# Patient Record
Sex: FEMALE | Race: WHITE | NOT HISPANIC OR LATINO
[De-identification: names, ages, dates, MRNs, and addresses within clinical notes are randomized per-mention and may not be internally consistent; named-entity substitution may affect disease eponyms.]

---

## 2017-10-30 ENCOUNTER — APPOINTMENT (OUTPATIENT)
Dept: MAMMOGRAPHY | Facility: IMAGING CENTER | Age: 57
End: 2017-10-30
Payer: COMMERCIAL

## 2017-10-30 ENCOUNTER — RESULT REVIEW (OUTPATIENT)
Age: 57
End: 2017-10-30

## 2017-10-30 ENCOUNTER — OUTPATIENT (OUTPATIENT)
Dept: OUTPATIENT SERVICES | Facility: HOSPITAL | Age: 57
LOS: 1 days | End: 2017-10-30
Payer: COMMERCIAL

## 2017-10-30 DIAGNOSIS — Z00.8 ENCOUNTER FOR OTHER GENERAL EXAMINATION: ICD-10-CM

## 2017-10-30 DIAGNOSIS — Z98.890 OTHER SPECIFIED POSTPROCEDURAL STATES: Chronic | ICD-10-CM

## 2017-10-30 DIAGNOSIS — O00.90 UNSPECIFIED ECTOPIC PREGNANCY WITHOUT INTRAUTERINE PREGNANCY: Chronic | ICD-10-CM

## 2017-10-30 PROCEDURE — 77067 SCR MAMMO BI INCL CAD: CPT

## 2017-10-30 PROCEDURE — 77063 BREAST TOMOSYNTHESIS BI: CPT

## 2017-10-30 PROCEDURE — G0202: CPT | Mod: 26

## 2017-10-30 PROCEDURE — 77063 BREAST TOMOSYNTHESIS BI: CPT | Mod: 26

## 2017-11-21 ENCOUNTER — LABORATORY RESULT (OUTPATIENT)
Age: 57
End: 2017-11-21

## 2017-11-21 ENCOUNTER — APPOINTMENT (OUTPATIENT)
Dept: DERMATOLOGY | Facility: CLINIC | Age: 57
End: 2017-11-21
Payer: COMMERCIAL

## 2017-11-21 VITALS — DIASTOLIC BLOOD PRESSURE: 50 MMHG | SYSTOLIC BLOOD PRESSURE: 100 MMHG

## 2017-11-21 DIAGNOSIS — D48.5 NEOPLASM OF UNCERTAIN BEHAVIOR OF SKIN: ICD-10-CM

## 2017-11-21 DIAGNOSIS — Z12.83 ENCOUNTER FOR SCREENING FOR MALIGNANT NEOPLASM OF SKIN: ICD-10-CM

## 2017-11-21 PROCEDURE — 11100 BX SKIN SUBCUTANEOUS&/MUCOUS MEMBRANE 1 LESION: CPT

## 2017-11-21 PROCEDURE — 99213 OFFICE O/P EST LOW 20 MIN: CPT | Mod: 25

## 2017-11-24 ENCOUNTER — TRANSCRIPTION ENCOUNTER (OUTPATIENT)
Age: 57
End: 2017-11-24

## 2017-11-24 PROBLEM — Z12.83 SCREENING FOR MALIGNANT NEOPLASM OF SKIN: Status: ACTIVE | Noted: 2017-11-21

## 2017-12-16 ENCOUNTER — TRANSCRIPTION ENCOUNTER (OUTPATIENT)
Age: 57
End: 2017-12-16

## 2018-02-25 ENCOUNTER — TRANSCRIPTION ENCOUNTER (OUTPATIENT)
Age: 58
End: 2018-02-25

## 2018-05-10 ENCOUNTER — APPOINTMENT (OUTPATIENT)
Dept: CARDIOLOGY | Facility: CLINIC | Age: 58
End: 2018-05-10
Payer: COMMERCIAL

## 2018-05-10 VITALS
HEIGHT: 61 IN | WEIGHT: 124 LBS | SYSTOLIC BLOOD PRESSURE: 101 MMHG | OXYGEN SATURATION: 97 % | DIASTOLIC BLOOD PRESSURE: 68 MMHG | HEART RATE: 59 BPM | TEMPERATURE: 98.1 F | BODY MASS INDEX: 23.41 KG/M2 | RESPIRATION RATE: 16 BRPM

## 2018-05-10 PROCEDURE — 99215 OFFICE O/P EST HI 40 MIN: CPT

## 2018-05-10 PROCEDURE — 93000 ELECTROCARDIOGRAM COMPLETE: CPT

## 2018-05-16 ENCOUNTER — OUTPATIENT (OUTPATIENT)
Dept: OUTPATIENT SERVICES | Facility: HOSPITAL | Age: 58
LOS: 1 days | End: 2018-05-16

## 2018-05-16 ENCOUNTER — APPOINTMENT (OUTPATIENT)
Dept: CV DIAGNOSITCS | Facility: HOSPITAL | Age: 58
End: 2018-05-16

## 2018-05-16 DIAGNOSIS — Z98.890 OTHER SPECIFIED POSTPROCEDURAL STATES: Chronic | ICD-10-CM

## 2018-05-16 DIAGNOSIS — O00.90 UNSPECIFIED ECTOPIC PREGNANCY WITHOUT INTRAUTERINE PREGNANCY: Chronic | ICD-10-CM

## 2018-05-16 DIAGNOSIS — I35.9 NONRHEUMATIC AORTIC VALVE DISORDER, UNSPECIFIED: ICD-10-CM

## 2018-08-15 ENCOUNTER — OUTPATIENT (OUTPATIENT)
Dept: OUTPATIENT SERVICES | Facility: HOSPITAL | Age: 58
LOS: 1 days | End: 2018-08-15

## 2018-08-15 ENCOUNTER — APPOINTMENT (OUTPATIENT)
Dept: CV DIAGNOSITCS | Facility: HOSPITAL | Age: 58
End: 2018-08-15
Payer: COMMERCIAL

## 2018-08-15 DIAGNOSIS — O00.90 UNSPECIFIED ECTOPIC PREGNANCY WITHOUT INTRAUTERINE PREGNANCY: Chronic | ICD-10-CM

## 2018-08-15 DIAGNOSIS — Z98.890 OTHER SPECIFIED POSTPROCEDURAL STATES: Chronic | ICD-10-CM

## 2018-08-15 DIAGNOSIS — R07.9 CHEST PAIN, UNSPECIFIED: ICD-10-CM

## 2018-08-15 DIAGNOSIS — I35.9 NONRHEUMATIC AORTIC VALVE DISORDER, UNSPECIFIED: ICD-10-CM

## 2018-08-15 PROCEDURE — 93306 TTE W/DOPPLER COMPLETE: CPT | Mod: 26

## 2018-09-01 ENCOUNTER — TRANSCRIPTION ENCOUNTER (OUTPATIENT)
Age: 58
End: 2018-09-01

## 2018-10-23 ENCOUNTER — RESULT REVIEW (OUTPATIENT)
Age: 58
End: 2018-10-23

## 2018-11-09 ENCOUNTER — APPOINTMENT (OUTPATIENT)
Dept: INFECTIOUS DISEASE | Facility: HOSPITAL | Age: 58
End: 2018-11-09
Payer: SELF-PAY

## 2018-11-09 PROCEDURE — 99401 PREV MED CNSL INDIV APPRX 15: CPT

## 2018-12-21 ENCOUNTER — APPOINTMENT (OUTPATIENT)
Dept: INFECTIOUS DISEASE | Facility: HOSPITAL | Age: 58
End: 2018-12-21
Payer: SELF-PAY

## 2018-12-21 DIAGNOSIS — Z71.89 OTHER SPECIFIED COUNSELING: ICD-10-CM

## 2018-12-21 PROCEDURE — 90472 IMMUNIZATION ADMIN EACH ADD: CPT | Mod: NC

## 2018-12-21 PROCEDURE — 90691 TYPHOID VACCINE IM: CPT

## 2018-12-21 PROCEDURE — 90632 HEPA VACCINE ADULT IM: CPT

## 2018-12-21 PROCEDURE — 90717 YELLOW FEVER VACCINE SUBQ: CPT

## 2018-12-21 PROCEDURE — 90471 IMMUNIZATION ADMIN: CPT | Mod: NC

## 2019-02-01 ENCOUNTER — TRANSCRIPTION ENCOUNTER (OUTPATIENT)
Age: 59
End: 2019-02-01

## 2019-02-06 ENCOUNTER — APPOINTMENT (OUTPATIENT)
Dept: ORTHOPEDIC SURGERY | Facility: CLINIC | Age: 59
End: 2019-02-06
Payer: COMMERCIAL

## 2019-02-06 VITALS
SYSTOLIC BLOOD PRESSURE: 105 MMHG | DIASTOLIC BLOOD PRESSURE: 71 MMHG | HEIGHT: 61 IN | WEIGHT: 125 LBS | BODY MASS INDEX: 23.6 KG/M2 | HEART RATE: 66 BPM

## 2019-02-06 DIAGNOSIS — M54.5 LOW BACK PAIN: ICD-10-CM

## 2019-02-06 DIAGNOSIS — M79.10 MYALGIA, UNSPECIFIED SITE: ICD-10-CM

## 2019-02-06 DIAGNOSIS — M60.9 MYOSITIS, UNSPECIFIED: ICD-10-CM

## 2019-02-06 PROCEDURE — 72100 X-RAY EXAM L-S SPINE 2/3 VWS: CPT

## 2019-02-06 PROCEDURE — 99214 OFFICE O/P EST MOD 30 MIN: CPT | Mod: 25

## 2019-02-06 PROCEDURE — 20552 NJX 1/MLT TRIGGER POINT 1/2: CPT

## 2019-02-06 NOTE — DISCUSSION/SUMMARY
[de-identified] : left sacroiliitis\par discussed options\par HEP-lumbar\par I offered an injection after all risks were explained including allergic reaction to an infection under sterile conditions 1 mg of Depo-Medrol and 2 cc of 1% lidocaine without epinephrine was injected into the painful site. The patient tolerated the procedure well and received significant relief following the injection.\par All options discussed including rest, medicine, home exercise, acupuncture, Chiropractic care, Physical Therapy, Pain management, and last resort surgery. \par All questions were answered, all alternatives discussed and the patient is in complete agreement with that plan. Follow-up appointment as instructed. Any issues and the patient will call or come in sooner. \par

## 2019-02-06 NOTE — ADDENDUM
[FreeTextEntry1] : This note was authored by Marilin Montez working as a medical scribe for Dr. Piyush Fontanez. The note was reviewed, edited, and revised by Dr. Piyush Fontanez whom is in agreement with the exam findings, imaging findings, and treatment plan. 02/06/2019.

## 2019-02-06 NOTE — HISTORY OF PRESENT ILLNESS
[de-identified] : C/o non radiating LBP x 1 month.\par Then went to [place].\par Recently had the flu- still recovering.\par Legs ok.\par L>R sided LBP  \par No fever chills sweats nausea vomiting no bowel or bladder dysfunction, no recent weight loss or gain no night pain. This history is in addition to the intake form that I personally reviewed.

## 2019-02-06 NOTE — PHYSICAL EXAM
[UE/LE] : Sensory: Intact in bilateral upper & lower extremities [ALL] : dorsalis pedis, posterior tibial, femoral, popliteal, and radial 2+ and symmetric bilaterally [de-identified] : 5 out of 5 motor strength, sensation is intact and symmetrical full range of motion flexion extension and rotation, no palpatory tenderness full range of motion of hips knees shoulders and elbows (all four extremities), no atrophy, negative straight leg raise, no pathological reflexes, no swelling, normal ambulation, no apparent distress skin is intact, no palpable lymph nodes, no upper or lower extremity instability, alert and oriented x3 and normal mood. Normal finger-to nose test. pain in LB [de-identified] : AP/lat lumbar- Adequate lordosis, adequate disc height, no instability- Reviewed with the patient. Patient understands that I am not a radiologist.

## 2019-02-13 ENCOUNTER — APPOINTMENT (OUTPATIENT)
Dept: ORTHOPEDIC SURGERY | Facility: CLINIC | Age: 59
End: 2019-02-13
Payer: COMMERCIAL

## 2019-02-13 VITALS — HEIGHT: 61 IN | WEIGHT: 125 LBS | BODY MASS INDEX: 23.6 KG/M2

## 2019-02-13 PROCEDURE — 99214 OFFICE O/P EST MOD 30 MIN: CPT

## 2019-02-13 PROCEDURE — 73562 X-RAY EXAM OF KNEE 3: CPT | Mod: LT

## 2019-03-04 ENCOUNTER — RESULT REVIEW (OUTPATIENT)
Age: 59
End: 2019-03-04

## 2019-03-13 ENCOUNTER — APPOINTMENT (OUTPATIENT)
Dept: RADIOLOGY | Facility: IMAGING CENTER | Age: 59
End: 2019-03-13
Payer: COMMERCIAL

## 2019-03-13 ENCOUNTER — APPOINTMENT (OUTPATIENT)
Dept: MAMMOGRAPHY | Facility: IMAGING CENTER | Age: 59
End: 2019-03-13
Payer: COMMERCIAL

## 2019-03-13 ENCOUNTER — OUTPATIENT (OUTPATIENT)
Dept: OUTPATIENT SERVICES | Facility: HOSPITAL | Age: 59
LOS: 1 days | End: 2019-03-13
Payer: COMMERCIAL

## 2019-03-13 DIAGNOSIS — Z98.890 OTHER SPECIFIED POSTPROCEDURAL STATES: Chronic | ICD-10-CM

## 2019-03-13 DIAGNOSIS — Z00.8 ENCOUNTER FOR OTHER GENERAL EXAMINATION: ICD-10-CM

## 2019-03-13 DIAGNOSIS — O00.90 UNSPECIFIED ECTOPIC PREGNANCY WITHOUT INTRAUTERINE PREGNANCY: Chronic | ICD-10-CM

## 2019-03-13 PROCEDURE — 77080 DXA BONE DENSITY AXIAL: CPT

## 2019-03-13 PROCEDURE — 77063 BREAST TOMOSYNTHESIS BI: CPT | Mod: 26

## 2019-03-13 PROCEDURE — 77080 DXA BONE DENSITY AXIAL: CPT | Mod: 26

## 2019-03-13 PROCEDURE — 77067 SCR MAMMO BI INCL CAD: CPT | Mod: 26

## 2019-03-13 PROCEDURE — 77063 BREAST TOMOSYNTHESIS BI: CPT

## 2019-03-13 PROCEDURE — 77067 SCR MAMMO BI INCL CAD: CPT

## 2019-04-30 ENCOUNTER — APPOINTMENT (OUTPATIENT)
Dept: MRI IMAGING | Facility: CLINIC | Age: 59
End: 2019-04-30
Payer: COMMERCIAL

## 2019-04-30 ENCOUNTER — OUTPATIENT (OUTPATIENT)
Dept: OUTPATIENT SERVICES | Facility: HOSPITAL | Age: 59
LOS: 1 days | End: 2019-04-30
Payer: COMMERCIAL

## 2019-04-30 DIAGNOSIS — M25.562 PAIN IN LEFT KNEE: ICD-10-CM

## 2019-04-30 DIAGNOSIS — O00.90 UNSPECIFIED ECTOPIC PREGNANCY WITHOUT INTRAUTERINE PREGNANCY: Chronic | ICD-10-CM

## 2019-04-30 DIAGNOSIS — Z98.890 OTHER SPECIFIED POSTPROCEDURAL STATES: Chronic | ICD-10-CM

## 2019-04-30 PROCEDURE — 73721 MRI JNT OF LWR EXTRE W/O DYE: CPT

## 2019-04-30 PROCEDURE — 73721 MRI JNT OF LWR EXTRE W/O DYE: CPT | Mod: 26,LT

## 2019-05-09 ENCOUNTER — APPOINTMENT (OUTPATIENT)
Dept: ORTHOPEDIC SURGERY | Facility: CLINIC | Age: 59
End: 2019-05-09
Payer: COMMERCIAL

## 2019-05-09 VITALS — HEIGHT: 61 IN | WEIGHT: 125 LBS | BODY MASS INDEX: 23.6 KG/M2

## 2019-05-09 DIAGNOSIS — G89.29 PAIN IN LEFT KNEE: ICD-10-CM

## 2019-05-09 DIAGNOSIS — M25.562 PAIN IN LEFT KNEE: ICD-10-CM

## 2019-05-09 PROCEDURE — 99214 OFFICE O/P EST MOD 30 MIN: CPT

## 2019-05-09 PROCEDURE — 73502 X-RAY EXAM HIP UNI 2-3 VIEWS: CPT | Mod: LT

## 2019-05-10 PROBLEM — M25.562 CHRONIC PAIN OF LEFT KNEE: Status: ACTIVE | Noted: 2019-02-13

## 2019-07-09 ENCOUNTER — APPOINTMENT (OUTPATIENT)
Dept: INFECTIOUS DISEASE | Facility: CLINIC | Age: 59
End: 2019-07-09

## 2019-08-16 ENCOUNTER — EMERGENCY (EMERGENCY)
Facility: HOSPITAL | Age: 59
LOS: 1 days | Discharge: HOME | End: 2019-08-16
Attending: EMERGENCY MEDICINE | Admitting: EMERGENCY MEDICINE
Payer: COMMERCIAL

## 2019-08-16 VITALS
OXYGEN SATURATION: 100 % | DIASTOLIC BLOOD PRESSURE: 79 MMHG | RESPIRATION RATE: 16 BRPM | SYSTOLIC BLOOD PRESSURE: 148 MMHG | HEART RATE: 62 BPM

## 2019-08-16 DIAGNOSIS — O00.90 UNSPECIFIED ECTOPIC PREGNANCY WITHOUT INTRAUTERINE PREGNANCY: Chronic | ICD-10-CM

## 2019-08-16 DIAGNOSIS — Z98.890 OTHER SPECIFIED POSTPROCEDURAL STATES: Chronic | ICD-10-CM

## 2019-08-16 LAB
ALBUMIN SERPL ELPH-MCNC: 4.4 G/DL — SIGNIFICANT CHANGE UP (ref 3.3–5)
ALP SERPL-CCNC: 72 U/L — SIGNIFICANT CHANGE UP (ref 40–120)
ALT FLD-CCNC: 25 U/L — SIGNIFICANT CHANGE UP (ref 4–33)
ANION GAP SERPL CALC-SCNC: 11 MMO/L — SIGNIFICANT CHANGE UP (ref 7–14)
APTT BLD: 34.3 SEC — SIGNIFICANT CHANGE UP (ref 27.5–36.3)
AST SERPL-CCNC: 24 U/L — SIGNIFICANT CHANGE UP (ref 4–32)
BILIRUB SERPL-MCNC: 0.6 MG/DL — SIGNIFICANT CHANGE UP (ref 0.2–1.2)
BUN SERPL-MCNC: 15 MG/DL — SIGNIFICANT CHANGE UP (ref 7–23)
CALCIUM SERPL-MCNC: 9.8 MG/DL — SIGNIFICANT CHANGE UP (ref 8.4–10.5)
CHLORIDE SERPL-SCNC: 108 MMOL/L — HIGH (ref 98–107)
CO2 SERPL-SCNC: 24 MMOL/L — SIGNIFICANT CHANGE UP (ref 22–31)
CREAT SERPL-MCNC: 0.63 MG/DL — SIGNIFICANT CHANGE UP (ref 0.5–1.3)
GLUCOSE SERPL-MCNC: 112 MG/DL — HIGH (ref 70–99)
HCT VFR BLD CALC: 43.3 % — SIGNIFICANT CHANGE UP (ref 34.5–45)
HGB BLD-MCNC: 13.8 G/DL — SIGNIFICANT CHANGE UP (ref 11.5–15.5)
INR BLD: 1.05 — SIGNIFICANT CHANGE UP (ref 0.88–1.17)
MCHC RBC-ENTMCNC: 29.2 PG — SIGNIFICANT CHANGE UP (ref 27–34)
MCHC RBC-ENTMCNC: 31.9 % — LOW (ref 32–36)
MCV RBC AUTO: 91.7 FL — SIGNIFICANT CHANGE UP (ref 80–100)
NRBC # FLD: 0 K/UL — SIGNIFICANT CHANGE UP (ref 0–0)
PLATELET # BLD AUTO: 111 K/UL — LOW (ref 150–400)
PMV BLD: 11.6 FL — SIGNIFICANT CHANGE UP (ref 7–13)
POTASSIUM SERPL-MCNC: 3.3 MMOL/L — LOW (ref 3.5–5.3)
POTASSIUM SERPL-SCNC: 3.3 MMOL/L — LOW (ref 3.5–5.3)
PROT SERPL-MCNC: 6.7 G/DL — SIGNIFICANT CHANGE UP (ref 6–8.3)
PROTHROM AB SERPL-ACNC: 11.7 SEC — SIGNIFICANT CHANGE UP (ref 9.8–13.1)
RBC # BLD: 4.72 M/UL — SIGNIFICANT CHANGE UP (ref 3.8–5.2)
RBC # FLD: 12.8 % — SIGNIFICANT CHANGE UP (ref 10.3–14.5)
SODIUM SERPL-SCNC: 143 MMOL/L — SIGNIFICANT CHANGE UP (ref 135–145)
TROPONIN T, HIGH SENSITIVITY: < 6 NG/L — SIGNIFICANT CHANGE UP (ref ?–14)
WBC # BLD: 5.41 K/UL — SIGNIFICANT CHANGE UP (ref 3.8–10.5)
WBC # FLD AUTO: 5.41 K/UL — SIGNIFICANT CHANGE UP (ref 3.8–10.5)

## 2019-08-16 PROCEDURE — 71046 X-RAY EXAM CHEST 2 VIEWS: CPT | Mod: 26

## 2019-08-16 PROCEDURE — 99284 EMERGENCY DEPT VISIT MOD MDM: CPT | Mod: 25

## 2019-08-16 PROCEDURE — 93010 ELECTROCARDIOGRAM REPORT: CPT

## 2019-08-16 NOTE — ED PROVIDER NOTE - PMH
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

## 2019-08-16 NOTE — ED PROVIDER NOTE - OBJECTIVE STATEMENT
59 yr old female c/o multiple episodes of chest tightness, @ SOB, also SOBOE. lasts 30 min.  started 1 week ago, sometimes @ exercise, sometimes at rest. No HTN, DM, smoking, chol, though pos family hx. Has low platelets, idiopathic portal and splenic vein thrombosis, periotic paralysis.  denies n/v/lightheadedness or palpitations.

## 2019-08-16 NOTE — ED PROVIDER NOTE - NSFOLLOWUPINSTRUCTIONS_ED_ALL_ED_FT
Call Dr. Martínez Schmidt [phone] Monday morning for possible stress test and echocardiogram, perhaps as early as Tuesday. Return if symptoms worsen beforehand.

## 2019-08-16 NOTE — ED PROVIDER NOTE - PROGRESS NOTE DETAILS
Maren: Lab results unremarkable. EKG unremarkable. HEART score low. Arranged short-term f/u w/ Tele-Doc Martínez Schmidt.

## 2019-08-16 NOTE — ED ADULT TRIAGE NOTE - CHIEF COMPLAINT QUOTE
my c/o mid sternal chest pressure/tightness. pt has similar episode on Saturday relieved with rest. 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 pt has similar episode on Saturday relieved with rest. [facility]

## 2019-08-21 ENCOUNTER — APPOINTMENT (OUTPATIENT)
Dept: CARDIOLOGY | Facility: CLINIC | Age: 59
End: 2019-08-21
Payer: COMMERCIAL

## 2019-08-21 VITALS
HEIGHT: 61 IN | WEIGHT: 122 LBS | OXYGEN SATURATION: 95 % | HEART RATE: 62 BPM | SYSTOLIC BLOOD PRESSURE: 113 MMHG | DIASTOLIC BLOOD PRESSURE: 73 MMHG | BODY MASS INDEX: 23.03 KG/M2

## 2019-08-21 LAB — DEPRECATED D DIMER PPP IA-ACNC: <150 NG/ML DDU

## 2019-08-21 PROCEDURE — 93000 ELECTROCARDIOGRAM COMPLETE: CPT

## 2019-08-21 PROCEDURE — 99215 OFFICE O/P EST HI 40 MIN: CPT

## 2019-08-22 ENCOUNTER — NON-APPOINTMENT (OUTPATIENT)
Age: 59
End: 2019-08-22

## 2019-08-22 NOTE — HISTORY OF PRESENT ILLNESS
[FreeTextEntry1] : 59 yr F with no known CAD. \par She has been experiencing dyspnea and chest tightness for last 2 weeks since she came back from a vacation, drove for 2 hours. (BUT  STATES THAT SHE HAD SYMPTOMS EVEN PRIOR TO THE TRIP). She had symptoms walking in the backyard, but yet there are times she can do activities with minimal symptoms. She had symptoms at rest too.

## 2019-08-22 NOTE — DISCUSSION/SUMMARY
[FreeTextEntry1] : Chest pain and Dyspnea: I ordered D-Dimer that is negative, its unlikely that she had a PE. Since she had symptoms even prior to the road trip , I think we should focus on work up for ischemia. Will get Stress test for the patient. \par

## 2019-08-22 NOTE — PHYSICAL EXAM
[General Appearance - Well Developed] : well developed [General Appearance - Well Nourished] : well nourished [Normal Conjunctiva] : the conjunctiva exhibited no abnormalities [Normal Oropharynx] : normal oropharynx [Normal Jugular Venous A Waves Present] : normal jugular venous A waves present [Normal Jugular Venous V Waves Present] : normal jugular venous V waves present [] : no respiratory distress [Respiration, Rhythm And Depth] : normal respiratory rhythm and effort [Heart Rate And Rhythm] : heart rate and rhythm were normal [Heart Sounds] : normal S1 and S2 [Abnormal Walk] : normal gait [Nail Clubbing] : no clubbing of the fingernails [Skin Color & Pigmentation] : normal skin color and pigmentation [Oriented To Time, Place, And Person] : oriented to person, place, and time

## 2019-08-22 NOTE — REVIEW OF SYSTEMS
[Fever] : no fever [Earache] : no earache [Blurry Vision] : no blurred vision [Shortness Of Breath] : shortness of breath [Chest Pain] : chest pain [Cough] : no cough [Heartburn] : no heartburn [Incontinence] : no incontinence [Dizziness] : no dizziness [Confusion] : no confusion was observed [Excessive Thirst] : no polydipsia [Easy Bleeding] : no tendency for easy bleeding

## 2019-08-30 ENCOUNTER — OUTPATIENT (OUTPATIENT)
Dept: OUTPATIENT SERVICES | Facility: HOSPITAL | Age: 59
LOS: 1 days | End: 2019-08-30

## 2019-08-30 ENCOUNTER — APPOINTMENT (OUTPATIENT)
Dept: CV DIAGNOSITCS | Facility: HOSPITAL | Age: 59
End: 2019-08-30
Payer: COMMERCIAL

## 2019-08-30 DIAGNOSIS — Z98.890 OTHER SPECIFIED POSTPROCEDURAL STATES: Chronic | ICD-10-CM

## 2019-08-30 DIAGNOSIS — R07.89 OTHER CHEST PAIN: ICD-10-CM

## 2019-08-30 DIAGNOSIS — O00.90 UNSPECIFIED ECTOPIC PREGNANCY WITHOUT INTRAUTERINE PREGNANCY: Chronic | ICD-10-CM

## 2019-08-30 PROCEDURE — 93350 STRESS TTE ONLY: CPT | Mod: 26

## 2019-08-30 PROCEDURE — 93325 DOPPLER ECHO COLOR FLOW MAPG: CPT | Mod: 26,GC

## 2019-08-30 PROCEDURE — 93320 DOPPLER ECHO COMPLETE: CPT | Mod: 26,GC

## 2019-10-18 ENCOUNTER — APPOINTMENT (OUTPATIENT)
Dept: DERMATOLOGY | Facility: CLINIC | Age: 59
End: 2019-10-18
Payer: COMMERCIAL

## 2019-10-18 DIAGNOSIS — R22.9 LOCALIZED SWELLING, MASS AND LUMP, UNSPECIFIED: ICD-10-CM

## 2019-10-18 DIAGNOSIS — L82.0 INFLAMED SEBORRHEIC KERATOSIS: ICD-10-CM

## 2019-10-18 DIAGNOSIS — L81.4 OTHER MELANIN HYPERPIGMENTATION: ICD-10-CM

## 2019-10-18 DIAGNOSIS — Z12.83 ENCOUNTER FOR SCREENING FOR MALIGNANT NEOPLASM OF SKIN: ICD-10-CM

## 2019-10-18 DIAGNOSIS — B07.8 OTHER VIRAL WARTS: ICD-10-CM

## 2019-10-18 DIAGNOSIS — L82.1 OTHER SEBORRHEIC KERATOSIS: ICD-10-CM

## 2019-10-18 PROCEDURE — 99214 OFFICE O/P EST MOD 30 MIN: CPT | Mod: 25

## 2019-10-18 PROCEDURE — 17110 DESTRUCTION B9 LES UP TO 14: CPT

## 2020-02-28 ENCOUNTER — EMERGENCY (EMERGENCY)
Facility: HOSPITAL | Age: 60
LOS: 1 days | Discharge: HOME | End: 2020-02-28
Attending: EMERGENCY MEDICINE | Admitting: EMERGENCY MEDICINE
Payer: COMMERCIAL

## 2020-02-28 VITALS
SYSTOLIC BLOOD PRESSURE: 121 MMHG | HEART RATE: 64 BPM | RESPIRATION RATE: 16 BRPM | OXYGEN SATURATION: 97 % | TEMPERATURE: 98 F | DIASTOLIC BLOOD PRESSURE: 68 MMHG

## 2020-02-28 VITALS
OXYGEN SATURATION: 99 % | SYSTOLIC BLOOD PRESSURE: 105 MMHG | HEART RATE: 59 BPM | DIASTOLIC BLOOD PRESSURE: 64 MMHG | RESPIRATION RATE: 16 BRPM | TEMPERATURE: 98 F

## 2020-02-28 DIAGNOSIS — O00.90 UNSPECIFIED ECTOPIC PREGNANCY WITHOUT INTRAUTERINE PREGNANCY: Chronic | ICD-10-CM

## 2020-02-28 DIAGNOSIS — Z98.890 OTHER SPECIFIED POSTPROCEDURAL STATES: Chronic | ICD-10-CM

## 2020-02-28 LAB
ALBUMIN SERPL ELPH-MCNC: 4.3 G/DL — SIGNIFICANT CHANGE UP (ref 3.3–5)
ALP SERPL-CCNC: 66 U/L — SIGNIFICANT CHANGE UP (ref 40–120)
ALT FLD-CCNC: 19 U/L — SIGNIFICANT CHANGE UP (ref 4–33)
ANION GAP SERPL CALC-SCNC: 11 MMO/L — SIGNIFICANT CHANGE UP (ref 7–14)
APPEARANCE UR: CLEAR — SIGNIFICANT CHANGE UP
APTT BLD: 35.9 SEC — SIGNIFICANT CHANGE UP (ref 27.5–36.3)
AST SERPL-CCNC: 20 U/L — SIGNIFICANT CHANGE UP (ref 4–32)
BASOPHILS # BLD AUTO: 0.03 K/UL — SIGNIFICANT CHANGE UP (ref 0–0.2)
BASOPHILS NFR BLD AUTO: 0.7 % — SIGNIFICANT CHANGE UP (ref 0–2)
BILIRUB SERPL-MCNC: 0.6 MG/DL — SIGNIFICANT CHANGE UP (ref 0.2–1.2)
BILIRUB UR-MCNC: NEGATIVE — SIGNIFICANT CHANGE UP
BLD GP AB SCN SERPL QL: NEGATIVE — SIGNIFICANT CHANGE UP
BLOOD UR QL VISUAL: NEGATIVE — SIGNIFICANT CHANGE UP
BUN SERPL-MCNC: 12 MG/DL — SIGNIFICANT CHANGE UP (ref 7–23)
CALCIUM SERPL-MCNC: 9.5 MG/DL — SIGNIFICANT CHANGE UP (ref 8.4–10.5)
CHLORIDE SERPL-SCNC: 109 MMOL/L — HIGH (ref 98–107)
CO2 SERPL-SCNC: 22 MMOL/L — SIGNIFICANT CHANGE UP (ref 22–31)
COLOR SPEC: SIGNIFICANT CHANGE UP
CREAT SERPL-MCNC: 0.59 MG/DL — SIGNIFICANT CHANGE UP (ref 0.5–1.3)
EOSINOPHIL # BLD AUTO: 0.15 K/UL — SIGNIFICANT CHANGE UP (ref 0–0.5)
EOSINOPHIL NFR BLD AUTO: 3.3 % — SIGNIFICANT CHANGE UP (ref 0–6)
GLUCOSE SERPL-MCNC: 98 MG/DL — SIGNIFICANT CHANGE UP (ref 70–99)
GLUCOSE UR-MCNC: NEGATIVE — SIGNIFICANT CHANGE UP
HCT VFR BLD CALC: 44.4 % — SIGNIFICANT CHANGE UP (ref 34.5–45)
HGB BLD-MCNC: 14.1 G/DL — SIGNIFICANT CHANGE UP (ref 11.5–15.5)
IMM GRANULOCYTES NFR BLD AUTO: 0.2 % — SIGNIFICANT CHANGE UP (ref 0–1.5)
INR BLD: 1.03 — SIGNIFICANT CHANGE UP (ref 0.88–1.17)
KETONES UR-MCNC: NEGATIVE — SIGNIFICANT CHANGE UP
LEUKOCYTE ESTERASE UR-ACNC: NEGATIVE — SIGNIFICANT CHANGE UP
LIDOCAIN IGE QN: 43.4 U/L — SIGNIFICANT CHANGE UP (ref 7–60)
LYMPHOCYTES # BLD AUTO: 1.31 K/UL — SIGNIFICANT CHANGE UP (ref 1–3.3)
LYMPHOCYTES # BLD AUTO: 28.7 % — SIGNIFICANT CHANGE UP (ref 13–44)
MCHC RBC-ENTMCNC: 29.6 PG — SIGNIFICANT CHANGE UP (ref 27–34)
MCHC RBC-ENTMCNC: 31.8 % — LOW (ref 32–36)
MCV RBC AUTO: 93.3 FL — SIGNIFICANT CHANGE UP (ref 80–100)
MONOCYTES # BLD AUTO: 0.41 K/UL — SIGNIFICANT CHANGE UP (ref 0–0.9)
MONOCYTES NFR BLD AUTO: 9 % — SIGNIFICANT CHANGE UP (ref 2–14)
NEUTROPHILS # BLD AUTO: 2.66 K/UL — SIGNIFICANT CHANGE UP (ref 1.8–7.4)
NEUTROPHILS NFR BLD AUTO: 58.1 % — SIGNIFICANT CHANGE UP (ref 43–77)
NITRITE UR-MCNC: NEGATIVE — SIGNIFICANT CHANGE UP
NRBC # FLD: 0 K/UL — SIGNIFICANT CHANGE UP (ref 0–0)
PH UR: 8 — SIGNIFICANT CHANGE UP (ref 5–8)
PLATELET # BLD AUTO: 115 K/UL — LOW (ref 150–400)
PMV BLD: 11.6 FL — SIGNIFICANT CHANGE UP (ref 7–13)
POTASSIUM SERPL-MCNC: 3.6 MMOL/L — SIGNIFICANT CHANGE UP (ref 3.5–5.3)
POTASSIUM SERPL-SCNC: 3.6 MMOL/L — SIGNIFICANT CHANGE UP (ref 3.5–5.3)
PROT SERPL-MCNC: 6.4 G/DL — SIGNIFICANT CHANGE UP (ref 6–8.3)
PROT UR-MCNC: NEGATIVE — SIGNIFICANT CHANGE UP
PROTHROM AB SERPL-ACNC: 11.8 SEC — SIGNIFICANT CHANGE UP (ref 9.8–13.1)
RBC # BLD: 4.76 M/UL — SIGNIFICANT CHANGE UP (ref 3.8–5.2)
RBC # FLD: 13.1 % — SIGNIFICANT CHANGE UP (ref 10.3–14.5)
RH IG SCN BLD-IMP: NEGATIVE — SIGNIFICANT CHANGE UP
SODIUM SERPL-SCNC: 142 MMOL/L — SIGNIFICANT CHANGE UP (ref 135–145)
SP GR SPEC: 1.01 — SIGNIFICANT CHANGE UP (ref 1–1.04)
UROBILINOGEN FLD QL: NORMAL — SIGNIFICANT CHANGE UP
WBC # BLD: 4.57 K/UL — SIGNIFICANT CHANGE UP (ref 3.8–10.5)
WBC # FLD AUTO: 4.57 K/UL — SIGNIFICANT CHANGE UP (ref 3.8–10.5)

## 2020-02-28 PROCEDURE — 99284 EMERGENCY DEPT VISIT MOD MDM: CPT

## 2020-02-28 PROCEDURE — 74177 CT ABD & PELVIS W/CONTRAST: CPT | Mod: 26

## 2020-02-28 RX ADMIN — Medication 30 MILLILITER(S): at 14:40

## 2020-02-28 NOTE — ED ADULT NURSE NOTE - ISOLATION TYPE:
Detail Level: Detailed
None
Quality 402: Tobacco Use And Help With Quitting Among Adolescents: Patient screened for tobacco and never smoked
Quality 130: Documentation Of Current Medications In The Medical Record: Current Medications Documented
Quality 110: Preventive Care And Screening: Influenza Immunization: Influenza Immunization Administered during Influenza season
Quality 46: Medication Reconciliation: For eligible patients only (patients seen within 30 days from discharge) Were discharged medications reconciled with the current medication list in their medication record within 30 days of discharge from the inpatient facility?

## 2020-02-28 NOTE — ED PROVIDER NOTE - NSFOLLOWUPINSTRUCTIONS_ED_ALL_ED_FT
REST, NO STRENUOUS ACTIVITY  LIGHT DIET ADVANCE AS TOLERATED  YOU WERE GIVEN COPIES OF ALL RESULTS  PLEASE FOLLOW UP WITH REGULAR DOCTOR IN 3-5 DAYS  RETURN TO ER FOR WORSENING SYMPTOMS

## 2020-02-28 NOTE — ED PROVIDER NOTE - PATIENT PORTAL LINK FT
You can access the FollowMyHealth Patient Portal offered by [facility] by registering at the following website: [email or web address]. By joining [employer]’s FollowMyHealth portal, you will also be able to view your health information using other applications (apps) compatible with our system.

## 2020-02-28 NOTE — ED PROVIDER NOTE - OBJECTIVE STATEMENT
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 Pt. states that yesterday afternoon she developed right sided lower abdominal pain - initially thought pain was gas pain - took otc gas meds with minimal relief - states this am neelam still persistent without any imrpovement - stats passing gas and had BM this am. admits to mild nausea, not vomit. + mild decreased appetite. Denies fever chills night sweats weakness dizziness.   PSH: inguinal hernia repair 50 years ago.

## 2020-02-28 NOTE — ED PROVIDER NOTE - ATTENDING CONTRIBUTION TO CARE
Agree with above, pt with worsening RLQ pain, worse with movement, +decreased appetite. Concern for appendicitis, pending CTAP, labs.

## 2020-02-28 NOTE — ED PROVIDER NOTE - CLINICAL SUMMARY MEDICAL DECISION MAKING FREE TEXT BOX
58 y/o female c/o RLQ pain x 2 days  -possible appendicitis, r/o intra-abdominal pathology  -labs, ct a/p w contrast  -possible surgery consult

## 2020-02-28 NOTE — ED ADULT NURSE NOTE - CHIEF COMPLAINT QUOTE
c/o right sided abdominal pain.  Last bowel movement, last night. denies nausea.  Pt endorses "I just don't feel well"  Pt endorses travel to [place] end of January.

## 2020-02-28 NOTE — ED ADULT TRIAGE NOTE - CHIEF COMPLAINT QUOTE
c/o right sided abdominal pain.  Last bowel movement, last night. denies nausea.  Pt endorses "I just don't feel well"  Pt endorses travel to [place] end of January.

## 2020-02-28 NOTE — ED PROVIDER NOTE - PROGRESS NOTE DETAILS
GABI Hernandez - patient still c/o right lower quadrant pain - worse with movement and taking a deep breath. Ct showing no signs of appnedicitis - otherwise unremarkable and does not explain patients pain. Discussed results of CT with patient - offered further observation in ER/CDU - states would rather go home - shared decision making performed with patient and her  - educated on strict return to ER precautions which patient agrees with - stable for dc with close PMD follow up.

## 2020-02-28 NOTE — ED ADULT NURSE REASSESSMENT NOTE - NS ED NURSE REASSESS COMMENT FT1
Patient with her  at her bedside, continues to have lower right sided abdominal pain, Maalox given as ordered. Patient states that she wants to go home, stating she thinks it is gas pains.

## 2020-04-02 ENCOUNTER — APPOINTMENT (OUTPATIENT)
Dept: DISASTER EMERGENCY | Facility: CLINIC | Age: 60
End: 2020-04-02

## 2020-04-25 ENCOUNTER — MESSAGE (OUTPATIENT)
Age: 60
End: 2020-04-25

## 2020-05-04 LAB
SARS-COV-2 IGG SERPL IA-ACNC: 0 INDEX
SARS-COV-2 IGG SERPL QL IA: NEGATIVE

## 2020-06-02 ENCOUNTER — RESULT REVIEW (OUTPATIENT)
Age: 60
End: 2020-06-02

## 2020-06-05 ENCOUNTER — RESULT REVIEW (OUTPATIENT)
Age: 60
End: 2020-06-05

## 2020-06-11 ENCOUNTER — APPOINTMENT (OUTPATIENT)
Dept: MAMMOGRAPHY | Facility: CLINIC | Age: 60
End: 2020-06-11

## 2020-06-11 ENCOUNTER — OUTPATIENT (OUTPATIENT)
Dept: OUTPATIENT SERVICES | Facility: HOSPITAL | Age: 60
LOS: 1 days | End: 2020-06-11
Payer: COMMERCIAL

## 2020-06-11 DIAGNOSIS — Z00.8 ENCOUNTER FOR OTHER GENERAL EXAMINATION: ICD-10-CM

## 2020-06-11 DIAGNOSIS — O00.90 UNSPECIFIED ECTOPIC PREGNANCY WITHOUT INTRAUTERINE PREGNANCY: Chronic | ICD-10-CM

## 2020-06-11 DIAGNOSIS — Z98.890 OTHER SPECIFIED POSTPROCEDURAL STATES: Chronic | ICD-10-CM

## 2020-06-11 PROCEDURE — 77063 BREAST TOMOSYNTHESIS BI: CPT | Mod: 26

## 2020-06-11 PROCEDURE — 77067 SCR MAMMO BI INCL CAD: CPT

## 2020-06-11 PROCEDURE — 77063 BREAST TOMOSYNTHESIS BI: CPT

## 2020-06-11 PROCEDURE — 77067 SCR MAMMO BI INCL CAD: CPT | Mod: 26

## 2020-07-30 ENCOUNTER — APPOINTMENT (OUTPATIENT)
Dept: CARDIOLOGY | Facility: CLINIC | Age: 60
End: 2020-07-30
Payer: COMMERCIAL

## 2020-07-30 VITALS
OXYGEN SATURATION: 97 % | HEIGHT: 61 IN | BODY MASS INDEX: 23.81 KG/M2 | HEART RATE: 63 BPM | TEMPERATURE: 97 F | SYSTOLIC BLOOD PRESSURE: 112 MMHG | DIASTOLIC BLOOD PRESSURE: 74 MMHG

## 2020-07-30 VITALS — BODY MASS INDEX: 23.81 KG/M2 | WEIGHT: 126 LBS

## 2020-07-30 DIAGNOSIS — I86.4 GASTRIC VARICES: ICD-10-CM

## 2020-07-30 PROCEDURE — 99214 OFFICE O/P EST MOD 30 MIN: CPT

## 2020-07-30 PROCEDURE — 93000 ELECTROCARDIOGRAM COMPLETE: CPT

## 2020-07-31 ENCOUNTER — NON-APPOINTMENT (OUTPATIENT)
Age: 60
End: 2020-07-31

## 2020-08-12 ENCOUNTER — OUTPATIENT (OUTPATIENT)
Dept: OUTPATIENT SERVICES | Facility: HOSPITAL | Age: 60
LOS: 1 days | End: 2020-08-12

## 2020-08-12 ENCOUNTER — APPOINTMENT (OUTPATIENT)
Dept: CV DIAGNOSITCS | Facility: HOSPITAL | Age: 60
End: 2020-08-12
Payer: COMMERCIAL

## 2020-08-12 DIAGNOSIS — Z98.890 OTHER SPECIFIED POSTPROCEDURAL STATES: Chronic | ICD-10-CM

## 2020-08-12 DIAGNOSIS — R06.02 SHORTNESS OF BREATH: ICD-10-CM

## 2020-08-12 DIAGNOSIS — O00.90 UNSPECIFIED ECTOPIC PREGNANCY WITHOUT INTRAUTERINE PREGNANCY: Chronic | ICD-10-CM

## 2020-08-12 PROCEDURE — 93350 STRESS TTE ONLY: CPT | Mod: 26

## 2020-08-12 PROCEDURE — 93325 DOPPLER ECHO COLOR FLOW MAPG: CPT | Mod: 26,GC

## 2020-08-12 PROCEDURE — 93320 DOPPLER ECHO COMPLETE: CPT | Mod: 26,GC

## 2020-12-01 ENCOUNTER — TRANSCRIPTION ENCOUNTER (OUTPATIENT)
Age: 60
End: 2020-12-01

## 2020-12-07 ENCOUNTER — APPOINTMENT (OUTPATIENT)
Dept: PRIMARY CARE | Facility: HOSPITAL | Age: 60
End: 2020-12-07

## 2020-12-07 ENCOUNTER — OUTPATIENT (OUTPATIENT)
Dept: OUTPATIENT SERVICES | Facility: HOSPITAL | Age: 60
LOS: 1 days | End: 2020-12-07

## 2020-12-07 VITALS
OXYGEN SATURATION: 100 % | SYSTOLIC BLOOD PRESSURE: 124 MMHG | BODY MASS INDEX: 23.41 KG/M2 | HEIGHT: 61 IN | HEART RATE: 67 BPM | TEMPERATURE: 98.2 F | WEIGHT: 124 LBS | DIASTOLIC BLOOD PRESSURE: 78 MMHG

## 2020-12-07 DIAGNOSIS — O00.90 UNSPECIFIED ECTOPIC PREGNANCY WITHOUT INTRAUTERINE PREGNANCY: Chronic | ICD-10-CM

## 2020-12-07 DIAGNOSIS — Z20.828 CONTACT WITH AND (SUSPECTED) EXPOSURE TO OTHER VIRAL COMMUNICABLE DISEASES: ICD-10-CM

## 2020-12-07 DIAGNOSIS — Z98.890 OTHER SPECIFIED POSTPROCEDURAL STATES: Chronic | ICD-10-CM

## 2020-12-08 LAB — SARS-COV-2 N GENE NPH QL NAA+PROBE: NOT DETECTED

## 2021-01-10 NOTE — HISTORY OF PRESENT ILLNESS
[FreeTextEntry8] : 60 F NKDA with PMH of GERD, Anxiety and and no PSH [facility].\par \par States that she wants to get COVID PCR before meeting her family member.  Denies any fever, cough, sore throat or SOB. No loss of smell or taste, no chest tightness, or any GI related symptoms of nausea, vomiting or diarrhea. \par \par She works  in [employer]. She takes regular maintenance medications. Denies any chest pain, no chest palpitations or any respiratory distress\par \par

## 2021-01-10 NOTE — PHYSICAL EXAM
[No Acute Distress] : no acute distress [Well Nourished] : well nourished [Well Developed] : well developed [Well-Appearing] : well-appearing [Normal Sclera/Conjunctiva] : normal sclera/conjunctiva [PERRL] : pupils equal round and reactive to light [EOMI] : extraocular movements intact [Normal Outer Ear/Nose] : the outer ears and nose were normal in appearance [Normal Oropharynx] : the oropharynx was normal [No JVD] : no jugular venous distention [No Lymphadenopathy] : no lymphadenopathy [Thyroid Normal, No Nodules] : the thyroid was normal and there were no nodules present [Supple] : supple [No Respiratory Distress] : no respiratory distress  [No Accessory Muscle Use] : no accessory muscle use [Clear to Auscultation] : lungs were clear to auscultation bilaterally [Regular Rhythm] : with a regular rhythm [Normal Rate] : normal rate  [Normal S1, S2] : normal S1 and S2 [No Murmur] : no murmur heard [No Carotid Bruits] : no carotid bruits [No Abdominal Bruit] : a ~M bruit was not heard ~T in the abdomen [No Varicosities] : no varicosities [Pedal Pulses Present] : the pedal pulses are present [No Edema] : there was no peripheral edema [No Palpable Aorta] : no palpable aorta [No Extremity Clubbing/Cyanosis] : no extremity clubbing/cyanosis [Soft] : abdomen soft [Non-distended] : non-distended [Non Tender] : non-tender [No Masses] : no abdominal mass palpated [No HSM] : no HSM [Normal Bowel Sounds] : normal bowel sounds [Normal Posterior Cervical Nodes] : no posterior cervical lymphadenopathy [Normal Anterior Cervical Nodes] : no anterior cervical lymphadenopathy [No CVA Tenderness] : no CVA  tenderness [No Spinal Tenderness] : no spinal tenderness [No Joint Swelling] : no joint swelling [Grossly Normal Strength/Tone] : grossly normal strength/tone [No Rash] : no rash [Coordination Grossly Intact] : coordination grossly intact [No Focal Deficits] : no focal deficits [Normal Gait] : normal gait [Normal Affect] : the affect was normal [Normal Insight/Judgement] : insight and judgment were intact

## 2021-06-09 ENCOUNTER — APPOINTMENT (OUTPATIENT)
Dept: ORTHOPEDIC SURGERY | Facility: CLINIC | Age: 61
End: 2021-06-09
Payer: COMMERCIAL

## 2021-06-09 VITALS
BODY MASS INDEX: 23.98 KG/M2 | SYSTOLIC BLOOD PRESSURE: 101 MMHG | DIASTOLIC BLOOD PRESSURE: 68 MMHG | OXYGEN SATURATION: 95 % | HEART RATE: 62 BPM | HEIGHT: 61 IN | WEIGHT: 127 LBS

## 2021-06-09 DIAGNOSIS — Z78.9 OTHER SPECIFIED HEALTH STATUS: ICD-10-CM

## 2021-06-09 DIAGNOSIS — M54.5 LOW BACK PAIN: ICD-10-CM

## 2021-06-09 DIAGNOSIS — M25.552 PAIN IN LEFT HIP: ICD-10-CM

## 2021-06-09 DIAGNOSIS — Z82.49 FAMILY HISTORY OF ISCHEMIC HEART DISEASE AND OTHER DISEASES OF THE CIRCULATORY SYSTEM: ICD-10-CM

## 2021-06-09 DIAGNOSIS — Z80.9 FAMILY HISTORY OF MALIGNANT NEOPLASM, UNSPECIFIED: ICD-10-CM

## 2021-06-09 DIAGNOSIS — Z87.891 PERSONAL HISTORY OF NICOTINE DEPENDENCE: ICD-10-CM

## 2021-06-09 DIAGNOSIS — G89.29 LOW BACK PAIN: ICD-10-CM

## 2021-06-09 DIAGNOSIS — M16.12 UNILATERAL PRIMARY OSTEOARTHRITIS, LEFT HIP: ICD-10-CM

## 2021-06-09 PROCEDURE — 99214 OFFICE O/P EST MOD 30 MIN: CPT

## 2021-06-09 PROCEDURE — 73502 X-RAY EXAM HIP UNI 2-3 VIEWS: CPT | Mod: LT

## 2021-06-09 PROCEDURE — 99072 ADDL SUPL MATRL&STAF TM PHE: CPT

## 2021-06-09 PROCEDURE — 72100 X-RAY EXAM L-S SPINE 2/3 VWS: CPT

## 2021-06-09 NOTE — HISTORY OF PRESENT ILLNESS
[Worsening] : worsening [___ days] : [unfilled] day(s) ago [6] : a maximum pain level of 6/10 [Walking] : worsened by walking [Ice] : relieved by ice [Rest] : relieved by rest [de-identified] : certain movements [de-identified] : Pt presents for initial evaluation with pain in her left hip pt is pointing to her left buttock and she states the pain radiates to her left thigh, she  has taken Motrin with relief, Pt states she was walking extensively  on 6/6/2021 which may have attributed to the pain in her hip, pt hx reveals T11 fx. [de-identified] : medication

## 2021-06-09 NOTE — PHYSICAL EXAM
[de-identified] : Physical examination of the left hip discloses functional stable but slightly tender range of motion on extremes of internal and external rotation.\par Examination of the lumbosacral spine discloses forward flexion at 85 degrees with mild tenderness to the left lateral flexion at 25 degrees.  Negative straight leg raise.  Deep tendon reflexes are bilaterally brisk and symmetric to the lower extremities. [de-identified] : X-rays taken of the lumbosacral spine and AP and lateral projections disclose minimal disc space narrowin\par X-rays of the pelvis including AP and lateral view of the left hip disclose mild medial joint space narrowing with inferior osteophytes otherwise nonspecific

## 2021-06-09 NOTE — DISCUSSION/SUMMARY
[de-identified] : The patient was advised of her findings.  She will start meloxicam and was advised on precautions.  The patient will also be referred to physical therapy for her left hip and back.  If radicular symptoms persist or worsen she may follow-up with a spine specialist accordingly.

## 2021-06-15 ENCOUNTER — RESULT REVIEW (OUTPATIENT)
Age: 61
End: 2021-06-15

## 2021-06-17 ENCOUNTER — APPOINTMENT (OUTPATIENT)
Dept: CARDIOLOGY | Facility: CLINIC | Age: 61
End: 2021-06-17
Payer: COMMERCIAL

## 2021-06-17 VITALS
OXYGEN SATURATION: 96 % | SYSTOLIC BLOOD PRESSURE: 128 MMHG | WEIGHT: 130 LBS | HEIGHT: 61 IN | HEART RATE: 63 BPM | BODY MASS INDEX: 24.55 KG/M2 | DIASTOLIC BLOOD PRESSURE: 80 MMHG | RESPIRATION RATE: 14 BRPM

## 2021-06-17 DIAGNOSIS — I05.9 RHEUMATIC MITRAL VALVE DISEASE, UNSPECIFIED: ICD-10-CM

## 2021-06-17 PROCEDURE — 93000 ELECTROCARDIOGRAM COMPLETE: CPT

## 2021-06-17 PROCEDURE — 99072 ADDL SUPL MATRL&STAF TM PHE: CPT

## 2021-06-17 PROCEDURE — 99214 OFFICE O/P EST MOD 30 MIN: CPT

## 2021-06-22 ENCOUNTER — OUTPATIENT (OUTPATIENT)
Dept: OUTPATIENT SERVICES | Facility: HOSPITAL | Age: 61
LOS: 1 days | End: 2021-06-22
Payer: COMMERCIAL

## 2021-06-22 ENCOUNTER — APPOINTMENT (OUTPATIENT)
Dept: MAMMOGRAPHY | Facility: IMAGING CENTER | Age: 61
End: 2021-06-22
Payer: COMMERCIAL

## 2021-06-22 ENCOUNTER — APPOINTMENT (OUTPATIENT)
Dept: RADIOLOGY | Facility: IMAGING CENTER | Age: 61
End: 2021-06-22
Payer: COMMERCIAL

## 2021-06-22 DIAGNOSIS — Z00.8 ENCOUNTER FOR OTHER GENERAL EXAMINATION: ICD-10-CM

## 2021-06-22 DIAGNOSIS — O00.90 UNSPECIFIED ECTOPIC PREGNANCY WITHOUT INTRAUTERINE PREGNANCY: Chronic | ICD-10-CM

## 2021-06-22 DIAGNOSIS — Z98.890 OTHER SPECIFIED POSTPROCEDURAL STATES: Chronic | ICD-10-CM

## 2021-06-22 PROCEDURE — 77080 DXA BONE DENSITY AXIAL: CPT | Mod: 26

## 2021-06-22 PROCEDURE — 77067 SCR MAMMO BI INCL CAD: CPT | Mod: 26

## 2021-06-22 PROCEDURE — 77067 SCR MAMMO BI INCL CAD: CPT

## 2021-06-22 PROCEDURE — 77080 DXA BONE DENSITY AXIAL: CPT

## 2021-06-22 PROCEDURE — 77063 BREAST TOMOSYNTHESIS BI: CPT | Mod: 26

## 2021-06-22 PROCEDURE — 77063 BREAST TOMOSYNTHESIS BI: CPT

## 2021-06-23 LAB
ANION GAP SERPL CALC-SCNC: 10 MMOL/L
BUN SERPL-MCNC: 23 MG/DL
CALCIUM SERPL-MCNC: 9.5 MG/DL
CHLORIDE SERPL-SCNC: 106 MMOL/L
CO2 SERPL-SCNC: 24 MMOL/L
CREAT SERPL-MCNC: 0.71 MG/DL
GLUCOSE SERPL-MCNC: 91 MG/DL
POTASSIUM SERPL-SCNC: 4.3 MMOL/L
SODIUM SERPL-SCNC: 140 MMOL/L

## 2021-06-30 ENCOUNTER — APPOINTMENT (OUTPATIENT)
Dept: CT IMAGING | Facility: CLINIC | Age: 61
End: 2021-06-30
Payer: COMMERCIAL

## 2021-06-30 ENCOUNTER — APPOINTMENT (OUTPATIENT)
Dept: CT IMAGING | Facility: CLINIC | Age: 61
End: 2021-06-30

## 2021-06-30 ENCOUNTER — APPOINTMENT (OUTPATIENT)
Dept: MAMMOGRAPHY | Facility: IMAGING CENTER | Age: 61
End: 2021-06-30
Payer: COMMERCIAL

## 2021-06-30 ENCOUNTER — APPOINTMENT (OUTPATIENT)
Dept: ULTRASOUND IMAGING | Facility: IMAGING CENTER | Age: 61
End: 2021-06-30
Payer: COMMERCIAL

## 2021-06-30 ENCOUNTER — OUTPATIENT (OUTPATIENT)
Dept: OUTPATIENT SERVICES | Facility: HOSPITAL | Age: 61
LOS: 1 days | End: 2021-06-30
Payer: COMMERCIAL

## 2021-06-30 ENCOUNTER — RESULT REVIEW (OUTPATIENT)
Age: 61
End: 2021-06-30

## 2021-06-30 DIAGNOSIS — R07.89 OTHER CHEST PAIN: ICD-10-CM

## 2021-06-30 DIAGNOSIS — O00.90 UNSPECIFIED ECTOPIC PREGNANCY WITHOUT INTRAUTERINE PREGNANCY: Chronic | ICD-10-CM

## 2021-06-30 DIAGNOSIS — Z98.890 OTHER SPECIFIED POSTPROCEDURAL STATES: Chronic | ICD-10-CM

## 2021-06-30 DIAGNOSIS — Z00.8 ENCOUNTER FOR OTHER GENERAL EXAMINATION: ICD-10-CM

## 2021-06-30 PROCEDURE — 75574 CT ANGIO HRT W/3D IMAGE: CPT | Mod: 26

## 2021-06-30 PROCEDURE — 75574 CT ANGIO HRT W/3D IMAGE: CPT | Mod: 26,76

## 2021-06-30 PROCEDURE — 76642 ULTRASOUND BREAST LIMITED: CPT

## 2021-06-30 PROCEDURE — G0279: CPT

## 2021-06-30 PROCEDURE — G0279: CPT | Mod: 26

## 2021-06-30 PROCEDURE — 77065 DX MAMMO INCL CAD UNI: CPT | Mod: 26,RT

## 2021-06-30 PROCEDURE — 82565 ASSAY OF CREATININE: CPT

## 2021-06-30 PROCEDURE — 76642 ULTRASOUND BREAST LIMITED: CPT | Mod: 26,RT

## 2021-06-30 PROCEDURE — 77065 DX MAMMO INCL CAD UNI: CPT

## 2021-07-07 ENCOUNTER — APPOINTMENT (OUTPATIENT)
Dept: CV DIAGNOSITCS | Facility: HOSPITAL | Age: 61
End: 2021-07-07
Payer: COMMERCIAL

## 2021-07-07 ENCOUNTER — OUTPATIENT (OUTPATIENT)
Dept: OUTPATIENT SERVICES | Facility: HOSPITAL | Age: 61
LOS: 1 days | End: 2021-07-07

## 2021-07-07 DIAGNOSIS — R06.02 SHORTNESS OF BREATH: ICD-10-CM

## 2021-07-07 DIAGNOSIS — I35.9 NONRHEUMATIC AORTIC VALVE DISORDER, UNSPECIFIED: ICD-10-CM

## 2021-07-07 DIAGNOSIS — O00.90 UNSPECIFIED ECTOPIC PREGNANCY WITHOUT INTRAUTERINE PREGNANCY: Chronic | ICD-10-CM

## 2021-07-07 DIAGNOSIS — Z98.890 OTHER SPECIFIED POSTPROCEDURAL STATES: Chronic | ICD-10-CM

## 2021-07-07 PROCEDURE — 93306 TTE W/DOPPLER COMPLETE: CPT | Mod: 26

## 2021-07-08 DIAGNOSIS — R07.89 OTHER CHEST PAIN: ICD-10-CM

## 2021-07-08 PROCEDURE — 75574 CT ANGIO HRT W/3D IMAGE: CPT

## 2021-07-28 ENCOUNTER — APPOINTMENT (OUTPATIENT)
Dept: CARDIOTHORACIC SURGERY | Facility: CLINIC | Age: 61
End: 2021-07-28
Payer: COMMERCIAL

## 2021-07-28 DIAGNOSIS — S22.089A UNSPECIFIED FRACTURE OF T11-T12 VERTEBRA, INITIAL ENCOUNTER FOR CLOSED FRACTURE: ICD-10-CM

## 2021-07-28 DIAGNOSIS — Z86.69 PERSONAL HISTORY OF OTHER DISEASES OF THE NERVOUS SYSTEM AND SENSE ORGANS: ICD-10-CM

## 2021-07-28 DIAGNOSIS — Z87.19 PERSONAL HISTORY OF OTHER DISEASES OF THE DIGESTIVE SYSTEM: ICD-10-CM

## 2021-07-28 DIAGNOSIS — Z85.828 PERSONAL HISTORY OF OTHER MALIGNANT NEOPLASM OF SKIN: ICD-10-CM

## 2021-07-28 DIAGNOSIS — Z82.79 FAMILY HISTORY OF OTHER CONGENITAL MALFORMATIONS, DEFORMATIONS AND CHROMOSOMAL ABNORMALITIES: ICD-10-CM

## 2021-07-28 PROCEDURE — 99203 OFFICE O/P NEW LOW 30 MIN: CPT | Mod: 95

## 2021-07-29 PROBLEM — Z85.828 HISTORY OF BASAL CELL CARCINOMA (BCC): Status: RESOLVED | Noted: 2021-07-29 | Resolved: 2021-07-29

## 2021-07-29 PROBLEM — S22.089A T11 VERTEBRAL FRACTURE: Status: RESOLVED | Noted: 2021-07-29 | Resolved: 2021-07-29

## 2021-07-29 PROBLEM — Z87.19 HISTORY OF GASTROESOPHAGEAL REFLUX (GERD): Status: RESOLVED | Noted: 2021-07-29 | Resolved: 2021-07-29

## 2021-07-29 PROBLEM — Z82.79 FAMILY HISTORY OF BICUSPID AORTIC VALVE: Status: ACTIVE | Noted: 2021-07-29

## 2021-07-29 PROBLEM — Z86.69 HISTORY OF PSEUDOSEIZURE: Status: RESOLVED | Noted: 2021-07-29 | Resolved: 2021-07-29

## 2021-07-29 RX ORDER — AZITHROMYCIN 250 MG/1
250 TABLET, FILM COATED ORAL
Qty: 4 | Refills: 0 | Status: COMPLETED | COMMUNITY
Start: 2018-12-19 | End: 2021-07-29

## 2021-07-29 RX ORDER — ONDANSETRON 4 MG/1
4 TABLET ORAL AS DIRECTED
Qty: 30 | Refills: 0 | Status: COMPLETED | COMMUNITY
Start: 2018-12-21 | End: 2021-07-29

## 2021-07-29 RX ORDER — ATOVAQUONE AND PROGUANIL HYDROCHLORIDE 250; 100 MG/1; MG/1
250-100 TABLET, FILM COATED ORAL
Qty: 30 | Refills: 0 | Status: COMPLETED | COMMUNITY
Start: 2018-12-19 | End: 2021-07-29

## 2021-07-29 RX ORDER — MELOXICAM 15 MG/1
15 TABLET ORAL
Qty: 30 | Refills: 1 | Status: COMPLETED | COMMUNITY
Start: 2021-06-09 | End: 2021-07-29

## 2021-07-29 NOTE — REASON FOR VISIT
[Consultation] : a consultation visit [Home] : at home, [unfilled] , at the time of the visit. [Medical Office: ([facility])___] : at the medical office located in  [Verbal consent obtained from patient] : the patient, [unfilled] [Spouse] : spouse

## 2021-08-01 NOTE — PROCEDURE
[FreeTextEntry1] : Dr. Echevarria reviewed the indications for surgery, and used our webpage [email or web address] <[email or web address]> to illustrate the aorta and anatomy of the heart. Those indications are the following: size greater than 5.0 cm, symptomatic aneurysms, family history of aortic dissection or aneurysm death with a size greater than 4.5 cm, other necessary cardiac procedures such as coronary artery bypass grafting or valvular disorders with an aneurysm greater than 4.5 cm, or connective tissue disorders with an aneurysm size greater than 4.5 cm. The patient does not meet size criteria for surgical intervention at the time.\par \par Dr. Echevarria discussed activity restrictions with the patient, and would advise exercise at a moderate amount with no heavy lifting over one third of body weight, and avoiding heart rates that exceed 140 beats per minute. In addition, every patient should abstain from tobacco abuse and to avoid all illicit drug use, especially stimulants such as cocaine or methamphetamine. Dr. Echevarria also counseled regarding maintaining a healthy heart diet, and losing any excessive weight as this also put undue stress on both the aorta and entire cardiovascular system. First degree family members should be screened for bicuspid valve disease, and ascending aortic aneurysms. \par \par Patient was advised to view the educational video prior to this visit regarding aortic pathology, risk factors, surgical procedures, and lifestyle modifications. Video can be retrieved at <[email or web address]>.\par

## 2021-08-01 NOTE — ASSESSMENT
[FreeTextEntry1] : 61 year old female with a past medical history of GERD, basal cell carcinoma, splenic vein thrombosis (dx 31 years ago), nonepileptic seizures (last episode 10-13 yrs ago), periodic paralysis (4/7 siblings), bicuspid aortic valve with moderate-severe aortic stenosis and mild to moderate aortic regurgitation who presents for evaluation and management of her valvular disease and dilated ascending aorta. She is referred by Dr. Celso Calix. \par \par I have reviewed the patient's medical records, diagnostic images during the time of this office consultation and have made the following recommendation. Based on the findings of the echocardiogram from 7/7/2021 (Peak transaortic valve gradient equals 31mmHg, mean transaortic valve gradient equals 18mmHg and estimated valve area equals 1 sqcm. I would like to discuss her case with Dr. Celso Calix before finalizing a plan.\par \par Plan: \par Dr. Echevarria will discuss plan with Dr. Fifi Briggspar F/U with [facility] to determine if she is a candidate for a TAVR \par Continue current medication management. \par \par

## 2021-08-01 NOTE — HISTORY OF PRESENT ILLNESS
[FreeTextEntry1] : 61 year old female with a past medical history of GERD, basal cell carcinoma, splenic vein thrombosis (dx 31 years ago), nonepileptic seizures (last episode 10-13 yrs ago), periodic paralysis (4/7 siblings), bicuspid aortic valve with moderate-severe aortic stenosis and mild to moderate aortic regurgitation who presents for evaluation and management of her valvular disease and dilated ascending aorta. She is referred by Dr. Celso Calix. \par \par Patient reports progressive dyspnea on exertion over the last 2-3 months. She is able to walk 1-2 blocks before having to stop and rest. She also reports chest pain (w/ activity and at rest), palpitations and fatigue (NYHA III). She denies fever, chills, headache, blurred vision, dizziness, syncope, orthopnea, paroxysmal nocturnal dyspnea, nausea, vomiting, abdominal pain, back pain, BRBPR or swelling to legs.\par

## 2021-08-01 NOTE — END OF VISIT
[Time Spent: ___ minutes] : I have spent [unfilled] minutes of time on the encounter. [FreeTextEntry3] : I, ZOHREH ROCHA , am scribing for and in the presence of ADAN WARE the following sections: History of present illness, past Medical/family/surgical/family/social history, review of systems, vital signs, physical exam and disposition.\par \par I personally performed the services described in the documentation, reviewed the documentation recorded by the scribe in my presence and it accurately and completely records my words and actions.\par \par

## 2021-08-01 NOTE — REVIEW OF SYSTEMS
[Feeling Tired] : feeling tired [Chest Pain] : chest pain [Palpitations] : palpitations [SOB on Exertion] : shortness of breath during exertion [Negative] : Heme/Lymph [Cough] : no cough [Orthopnea] : no orthopnea

## 2021-08-01 NOTE — DATA REVIEWED
[FreeTextEntry1] : Echocardiogram 7/7/21: \par Mitral annular calcification, otherwise normal mitral valve. Moderate mitral regurgitation \par Calcified bicuspid aortic valve, Peak transaortic gradient equals 31mmHg , mean transaortic valve gradient equals 18mmHg, estimated aortic valve equals 1 sqcm. consistent with moderate to severe aortic stenosis. Mild -to-moderate aortic regurgitation \par The aortic root at the sinus of Valsalva measures 2.9 cm. The ascending aorta measures 4.0 cmm at 3.7 cm from the SOV. There is effacement of the sinotubular junction\par Mildly dilated left atrium. LA Volume index =37 cc/m2 \par Normal left ventricular internal dimensions and wall thicknesses\par Normal left ventricular systolic function. No segmental wall motion abnormalities \par Mild diastolic dysfunction \par Normal right ventricular size and function \par Normal tricuspid valve. Mild to moderate tricuspid regurgitation \par Estimated pulmonary artery systolic pressures equals 32 mmHg, assuming right atrial pressure equals 100 mmHg, consistent with normal pulmonary pressures\par \par \par CTA heart coronary 6/30/21: \par \par Aortic measurements as follows:\par Sinuses of Valsalva: 3.1 x 3.6 x 3.2 cm\par Sinotubular junction: 3.3 x 3.3\par Ascending aorta: 3.9 x 4.0 cm\par Descending thoracic aorta: 2.4 x 2.4 cm\par \par Approximately 20% narrowing in the proximal LAD from noncalcified plaque. No obstructive coronary disease.\par Dilated ascending aorta up to 4 cm. No intramural hematoma or aortic dissection.\par \par

## 2021-08-09 ENCOUNTER — APPOINTMENT (OUTPATIENT)
Dept: CARDIOTHORACIC SURGERY | Facility: CLINIC | Age: 61
End: 2021-08-09
Payer: COMMERCIAL

## 2021-08-09 PROCEDURE — 99214 OFFICE O/P EST MOD 30 MIN: CPT | Mod: 95

## 2021-08-09 NOTE — REASON FOR VISIT
[Home] : at home, [unfilled] , at the time of the visit. [Medical Office: ([facility])___] : at the medical office located in  [Verbal consent obtained from patient] : the patient, [unfilled] [Consultation] : a consultation visit [Spouse] : spouse

## 2021-08-11 ENCOUNTER — APPOINTMENT (OUTPATIENT)
Dept: HEMATOLOGY ONCOLOGY | Facility: CLINIC | Age: 61
End: 2021-08-11
Payer: COMMERCIAL

## 2021-08-11 ENCOUNTER — LABORATORY RESULT (OUTPATIENT)
Age: 61
End: 2021-08-11

## 2021-08-11 VITALS
OXYGEN SATURATION: 97 % | TEMPERATURE: 97.4 F | BODY MASS INDEX: 23.6 KG/M2 | HEIGHT: 61 IN | WEIGHT: 125 LBS | DIASTOLIC BLOOD PRESSURE: 74 MMHG | HEART RATE: 64 BPM | SYSTOLIC BLOOD PRESSURE: 118 MMHG

## 2021-08-11 PROCEDURE — 99204 OFFICE O/P NEW MOD 45 MIN: CPT | Mod: 25

## 2021-08-11 PROCEDURE — 36415 COLL VENOUS BLD VENIPUNCTURE: CPT

## 2021-08-11 NOTE — HISTORY OF PRESENT ILLNESS
[de-identified] : 61 years old white female history of severe GI bleed at age 15 for unclear reason... 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 She was seen by Dr. Myrna Louis at the time...\par \par Patient is now scheduled to have possible TAVR as well as repair of an aortic aneurysm and CABG, and she is here for hematological consultation prior to the planned procedures.

## 2021-08-11 NOTE — ASSESSMENT
[FreeTextEntry1] : Discussed with patient and Dr. Watkins..\par \par We will do repeat CBC, coags and lupus anticoagulants...\par \par \par Patient may also be a candidate for hypercoagulable work-up in view of her portal vein thrombosis... She denies family history of thrombosis.

## 2021-08-11 NOTE — END OF VISIT
[Time Spent: ___ minutes] : I have spent [unfilled] minutes of time on the encounter. [FreeTextEntry3] : \par \par I personally performed the services described in the documentation, reviewed the documentation recorded by the scribe in my presence and it accurately and completely records my words and actions.\par \par

## 2021-08-12 LAB
25(OH)D3 SERPL-MCNC: 35.7 NG/ML
ALBUMIN SERPL ELPH-MCNC: 4.6 G/DL
ALP BLD-CCNC: 75 U/L
ALT SERPL-CCNC: 22 U/L
ANION GAP SERPL CALC-SCNC: 10 MMOL/L
APTT 2H P 1:4 NP PPP: 35.3 SEC
APTT 2H P INC PPP: 36 SEC
APTT IMM NP/PRE NP PPP: 34.3 SEC
APTT INV RATIO PPP: 38 SEC
AST SERPL-CCNC: 22 U/L
BASOPHILS # BLD AUTO: 0.04 K/UL
BASOPHILS NFR BLD AUTO: 0.7 %
BILIRUB SERPL-MCNC: 0.7 MG/DL
BUN SERPL-MCNC: 14 MG/DL
CALCIUM SERPL-MCNC: 9.8 MG/DL
CHLORIDE SERPL-SCNC: 109 MMOL/L
CO2 SERPL-SCNC: 24 MMOL/L
CONFIRM: 32.6 SEC
CREAT SERPL-MCNC: 0.71 MG/DL
DRVVT IMM 1:2 NP PPP: NORMAL
DRVVT SCREEN TO CONFIRM RATIO: 0.86 RATIO
EOSINOPHIL # BLD AUTO: 0.16 K/UL
EOSINOPHIL NFR BLD AUTO: 2.9 %
ERYTHROCYTE [SEDIMENTATION RATE] IN BLOOD BY WESTERGREN METHOD: 2 MM/HR
GLUCOSE SERPL-MCNC: 97 MG/DL
HCT VFR BLD CALC: 46.5 %
HGB BLD-MCNC: 14.7 G/DL
IMM GRANULOCYTES NFR BLD AUTO: 0.2 %
LDH SERPL-CCNC: 173 U/L
LYMPHOCYTES # BLD AUTO: 1.54 K/UL
LYMPHOCYTES NFR BLD AUTO: 27.7 %
MAN DIFF?: NORMAL
MCHC RBC-ENTMCNC: 29.4 PG
MCHC RBC-ENTMCNC: 31.6 GM/DL
MCV RBC AUTO: 93 FL
MONOCYTES # BLD AUTO: 0.47 K/UL
MONOCYTES NFR BLD AUTO: 8.5 %
NEUTROPHILS # BLD AUTO: 3.33 K/UL
NEUTROPHILS NFR BLD AUTO: 60 %
NPP NORMAL POOLED PLASMA: 33.3 SECS
PLATELET # BLD AUTO: 145 K/UL
POTASSIUM SERPL-SCNC: 3.9 MMOL/L
PROT SERPL-MCNC: 6.9 G/DL
RBC # BLD: 5 M/UL
RBC # FLD: 13.6 %
SCREEN DRVVT: 37.5 SEC
SILICA CLOTTING TIME INTERPRETATION: NORMAL
SILICA CLOTTING TIME S/C: 1.04 RATIO
SODIUM SERPL-SCNC: 143 MMOL/L
TSH SERPL-ACNC: 2.15 UIU/ML
VIT B12 SERPL-MCNC: 440 PG/ML
WBC # FLD AUTO: 5.55 K/UL

## 2021-08-22 LAB
B2 GLYCOPROT1 IGA SERPL IA-ACNC: <5 SAU
B2 GLYCOPROT1 IGG SER-ACNC: <5 SGU
B2 GLYCOPROT1 IGM SER-ACNC: 6.6 SMU
CARDIOLIPIN AB SER IA-ACNC: NEGATIVE

## 2021-08-25 ENCOUNTER — APPOINTMENT (OUTPATIENT)
Dept: NEUROLOGY | Facility: CLINIC | Age: 61
End: 2021-08-25
Payer: COMMERCIAL

## 2021-08-25 VITALS
BODY MASS INDEX: 23.41 KG/M2 | TEMPERATURE: 98.2 F | HEART RATE: 63 BPM | HEIGHT: 61 IN | OXYGEN SATURATION: 97 % | SYSTOLIC BLOOD PRESSURE: 106 MMHG | DIASTOLIC BLOOD PRESSURE: 70 MMHG | WEIGHT: 124 LBS

## 2021-08-25 DIAGNOSIS — G72.3 PERIODIC PARALYSIS: ICD-10-CM

## 2021-08-25 PROCEDURE — 99205 OFFICE O/P NEW HI 60 MIN: CPT

## 2021-08-25 NOTE — PHYSICAL EXAM
[FreeTextEntry1] : Alert.  Fully oriented.  Speech and language are intact.  Cranial nerves II-XII are intact.  Motor exam reveals intact strength with individual muscle testing in bilateral upper and lower extremities.  Tone is normal. However, with percussion, she does have myotonia  Reflexes are normal.  Toes are downgoing.  Sensation is intact to light touch in distal extremities.  Finger-to-nose and heel-to-shin are intact.  Rapid alternating movements are normal in the upper and lower extremities.  Gait is normal.\par

## 2021-08-25 NOTE — HISTORY OF PRESENT ILLNESS
[FreeTextEntry1] : The patient is a 61-year-old female with a past medical history of splenic and portal vein thrombosis, GERD, basal cell carcinoma, nonepileptic spells, and hyperkalemic periodic paralysis.  She has recently been undergoing evaluation for aortic stenosis which was found on evaluation of exertional dyspnea and chest discomfort.  She was referred to neurology given her history of hyperkalemic periodic paralysis and nonepileptic spells.\par \par The patient was diagnosed with hyperkalemic periodic paralysis approximately at the age of 24 after delivery of her son.  She had received succinylcholine for procedure which resulted in prolonged paralysis from almost 1 week.  Eventually she was evaluated by a neuromuscular expert and underwent genetic testing which was consistent with the disorder.  In retrospect, she recalls having multiple episodes during childhood after exertion or after illness where she was unable to move her limbs. She specifically recalls having a gastroenteritis resulting in significant vomiting during which she was unable to move other than turn her head.  3 of her other siblings also have a similar condition.  Their mother likely also had the condition as well.  The patient recalls her mother having to prop herself up on her children while walking home after periods of physical exertion.  One of her 2 sons also has the condition.  Many of her distant family members have the disorder and have responded favorably to treatment. The patient has been taking acetazolamide since her diagnosis which has lessened and decreased the severity of the episodes.  However, she continues to have more mild episodes, with the last 2 weeks ago.  Typical triggers include exercise, extreme cold, illness, or stress.  The weakness can be relatively short-lived or last days but always eventually returns to baseline.  At times, only her upper extremities to be involved whereas other times her whole body is involved..\par \par The patient also reports a prior diagnosis of nonepileptic events.  She describes having a cluster of episodes about 10 to 12 years ago during which her jaw and whole body stiffened.  There were no particular triggers and she does not recall any significant stressors at the time.  She never lost consciousness and recalls the entire episode.Regardless, she has not had any of these for several years.  She has had none of these episodes for several years and attributes this at least in part potentially to starting Celexa.\par \par \par

## 2021-08-25 NOTE — ASSESSMENT
[FreeTextEntry1] : The patient is a very pleasant 59-year-old female with hyperkalemic periodic paralysis for which she is on acetazolamide therapy with benefit.  I also wonder if her previously described nonepileptic seizures were actually myotonic reactions given her history.  Regardless, she has not had any of these for several years. \par \par Given her rare disorder, I have recommended she be evaluated by our neuromuscular expert, Dr. Prado, ideally prior to cardiac intervention, to better risk stratify the patient. In general, opioids and depolarizing drugs should be avoided. I have also requested a Holter monitor to evaluate for potential arrhythmias as patient with this condition can also have electrical abnormalities/prolonged QT interval.

## 2021-09-01 ENCOUNTER — APPOINTMENT (OUTPATIENT)
Dept: PULMONOLOGY | Facility: CLINIC | Age: 61
End: 2021-09-01
Payer: COMMERCIAL

## 2021-09-01 VITALS
TEMPERATURE: 98.1 F | HEART RATE: 71 BPM | BODY MASS INDEX: 23.98 KG/M2 | WEIGHT: 127 LBS | SYSTOLIC BLOOD PRESSURE: 102 MMHG | HEIGHT: 61 IN | DIASTOLIC BLOOD PRESSURE: 68 MMHG | OXYGEN SATURATION: 94 %

## 2021-09-01 DIAGNOSIS — Z11.59 ENCOUNTER FOR SCREENING FOR OTHER VIRAL DISEASES: ICD-10-CM

## 2021-09-01 PROCEDURE — 99204 OFFICE O/P NEW MOD 45 MIN: CPT

## 2021-09-01 NOTE — HISTORY OF PRESENT ILLNESS
[Never] : never [TextBox_4] : She is complaining of progressive shortness of breath.  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  She is short of breath with slight exertion and that shortness of breath associated with chest pain.  The symptoms have released with rest.

## 2021-09-01 NOTE — ASSESSMENT
[FreeTextEntry1] : Cussed the condition in details with the patient.  The work-up that the patient had was consistent with CT scan angiogram and echocardiogram.  The baseline oxygen saturation is normal.  The patient did not desaturate after walking 160 feet.  Exam was unremarkable.  The echo did not reveal any evidence of pulmonary hypertension.  The CT scan angiogram did not reveal any parenchymal abnormality but there was 2 vascular lesions on the right heart border.\par \par I informed the patient that she has no evidence of underlying restrictive lung disease, parenchymal abnormality, no evidence of pulmonary hypertension on the echocardiogram.  I doubt the patient has underlying obstructive lung disease.\par \par I will reviewed the CT scan of the chest with the chest radiologist to evaluate the structure abnormality on the right heart border which was reported could be varuces.\par \par At this point I will follow up with PFT and 6-minute walk test and further recommendation will follow.  I doubt his distant exertion with chest tightness is related to pulmonary source

## 2021-09-09 ENCOUNTER — OUTPATIENT (OUTPATIENT)
Dept: OUTPATIENT SERVICES | Facility: HOSPITAL | Age: 61
LOS: 1 days | End: 2021-09-09
Payer: COMMERCIAL

## 2021-09-09 DIAGNOSIS — O00.90 UNSPECIFIED ECTOPIC PREGNANCY WITHOUT INTRAUTERINE PREGNANCY: Chronic | ICD-10-CM

## 2021-09-09 DIAGNOSIS — Z98.890 OTHER SPECIFIED POSTPROCEDURAL STATES: Chronic | ICD-10-CM

## 2021-09-09 DIAGNOSIS — R55 SYNCOPE AND COLLAPSE: ICD-10-CM

## 2021-09-09 PROCEDURE — 93227 XTRNL ECG REC<48 HR R&I: CPT

## 2021-09-10 DIAGNOSIS — Z01.818 ENCOUNTER FOR OTHER PREPROCEDURAL EXAMINATION: ICD-10-CM

## 2021-09-14 ENCOUNTER — APPOINTMENT (OUTPATIENT)
Dept: PULMONOLOGY | Facility: CLINIC | Age: 61
End: 2021-09-14

## 2021-09-14 ENCOUNTER — APPOINTMENT (OUTPATIENT)
Dept: DISASTER EMERGENCY | Facility: CLINIC | Age: 61
End: 2021-09-14

## 2021-09-15 LAB — SARS-COV-2 N GENE NPH QL NAA+PROBE: NOT DETECTED

## 2021-09-17 ENCOUNTER — APPOINTMENT (OUTPATIENT)
Dept: PULMONOLOGY | Facility: CLINIC | Age: 61
End: 2021-09-17
Payer: COMMERCIAL

## 2021-09-17 VITALS
HEART RATE: 60 BPM | TEMPERATURE: 98.1 F | OXYGEN SATURATION: 97 % | HEIGHT: 60 IN | WEIGHT: 128 LBS | BODY MASS INDEX: 25.13 KG/M2

## 2021-09-17 PROCEDURE — 94726 PLETHYSMOGRAPHY LUNG VOLUMES: CPT

## 2021-09-17 PROCEDURE — 94618 PULMONARY STRESS TESTING: CPT

## 2021-09-17 PROCEDURE — ZZZZZ: CPT

## 2021-09-17 PROCEDURE — 94729 DIFFUSING CAPACITY: CPT

## 2021-09-17 PROCEDURE — 94010 BREATHING CAPACITY TEST: CPT

## 2021-09-24 ENCOUNTER — APPOINTMENT (OUTPATIENT)
Dept: NEUROLOGY | Facility: CLINIC | Age: 61
End: 2021-09-24
Payer: COMMERCIAL

## 2021-09-24 ENCOUNTER — APPOINTMENT (OUTPATIENT)
Dept: NEUROLOGY | Facility: CLINIC | Age: 61
End: 2021-09-24

## 2021-09-24 DIAGNOSIS — G72.3 PERIODIC PARALYSIS: ICD-10-CM

## 2021-09-24 PROCEDURE — 99214 OFFICE O/P EST MOD 30 MIN: CPT | Mod: 95

## 2021-09-24 NOTE — ASSESSMENT
[FreeTextEntry1] : If she does require surgical intervention, she should have a consultation with the anesthesiologist beforehand to ensure proper precautions for her condition, which include: \par avoiding cholinesterase inhibitors\par avoiding depolarizing muscle relaxants (e.g. succinylcholine)\par avoiding administration of potassium\par Maintaining normothermia and normoglycemia \par \par Her neuromuscular symptoms are well controlled on acetazolamide - continue at current dose. \par She can f/u with me going forward every 6 months or as needed

## 2021-09-24 NOTE — CONSULT LETTER
[Dear  ___] : Dear  [unfilled], [Courtesy Letter:] : I had the pleasure of seeing your patient, [unfilled], in my office today. [Please see my note below.] : Please see my note below. [Consult Closing:] : Thank you very much for allowing me to participate in the care of this patient.  If you have any questions, please do not hesitate to contact me. [Sincerely,] : Sincerely, [FreeTextEntry3] : Max Prado M.D.\par Neurology, Electromyography and Neuromuscular Medicine\par [facility]\par \par  of Neurology\par [facility] [place] [facility] [employer] [facility] [employer]

## 2021-09-24 NOTE — HISTORY OF PRESENT ILLNESS
[FreeTextEntry1] : \par Since she was a kid she had episodes of weakness, precipitated by vomiting (when she was sick) \par 4/7 siblings and her mother also had it, and her son developed symptoms at age 1 \par She had an episode as an adult after succinylcholine administration\par She was diagnosed with hyperkalemic periodic paralysis by Dr. Osmel Patterson \par She had genetic testing by Dr. Tawil at [place] which confirmed mutation \par \par She's been having shortness of breath, has undergone extensive evaluation with no clear answer. She was found to have bicuspid aortic valve with mod-sev stenosis and mild regurgitation\par PFTs were normal and CTA coronaries did not show significant stenosis

## 2021-10-04 ENCOUNTER — NON-APPOINTMENT (OUTPATIENT)
Age: 61
End: 2021-10-04

## 2021-10-04 VITALS
TEMPERATURE: 98 F | WEIGHT: 128.09 LBS | HEIGHT: 60 IN | HEART RATE: 60 BPM | DIASTOLIC BLOOD PRESSURE: 77 MMHG | RESPIRATION RATE: 18 BRPM | OXYGEN SATURATION: 99 % | SYSTOLIC BLOOD PRESSURE: 131 MMHG

## 2021-10-05 ENCOUNTER — OUTPATIENT (OUTPATIENT)
Dept: OUTPATIENT SERVICES | Facility: HOSPITAL | Age: 61
LOS: 1 days | Discharge: HOME | End: 2021-10-05
Payer: COMMERCIAL

## 2021-10-05 ENCOUNTER — APPOINTMENT (OUTPATIENT)
Dept: CARDIOTHORACIC SURGERY | Facility: HOSPITAL | Age: 61
End: 2021-10-05

## 2021-10-05 DIAGNOSIS — O00.90 UNSPECIFIED ECTOPIC PREGNANCY WITHOUT INTRAUTERINE PREGNANCY: Chronic | ICD-10-CM

## 2021-10-05 DIAGNOSIS — Z98.890 OTHER SPECIFIED POSTPROCEDURAL STATES: Chronic | ICD-10-CM

## 2021-10-05 LAB
ALBUMIN SERPL ELPH-MCNC: 4.9 G/DL — SIGNIFICANT CHANGE UP (ref 3.3–5)
ALP SERPL-CCNC: 92 U/L — SIGNIFICANT CHANGE UP (ref 40–120)
ALT FLD-CCNC: 22 U/L — SIGNIFICANT CHANGE UP (ref 10–45)
ANION GAP SERPL CALC-SCNC: 9 MMOL/L — SIGNIFICANT CHANGE UP (ref 5–17)
APTT BLD: 35.6 SEC — HIGH (ref 27.5–35.5)
AST SERPL-CCNC: 25 U/L — SIGNIFICANT CHANGE UP (ref 10–40)
BILIRUB SERPL-MCNC: 0.7 MG/DL — SIGNIFICANT CHANGE UP (ref 0.2–1.2)
BLD GP AB SCN SERPL QL: NEGATIVE — SIGNIFICANT CHANGE UP
BLD GP AB SCN SERPL QL: NEGATIVE — SIGNIFICANT CHANGE UP
BUN SERPL-MCNC: 14 MG/DL — SIGNIFICANT CHANGE UP (ref 7–23)
CALCIUM SERPL-MCNC: 10.4 MG/DL — SIGNIFICANT CHANGE UP (ref 8.4–10.5)
CHLORIDE SERPL-SCNC: 107 MMOL/L — SIGNIFICANT CHANGE UP (ref 96–108)
CK MB CFR SERPL CALC: 2.4 NG/ML — SIGNIFICANT CHANGE UP (ref 0–6.7)
CK SERPL-CCNC: 179 U/L — HIGH (ref 25–170)
CO2 SERPL-SCNC: 27 MMOL/L — SIGNIFICANT CHANGE UP (ref 22–31)
CREAT SERPL-MCNC: 0.69 MG/DL — SIGNIFICANT CHANGE UP (ref 0.5–1.3)
GLUCOSE SERPL-MCNC: 101 MG/DL — HIGH (ref 70–99)
HCT VFR BLD CALC: 49 % — HIGH (ref 34.5–45)
HGB BLD-MCNC: 15.7 G/DL — HIGH (ref 11.5–15.5)
INR BLD: 0.93 — SIGNIFICANT CHANGE UP (ref 0.88–1.16)
MAGNESIUM SERPL-MCNC: 2.1 MG/DL — SIGNIFICANT CHANGE UP (ref 1.6–2.6)
MCHC RBC-ENTMCNC: 29.6 PG — SIGNIFICANT CHANGE UP (ref 27–34)
MCHC RBC-ENTMCNC: 32 GM/DL — SIGNIFICANT CHANGE UP (ref 32–36)
MCV RBC AUTO: 92.3 FL — SIGNIFICANT CHANGE UP (ref 80–100)
NRBC # BLD: 0 /100 WBCS — SIGNIFICANT CHANGE UP (ref 0–0)
NT-PROBNP SERPL-SCNC: 129 PG/ML — SIGNIFICANT CHANGE UP (ref 0–300)
PLATELET # BLD AUTO: 141 K/UL — LOW (ref 150–400)
POTASSIUM SERPL-MCNC: 3.6 MMOL/L — SIGNIFICANT CHANGE UP (ref 3.5–5.3)
POTASSIUM SERPL-SCNC: 3.6 MMOL/L — SIGNIFICANT CHANGE UP (ref 3.5–5.3)
PROT SERPL-MCNC: 7.9 G/DL — SIGNIFICANT CHANGE UP (ref 6–8.3)
PROTHROM AB SERPL-ACNC: 11.2 SEC — SIGNIFICANT CHANGE UP (ref 10.6–13.6)
RBC # BLD: 5.31 M/UL — HIGH (ref 3.8–5.2)
RBC # FLD: 13.2 % — SIGNIFICANT CHANGE UP (ref 10.3–14.5)
RH IG SCN BLD-IMP: NEGATIVE — SIGNIFICANT CHANGE UP
RH IG SCN BLD-IMP: NEGATIVE — SIGNIFICANT CHANGE UP
SODIUM SERPL-SCNC: 143 MMOL/L — SIGNIFICANT CHANGE UP (ref 135–145)
TROPONIN T SERPL-MCNC: <0.01 NG/ML — SIGNIFICANT CHANGE UP (ref 0–0.01)
WBC # BLD: 6.15 K/UL — SIGNIFICANT CHANGE UP (ref 3.8–10.5)
WBC # FLD AUTO: 6.15 K/UL — SIGNIFICANT CHANGE UP (ref 3.8–10.5)

## 2021-10-05 PROCEDURE — 93460 R&L HRT ART/VENTRICLE ANGIO: CPT | Mod: 26

## 2021-10-05 NOTE — PROGRESS NOTE ADULT - SUBJECTIVE AND OBJECTIVE BOX
Interventional Cardiology PA SDA Discharge Note    Patient without complaints. 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  Pt noted with no significant gradient. She is to continue current medications as prescribed.     1.	Stable for discharge as per attending Dr. Watkins after bed rest, pt voids, groin/wrist stable and 30 minutes of ambulation.  2.	Follow-up with CTS, Dr. Watkins in 1-2 weeks  3.	Discharged forms signed and copies in chart

## 2021-10-18 DIAGNOSIS — I35.0 NONRHEUMATIC AORTIC (VALVE) STENOSIS: ICD-10-CM

## 2021-10-18 PROCEDURE — C1887: CPT

## 2021-10-18 PROCEDURE — 82553 CREATINE MB FRACTION: CPT

## 2021-10-18 PROCEDURE — C1889: CPT

## 2021-10-18 PROCEDURE — 85027 COMPLETE CBC AUTOMATED: CPT

## 2021-10-18 PROCEDURE — 83735 ASSAY OF MAGNESIUM: CPT

## 2021-10-18 PROCEDURE — C1760: CPT

## 2021-10-18 PROCEDURE — 86901 BLOOD TYPING SEROLOGIC RH(D): CPT

## 2021-10-18 PROCEDURE — C1894: CPT

## 2021-10-18 PROCEDURE — 85730 THROMBOPLASTIN TIME PARTIAL: CPT

## 2021-10-18 PROCEDURE — 84484 ASSAY OF TROPONIN QUANT: CPT

## 2021-10-18 PROCEDURE — 36415 COLL VENOUS BLD VENIPUNCTURE: CPT

## 2021-10-18 PROCEDURE — 83880 ASSAY OF NATRIURETIC PEPTIDE: CPT

## 2021-10-18 PROCEDURE — 80053 COMPREHEN METABOLIC PANEL: CPT

## 2021-10-18 PROCEDURE — C1769: CPT

## 2021-10-18 PROCEDURE — 82550 ASSAY OF CK (CPK): CPT

## 2021-10-18 PROCEDURE — 86850 RBC ANTIBODY SCREEN: CPT

## 2021-10-18 PROCEDURE — 85610 PROTHROMBIN TIME: CPT

## 2021-10-18 PROCEDURE — 93460 R&L HRT ART/VENTRICLE ANGIO: CPT

## 2021-10-18 PROCEDURE — 86900 BLOOD TYPING SEROLOGIC ABO: CPT

## 2021-10-19 DIAGNOSIS — R06.02 SHORTNESS OF BREATH: ICD-10-CM

## 2021-10-19 DIAGNOSIS — G83.9 PARALYTIC SYNDROME, UNSPECIFIED: ICD-10-CM

## 2021-10-19 DIAGNOSIS — I35.0 NONRHEUMATIC AORTIC (VALVE) STENOSIS: ICD-10-CM

## 2021-10-26 ENCOUNTER — APPOINTMENT (OUTPATIENT)
Dept: PULMONOLOGY | Facility: CLINIC | Age: 61
End: 2021-10-26
Payer: COMMERCIAL

## 2021-10-26 PROCEDURE — 99214 OFFICE O/P EST MOD 30 MIN: CPT | Mod: 95

## 2021-10-26 NOTE — REASON FOR VISIT
[Home] : at home, [unfilled] , at the time of the visit. [Medical Office: ([facility])___] : at the medical office located in  [Follow-Up] : a follow-up visit [Shortness of Breath] : shortness of breath

## 2021-10-26 NOTE — ASSESSMENT
[FreeTextEntry1] : I discussed the case in details with the patient I reviewed the PFT which was completely normal the 6-minute walk test was normal the right heart catheterization revealed normal pulmonary artery pressures normal pulmonary vascular resistance and no escalation of oxygenation that would confirm a shunt.  The CT scan angiograms were reviewed with the patient and she has varices.I discussed with the patient that I cannot identify any pulmonary source of intestinal exertion at this point her work-up been negative so far.  Advised the patient to undergo an exercise program and reevaluate in 3 months.  She will require metabolic exercise test and a CT scan of the chest if she is still dyspneic with exertion.

## 2021-11-03 ENCOUNTER — APPOINTMENT (OUTPATIENT)
Dept: PULMONOLOGY | Facility: CLINIC | Age: 61
End: 2021-11-03
Payer: COMMERCIAL

## 2021-11-03 VITALS
TEMPERATURE: 97 F | BODY MASS INDEX: 25.13 KG/M2 | DIASTOLIC BLOOD PRESSURE: 76 MMHG | SYSTOLIC BLOOD PRESSURE: 110 MMHG | OXYGEN SATURATION: 98 % | WEIGHT: 128 LBS | RESPIRATION RATE: 16 BRPM | HEART RATE: 72 BPM | HEIGHT: 60 IN

## 2021-11-03 DIAGNOSIS — Z81.8 FAMILY HISTORY OF OTHER MENTAL AND BEHAVIORAL DISORDERS: ICD-10-CM

## 2021-11-03 DIAGNOSIS — Z80.0 FAMILY HISTORY OF MALIGNANT NEOPLASM OF DIGESTIVE ORGANS: ICD-10-CM

## 2021-11-03 PROCEDURE — 99214 OFFICE O/P EST MOD 30 MIN: CPT | Mod: 25

## 2021-11-03 PROCEDURE — 71046 X-RAY EXAM CHEST 2 VIEWS: CPT

## 2021-11-03 PROCEDURE — 94618 PULMONARY STRESS TESTING: CPT

## 2021-11-03 PROCEDURE — 99204 OFFICE O/P NEW MOD 45 MIN: CPT | Mod: 25

## 2021-11-03 NOTE — HISTORY OF PRESENT ILLNESS
[TextBox_4] : Ms. LOVELL is a 61 year female with a history of arthritis of the hip, bicuspid aortic valve, hypercalcemic periodic predialysis, mild aortic regurgitation, moderate aortic stenosis, myositis rheumatic mitral valve disease, idiopathic thrombocytopenia, GERD, gastric esophageal varices  who now comes in for an initial pulmonary evaluation. Her chief complaint is\par \par -she notes recent SOB on exertion and on stairs\par -she notes Dr. Calix ordered CT and found aneurysm of 4.3 cm in aorta and consult for valve repair.\par -CT angio also found varices \par -she notes Dr Lepe for platelets which was fine \par -she notes seeing Dr. Prado (neruologists) \par -she notes seeing Dr Fatima in 10/2021\par -she notes unable to bike due to SOB\par -she notes walking for exercise\par -she notes intermittent chest pressure on exertion\par -she notes intermittent palpitations that resolved with change in diet\par -she denies coughing and wheezing\par -she denies recent Stress test\par -she notes weight is mostly stable\par -she notes sleeping too much \par -she denies snoring \par -she notes walking up fatigues \par - she reports being able to fall asleep while watching a boring TV show \par -she reports being able to fall asleep as a passenger in a car for over an hour \par -she denies PND \par -she denies itchy eyes and ears \par \par - patient denies any headaches, nausea, vomiting, fever, chills, sweats, chest pain, coughing, wheezing, fatigue, diarrhea, constipation, dysphagia, myalgias, dizziness, leg swelling, leg pain, itchy eyes, itchy ears, heartburn, reflux or sour taste in the mouth \par \par

## 2021-11-03 NOTE — PHYSICAL EXAM
[No Acute Distress] : no acute distress [Normal Oropharynx] : normal oropharynx [Normal Appearance] : normal appearance [No Neck Mass] : no neck mass [Normal Rate/Rhythm] : normal rate/rhythm [Normal S1, S2] : normal s1, s2 [No Murmurs] : no murmurs [No Resp Distress] : no resp distress [Clear to Auscultation Bilaterally] : clear to auscultation bilaterally [No Abnormalities] : no abnormalities [Benign] : benign [Normal Gait] : normal gait [No Clubbing] : no clubbing [No Cyanosis] : no cyanosis [No Edema] : no edema [FROM] : FROM [Normal Color/ Pigmentation] : normal color/ pigmentation [No Focal Deficits] : no focal deficits [Oriented x3] : oriented x3 [Normal Affect] : normal affect [III] : Mallampati Class: III [TextBox_68] : I:E 1:2; distant breath sounds

## 2021-11-03 NOTE — PROCEDURE
[FreeTextEntry1] : CXR revealed a normal sized heart; there was no evidence of infiltrate or effusion -- A normal appearing chest radiograph \par \par Ct Angio Heart (6/30/2021) revealed Approximately 20% narrowing in the proximal LAD from noncalcified plaque. No obstructive coronary disease. Dilated ascending aorta up to 4 cm. No intramural hematoma or aortic dissection.\par \par Cath (10.5.2021) revealed no pulmonary Hypertension and normal left and right heart Catherization \par \par ECHO revealed  moderate MR ,calcified aortic valve. 1 cm opening, Moderate to severe AS. mild to moderate AI. normal pulmonary pressure at the time. \par \par Full PFT revealed normal flows, with a FEV1 of 2,26L, which is 107% of predicted, normal lung volumes, and a diffusion of 14.8, which is 75% of predicted, with a normal flow volume loop \par \par 6 minute walk test reveals a low saturation of 97% with slight evidence of dyspnea or fatigue; walked 489 meters\par  \par

## 2021-11-03 NOTE — REASON FOR VISIT
[Shortness of Breath] : shortness of breath [Initial] : an initial visit [TextBox_44] : SOB following extensive workup CT,cardiac angiogram, cardiac Catherization right and left, full PFTs,\par SOB following extensive workup CT,cardiac angiogram, cardiac Catherization right and left, full PFTs,\par second opinion for SOB following extensive workup CT,cardiac angiogram, cardiac Catherization right and left, full PFTs,

## 2021-11-03 NOTE — ASSESSMENT
[FreeTextEntry1] : Ms. LOVELL is a 61 year female with a history of arthritis of the hip, bicuspid aortic valve, hypercalcemic periodic predialysis, mild aortic regurgitation, moderate aortic stenosis, myositis rheumatic mitral valve disease, idiopathic thrombocytopenia, GERD, gastric esophageal varices  who now comes in for an initial pulmonary evaluation for second opinion for SOB following extensive workup CT,cardiac angiogram, cardiac Catherization right and left, full PFTs,\par \par \par The patient's SOB is felt to be multifactorial:\par -poor mechanics of breathing\par -out of shape/overweight\par -Pulmonary\par   -no pulmonary hypertension based on CT and cardiac ECHO \par   -no emphysema based of CT and PFT\par   -?asthma\par -Cardiac\par    -valvular dysfunction brought on by exertion (resting studies right and left cardiac cath and echo done at rest)\par \par Problem 1: ?asthma \par -consider MCT test \par - Add Stiolto 2 Puffs BID \par -complete HRCT \par - Inhaler technique reviewed as well as oral hygiene technique reviewed with patient. Avoidance of cold air, extremes of temperature, rescue inhaler should be used before exercise. Order of medication reviewed with patient. Recommended use of a cool mist humidifier in the bedroom. \par - Asthma is believed to be caused by inherited (genetic) and environmental factor, but its exact cause is unknown. asthma may be triggered by allergens, lung infections, or irritants in the air. Asthma triggers are different for each person \par \par Problem 2:R/O Iron deficiency anemia \par -complete iron test\par \par Problem 3:Cardiac (valvular dysfunction brought on by exertion (resting studies right and left cardiac cath and echo done at rest)\par -complete cardio pulmonary stress test\par -complete ECHO stress jasmin\par -recommended consult with Dr. Tom Hwang for structural heart\par -Recommend cardiac follow up evaluation with cardiologist if needed \par \par Problem 4?JO (Unrestful sleep/ fatigue)\par -complete home sleep study \par -Sleep apnea is associated with adverse clinical consequences which can affect most organ systems. Cardiovascular disease risk includes arrhythmias, atrial fibrillation, hypertension, coronary artery disease, and stroke. Metabolic disorders include diabetes type 2, non-alcoholic fatty liver disease. Mood disorder especially depression; and cognitive decline especially in the elderly. Associations with chronic reflux/Levine’s esophagus some but not all inclusive. \par -Reasons include arousal consistent with hypopnea; respiratory events most prominent in REM sleep or supine position; therefore sleep staging and body position are important for accurate diagnosis and estimation of AHI. \par  \par \par Problem 5: out of shape\par - Weight loss, exercise and diet control were discussed and are highly encouraged. Treatment options were given such as aqua therapy, and contacting a nutritionist. Recommended to use the elliptical, stationary bike, less use of treadmill. Mindful eating was explained to the patient. Obesity is associated with worsening asthma, SOB, and potential for cardiac disease, diabetes, and other underlying medical conditions.\par \par Problem 6: Poor mechanics of breathing\par - Proper breathing techniques were reviewed with an emphasis on exhalation. Patient instructed to breath in for 1 second and out for four seconds. Patient was encouraged not to talk while walking.\par \par Problem 7: Health Maintenance\par -s/p flu shot\par -recommended strep pneumonia vaccines: Prevnar-13 vaccine, follow by Pneumo vaccine 23 one year following\par -recommended early intervention for URIs\par -recommended regular osteoporosis evaluations\par -recommended early dermatological evaluations\par -recommended after the age of 50 to the age of 70, colonoscopy every 5 years\par \par f/u in 6-8 weeks\par pt is encouraged to call or fax the office with any questions or concerns.\par

## 2021-11-03 NOTE — ADDENDUM
[FreeTextEntry1] : Documented by Meera Jaquez acting as a scribe for Dr. Onofre Cameron on (11/03/2021).\par \par All medical record entries made by the Scribe were at my, Dr. Onofre Cameron's, direction and personally dictated by me on (11/03/2021). I have reviewed the chart and agree that the record accurately reflects my personal performance of the history, physical exam, assessment and plan. I have also personally directed, reviewed, and agree with the discharge instructions.\par

## 2021-11-03 NOTE — HISTORY OF PRESENT ILLNESS
[TextBox_4] : Ms. LOVELL is a 61 year female with a history of arthritis of the hip, bicuspid aortic valve, hypercalcemic periodic predialysis, mild aortic regurgitation, moderate aortic stenosis, myositis rheumatic mitral valve disease, idiopathic thrombocytopenia, GERD, gastric esophageal varices  who now comes in for an initial pulmonary evaluation. Her chief complaint is\par \par -she notes recent SOB on exertion and on stairs\par -she notes Dr. Calix ordered CT and found aneurysm of 4.3 cm in aorta and consult for valve repair.\par -CT angio also found varices \par -she notes Dr Lepe for platelets which was fine \par -she notes seeing Dr. Pardo (neruologists) \par -she notes seeing Dr Fatima in 10/2021\par -she notes unable to bike due to SOB\par -she notes walking for exercise\par -she notes intermittent chest pressure on exertion\par -she notes intermittent palpitations that resolved with change in diet\par -she denies coughing and wheezing\par -she denies recent Stress test\par -she notes weight is mostly stable\par -she notes sleeping too much \par -she denies snoring \par -she notes walking up fatigues \par - she reports being able to fall asleep while watching a boring TV show \par -she reports being able to fall asleep as a passenger in a car for over an hour \par -she denies PND \par -she denies itchy eyes and ears \par \par - patient denies any headaches, nausea, vomiting, fever, chills, sweats, chest pain, coughing, wheezing, fatigue, diarrhea, constipation, dysphagia, myalgias, dizziness, leg swelling, leg pain, itchy eyes, itchy ears, heartburn, reflux or sour taste in the mouth \par \par

## 2021-11-08 ENCOUNTER — TRANSCRIPTION ENCOUNTER (OUTPATIENT)
Age: 61
End: 2021-11-08

## 2021-11-08 LAB
FERRITIN SERPL-MCNC: 25 NG/ML
IRON SATN MFR SERPL: 40 %
IRON SERPL-MCNC: 136 UG/DL
TIBC SERPL-MCNC: 340 UG/DL
UIBC SERPL-MCNC: 204 UG/DL

## 2021-11-11 ENCOUNTER — TRANSCRIPTION ENCOUNTER (OUTPATIENT)
Age: 61
End: 2021-11-11

## 2021-11-12 ENCOUNTER — APPOINTMENT (OUTPATIENT)
Dept: CT IMAGING | Facility: CLINIC | Age: 61
End: 2021-11-12
Payer: COMMERCIAL

## 2021-11-12 ENCOUNTER — OUTPATIENT (OUTPATIENT)
Dept: OUTPATIENT SERVICES | Facility: HOSPITAL | Age: 61
LOS: 1 days | End: 2021-11-12
Payer: COMMERCIAL

## 2021-11-12 DIAGNOSIS — R06.02 SHORTNESS OF BREATH: ICD-10-CM

## 2021-11-12 DIAGNOSIS — O00.90 UNSPECIFIED ECTOPIC PREGNANCY WITHOUT INTRAUTERINE PREGNANCY: Chronic | ICD-10-CM

## 2021-11-12 DIAGNOSIS — Z98.890 OTHER SPECIFIED POSTPROCEDURAL STATES: Chronic | ICD-10-CM

## 2021-11-12 PROCEDURE — 71250 CT THORAX DX C-: CPT | Mod: 26

## 2021-11-12 PROCEDURE — 71250 CT THORAX DX C-: CPT

## 2021-11-17 ENCOUNTER — TRANSCRIPTION ENCOUNTER (OUTPATIENT)
Age: 61
End: 2021-11-17

## 2021-11-19 ENCOUNTER — NON-APPOINTMENT (OUTPATIENT)
Age: 61
End: 2021-11-19

## 2021-11-30 ENCOUNTER — OUTPATIENT (OUTPATIENT)
Dept: OUTPATIENT SERVICES | Facility: HOSPITAL | Age: 61
LOS: 1 days | End: 2021-11-30
Payer: COMMERCIAL

## 2021-11-30 ENCOUNTER — APPOINTMENT (OUTPATIENT)
Dept: CARDIOTHORACIC SURGERY | Facility: CLINIC | Age: 61
End: 2021-11-30
Payer: COMMERCIAL

## 2021-11-30 VITALS
OXYGEN SATURATION: 98 % | TEMPERATURE: 97.5 F | BODY MASS INDEX: 24.55 KG/M2 | DIASTOLIC BLOOD PRESSURE: 69 MMHG | RESPIRATION RATE: 16 BRPM | WEIGHT: 130 LBS | HEART RATE: 60 BPM | SYSTOLIC BLOOD PRESSURE: 105 MMHG | HEIGHT: 61 IN

## 2021-11-30 DIAGNOSIS — I35.0 NONRHEUMATIC AORTIC (VALVE) STENOSIS: ICD-10-CM

## 2021-11-30 DIAGNOSIS — O00.90 UNSPECIFIED ECTOPIC PREGNANCY WITHOUT INTRAUTERINE PREGNANCY: Chronic | ICD-10-CM

## 2021-11-30 DIAGNOSIS — Z98.890 OTHER SPECIFIED POSTPROCEDURAL STATES: Chronic | ICD-10-CM

## 2021-11-30 DIAGNOSIS — K29.71 GASTRITIS, UNSPECIFIED, WITH BLEEDING: ICD-10-CM

## 2021-11-30 PROCEDURE — 99214 OFFICE O/P EST MOD 30 MIN: CPT

## 2021-11-30 PROCEDURE — 93306 TTE W/DOPPLER COMPLETE: CPT | Mod: 26

## 2021-11-30 PROCEDURE — 93306 TTE W/DOPPLER COMPLETE: CPT

## 2021-11-30 PROCEDURE — 99204 OFFICE O/P NEW MOD 45 MIN: CPT

## 2021-12-16 ENCOUNTER — NON-APPOINTMENT (OUTPATIENT)
Age: 61
End: 2021-12-16

## 2021-12-16 ENCOUNTER — APPOINTMENT (OUTPATIENT)
Dept: PULMONOLOGY | Facility: CLINIC | Age: 61
End: 2021-12-16
Payer: COMMERCIAL

## 2021-12-16 VITALS
BODY MASS INDEX: 24.55 KG/M2 | RESPIRATION RATE: 16 BRPM | SYSTOLIC BLOOD PRESSURE: 110 MMHG | DIASTOLIC BLOOD PRESSURE: 60 MMHG | HEIGHT: 61 IN | OXYGEN SATURATION: 99 % | WEIGHT: 130 LBS | HEART RATE: 72 BPM | TEMPERATURE: 97.5 F

## 2021-12-16 PROCEDURE — 94010 BREATHING CAPACITY TEST: CPT

## 2021-12-16 PROCEDURE — 95012 NITRIC OXIDE EXP GAS DETER: CPT

## 2021-12-16 PROCEDURE — 99214 OFFICE O/P EST MOD 30 MIN: CPT | Mod: 25

## 2021-12-16 NOTE — PROCEDURE
[FreeTextEntry1] : PFT - spi reveals normal flows; FEV1 is 2.07 which is 93% of predicted, normal flow volume loop \par \par FENO was   22    ; a normal value being less than 25\par Fractional exhaled nitric oxide (FENO) is regarded as a simple, noninvasive method for assessing eosinophilic airway inflammation. Produced by a variety of cells within the lung, nitric oxide (NO) concentrations are generally low in healthy individuals. However, high concentrations of NO appear to be involved in nonspecific host defense mechanisms and chronic inflammatory diseases such as asthma. The American Thoracic Society (ATS) therefore has recommended using FENO to aid in the diagnosis and monitoring of eosinophilic airway inflammation and asthma, and for identifying steroid responsive individuals whose chronic respiratory symptoms may be caused by airway inflammation. \par \par HRCT chest (11/12/2021) reveals Tubular/branching opacity within the lingula likely represents mucoid impaction. Lungs otherwise clear\par \par ECHO showed moderate aortic stenosis but no Pulmonary hypertension \par

## 2021-12-16 NOTE — ASSESSMENT
[FreeTextEntry1] : Ms. LOVELL is a 61 year female with a history of arthritis of the hip, bicuspid aortic valve, hypercalcemic periodic predialysis, mild aortic regurgitation, moderate aortic stenosis, myositis rheumatic mitral valve disease, idiopathic thrombocytopenia, GERD, gastric esophageal varices  who now comes in for an initial pulmonary evaluation for second opinion for SOB following extensive workup CT,cardiac angiogram, cardiac Catherization right and left;- still symptomatic \par \par \par The patient's SOB is felt to be multifactorial:\par -poor mechanics of breathing\par -out of shape/overweight\par -Pulmonary\par   -no pulmonary hypertension based on CT and cardiac ECHO \par   -no emphysema based of CT and PFT\par   -?asthma\par -Cardiac\par    -valvular dysfunction brought on by exertion (resting studies right and left cardiac cath and echo done at rest)\par \par Problem 1: ?asthma \par -consider MCT test \par - continue Stiolto 2 Puffs qDay \par -add Trelegy 1 puff QD \par -s/p HRCT -showed mucous plugging \par - Inhaler technique reviewed as well as oral hygiene technique reviewed with patient. Avoidance of cold air, extremes of temperature, rescue inhaler should be used before exercise. Order of medication reviewed with patient. Recommended use of a cool mist humidifier in the bedroom. \par - Asthma is believed to be caused by inherited (genetic) and environmental factor, but its exact cause is unknown. asthma may be triggered by allergens, lung infections, or irritants in the air. Asthma triggers are different for each person \par \par Problem 2: No Iron deficiency anemia \par -s/p iron test (normal)\par \par Problem 3:Cardiac (valvular dysfunction brought on by exertion (resting studies right and left cardiac cath and echo done at rest)\par -s/p cardio pulmonary stress test\par -s/p ECHO stress jasmin\par -recommended consult with Dr. Tom Dowell for structural heart\par -Recommend cardiac follow up evaluation with cardiologist if needed \par \par Problem 4?JO (Unrestful sleep/ fatigue)\par -complete home sleep study \par -Sleep apnea is associated with adverse clinical consequences which can affect most organ systems. Cardiovascular disease risk includes arrhythmias, atrial fibrillation, hypertension, coronary artery disease, and stroke. Metabolic disorders include diabetes type 2, non-alcoholic fatty liver disease. Mood disorder especially depression; and cognitive decline especially in the elderly. Associations with chronic reflux/Levine’s esophagus some but not all inclusive. \par -Reasons include arousal consistent with hypopnea; respiratory events most prominent in REM sleep or supine position; therefore sleep staging and body position are important for accurate diagnosis and estimation of AHI. \par  \par \par Problem 5: out of shape\par -Recommended Muniq "OTC" Supplement for controlling blood sugar levels, weight loss, and energy \par - Weight loss, exercise and diet control were discussed and are highly encouraged. Treatment options were given such as aqua therapy, and contacting a nutritionist. Recommended to use the elliptical, stationary bike, less use of treadmill. Mindful eating was explained to the patient. Obesity is associated with worsening asthma, SOB, and potential for cardiac disease, diabetes, and other underlying medical conditions.\par \par Problem 6: Poor mechanics of breathing\par -Recommended Wim Hof and Buteyko breathing techniques \par - Proper breathing techniques were reviewed with an emphasis on exhalation. Patient instructed to breath in for 1 second and out for four seconds. Patient was encouraged not to talk while walking.\par \par Problem 7: Health Maintenance\par -Recommend attempting to get back into work\par -s/p flu shot 2021\par -recommended strep pneumonia vaccines: Prevnar-13 vaccine, follow by Pneumo vaccine 23 one year following\par -recommended early intervention for URIs\par -recommended regular osteoporosis evaluations\par -recommended early dermatological evaluations\par -recommended after the age of 50 to the age of 70, colonoscopy every 5 years\par \par f/u in 6-8 weeks\par pt is encouraged to call or fax the office with any questions or concerns.\par

## 2021-12-16 NOTE — ADDENDUM
[FreeTextEntry1] : Documented by Audie Mars acting as a scribe for Dr. Onofre Cameron on 12/16/2021 .\par \par All medical record entries made by the Scribe were at my, Dr. Onofre Cameron's, direction and personally dictated by me on. I have reviewed the chart and agree that the record accurately reflects my personal performance of the history, physical exam, assessment and plan. I have also personally directed, reviewed, and agree with the discharge instructions.

## 2021-12-16 NOTE — REASON FOR VISIT
[Follow-Up] : a follow-up visit [TextBox_44] : (2nd opinion)-for SOB s/p work incident-work up Non-Dx to this point

## 2021-12-16 NOTE — PHYSICAL EXAM

## 2021-12-16 NOTE — HISTORY OF PRESENT ILLNESS
[TextBox_4] : Ms. LOVELL is a 61 year female with a history of arthritis of the hip, bicuspid aortic valve, hypercalcemic periodic predialysis, mild aortic regurgitation, moderate aortic stenosis, myositis rheumatic mitral valve disease, idiopathic thrombocytopenia, GERD, gastric esophageal varices  who now comes in for an initial pulmonary evaluation. Her chief complaint is\par \par -she notes using her inhaler \par -she notes using the breathing device but it was hurting her chest muscles \par -she notes being sick 3 weeks ago \par -she notes getting test for covid but it was negative \par -she notes sob on exertion \par -she notes blurry vision \par -she notes terrible sour taste in mouth but then resolved \par -she notes 4-5/10 energy levels \par -she notes gaining 3 lbs \par -she notes bowels are regular \par -she notes she can sleep for a while (9-10 hrs/night)   \par -she notes being always thirsty \par -she notes no energy \par -she notes used to walk and yoga \par -she notes taking aspirin at night \par denies any headaches, nausea, vomiting, fever, chills, sweats, chest pain, chest pressure, diarrhea, constipation, dysphagia, dizziness, leg swelling, leg pain, itchy eyes, itchy ears, heartburn, reflux, or sour taste in the mouth.

## 2022-01-09 NOTE — DISCUSSION/SUMMARY
[FreeTextEntry1] : Ms Villalobos presents for evaluation of a bicuspid aortic valve with reported stenosis, which appears to be moderate by today's TTE. This is somewhat disproportionate to a recent direct LV-Ao gradient measurement in the cath lab (see the pressure tracings on the scanned catheterization report), which was in the single digits. Either way, it appears that no AV intervention is warranted at this time. Also documented on the catheterization report were normal right sided and pulmonary pressures, as well as CO and CI (5 and 3.2), wedge pressure (8), LVEDP (6), and oxygen saturations (PA 79%). As such, with respect to her AS, I would recommend continued close cardiac follow up with Dr Calix and a repeat TTE in approximately 4-6 months, or sooner if clinically indicated. 4

## 2022-01-09 NOTE — PHYSICAL EXAM
[Rt] : no varicose veins of the right leg [Lt] : no varicose veins of the left leg [Right Carotid Bruit] : no bruit heard over the right carotid [Left Carotid Bruit] : no bruit heard over the left carotid

## 2022-01-09 NOTE — REVIEW OF SYSTEMS
[Fever] : no fever [Chills] : no chills [Feeling Fatigued] : not feeling fatigued [Lower Ext Edema] : no extremity edema [Abdominal Pain] : no abdominal pain [Nausea] : no nausea [Change in Appetite] : no change in appetite [Dizziness] : no dizziness [de-identified] : ITP

## 2022-01-09 NOTE — CARDIOLOGY SUMMARY
[de-identified] : Aortic measurements as follows:\par Sinuses of Valsalva: 3.1 x 3.6 x 3.2 cm\par Sinotubular junction: 3.3 x 3.3\par Ascending aorta: 3.9 x 4.0 cm\par Descending thoracic aorta: 2.4 x 2.4 cm\par \par Approximately 20% narrowing in the proximal LAD from noncalcified plaque. No obstructive coronary disease.\par Dilated ascending aorta up to 4 cm. No intramural hematoma or aortic dissection. [de-identified] : 10/5/2021\par normal coronary arteries

## 2022-01-09 NOTE — HISTORY OF PRESENT ILLNESS
[FreeTextEntry1] : Ms. Villalobos presents to clinic today for evaluation of aortic stenosis with a known history of a bicuspid aortic valve. She reports Dr. Calix has been monitoring her AS for several years. She has had many years of mild exertional dyspnea, but it has progressed in recent months. In August she reports an onset of exertional dyspnea now associated with exertional angina. She was evaluated via telehealth visit by Dr. Echevarria in July and subsequently underwent cardiac catheterization with Dr. Watkins on 10/5/21 that reported a left dominant system with normal coronary arteries and a peak to peak LV-Ao gradient of 5 mmHg. \par \par Her past medical history includes myositis, basal cell carcinoma, a remote history of splenic vein thrombosis,  rheumatic mitral disease, GERD, esophageal varices, ITP (platelets around 100k), and hypercalcemic/hyperkalemic periodic paralysis diagnosed at age 36. \par \par A repeat echocardiogram today demonstrates a Siever's Type 1 bicuspid aortic valve with a fused raphe between the left and right cusps; the mean AV gradient is estimated at 23 mmHg, DVI 0.27, mild AI, and an MAYCO of 1.1; LVEF is normal (60-65%).

## 2022-04-11 PROBLEM — Z11.59 SCREENING FOR VIRAL DISEASE: Status: ACTIVE | Noted: 2021-09-01

## 2022-04-17 ENCOUNTER — FORM ENCOUNTER (OUTPATIENT)
Age: 62
End: 2022-04-17

## 2022-04-18 ENCOUNTER — OUTPATIENT (OUTPATIENT)
Dept: OUTPATIENT SERVICES | Facility: HOSPITAL | Age: 62
LOS: 1 days | End: 2022-04-18
Payer: COMMERCIAL

## 2022-04-18 ENCOUNTER — APPOINTMENT (OUTPATIENT)
Dept: SLEEP CENTER | Facility: CLINIC | Age: 62
End: 2022-04-18
Payer: COMMERCIAL

## 2022-04-18 DIAGNOSIS — Z98.890 OTHER SPECIFIED POSTPROCEDURAL STATES: Chronic | ICD-10-CM

## 2022-04-18 DIAGNOSIS — O00.90 UNSPECIFIED ECTOPIC PREGNANCY WITHOUT INTRAUTERINE PREGNANCY: Chronic | ICD-10-CM

## 2022-04-18 PROCEDURE — 95806 SLEEP STUDY UNATT&RESP EFFT: CPT

## 2022-04-18 PROCEDURE — 95806 SLEEP STUDY UNATT&RESP EFFT: CPT | Mod: 26

## 2022-04-24 ENCOUNTER — TRANSCRIPTION ENCOUNTER (OUTPATIENT)
Age: 62
End: 2022-04-24

## 2022-04-26 ENCOUNTER — RESULT REVIEW (OUTPATIENT)
Age: 62
End: 2022-04-26

## 2022-04-27 ENCOUNTER — APPOINTMENT (OUTPATIENT)
Dept: PULMONOLOGY | Facility: CLINIC | Age: 62
End: 2022-04-27
Payer: COMMERCIAL

## 2022-04-27 PROCEDURE — 94375 RESPIRATORY FLOW VOLUME LOOP: CPT

## 2022-04-27 PROCEDURE — 94621 CARDIOPULM EXERCISE TESTING: CPT

## 2022-05-03 ENCOUNTER — NON-APPOINTMENT (OUTPATIENT)
Age: 62
End: 2022-05-03

## 2022-05-03 ENCOUNTER — APPOINTMENT (OUTPATIENT)
Dept: PULMONOLOGY | Facility: CLINIC | Age: 62
End: 2022-05-03
Payer: COMMERCIAL

## 2022-05-03 VITALS
OXYGEN SATURATION: 97 % | HEART RATE: 64 BPM | BODY MASS INDEX: 24.73 KG/M2 | TEMPERATURE: 97.3 F | DIASTOLIC BLOOD PRESSURE: 68 MMHG | HEIGHT: 61 IN | WEIGHT: 131 LBS | SYSTOLIC BLOOD PRESSURE: 112 MMHG | RESPIRATION RATE: 16 BRPM

## 2022-05-03 PROCEDURE — 94010 BREATHING CAPACITY TEST: CPT

## 2022-05-03 PROCEDURE — 99214 OFFICE O/P EST MOD 30 MIN: CPT | Mod: 25

## 2022-05-03 PROCEDURE — 95012 NITRIC OXIDE EXP GAS DETER: CPT

## 2022-05-03 NOTE — PROCEDURE
[FreeTextEntry1] : PFT revealed normal flows, with a FEV1 of 2.02L,which is 92% of predicted with a normal flow volume loop \par \par FENO was 32 ; a normal value being less than 25\par Fractional exhaled nitric oxide (FENO) is regarded as a simple, noninvasive method for assessing eosinophilic airway inflammation. Produced by a variety of cells within the lung, nitric oxide (NO) concentrations are generally low in healthy individuals. However, high concentrations of NO appear to be involved in nonspecific host defense mechanisms and chronic inflammatory diseases such as asthma. The American Thoracic Society (ATS) therefore has recommended using FENO to aid in the diagnosis and monitoring of eosinophilic airway inflammation and asthma, and for identifying steroid responsive individuals whose chronic respiratory symptoms may be caused by airway inflammation. \par \par Sleep study (4.18.2022) revealed sleep apnea with an AHI/KATRINA of30.2 , snore index of 0.2% and a low oxygen saturation of 88.0%

## 2022-05-03 NOTE — ASSESSMENT
[FreeTextEntry1] : Ms. LOVELL is a 62 year female with a history of arthritis of the hip, bicuspid aortic valve, hypercalcemic periodic predialysis, mild aortic regurgitation, moderate aortic stenosis, myositis rheumatic mitral valve disease, idiopathic thrombocytopenia, GERD, gastric esophageal varices  who now comes in for an initial pulmonary evaluation for second opinion for SOB following extensive workup CT,cardiac angiogram, cardiac Catherization right and left;- still symptomatic(?cardiac vs pulm vs ortho), +JO\par \par \par The patient's SOB is felt to be multifactorial:\par -poor mechanics of breathing\par -out of shape/overweight\par -Pulmonary\par   -no pulmonary hypertension based on CT and cardiac ECHO \par   -no emphysema based of CT and PFT\par   -?asthma\par -Cardiac\par    -valvular dysfunction brought on by exertion (resting studies right and left cardiac cath and echo done at rest)\par \par Problem 1: ?asthma \par -complete methacholine challenge\par -consider MCT test \par - continue Stiolto 2 Puffs qDay or Trelegy 100 1 puff QD \par -add Trelegy 1 puff QD \par -s/p HRCT -showed mucous plugging \par - Inhaler technique reviewed as well as oral hygiene technique reviewed with patient. Avoidance of cold air, extremes of temperature, rescue inhaler should be used before exercise. Order of medication reviewed with patient. Recommended use of a cool mist humidifier in the bedroom. \par - Asthma is believed to be caused by inherited (genetic) and environmental factor, but its exact cause is unknown. asthma may be triggered by allergens, lung infections, or irritants in the air. Asthma triggers are different for each person \par \par Problem 2: No Iron deficiency anemia \par -s/p iron test (normal)\par \par Problem 3:Cardiac (valvular dysfunction brought on by exertion (resting studies right and left cardiac cath and echo done at rest)\par -s/p cardio pulmonary stress test\par -s/p ECHO stress jasmin\par -recommended consult with Dr. Tom Dowell for structural heart- recommended follow up \par -Recommend cardiac follow up evaluation with cardiologist if needed \par \par Problem 4 (+) JO (Unrestful sleep/ fatigue)\par -complete 2nd sleep study \par -s/p home sleep study \par -Sleep apnea is associated with adverse clinical consequences which can affect most organ systems. Cardiovascular disease risk includes arrhythmias, atrial fibrillation, hypertension, coronary artery disease, and stroke. Metabolic disorders include diabetes type 2, non-alcoholic fatty liver disease. Mood disorder especially depression; and cognitive decline especially in the elderly. Associations with chronic reflux/Levine’s esophagus some but not all inclusive. \par -Reasons include arousal consistent with hypopnea; respiratory events most prominent in REM sleep or supine position; therefore sleep staging and body position are important for accurate diagnosis and estimation of AHI. \par  \par \par Problem 5: out of shape\par -Recommended Muniq "OTC" Supplement for controlling blood sugar levels, weight loss, and energy \par - Weight loss, exercise and diet control were discussed and are highly encouraged. Treatment options were given such as aqua therapy, and contacting a nutritionist. Recommended to use the elliptical, stationary bike, less use of treadmill. Mindful eating was explained to the patient. Obesity is associated with worsening asthma, SOB, and potential for cardiac disease, diabetes, and other underlying medical conditions.\par \par Problem 6: Poor mechanics of breathing\par -Recommended Wim Hof and Buteyko breathing techniques \par -recommended internet exercise platforms: [employer]ward and Aerobic exercise for seniors \par - Proper breathing techniques were reviewed with an emphasis on exhalation. Patient instructed to breath in for 1 second and out for four seconds. Patient was encouraged not to talk while walking.\par \par Problem 7: Health Maintenance\par -Recommend attempting to get back into work\par -s/p flu shot 2021\par -recommended strep pneumonia vaccines: Prevnar-13 vaccine, follow by Pneumo vaccine 23 one year following\par -recommended early intervention for URIs\par -recommended regular osteoporosis evaluations\par -recommended early dermatological evaluations\par -recommended after the age of 50 to the age of 70, colonoscopy every 5 years\par \par f/u in 6-8 weeks\par pt is encouraged to call or fax the office with any questions or concerns.\par

## 2022-05-03 NOTE — HISTORY OF PRESENT ILLNESS
[TextBox_4] : Ms. LOVELL is a 61 year female with a history of arthritis of the hip, bicuspid aortic valve, hypercalcemic periodic predialysis, mild aortic regurgitation, moderate aortic stenosis, myositis rheumatic mitral valve disease, idiopathic thrombocytopenia, GERD, gastric esophageal varices  who now comes in for a follow up pulmonary evaluation. Her chief complaint is\par \par -she notes exposure to the flu\par -she notes returning back to work\par -she notes increased fatigue \par -she notes SOB on stairs \par -she notes chest pressure and awaiting cardiologist appointment \par -she notes good quality of sleep \par -she notes sleep is uninterrupted  \par -She notes that bowels are regular  \par -she notes sense of smell and taste is stable \par -she denies exercise due to work and increased fatigue \par -she notes appetite is stable\par -she notes severe arthralgia and myalgia throughout her whole body 2 weeks ago that has improved \par -She denies taking any new medications, vitamins, or supplements. \par \par -denies any visual issues, headaches, nausea, vomiting, fever, chills, sweats, chest pain, diarrhea, constipation, dysphagia, dizziness, leg swelling, leg pain, itchy eyes, itchy ears, heartburn, reflux, or sour taste in the mouth.

## 2022-05-03 NOTE — ADDENDUM
[FreeTextEntry1] : Documented by Meera Jaquez acting as a scribe for Dr. Onofre Cameron on 05/03/2022 \par \par All medical record entries made by the Scribe were at my, Dr. Onofre Cameron's, direction and personally dictated by me on 05/03/2022 . I have reviewed the chart and agree that the record accurately reflects my personal performance of the history, physical exam, assessment and plan. I have also personally directed, reviewed, and agree with the discharge instructions

## 2022-05-04 ENCOUNTER — NON-APPOINTMENT (OUTPATIENT)
Age: 62
End: 2022-05-04

## 2022-05-06 ENCOUNTER — NON-APPOINTMENT (OUTPATIENT)
Age: 62
End: 2022-05-06

## 2022-05-06 DIAGNOSIS — G47.33 OBSTRUCTIVE SLEEP APNEA (ADULT) (PEDIATRIC): ICD-10-CM

## 2022-05-31 ENCOUNTER — OUTPATIENT (OUTPATIENT)
Dept: OUTPATIENT SERVICES | Facility: HOSPITAL | Age: 62
LOS: 1 days | End: 2022-05-31

## 2022-05-31 ENCOUNTER — APPOINTMENT (OUTPATIENT)
Dept: CV DIAGNOSITCS | Facility: HOSPITAL | Age: 62
End: 2022-05-31
Payer: COMMERCIAL

## 2022-05-31 DIAGNOSIS — Z98.890 OTHER SPECIFIED POSTPROCEDURAL STATES: Chronic | ICD-10-CM

## 2022-05-31 DIAGNOSIS — R06.00 DYSPNEA, UNSPECIFIED: ICD-10-CM

## 2022-05-31 DIAGNOSIS — O00.90 UNSPECIFIED ECTOPIC PREGNANCY WITHOUT INTRAUTERINE PREGNANCY: Chronic | ICD-10-CM

## 2022-05-31 DIAGNOSIS — Q23.1 CONGENITAL INSUFFICIENCY OF AORTIC VALVE: ICD-10-CM

## 2022-05-31 PROCEDURE — 93306 TTE W/DOPPLER COMPLETE: CPT | Mod: 26

## 2022-06-06 ENCOUNTER — APPOINTMENT (OUTPATIENT)
Dept: PULMONOLOGY | Facility: CLINIC | Age: 62
End: 2022-06-06

## 2022-06-06 ENCOUNTER — NON-APPOINTMENT (OUTPATIENT)
Age: 62
End: 2022-06-06

## 2022-06-07 ENCOUNTER — EMERGENCY (EMERGENCY)
Facility: HOSPITAL | Age: 62
LOS: 1 days | Discharge: HOME | End: 2022-06-07
Attending: EMERGENCY MEDICINE | Admitting: EMERGENCY MEDICINE
Payer: COMMERCIAL

## 2022-06-07 VITALS
TEMPERATURE: 98 F | SYSTOLIC BLOOD PRESSURE: 130 MMHG | HEART RATE: 67 BPM | OXYGEN SATURATION: 98 % | DIASTOLIC BLOOD PRESSURE: 65 MMHG | RESPIRATION RATE: 18 BRPM | HEIGHT: 60 IN

## 2022-06-07 VITALS
HEART RATE: 68 BPM | RESPIRATION RATE: 18 BRPM | SYSTOLIC BLOOD PRESSURE: 121 MMHG | DIASTOLIC BLOOD PRESSURE: 70 MMHG | OXYGEN SATURATION: 98 %

## 2022-06-07 DIAGNOSIS — Z98.890 OTHER SPECIFIED POSTPROCEDURAL STATES: Chronic | ICD-10-CM

## 2022-06-07 DIAGNOSIS — O00.90 UNSPECIFIED ECTOPIC PREGNANCY WITHOUT INTRAUTERINE PREGNANCY: Chronic | ICD-10-CM

## 2022-06-07 LAB
ALBUMIN SERPL ELPH-MCNC: 4 G/DL — SIGNIFICANT CHANGE UP (ref 3.3–5)
ALP SERPL-CCNC: 75 U/L — SIGNIFICANT CHANGE UP (ref 40–120)
ALT FLD-CCNC: 20 U/L — SIGNIFICANT CHANGE UP (ref 4–33)
ANION GAP SERPL CALC-SCNC: 9 MMOL/L — SIGNIFICANT CHANGE UP (ref 7–14)
AST SERPL-CCNC: 25 U/L — SIGNIFICANT CHANGE UP (ref 4–32)
BASE EXCESS BLDV CALC-SCNC: -2 MMOL/L — SIGNIFICANT CHANGE UP (ref -2–3)
BASOPHILS # BLD AUTO: 0.04 K/UL — SIGNIFICANT CHANGE UP (ref 0–0.2)
BASOPHILS NFR BLD AUTO: 0.7 % — SIGNIFICANT CHANGE UP (ref 0–2)
BILIRUB SERPL-MCNC: 0.5 MG/DL — SIGNIFICANT CHANGE UP (ref 0.2–1.2)
BLOOD GAS VENOUS COMPREHENSIVE RESULT: SIGNIFICANT CHANGE UP
BUN SERPL-MCNC: 14 MG/DL — SIGNIFICANT CHANGE UP (ref 7–23)
CALCIUM SERPL-MCNC: 9.3 MG/DL — SIGNIFICANT CHANGE UP (ref 8.4–10.5)
CHLORIDE BLDV-SCNC: 108 MMOL/L — SIGNIFICANT CHANGE UP (ref 96–108)
CHLORIDE SERPL-SCNC: 107 MMOL/L — SIGNIFICANT CHANGE UP (ref 98–107)
CO2 BLDV-SCNC: 24.3 MMOL/L — SIGNIFICANT CHANGE UP (ref 22–26)
CO2 SERPL-SCNC: 20 MMOL/L — LOW (ref 22–31)
CREAT SERPL-MCNC: 0.68 MG/DL — SIGNIFICANT CHANGE UP (ref 0.5–1.3)
EGFR: 98 ML/MIN/1.73M2 — SIGNIFICANT CHANGE UP
EOSINOPHIL # BLD AUTO: 0.15 K/UL — SIGNIFICANT CHANGE UP (ref 0–0.5)
EOSINOPHIL NFR BLD AUTO: 2.7 % — SIGNIFICANT CHANGE UP (ref 0–6)
GAS PNL BLDV: 137 MMOL/L — SIGNIFICANT CHANGE UP (ref 136–145)
GLUCOSE BLDV-MCNC: 109 MG/DL — HIGH (ref 70–99)
GLUCOSE SERPL-MCNC: 103 MG/DL — HIGH (ref 70–99)
HCO3 BLDV-SCNC: 23 MMOL/L — SIGNIFICANT CHANGE UP (ref 22–29)
HCT VFR BLD CALC: 41.2 % — SIGNIFICANT CHANGE UP (ref 34.5–45)
HCT VFR BLDA CALC: 40 % — SIGNIFICANT CHANGE UP (ref 34.5–46.5)
HGB BLD CALC-MCNC: 13.4 G/DL — SIGNIFICANT CHANGE UP (ref 11.5–15.5)
HGB BLD-MCNC: 13.2 G/DL — SIGNIFICANT CHANGE UP (ref 11.5–15.5)
IANC: 2.96 K/UL — SIGNIFICANT CHANGE UP (ref 1.8–7.4)
IMM GRANULOCYTES NFR BLD AUTO: 0.2 % — SIGNIFICANT CHANGE UP (ref 0–1.5)
LACTATE BLDV-MCNC: 1.7 MMOL/L — SIGNIFICANT CHANGE UP (ref 0.5–2)
LYMPHOCYTES # BLD AUTO: 1.75 K/UL — SIGNIFICANT CHANGE UP (ref 1–3.3)
LYMPHOCYTES # BLD AUTO: 32 % — SIGNIFICANT CHANGE UP (ref 13–44)
MAGNESIUM SERPL-MCNC: 2 MG/DL — SIGNIFICANT CHANGE UP (ref 1.6–2.6)
MCHC RBC-ENTMCNC: 28.5 PG — SIGNIFICANT CHANGE UP (ref 27–34)
MCHC RBC-ENTMCNC: 32 GM/DL — SIGNIFICANT CHANGE UP (ref 32–36)
MCV RBC AUTO: 89 FL — SIGNIFICANT CHANGE UP (ref 80–100)
MONOCYTES # BLD AUTO: 0.56 K/UL — SIGNIFICANT CHANGE UP (ref 0–0.9)
MONOCYTES NFR BLD AUTO: 10.2 % — SIGNIFICANT CHANGE UP (ref 2–14)
NEUTROPHILS # BLD AUTO: 2.96 K/UL — SIGNIFICANT CHANGE UP (ref 1.8–7.4)
NEUTROPHILS NFR BLD AUTO: 54.2 % — SIGNIFICANT CHANGE UP (ref 43–77)
NRBC # BLD: 0 /100 WBCS — SIGNIFICANT CHANGE UP
NRBC # FLD: 0 K/UL — SIGNIFICANT CHANGE UP
PCO2 BLDV: 40 MMHG — SIGNIFICANT CHANGE UP (ref 39–42)
PH BLDV: 7.37 — SIGNIFICANT CHANGE UP (ref 7.32–7.43)
PLATELET # BLD AUTO: 117 K/UL — LOW (ref 150–400)
PO2 BLDV: 53 MMHG — SIGNIFICANT CHANGE UP
POTASSIUM BLDV-SCNC: 3.9 MMOL/L — SIGNIFICANT CHANGE UP (ref 3.5–5.1)
POTASSIUM SERPL-MCNC: 3.8 MMOL/L — SIGNIFICANT CHANGE UP (ref 3.5–5.3)
POTASSIUM SERPL-SCNC: 3.8 MMOL/L — SIGNIFICANT CHANGE UP (ref 3.5–5.3)
PROT SERPL-MCNC: 6.1 G/DL — SIGNIFICANT CHANGE UP (ref 6–8.3)
RBC # BLD: 4.63 M/UL — SIGNIFICANT CHANGE UP (ref 3.8–5.2)
RBC # FLD: 13.9 % — SIGNIFICANT CHANGE UP (ref 10.3–14.5)
SAO2 % BLDV: 85.2 % — SIGNIFICANT CHANGE UP
SODIUM SERPL-SCNC: 136 MMOL/L — SIGNIFICANT CHANGE UP (ref 135–145)
WBC # BLD: 5.47 K/UL — SIGNIFICANT CHANGE UP (ref 3.8–10.5)
WBC # FLD AUTO: 5.47 K/UL — SIGNIFICANT CHANGE UP (ref 3.8–10.5)

## 2022-06-07 PROCEDURE — 93010 ELECTROCARDIOGRAM REPORT: CPT

## 2022-06-07 PROCEDURE — 99285 EMERGENCY DEPT VISIT HI MDM: CPT | Mod: 25

## 2022-06-07 RX ORDER — SODIUM CHLORIDE 9 MG/ML
1000 INJECTION INTRAMUSCULAR; INTRAVENOUS; SUBCUTANEOUS ONCE
Refills: 0 | Status: COMPLETED | OUTPATIENT
Start: 2022-06-07 | End: 2022-06-07

## 2022-06-07 RX ADMIN — SODIUM CHLORIDE 1000 MILLILITER(S): 9 INJECTION INTRAMUSCULAR; INTRAVENOUS; SUBCUTANEOUS at 11:03

## 2022-06-07 NOTE — ED PROVIDER NOTE - PATIENT PORTAL LINK FT
You can access the FollowMyHealth Patient Portal offered by [facility] by registering at the following website: [email or web address]. By joining [facility]’s FollowMyHealth portal, you will also be able to view your health information using other applications (apps) compatible with our system.

## 2022-06-07 NOTE — ED PROVIDER NOTE - ATTENDING CONTRIBUTION TO CARE
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 Pt was a rapid response and brought to the ED. Pt states since the event feeling better and has had similar events in the past with periodic paralysis. Pt denies any headache, neck pain, back pain, fever, chills, nausea, vomiting, SOB, chest pain, or abd pain. Denies any falls or LOC.   VITALS: Vitals have been reviewed.  GEN APPEARANCE: WDWN, alert and cooperative, non-toxic appearing and in NAD  HEAD: Atraumatic, normocephalic.   EYES: PERRL, EOMI.   EARS: Gross hearing intact.   NOSE: No nasal discharge.   THROAT: MMM. O  CV: RRR, S1S2, no c/r/m/g. No cyanosis or pallor. Extremities warm, well perfused. Cap refill <2 seconds. No bruits.   LUNGS: CTAB. No wheezing. No rales. No rhonchi. No diminished breath sounds.   ABDOMEN: Soft, NTND. No guarding or rebound.   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  Concern for periodic paralysis, electrolyte abnormality, dehydration  Labs, EKG, IVF

## 2022-06-07 NOTE — ED PROVIDER NOTE - MUSCULOSKELETAL, MLM
Spine appears normal, range of motion is not limited, no muscle or joint tenderness. Global weakness but equal bilaterally

## 2022-06-07 NOTE — ED PROVIDER NOTE - NSFOLLOWUPINSTRUCTIONS_ED_ALL_ED_FT
1. Important to follow up with your cardiologist tomorrow for follow up. Please find your results attached and share with your healthcare providers.     2. Important to continue your home medications.     3. Please return to the ED for any persistent or recurrent symptoms, vomiting, diarrhea, or any other concerns.

## 2022-06-07 NOTE — ED PROVIDER NOTE - OBJECTIVE STATEMENT
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 Pt was a rapid response and brought to the ED. Pt states since the event feeling better and has had similar events in the past with periodic paralysis. Pt denies any headache, neck pain, back pain, fever, chills, nausea, vomiting, SOB, chest pain, or abd pain. Denies any falls or LOC.

## 2022-06-07 NOTE — ED ADULT NURSE NOTE - OBJECTIVE STATEMENT
p/t with is a 62y old female with unk neurologic disorder, received awake and responsive, c/o of generalized weakness and near syncopal episode this am, p/t denies any chest pain denies headaches, no neuro deficit noted

## 2022-06-07 NOTE — ED PROVIDER NOTE - PROGRESS NOTE DETAILS
EKG and lab work up WNL Results discussed with patient. Endorses understanding and will follow up with cards tomorrow. Aware to follow up with neurology as well. Will dc with return precautions. Dr. Blair: Pt states feeling better. Able to ambulate. Strength equal b/l. EKG and lab work up WNL

## 2022-06-07 NOTE — ED PROVIDER NOTE - NSICDXPASTMEDICALHX_GEN_ALL_CORE_FT
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

## 2022-06-07 NOTE — ED PROVIDER NOTE - CLINICAL SUMMARY MEDICAL DECISION MAKING FREE TEXT BOX
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   Concern for periodic paralysis, electrolyte abnormality, dehydration  Labs, EKG, IVF

## 2022-06-07 NOTE — ED PROVIDER NOTE - NSICDXPASTSURGICALHX_GEN_ALL_CORE_FT
PAST SURGICAL HISTORY:   delivery NOS     Ectopic pregnancy     H/O bilateral inguinal hernia repair age 5

## 2022-06-07 NOTE — ED ADULT NURSE NOTE - NSIMPLEMENTINTERV_GEN_ALL_ED
Implemented All Fall Risk Interventions:  [place] to call system. Call bell, personal items and telephone within reach. Instruct patient to call for assistance. Room bathroom lighting operational. Non-slip footwear when patient is off stretcher. Physically safe environment: no spills, clutter or unnecessary equipment. Stretcher in lowest position, wheels locked, appropriate side rails in place. Provide visual cue, wrist band, yellow gown, etc. Monitor gait and stability. Monitor for mental status changes and reorient to person, place, and time. Review medications for side effects contributing to fall risk. Reinforce activity limits and safety measures with patient and family.

## 2022-06-07 NOTE — ED ADULT TRIAGE NOTE - CHIEF COMPLAINT QUOTE
p/t c/o of near syncopal episode while drawing blood from patient, denies any chest pain, denies headaches

## 2022-06-07 NOTE — ED PROVIDER NOTE - GASTROINTESTINAL, MLM
[address] Exam    Yuliya Veronica is a 28year old male who presents for comprehensive eye exam. Patient was seen at our office last year, no major changes in vision. Mostly wearing contacts which work well, does have glasses with him today. Also dealing with stye on lower left lid x 1 week, went to [facility] and recommended warm compress which he has been doing a few times a day. States that it is better, but tried putting in CLs and thinks it got irritated again. Still red and slightly swollen, but less irritation than at onset. Negative burning, itching, redness, tearing  Negative flashes, floaters, double vision, loss of vision. HISTORY OF PRESENT ILLNESS  I have reviewed and appreciated the technician's history and test results as outlined above. VISION  Visual Acuity (Snellen - Linear)       Right Left    Dist cc 20/30 20/30    Near cc 20/25 20/    Correction:  Glasses           REFRACTION  Final Rx       Sphere Cylinder    Right -16.75 Sphere    Left -15.75 Sphere    Type:  Single Vision    Expiration Date:  8/30/2021          PHYSICAL EXAM  Main Ophthalmology Exam     External Exam       Right Left    External Normal Normal          Slit Lamp Exam       Right Left    Lids/Lashes Normal Chalazion: Lower lid    Conjunctiva/Sclera Clear Clear    Cornea 2-3 peripheral round scars at 7:00 Clear    Anterior Chamber Deep and quiet Deep and quiet    Iris Round and regular Round and regular    Lens Clear Clear    Vitreous Clear Clear          Fundus Exam       Right Left    Disc Peripapillary atrophy Peripapillary atrophy    C/D Ratio 0.15 0.15    Macula Healthy with sharp foveal reflex Healthy with sharp foveal reflex    Vessels Healthy with normal Artery-Vein ratio Healthy with normal Artery-Vein ratio    Periphery Flat, healthy, without tears or detachments Flat, healthy, without tears or detachments                ASSESSMENT   1. High myopia, both eyes    2.  Chalazion of left lower eyelid        PLAN 1. Dispensed updated glasses and CL Rx. No changes made  2. Add Maxitrol ointment BID OS x 1 week, continue with warm compresses and lid massage. Stay out of CLs until resolved    Discussed exam findings with patient. Patient reassured and has no further questions. Follow up in 1 year . Abdomen soft, non-tender, no guarding.

## 2022-06-08 ENCOUNTER — APPOINTMENT (OUTPATIENT)
Dept: CARDIOLOGY | Facility: CLINIC | Age: 62
End: 2022-06-08

## 2022-06-08 VITALS
BODY MASS INDEX: 24.73 KG/M2 | HEART RATE: 64 BPM | WEIGHT: 131 LBS | OXYGEN SATURATION: 96 % | SYSTOLIC BLOOD PRESSURE: 122 MMHG | DIASTOLIC BLOOD PRESSURE: 76 MMHG | HEIGHT: 61 IN

## 2022-06-08 DIAGNOSIS — I35.1 NONRHEUMATIC AORTIC (VALVE) INSUFFICIENCY: ICD-10-CM

## 2022-06-08 PROCEDURE — 93000 ELECTROCARDIOGRAM COMPLETE: CPT

## 2022-06-08 PROCEDURE — 99214 OFFICE O/P EST MOD 30 MIN: CPT

## 2022-06-21 ENCOUNTER — APPOINTMENT (OUTPATIENT)
Dept: OBGYN | Facility: CLINIC | Age: 62
End: 2022-06-21
Payer: COMMERCIAL

## 2022-06-21 ENCOUNTER — RESULT REVIEW (OUTPATIENT)
Age: 62
End: 2022-06-21

## 2022-06-21 PROCEDURE — 82270 OCCULT BLOOD FECES: CPT

## 2022-06-21 PROCEDURE — 99396 PREV VISIT EST AGE 40-64: CPT | Mod: 25

## 2022-06-21 PROCEDURE — 81002 URINALYSIS NONAUTO W/O SCOPE: CPT

## 2022-06-28 PROBLEM — I35.1 MILD AORTIC REGURGITATION: Status: ACTIVE | Noted: 2021-07-29

## 2022-07-12 NOTE — ED PROVIDER NOTE - NSCAREINITIATED _GEN_ER
MEDICARE WELLNESS VISIT NOTE    HISTORY OF PRESENT ILLNESS:   Anastacio Mcintosh presents for his Subsequent Annual Medicare Wellness Visit. He has no current complaints or concerns.       Patient Care Team:  Jasbir Bullock MD as PCP - General (Internal Medicine)  Ellen Choi MD as Consulting Physician (Electrophysiology)  Vianca Massey MD as Consulting Physician (Peripheral Vascular Medicine)  Lauren Means MD (Internal Medicine - Endocrinology,Diabetes,Metabolism)  Laine Che MD as Anesthesiologist (Pain Medicine - Interventional Pain Medicine)  Hector Barrett MD (Internal Medicine - Pulmonary Disease)  Sean Miller MD (Ophthalmology)  Mine Lewis MD (Orthopedic Surgery)  Vishal Lovell MD (Vascular Surgery)  Cecily Mcmahon RN as Care Transitions Nurse (Registered Nurse)        Patient Active Problem List   Diagnosis   â¢ Vitamin D deficiency   â¢ Mixed hyperlipidemia   â¢ ED (erectile dysfunction)   â¢ Peripheral neuropathy   â¢ Nonalcoholic fatty liver   â¢ DJD (degenerative joint disease), multiple sites   â¢ Obstructive sleep apnea   â¢ Acne rosacea   â¢ Atrial fibrillation, persistent   â¢ Esophageal reflux   â¢ Coronary artery disease    â¢ Hypertension   â¢ Aortic valve regurgitation   â¢ Chronic cough   â¢ Shoulder pain, right   â¢ Gait instability   â¢ Near syncope   â¢ Autonomic dysfunction   â¢ Orthostatic hypotension   â¢ Gout   â¢ Pes cavus   â¢ Peripheral autonomic neuropathy in disorders classified elsewhere   â¢ Porokeratosis   â¢ Fluid level behind tympanic membrane of right ear   â¢ Hearing loss in right ear   â¢ Meniere's disease of right ear   â¢ Lumbar radiculopathy   â¢ Lumbosacral stenosis   â¢ Spinal stenosis, lumbar region, without neurogenic claudication   â¢ Degeneration of lumbar or lumbosacral intervertebral disc   â¢ DDD (degenerative disc disease), lumbar   â¢ Morbid obesity with BMI of 40.0-44.9, adult (CMS/[facility])   â¢ Unsteadiness on feet   â¢ Sensorineural hearing loss (SNHL) of both ears   â¢ Bronchospastic airway disease   â¢ AV block, 1st degree   â¢ Encounter for therapeutic drug monitoring   â¢ Traumatic hematoma of thigh, right, initial encounter   â¢ PVD (peripheral vascular disease) (CMS/[facility])   â¢ Type 2 diabetes mellitus with diabetic nephropathy, without long-term current use of insulin (CMS/[facility])   â¢ Abdominal aortic aneurysm (AAA) without rupture (CMS/[facility])   â¢ Moderate recurrent major depression (CMS/[facility])   â¢ Moderate persistent asthma without complication   â¢ Presence of Watchman left atrial appendage closure device         Past Medical History:   Diagnosis Date   â¢ Arthritis    â¢ Balance problem     uses cane PRN   â¢ CAD (coronary artery disease)    â¢ Cerebral infarction (CMS/[facility])     TIA  hx, no residual   â¢ Colon polyp 06/08/2020    dr Leonardo Owen, tubular adenoma recall 1 year. â¢ Colon polyp 05/24/2021    dr Leonardo Owen   â¢ Depression    â¢ Diabetes    â¢ Diverticulosis of colon    â¢ Dyslipidemia    â¢ Esophageal reflux    â¢ Fracture     4 &5th metatarsal Rt. foot   â¢ Hearing aid worn    â¢ [place] (hard of hearing)     right ear   â¢ Hypertension    â¢ Metabolic syndrome    â¢ Obesity    â¢ ZAINA on CPAP     CPAP 9CM CHANGED FROM AUTOPAP 5-15 cmH20  Patient switched from [facility] to [employer] due to insurance.     â¢ Osteoporosis, unspecified 7/6/2011   â¢ Other chronic pain     arthritis    â¢ Paroxysmal A-fib (CMS/[facility])    â¢ Pneumonia    â¢ RAD (reactive airway disease)    â¢ Seroma due to trauma (CMS/[facility]) 11/20/2020    hematoma/seroma right knee from a fall on 11/20/2020   â¢ Spinal stenosis    â¢ Unspecified sinusitis (chronic)    â¢ Uses walker          Past Surgical History:   Procedure Laterality Date   â¢ Cardiac catherization  2010   â¢ Colonoscopy w/ polypectomy  06/08/2020    Dr Leonardo Owen - 10 polyps removed, repeat in 1 yr   â¢ Colonoscopy w/ polypectomy  05/24/2021    dr Leonardo Owen   â¢ Echo heart resting  08/06/2014    LVEF 60%   â¢ Ep a-fib abl (w/anest)  12/16/2014   â¢ Eye surgery      laser eye surg to rt eye "  â¢ Knee surgery  2011     rt knee arthroscopy   â¢ Memory loop wearable  2013   â¢ Percutaneous placement of drainage catheter knee joint  2021    right knee/thigh area   â¢ Ptca  Mar 2010     3 stents    â¢ Removal gallbladder  1998   â¢ Service to gastroenterology  2012    Colonoscopy   â¢ Stress test  2014   â¢ Rolo - cv  2022   Iesha Beasleye for monitoring purposes     â¢ Rolo for monitoring purposes     â¢ Tilt table evaluation  2013    Positive for vasodepression likely due to autonomic dysfunction with identical reproduction of presenting symptoms   â¢ Tonsillectomy     â¢ Watchman patch mitral valve  2021    left atrial appendage         Social History     Tobacco Use   â¢ Smoking status: Former Smoker     Packs/day: 1.00     Years: 10.00     Pack years: 10.00     Types: Cigarettes     Quit date: 1978     Years since quittin.5   â¢ Smokeless tobacco: Never Used   Vaping Use   â¢ Vaping Use: never used   Substance Use Topics   â¢ Alcohol use: Yes     Alcohol/week: 1.0 standard drink     Types: 1 Standard drinks or equivalent per week     Comment: ""Very limited\""   â¢ Drug use: No     Drug use:    Drug Use:    No              Family History   Problem Relation Age of Onset   â¢ Cancer Mother    â¢ Stroke Mother    â¢ Heart disease Father    â¢ Arrhythmia Sister    â¢ Diabetes Brother        Current Outpatient Medications   Medication Sig Dispense Refill   â¢ metoPROLOL tartrate (LOPRESSOR) 25 MG tablet TAKE 1 TABLET BY MOUTH TWICE DAILY 180 tablet 3   â¢ Trulicity 6.33 WG/7.3HS pen-injector ADMINISTER 0.75 MG UNDER THE SKIN EVERY 7 DAYS FOR 4 DOSES.  INCREASE TO 1.5 MG WEEKLY INJECTION 2 mL 5   â¢ pantoprazole (PROTONIX) 40 MG tablet TAKE 1 TABLET BY MOUTH DAILY 90 tablet 0   â¢ pravastatin (PRAVACHOL) 40 MG tablet TAKE 1 TABLET BY MOUTH DAILY 90 tablet 3   â¢ Januvia 50 MG tablet TAKE 1 TABLET BY MOUTH DAILY 90 tablet 0   â¢ buPROPion XL (WELLBUTRIN XL) 150 MG 24 hr tablet Take 1 tablet by mouth " daily. 90 tablet 1   â¢ escitalopram (LEXAPRO) 20 MG tablet Take 1 tablet by mouth daily. 90 tablet 1   â¢ blood glucose test strip Test blood sugar 1-2 times daily as directed. Diagnosis: E11.9. Meter: One touch ultra 100 strip 3   â¢ ciclopirox olamine (LOPROX) 0.77 % cream Apply topically 2 times daily. Indications: Ringworm of Groin Area, Athlete's Foot 60 g 3   â¢ amLODIPine (NORVASC) 5 MG tablet TAKE 1 TABLET BY MOUTH EVERY EVENING 90 tablet 3   â¢ losartan (COZAAR) 50 MG tablet TAKE 1 TABLET BY MOUTH DAILY 90 tablet 3   â¢ ketoconazole (NIZORAL) 2 % cream Apply two times daily to the face 60 g 11   â¢ Dupilumab (Dupixent) 300 MG/2ML Solution Pen-injector Inject 300 mg into the skin every 28 days. (Patient taking differently: Inject 300 mg into the skin every 28 days. Indications: Atopic Dermatitis) 3.92 mL 6   â¢ albuterol (Ventolin HFA) 108 (90 Base) MCG/ACT inhaler INHALE 2 PUFFS BY MOUTH INTO THE LUNGS EVERY 4 HOURS AS NEEDED FOR SHORTNESS OF BREATH OR WHEEZING 1 each 0   â¢ triamcinolone (ARISTOCORT) 0.1 % cream Apply topically 2 times daily. Indications: Allergic Contact Dermatitis 454 g 0   â¢ alfuzosin (UROXATRAL) 10 MG 24 hr tablet Take 1 tablet by mouth daily. 90 tablet 3   â¢ fluticasone-salmeterol (Advair Diskus) 500-50 MCG/DOSE inhaler Inhale 1 puff into the lungs two times daily. 60 each 5   â¢ camphor-menthol (Sarna) 0.5-0.5 % lotion Apply topically as needed for Itching. 222 mL 0   â¢ psyllium (Metamucil MultiHealth Fiber) 58.12 % packet for oral suspension Take 1 packet by mouth daily. â¢ Vitamin D, Cholecalciferol, 50 mcg (2,000 units) capsule Take 50 mcg by mouth daily. â¢ aspirin 81 MG tablet Take 162 mg by mouth daily. â¢ Multiple Vitamins-Minerals (MULTIVITAMIN PO) Take 1 tablet by mouth nightly. â¢ Loratadine (CLARITIN PO) Take 10 mg by mouth daily. No current facility-administered medications for this visit.         The following items on the Medicare Health Risk Assessment were found to be positive  1.) Do you have an Advance directive, living will, or power of  for health care document that contains your wishes for end of life care?: No     7a.) Have you had a fall in the past year?: Yes     7b.) Do you feel unsteady when standing or walking?: Yes     7c.) Do you worry about falling?: Yes     11h.) Problems with your hearing: Often     15.) How confident are you that you can control and manage most of your health problems?: Somewhat confident         Vision and Hearing screens: Not performed    Advance Directive: The patient has the following documents:  No Advance Directives on file. Patient offered documents. Cognitive/Functional Status: no evidence of cognitive dysfunction by direct observation    Opioid Review: Nolvia Orlando is not taking opioid medications. Recent PHQ 2/9 Score:    PHQ 2:  Date Adult PHQ 2 Score Adult PHQ 2 Interpretation   7/10/2022 0 No further screening needed       PHQ 9:       DEPRESSION ASSESSMENT/PLAN:  Start and/or continue medication. See orders for details. Body mass index is 46.2 kg/mÂ². BMI ASSESSMENT/PLAN:  Patient is obese. 15 minutes of physical activity a day        Needed Screening/Treatment:   Immunizations reviewed and patient needs: COVID-19  Needed follow up:  None    See orders. See Patient Instructions section. Return in about 1 year (around 7/12/2023) for Medicare Wellness Visit. CHIEF COMPLAINT/HISTORY OF PRESENT ILLNESS:  Reddy Kim is a 66year old male that presents for follow up of medical problems  ASHD-no chest pain follows with cardiology  Â   Lipids-tolerating statin  Â   Knee pain-biggest issue and ongoing looking for second opinion regarding the pain has not been taking anything for the pain  Â   DM-on Juanice Ling which is effective not checking sugars.  Also was on trulicity but stopped secondary to cost. Is frustrated with the cost of Juanice Ling and trulicity looking for assistance with Dana Ahn  Â   Depression/anxiety-feels symptoms are stable. Pt does care for his wife who has parkinson disease which is progressing. Pt also is POA for brother in nursing home and had dementia  Â   Apnea-follows with pulmonary and complaint with CPAP  Â   GERD-on PPI  Â       Past Medical History:   Diagnosis Date   â¢ Arthritis    â¢ Balance problem     uses cane PRN   â¢ CAD (coronary artery disease)    â¢ Cerebral infarction (CMS/[facility])     TIA  hx, no residual   â¢ Colon polyp 06/08/2020    dr Carlota Larry, tubular adenoma recall 1 year. â¢ Colon polyp 05/24/2021    dr Carlota Larry   â¢ Depression    â¢ Diabetes    â¢ Diverticulosis of colon    â¢ Dyslipidemia    â¢ Esophageal reflux    â¢ Fracture     4 &5th metatarsal Rt. foot   â¢ Hearing aid worn    â¢ [place] (hard of hearing)     right ear   â¢ Hypertension    â¢ Metabolic syndrome    â¢ Obesity    â¢ ZAINA on CPAP     CPAP 9CM CHANGED FROM AUTOPAP 5-15 cmH20  Patient switched from [facility] to [employer] due to insurance. â¢ Osteoporosis, unspecified 7/6/2011   â¢ Other chronic pain     arthritis    â¢ Paroxysmal A-fib (CMS/[facility])    â¢ Pneumonia    â¢ RAD (reactive airway disease)    â¢ Seroma due to trauma (CMS/[facility]) 11/20/2020    hematoma/seroma right knee from a fall on 11/20/2020   â¢ Spinal stenosis    â¢ Unspecified sinusitis (chronic)    â¢ Uses walker        Current Outpatient Medications   Medication Sig Dispense Refill   â¢ metoPROLOL tartrate (LOPRESSOR) 25 MG tablet TAKE 1 TABLET BY MOUTH TWICE DAILY 180 tablet 3   â¢ Trulicity 7.83 YM/8.7BO pen-injector ADMINISTER 0.75 MG UNDER THE SKIN EVERY 7 DAYS FOR 4 DOSES. INCREASE TO 1.5 MG WEEKLY INJECTION 2 mL 5   â¢ pantoprazole (PROTONIX) 40 MG tablet TAKE 1 TABLET BY MOUTH DAILY 90 tablet 0   â¢ pravastatin (PRAVACHOL) 40 MG tablet TAKE 1 TABLET BY MOUTH DAILY 90 tablet 3   â¢ Januvia 50 MG tablet TAKE 1 TABLET BY MOUTH DAILY 90 tablet 0   â¢ buPROPion XL (WELLBUTRIN XL) 150 MG 24 hr tablet Take 1 tablet by mouth daily.  90 tablet 1   â¢ escitalopram (LEXAPRO) 20 MG tablet Take 1 tablet by mouth daily. 90 tablet 1   â¢ blood glucose test strip Test blood sugar 1-2 times daily as directed. Diagnosis: E11.9. Meter: One touch ultra 100 strip 3   â¢ ciclopirox olamine (LOPROX) 0.77 % cream Apply topically 2 times daily. Indications: Ringworm of Groin Area, Athlete's Foot 60 g 3   â¢ amLODIPine (NORVASC) 5 MG tablet TAKE 1 TABLET BY MOUTH EVERY EVENING 90 tablet 3   â¢ losartan (COZAAR) 50 MG tablet TAKE 1 TABLET BY MOUTH DAILY 90 tablet 3   â¢ ketoconazole (NIZORAL) 2 % cream Apply two times daily to the face 60 g 11   â¢ Dupilumab (Dupixent) 300 MG/2ML Solution Pen-injector Inject 300 mg into the skin every 28 days. (Patient taking differently: Inject 300 mg into the skin every 28 days. Indications: Atopic Dermatitis) 3.92 mL 6   â¢ albuterol (Ventolin HFA) 108 (90 Base) MCG/ACT inhaler INHALE 2 PUFFS BY MOUTH INTO THE LUNGS EVERY 4 HOURS AS NEEDED FOR SHORTNESS OF BREATH OR WHEEZING 1 each 0   â¢ triamcinolone (ARISTOCORT) 0.1 % cream Apply topically 2 times daily. Indications: Allergic Contact Dermatitis 454 g 0   â¢ alfuzosin (UROXATRAL) 10 MG 24 hr tablet Take 1 tablet by mouth daily. 90 tablet 3   â¢ fluticasone-salmeterol (Advair Diskus) 500-50 MCG/DOSE inhaler Inhale 1 puff into the lungs two times daily. 60 each 5   â¢ camphor-menthol (Sarna) 0.5-0.5 % lotion Apply topically as needed for Itching. 222 mL 0   â¢ psyllium (Metamucil MultiHealth Fiber) 58.12 % packet for oral suspension Take 1 packet by mouth daily. â¢ Vitamin D, Cholecalciferol, 50 mcg (2,000 units) capsule Take 50 mcg by mouth daily. â¢ aspirin 81 MG tablet Take 162 mg by mouth daily. â¢ Multiple Vitamins-Minerals (MULTIVITAMIN PO) Take 1 tablet by mouth nightly. â¢ Loratadine (CLARITIN PO) Take 10 mg by mouth daily. No current facility-administered medications for this visit.        ALLERGIES:   Allergen Reactions   â¢ Crestor [Rosuvastatin Calcium] MYALGIA   â¢ Simvastatin "neuropathy   â¢ Latex   (Environmental) RASH   â¢ Skin Adhesives RASH     TELE Patches   â¢ Metformin      GI upset   â¢ Tegaderm Film [Wound Dressing Adhesive] RASH       PHYSICAL EXAM:  Visit Vitals  /84 (BP Location: RUE - Right upper extremity, Patient Position: Sitting, Cuff Size: Large Adult)   Pulse 68   Ht 6' 1.5"" (1.867 m)   Wt (!) 161 kg (355 lb)   SpO2 97%   BMI 46.20 kg/mÂ²     General:  Alert, in no acute distress. Skin:  Warm with normal turgor, no rash. Head:  Atraumatic and normocephalic. Neck:  Supple  Lungs:  Clear to auscultation, no wheezing or rhonchi noted. Heart:  Regular rhythm, S1, S2, no murmur present. Abdomen:  Soft, nondistended, nontender, no guarding or masses, normoactive bowel sounds, no abdominal bruits.   Extremities:  1 plus edema R greater than L    ASSESSMENT:        Type 2 diabetes mellitus with diabetic nephropathy, without long-term current use of insulin (CMS/[facility])  Follow a1c discuss treatment pending results as [facility] [place] [facility] but hoping ot get help from [place] [facility] for program assistance    Obstructive sleep apnea  Follow with pulm continue CPAP    Mild major depression (CMS/[facility])  Stable on current medications    Mixed hyperlipidemia  Continue statin    Coronary artery disease   Medical management follows with cardiology    Lymphedema of lower extremity, unspecified laterality  Lymph edema treatment          " Salbador HernandezPA)

## 2022-07-26 ENCOUNTER — APPOINTMENT (OUTPATIENT)
Dept: CARDIOLOGY | Facility: CLINIC | Age: 62
End: 2022-07-26

## 2022-07-26 PROCEDURE — 99213 OFFICE O/P EST LOW 20 MIN: CPT | Mod: 95

## 2022-08-11 ENCOUNTER — APPOINTMENT (OUTPATIENT)
Dept: PULMONOLOGY | Facility: CLINIC | Age: 62
End: 2022-08-11

## 2022-08-16 ENCOUNTER — APPOINTMENT (OUTPATIENT)
Dept: ULTRASOUND IMAGING | Facility: CLINIC | Age: 62
End: 2022-08-16

## 2022-08-16 ENCOUNTER — APPOINTMENT (OUTPATIENT)
Dept: MAMMOGRAPHY | Facility: CLINIC | Age: 62
End: 2022-08-16

## 2022-08-16 ENCOUNTER — OUTPATIENT (OUTPATIENT)
Dept: OUTPATIENT SERVICES | Facility: HOSPITAL | Age: 62
LOS: 1 days | End: 2022-08-16
Payer: COMMERCIAL

## 2022-08-16 DIAGNOSIS — Z98.890 OTHER SPECIFIED POSTPROCEDURAL STATES: Chronic | ICD-10-CM

## 2022-08-16 DIAGNOSIS — O00.90 UNSPECIFIED ECTOPIC PREGNANCY WITHOUT INTRAUTERINE PREGNANCY: Chronic | ICD-10-CM

## 2022-08-16 DIAGNOSIS — Z00.8 ENCOUNTER FOR OTHER GENERAL EXAMINATION: ICD-10-CM

## 2022-08-16 PROCEDURE — 76641 ULTRASOUND BREAST COMPLETE: CPT

## 2022-08-16 PROCEDURE — 77063 BREAST TOMOSYNTHESIS BI: CPT | Mod: 26

## 2022-08-16 PROCEDURE — 77067 SCR MAMMO BI INCL CAD: CPT | Mod: 26

## 2022-08-16 PROCEDURE — 77067 SCR MAMMO BI INCL CAD: CPT

## 2022-08-16 PROCEDURE — 76641 ULTRASOUND BREAST COMPLETE: CPT | Mod: 26,50

## 2022-08-16 PROCEDURE — 77063 BREAST TOMOSYNTHESIS BI: CPT

## 2022-09-12 ENCOUNTER — APPOINTMENT (OUTPATIENT)
Dept: PULMONOLOGY | Facility: CLINIC | Age: 62
End: 2022-09-12

## 2022-09-12 ENCOUNTER — NON-APPOINTMENT (OUTPATIENT)
Age: 62
End: 2022-09-12

## 2022-09-12 VITALS
HEIGHT: 61 IN | OXYGEN SATURATION: 98 % | DIASTOLIC BLOOD PRESSURE: 80 MMHG | SYSTOLIC BLOOD PRESSURE: 125 MMHG | WEIGHT: 129 LBS | RESPIRATION RATE: 16 BRPM | BODY MASS INDEX: 24.35 KG/M2 | TEMPERATURE: 97.1 F | HEART RATE: 61 BPM

## 2022-09-12 PROCEDURE — 99214 OFFICE O/P EST MOD 30 MIN: CPT | Mod: 25

## 2022-09-12 PROCEDURE — 94010 BREATHING CAPACITY TEST: CPT

## 2022-09-12 PROCEDURE — 95012 NITRIC OXIDE EXP GAS DETER: CPT

## 2022-09-12 NOTE — HISTORY OF PRESENT ILLNESS
[TextBox_4] : Ms. LOVELL is a 61 year female with a history of arthritis of the hip, bicuspid aortic valve, hypercalcemic periodic predialysis, mild aortic regurgitation, moderate aortic stenosis, myositis rheumatic mitral valve disease, idiopathic thrombocytopenia, GERD, gastric esophageal varices  who now comes in for a follow up pulmonary evaluation. Her chief complaint is\par - she notes getting CPAP machine with full mask but was not able to tolerate it \par - she notes changing the mask but it is leaking which interrupts her sleep \par - she notes she have been using it for 3-4 weeks\par - she notes using is regularly\par - she notes weight is stable\par - she notes she will be getting cataract surgery 10/13 and won't be using the machine \par - she notes bowels are regular \par - she notes sense of smell and taste is okay \par - she notes heartburn and reflux \par - she notes breathing has been better \par - she denies taking any new medications, vitamins, or supplements \par - s/p COVID-19 vax x3 \par \par - She denies any headaches, nausea, vomiting, fever, chills, sweats, palpitations, SOB, coughing, wheezing, fatigue, diarrhea, constipation, dysphagia, myalgias, dizziness, leg swelling, leg pain, itchy eyes, itchy ears,  or sour taste in the mouth

## 2022-09-12 NOTE — ASSESSMENT
[FreeTextEntry1] : Ms. LOVELL is a 62 year female with a history of arthritis of the hip, bicuspid aortic valve, hypercalcemic periodic predialysis, mild aortic regurgitation, moderate aortic stenosis, myositis rheumatic mitral valve disease, idiopathic thrombocytopenia, GERD, gastric esophageal varices  who now comes in for a follow up pulmonary evaluation for second opinion for SOB following extensive workup CT,cardiac angiogram, cardiac Catherization right and left;- still symptomatic(?cardiac vs pulm vs ortho), +JO- poor mask tolerance \par \par \par The patient's SOB is felt to be multifactorial:\par -poor mechanics of breathing\par -out of shape/overweight\par -Pulmonary\par   -no pulmonary hypertension based on CT and cardiac ECHO \par   -no emphysema based of CT and PFT\par   -?asthma\par -Cardiac\par    -valvular dysfunction brought on by exertion (resting studies right and left cardiac cath and echo done at rest)\par \par Problem 1: ?asthma \par -complete methacholine challenge (MCT)\par - continue Stiolto 2 Puffs qDay or Trelegy 100 1 puff QD \par -s/p HRCT -showed mucous plugging \par - Inhaler technique reviewed as well as oral hygiene technique reviewed with patient. Avoidance of cold air, extremes of temperature, rescue inhaler should be used before exercise. Order of medication reviewed with patient. Recommended use of a cool mist humidifier in the bedroom. \par - Asthma is believed to be caused by inherited (genetic) and environmental factor, but its exact cause is unknown. asthma may be triggered by allergens, lung infections, or irritants in the air. Asthma triggers are different for each person \par \par Problem 2: No Iron deficiency anemia \par -s/p iron test (normal)\par \par Problem 3:Cardiac (valvular dysfunction brought on by exertion (resting studies right and left cardiac cath and echo done at rest)\par -s/p cardio pulmonary stress test\par -s/p ECHO stress jasmin\par -recommended consult with Dr. Tom Dowell for structural heart- recommended follow up \par -Recommend cardiac follow up evaluation with cardiologist if needed \par \par Problem 4 (+) JO (Unrestful sleep/ fatigue)\par -s/p 2nd sleep study- CPAP in place- (poor mask tolerance) - Dreamwear full face (community) \par -Sleep apnea is associated with adverse clinical consequences which can affect most organ systems. Cardiovascular disease risk includes arrhythmias, atrial fibrillation, hypertension, coronary artery disease, and stroke. Metabolic disorders include diabetes type 2, non-alcoholic fatty liver disease. Mood disorder especially depression; and cognitive decline especially in the elderly. Associations with chronic reflux/Levine’s esophagus some but not all inclusive. \par -Reasons include arousal consistent with hypopnea; respiratory events most prominent in REM sleep or supine position; therefore sleep staging and body position are important for accurate diagnosis and estimation of AHI. \par  \par Problem 5: out of shape\par - [employer] 10 days detox \par -Recommended Muniq "OTC" Supplement for controlling blood sugar levels, weight loss, and energy \par - Weight loss, exercise and diet control were discussed and are highly encouraged. Treatment options were given such as aqua therapy, and contacting a nutritionist. Recommended to use the elliptical, stationary bike, less use of treadmill. Mindful eating was explained to the patient. Obesity is associated with worsening asthma, SOB, and potential for cardiac disease, diabetes, and other underlying medical conditions.\par \par Problem 6: Poor mechanics of breathing\par -Recommended Wim Hof and Buteyko breathing techniques \par -recommended internet exercise platforms: [email or web address] and Aerobic exercise for seniors \par - Proper breathing techniques were reviewed with an emphasis on exhalation. Patient instructed to breath in for 1 second and out for four seconds. Patient was encouraged not to talk while walking.\par \par Problem 7: Health Maintenance\par - s/p COVID-19 vax x3 \par -Recommend attempting to get back into work\par -s/p flu shot 2021\par -recommended strep pneumonia vaccines: Prevnar-13 vaccine, follow by Pneumo vaccine 23 one year following\par -recommended early intervention for URIs\par -recommended regular osteoporosis evaluations\par -recommended early dermatological evaluations\par -recommended after the age of 50 to the age of 70, colonoscopy every 5 years\par \par f/u in 6-8 weeks\par pt is encouraged to call or fax the office with any questions or concerns.\par

## 2022-09-12 NOTE — PROCEDURE
[FreeTextEntry1] : PFT reveals normal flows, with an FEV1 of  2.15L, which is 98% of predicted, with a normal flow volume loop \par \par FENO was 33; a normal value being less than 25\par Fractional exhaled nitric oxide (FENO) is regarded as a simple, noninvasive method for assessing eosinophilic airway inflammation. Produced by a variety of cells within the lung, nitric oxide (NO) concentrations are generally low in healthy individuals. However, high concentrations of NO appear to be involved in nonspecific host defense mechanisms and chronic inflammatory diseases such as asthma. The American Thoracic Society (ATS) therefore has recommended using FENO to aid in the diagnosis and monitoring of eosinophilic airway inflammation and asthma, and for identifying steroid responsive individuals whose chronic respiratory symptoms may be caused by airway inflammation.

## 2022-09-12 NOTE — HISTORY OF PRESENT ILLNESS
[TextBox_4] : VISIT 5/3/2022:\par Ms. LOVELL is a 61 year female with a history of arthritis of the hip, bicuspid aortic valve, hypercalcemic periodic predialysis, mild aortic regurgitation, moderate aortic stenosis, myositis rheumatic mitral valve disease, idiopathic thrombocytopenia, GERD, gastric esophageal varices  who now comes in for a follow up pulmonary evaluation. Her chief complaint is\par \par -she notes exposure to the flu\par -she notes returning back to work\par -she notes increased fatigue \par -she notes SOB on stairs \par -she notes chest pressure and awaiting cardiologist appointment \par -she notes good quality of sleep \par -she notes sleep is uninterrupted  \par -She notes that bowels are regular  \par -she notes sense of smell and taste is stable \par -she denies exercise due to work and increased fatigue \par -she notes appetite is stable\par -she notes severe arthralgia and myalgia throughout her whole body 2 weeks ago that has improved \par -She denies taking any new medications, vitamins, or supplements. \par \par -denies any visual issues, headaches, nausea, vomiting, fever, chills, sweats, chest pain, diarrhea, constipation, dysphagia, dizziness, leg swelling, leg pain, itchy eyes, itchy ears, heartburn, reflux, or sour taste in the mouth. \par \par VISIT TODAY 9/12/2022:\par

## 2022-09-12 NOTE — ASSESSMENT
[FreeTextEntry1] : Ms. LOVELL is a 62 year female with a history of arthritis of the hip, bicuspid aortic valve, hypercalcemic periodic predialysis, mild aortic regurgitation, moderate aortic stenosis, myositis rheumatic mitral valve disease, idiopathic thrombocytopenia, GERD, gastric esophageal varices  who now comes in for an initial pulmonary evaluation for second opinion for SOB following extensive workup CT,cardiac angiogram, cardiac Catherization right and left;- still symptomatic(?cardiac vs pulm vs ortho), +JO\par \par \par The patient's SOB is felt to be multifactorial:\par -poor mechanics of breathing\par -out of shape/overweight\par -Pulmonary\par   -no pulmonary hypertension based on CT and cardiac ECHO \par   -no emphysema based of CT and PFT\par   -?asthma\par -Cardiac\par    -valvular dysfunction brought on by exertion (resting studies right and left cardiac cath and echo done at rest)\par \par Problem 1: ?asthma \par -complete methacholine challenge\par -consider MCT test \par - continue Stiolto 2 Puffs qDay or Trelegy 100 1 puff QD \par -add Trelegy 1 puff QD \par -s/p HRCT -showed mucous plugging \par - Inhaler technique reviewed as well as oral hygiene technique reviewed with patient. Avoidance of cold air, extremes of temperature, rescue inhaler should be used before exercise. Order of medication reviewed with patient. Recommended use of a cool mist humidifier in the bedroom. \par - Asthma is believed to be caused by inherited (genetic) and environmental factor, but its exact cause is unknown. asthma may be triggered by allergens, lung infections, or irritants in the air. Asthma triggers are different for each person \par \par Problem 2: No Iron deficiency anemia \par -s/p iron test (normal)\par \par Problem 3:Cardiac (valvular dysfunction brought on by exertion (resting studies right and left cardiac cath and echo done at rest)\par -s/p cardio pulmonary stress test\par -s/p ECHO stress jasmin\par -recommended consult with Dr. Tom Dowell for structural heart- recommended follow up \par -Recommend cardiac follow up evaluation with cardiologist if needed \par \par Problem 4 (+) JO (Unrestful sleep/ fatigue)\par -complete 2nd sleep study \par -s/p home sleep study \par -Sleep apnea is associated with adverse clinical consequences which can affect most organ systems. Cardiovascular disease risk includes arrhythmias, atrial fibrillation, hypertension, coronary artery disease, and stroke. Metabolic disorders include diabetes type 2, non-alcoholic fatty liver disease. Mood disorder especially depression; and cognitive decline especially in the elderly. Associations with chronic reflux/Levine’s esophagus some but not all inclusive. \par -Reasons include arousal consistent with hypopnea; respiratory events most prominent in REM sleep or supine position; therefore sleep staging and body position are important for accurate diagnosis and estimation of AHI. \par  \par \par Problem 5: out of shape\par -Recommended Muniq "OTC" Supplement for controlling blood sugar levels, weight loss, and energy \par - Weight loss, exercise and diet control were discussed and are highly encouraged. Treatment options were given such as aqua therapy, and contacting a nutritionist. Recommended to use the elliptical, stationary bike, less use of treadmill. Mindful eating was explained to the patient. Obesity is associated with worsening asthma, SOB, and potential for cardiac disease, diabetes, and other underlying medical conditions.\par \par Problem 6: Poor mechanics of breathing\par -Recommended Wim Hof and Buteyko breathing techniques \par -recommended internet exercise platforms: Sookasa and Aerobic exercise for seniors \par - Proper breathing techniques were reviewed with an emphasis on exhalation. Patient instructed to breath in for 1 second and out for four seconds. Patient was encouraged not to talk while walking.\par \par Problem 7: Health Maintenance\par -Recommend attempting to get back into work\par -s/p flu shot 2021\par -recommended strep pneumonia vaccines: Prevnar-13 vaccine, follow by Pneumo vaccine 23 one year following\par -recommended early intervention for URIs\par -recommended regular osteoporosis evaluations\par -recommended early dermatological evaluations\par -recommended after the age of 50 to the age of 70, colonoscopy every 5 years\par \par f/u in 6-8 weeks\par pt is encouraged to call or fax the office with any questions or concerns.\par

## 2022-09-12 NOTE — REASON FOR VISIT
[Follow-Up] : a follow-up visit [TextBox_44] : (2nd opinion)-for SOB s/p work incident-work up Non-Dx to this point, [facility]

## 2022-09-12 NOTE — ADDENDUM
[FreeTextEntry1] : Documented by Dewey Burden acting as a scribe for Dr. Onofre Cameron on (09/12/2022).\par \par All medical record entries made by the Scribe were at my, Dr. Onofre Cameron's, direction and personally dictated by me on (09/12/2022). I have reviewed the chart and agree that the record accurately reflects my personal performance of the history, physical exam, assessment and plan. I have personally directed, reviewed, and agree with the discharge instructions.

## 2022-09-27 ENCOUNTER — APPOINTMENT (OUTPATIENT)
Dept: CARDIOTHORACIC SURGERY | Facility: CLINIC | Age: 62
End: 2022-09-27

## 2022-09-27 ENCOUNTER — OUTPATIENT (OUTPATIENT)
Dept: OUTPATIENT SERVICES | Facility: HOSPITAL | Age: 62
LOS: 1 days | End: 2022-09-27
Payer: COMMERCIAL

## 2022-09-27 VITALS
HEART RATE: 58 BPM | HEIGHT: 61 IN | SYSTOLIC BLOOD PRESSURE: 108 MMHG | RESPIRATION RATE: 18 BRPM | DIASTOLIC BLOOD PRESSURE: 70 MMHG | BODY MASS INDEX: 24.17 KG/M2 | WEIGHT: 128 LBS | OXYGEN SATURATION: 98 %

## 2022-09-27 DIAGNOSIS — I35.0 NONRHEUMATIC AORTIC (VALVE) STENOSIS: ICD-10-CM

## 2022-09-27 DIAGNOSIS — O00.90 UNSPECIFIED ECTOPIC PREGNANCY WITHOUT INTRAUTERINE PREGNANCY: Chronic | ICD-10-CM

## 2022-09-27 DIAGNOSIS — Z98.890 OTHER SPECIFIED POSTPROCEDURAL STATES: Chronic | ICD-10-CM

## 2022-09-27 PROCEDURE — 93306 TTE W/DOPPLER COMPLETE: CPT

## 2022-09-27 PROCEDURE — 99214 OFFICE O/P EST MOD 30 MIN: CPT

## 2022-09-27 PROCEDURE — 93306 TTE W/DOPPLER COMPLETE: CPT | Mod: 26

## 2022-09-27 RX ORDER — ASPIRIN ENTERIC COATED TABLETS 81 MG 81 MG/1
81 TABLET, DELAYED RELEASE ORAL
Refills: 0 | Status: DISCONTINUED | COMMUNITY
End: 2022-09-27

## 2022-09-27 RX ORDER — OSELTAMIVIR PHOSPHATE 75 MG/1
75 CAPSULE ORAL TWICE DAILY
Qty: 10 | Refills: 0 | Status: DISCONTINUED | COMMUNITY
Start: 2022-05-03 | End: 2022-09-27

## 2022-09-30 NOTE — HISTORY OF PRESENT ILLNESS
[FreeTextEntry1] : Ms. Villalobos is a 62 year old female followed by Dr. Calix who presents today for evaluation of aortic stenosis. To review PMH includes known bicuspid AV monitored by Dr. Calix, myositis, basal cell carcinoma, a remote history of splenic vein thrombosis, rheumatic mitral disease, GERD, esophageal varices, ITP (platelets around 100k), and hyperkalemic periodic paralysis diagnosed at age 36. She was seen by Dr. Dowell of structural heart in November of last year were she noted an increase in exertional GUEVARA several months prior to that visit.  She underwent cardiac cath with Dr. Watkins on 10/5/21 that reported a left dominant system with normal coronary arteries and a peak to peak LV-Ao gradient of 5 mmHg. Repeat TTE at the time of that visit (11/30/2021) demonstrated a Siever's Type 1 bicuspid aortic valve with a fused raphe between the left and right cusps; the mean AV gradient is estimated at 23 mmHg, DVI 0.27, mild AI, and an MAYCO of 1.1; LVEF is normal (60-65%). She was to follow up in 4-6 months with TTE or sooner if clinically indicated.  SHe underwent TTE with Dr. Calix on 5/31/2022 that showed Calcified bicuspid AV with decreased opening, PVG 41 mmHg, MVG 23 mmHg MAYCO 1cm2 consistent with mod-severe AS. 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 She remains active biking and camping with no decline in activity tolerance. She has persistent dyspnea when climbing one flight of stairs or climbing an incline. She has intermittent nonexertional chest pressure. She was diagnosed with JO in Spring and now uses a CPAP. \par

## 2022-09-30 NOTE — PHYSICAL EXAM
[General Appearance - In No Acute Distress] : in no acute distress [Sclera] : the sclera and conjunctiva were normal [Hearing Threshold Finger Rub Not Owen] : hearing was normal [Neck Appearance] : the appearance of the neck was normal [Respiration, Rhythm And Depth] : normal respiratory rhythm and effort [Systolic grade ___/6] : A grade [unfilled]/6 systolic murmur was heard. [Left Carotid Bruit] : left carotid bruit heard [Bowel Sounds] : normal bowel sounds [Abnormal Walk] : normal gait [Oriented To Time, Place, And Person] : oriented to person, place, and time [Right Carotid Bruit] : no bruit heard over the right carotid

## 2022-09-30 NOTE — DATA REVIEWED
[FreeTextEntry1] : TTE with Dr. Calix on 5/31/2022 that showed Calcified bicuspid AV with decreased opening, PVG 41 mmHg, MVG 23 mmHg MAYCO 1cm2 consistent with mod-severe AS.

## 2022-09-30 NOTE — ADDENDUM
[FreeTextEntry1] : I met Mrs Villalobos who has moderate-severe aortic stenosis. She also has has known ascending aorta dilatation. \par \par She indeed has exertional symptoms though they are not severe, they do limit her somewhat.\par \par She is young and would normally be an operative candidate, however her history of myositis, ITP and hyperkalemic paralysis do mean that her operative risks is elevated. I don't think she is high risk per sey, but she is certainly prone to the possibility of perioperative morbidity. She has excellent insight and understands this.\par \par I think the best step forward is to undergo a TAVR CT to review the valve morphology and size of the ascending aorta. I think this will help us determine the feasibility of TAVR and whether the ascending aorta needs to be addressed.\par \par If the aorta is stable in size and her valve morphology is favorable, then TAVR I think would be a good option.\par \par Tomás Matthews\par Cardiac Surgeon

## 2022-09-30 NOTE — REVIEW OF SYSTEMS
[SOB on Exertion] : shortness of breath during exertion [Easy Bruising] : a tendency for easy bruising [Negative] : Endocrine [Feeling Tired] : not feeling tired [Orthopnea] : no orthopnea [PND] : no PND [Abdominal Pain] : no abdominal pain [Dizziness] : no dizziness [FreeTextEntry3] : cataract surgery scheduled for 10/13 [FreeTextEntry6] : JO/CPAP

## 2022-09-30 NOTE — CONSULT LETTER
[Dear  ___] : Dear  [unfilled], [Courtesy Letter:] : I had the pleasure of seeing your patient, [unfilled], in my office today. [Please see my note below.] : Please see my note below. [Sincerely,] : Sincerely, [FreeTextEntry2] : Dr. Calix [FreeTextEntry3] : Tomás Matthews MD\par Attending Surgeon\par Cardiovascular & Thoracic Surgery

## 2022-10-10 ENCOUNTER — APPOINTMENT (OUTPATIENT)
Dept: CARDIOLOGY | Facility: CLINIC | Age: 62
End: 2022-10-10

## 2022-10-10 ENCOUNTER — RESULT REVIEW (OUTPATIENT)
Age: 62
End: 2022-10-10

## 2022-10-10 ENCOUNTER — OUTPATIENT (OUTPATIENT)
Dept: OUTPATIENT SERVICES | Facility: HOSPITAL | Age: 62
LOS: 1 days | End: 2022-10-10
Payer: COMMERCIAL

## 2022-10-10 DIAGNOSIS — Z98.890 OTHER SPECIFIED POSTPROCEDURAL STATES: Chronic | ICD-10-CM

## 2022-10-10 DIAGNOSIS — O00.90 UNSPECIFIED ECTOPIC PREGNANCY WITHOUT INTRAUTERINE PREGNANCY: Chronic | ICD-10-CM

## 2022-10-10 DIAGNOSIS — Z00.00 ENCOUNTER FOR GENERAL ADULT MEDICAL EXAMINATION WITHOUT ABNORMAL FINDINGS: ICD-10-CM

## 2022-10-10 DIAGNOSIS — I35.0 NONRHEUMATIC AORTIC (VALVE) STENOSIS: ICD-10-CM

## 2022-10-10 DIAGNOSIS — Q23.1 CONGENITAL INSUFFICIENCY OF AORTIC VALVE: ICD-10-CM

## 2022-10-10 PROCEDURE — 75572 CT HRT W/3D IMAGE: CPT | Mod: 26

## 2022-10-10 PROCEDURE — 75572 CT HRT W/3D IMAGE: CPT

## 2022-10-13 ENCOUNTER — NON-APPOINTMENT (OUTPATIENT)
Age: 62
End: 2022-10-13

## 2022-11-07 PROBLEM — M54.16 LEFT LUMBAR RADICULOPATHY: Status: ACTIVE | Noted: 2021-06-09

## 2022-11-09 ENCOUNTER — APPOINTMENT (OUTPATIENT)
Dept: CARDIOTHORACIC SURGERY | Facility: CLINIC | Age: 62
End: 2022-11-09

## 2022-11-09 VITALS
TEMPERATURE: 97.6 F | OXYGEN SATURATION: 97 % | WEIGHT: 128 LBS | SYSTOLIC BLOOD PRESSURE: 139 MMHG | HEART RATE: 66 BPM | RESPIRATION RATE: 16 BRPM | DIASTOLIC BLOOD PRESSURE: 78 MMHG | HEIGHT: 61 IN | BODY MASS INDEX: 24.17 KG/M2

## 2022-11-09 DIAGNOSIS — M54.16 RADICULOPATHY, LUMBAR REGION: ICD-10-CM

## 2022-11-09 PROCEDURE — 99215 OFFICE O/P EST HI 40 MIN: CPT

## 2022-11-10 RX ORDER — VIT A/VIT C/VIT E/ZINC/COPPER 4296-226
CAPSULE ORAL
Refills: 0 | Status: ACTIVE | COMMUNITY
Start: 2022-11-10

## 2022-11-16 NOTE — DATA REVIEWED
[FreeTextEntry1] : TTE with Dr. Calix on 5/31/2022 that showed Calcified bicuspid AV with decreased opening, PVG 41 mmHg, MVG 23 mmHg MAYCO 1cm2 consistent with mod-severe AS. \par \par 10/10/22 CT heart w/o coronaries revealed  Moderately calcified (Agatston calcium score 549), thickened, and redundant bicuspid aortic valve with a calcified raphe between the left and right coronary cusps (Carson type I) with associated aortopathy. Aortic root measures 41.5 mm at the sinuses of Valsalva in maximum cusp to cusp dimension. Additional aortic measurements reported above. No significant change in the caliber of the aortic root or ascending aorta compared to cardiovascular computed .\par \par 9/27/22 TTE revealed Minimal mitral\par regurgitation.\par Aortic Valve/Aorta: Moderate-severe aortic stenosis:\par ---LVOTd 2.17 cm. DVI = 21.5/81.5 = 0.26.\par ---Peak/mean gradient 44.0/24.3 mmHg.\par ---Aortic valve area by continuity 1.0 cm 2.\par Mild aortic regurgitation.\par Normal aortic root size.\par

## 2022-11-16 NOTE — HISTORY OF PRESENT ILLNESS
[FreeTextEntry1] : Ms. Villalobos is a 62 year old female with PMH of  known bicuspid AV monitored by Dr. Calix, myositis, basal cell carcinoma, a remote history of splenic vein thrombosis, rheumatic mitral disease, GERD, Maccualr degeneration, esophageal varices, ITP (platelets around 100k), and hyperkalemic periodic paralysis diagnosed at age 36. She was seen by Dr. Dowell of structural heart in November of last year were she noted an increase in exertional GUEVARA several months prior to that visit. She underwent cardiac cath with Dr. Watkins on 10/5/21 that reported a left dominant system with normal coronary arteries and a peak to peak LV-Ao gradient of 5 mmHg. Repeat TTE at the time of that visit (11/30/2021) demonstrated a Siever's Type 1 bicuspid aortic valve with a fused raphe between the left and right cusps; the mean AV gradient is estimated at 23 mmHg, DVI 0.27, mild AI, and an MAYCO of 1.1; LVEF is normal (60-65%). She was to follow up in 4-6 months with TTE or sooner if clinically indicated. She underwent TTE with Dr. Calix on 5/31/2022 that showed Calcified bicuspid AV with decreased opening, PVG 41 mmHg, MVG 23 mmHg MAYCO 1cm2 consistent with mod-severe AS. Last visit, was advised to follow up for TAVR work up by  .  She is here for 1.5 year follow up visit. \par \par Today, she presents and reports that she has dyspnea on exertion when climbing stairs for the past 2 years and got worse over the past 1 year, atypical chest pain on/off with occasional pain radiating to Left arm even at rest for 1 year and more frequent for the past 2 - 3 months , Dizziness. She reports that she has periodic paralysis since the age 36 which occurs with stress, fever or other extra stressful activities, last episode 5 years ago.She was started on Diamox which helps her a lot with the symptoms. She was initially followed by the neurologist who is not in practice any more . \par \par Patient is doing well and denies recent hospitalization, ER visits, or surgeries. He denies fever, chills, fatigue, headache, blurred vision,  syncope,  palpitations, orthopnea, paroxysmal nocturnal dyspnea, nausea, vomiting, abdominal pain, back pain, headache, visual disturbances, CVA, PE, DVT, D/C, hematochezia, melena, dysuria, hematuria,  BRBPR or swelling to legs.\par \par \par \par OFF NOTE: As per the patient, she cannot take Calcium Channel blocker

## 2022-11-16 NOTE — ASSESSMENT
[FreeTextEntry1] : Ms. Villalobos is a 62 year old female with PMH of  known bicuspid AV monitored by Dr. Calix, myositis, basal cell carcinoma, a remote history of splenic vein thrombosis, rheumatic mitral disease, GERD, Maccualr degeneration, esophageal varices, ITP (platelets around 100k), and hyperkalemic periodic paralysis diagnosed at age 36. She was seen by Dr. Dowell of structural heart in November of last year were she noted an increase in exertional GUEVARA several months prior to that visit. She underwent cardiac cath with Dr. Watkins on 10/5/21 that reported a left dominant system with normal coronary arteries and a peak to peak LV-Ao gradient of 5 mmHg. Repeat TTE at the time of that visit (11/30/2021) demonstrated a Siever's Type 1 bicuspid aortic valve with a fused raphe between the left and right cusps; the mean AV gradient is estimated at 23 mmHg, DVI 0.27, mild AI, and an MAYCO of 1.1; LVEF is normal (60-65%). She was to follow up in 4-6 months with TTE or sooner if clinically indicated. She underwent TTE with Dr. Calix on 5/31/2022 that showed Calcified bicuspid AV with decreased opening, PVG 41 mmHg, MVG 23 mmHg MAYCO 1cm2 consistent with mod-severe AS. Last visit, was advised to follow up for TAVR work up by  .  She is here for 1.5 year follow up visit. \par \par Today, she presents and reports that she has dyspnea on exertion when climbing stairs for the past 2 years and got worse over the past 1 year, atypical chest pain on/off with occasional pain radiating to Left arm even at rest for 1 year and more frequent for the past 2 - 3 months , Dizziness. She reports that she has periodic paralysis since the age 36 which occurs with stress, fever or other extra stressful activities, last episode 5 years ago.She was started on Diamox which helps her a lot with the symptoms. She was initially followed by the neurologist who is not in practice any more . \par \par \par  reviewed the cardiac imaging, medical records and reports with patient and discussed the case. \par \par TTE with Dr. Calix on 5/31/2022 that showed Calcified bicuspid AV with decreased opening, PVG 41 mmHg, MVG 23 mmHg MAYCO 1cm2 consistent with mod-severe AS. \par \par 10/10/22 CT heart w/o coronaries revealed  Moderately calcified (Agatston calcium score 549), thickened, and redundant bicuspid aortic valve with a calcified raphe between the left and right coronary cusps (Carson type I) with associated aortopathy. Aortic root measures 41.5 mm at the sinuses of Valsalva in maximum cusp to cusp dimension. Additional aortic measurements reported above. No significant change in the caliber of the aortic root or ascending aorta compared to cardiovascular computed .\par \par 9/27/22 TTE revealed Minimal mitral regurgitation.\par Aortic Valve/Aorta: Moderate-severe aortic stenosis:\par ---LVOTd 2.17 cm. DVI = 21.5/81.5 = 0.26.\par ---Peak/mean gradient 44.0/24.3 mmHg.\par ---Aortic valve area by continuity 1.0 cm 2.\par Mild aortic regurgitation.\par Normal aortic root size.\par \par  discussed the risks , benefits and alternatives to surgery. Risks included but not limited to  bleeding , stroke, Myocardial Infarction, kidney problems,Blood transfusion ,permanent  pacemaker implantation,  infections and death.  quoted a (1 - 2%  ) operative mortality and complication risks.  also discussed the various approaches in detail ( Open AVR and Ascending aortic valve replacement VS TAVR ) . will discuss with Dr. Tom Dowell from structural heart team and make final recommendations.  will call back the patient with final recommendations. All questions and concerns were addressed and patient agrees to proceed with the plan. \par \par \par Plan:\par 1)  AVR and Ascending aortic valve replacement VS TAVR  \par 2) Cardiac Catherization to evaluate coronary arteries\par 3) PST \par 4) May return to clinic on PRN basis \par

## 2022-11-16 NOTE — END OF VISIT
[FreeTextEntry3] : \par \par Scribe Attestation:\par I personally scribed for ADAN WARE on Nov 9 2022 12:00PM . \par \par I personally performed the services described in the documentation, reviewed the documentation recorded by the scribe in my presence and it accurately and completely records my words and actions.\par \par \par \par \par Physician Attestation:\par Documented by ART BARDALES acting as a scribe for ADAN WARE 11/9/2022 . \par                 All medical record entries made by the Scribe were at my, ADNA WARE , direction and personally dictated by me on 11/09/2022 . I have reviewed the chart and agree that the record accurately reflects my personal performance of the history, physical exam, assessment and plan\par

## 2022-11-16 NOTE — CONSULT LETTER
[FreeTextEntry1] : \par \par I had the pleasure of seeing your patient, ELLI LOVELL,in my office today. \par \par We take a multidisciplinary team approach to patient care and consider you, the primary physician, an extension of our team. We will maintain an open line of communication with you throughout your patient's treatment course. \par \par She  is being evaluated for thoracic aortic aneurysm. I have reviewed all of the patient's medical records and diagnostic images at the time of her  office consultation. I have enclosed a copy for your records. \par \par I have reviewed the indications for surgery,and used our webpage [email or web address] <[email or web address].org> to illustrate the aorta and anatomy of the heart. The patient meets criteria for surgery (AVR and Ascending aortic valve replacement VS TAVR ).  will discuss with Dr. Tom Dowell from structural heart team and make final recommendations.  will call back the patient with final recommendations. All questions and concerns were addressed and patient agrees to proceed with the plan. \par \par  I will update you on her perioperative status and disposition upon discharge. \par \par I appreciate the opportunity to care for your patient at the [facility] based at [facility]. If there are any questions or concerns, please call me directly at [phone]. \par \par \par \par Sincerely, \par \par \par \par Vimal Echevarria M.D.\par Professor of Cardiovascular and Thoracic Surgery\par Minimally Invasive Valve Surgeon\par Director of Aortic Surgery, [facility]\par Cell: [phone]\par Email: [email or web address] \par \par [facility]:\par [address], [address], [place] [address]\par Office: [phone]\par Fax: [phone]\par \par [facility]:\par Department of Cardiovascular and Thoracic Surgery\par [address] [place] [address], [address]\par Office: [phone]\par Fax: [phone]\par \par Practice Manager: Ms. Theresa Cerda\par Email: [email or web address]\par Phone: [phone]\par \par  [FreeTextEntry2] : Dr.Joe Calix

## 2022-11-28 ENCOUNTER — OUTPATIENT (OUTPATIENT)
Dept: OUTPATIENT SERVICES | Facility: HOSPITAL | Age: 62
LOS: 1 days | End: 2022-11-28
Payer: COMMERCIAL

## 2022-11-28 ENCOUNTER — TRANSCRIPTION ENCOUNTER (OUTPATIENT)
Age: 62
End: 2022-11-28

## 2022-11-28 VITALS
DIASTOLIC BLOOD PRESSURE: 63 MMHG | SYSTOLIC BLOOD PRESSURE: 101 MMHG | RESPIRATION RATE: 16 BRPM | HEART RATE: 60 BPM | OXYGEN SATURATION: 96 %

## 2022-11-28 VITALS
OXYGEN SATURATION: 97 % | TEMPERATURE: 98 F | HEIGHT: 61 IN | WEIGHT: 128.09 LBS | SYSTOLIC BLOOD PRESSURE: 115 MMHG | DIASTOLIC BLOOD PRESSURE: 67 MMHG | RESPIRATION RATE: 16 BRPM | HEART RATE: 60 BPM

## 2022-11-28 DIAGNOSIS — Z98.890 OTHER SPECIFIED POSTPROCEDURAL STATES: Chronic | ICD-10-CM

## 2022-11-28 DIAGNOSIS — I35.1 NONRHEUMATIC AORTIC (VALVE) INSUFFICIENCY: ICD-10-CM

## 2022-11-28 DIAGNOSIS — O00.90 UNSPECIFIED ECTOPIC PREGNANCY WITHOUT INTRAUTERINE PREGNANCY: Chronic | ICD-10-CM

## 2022-11-28 LAB
ALBUMIN SERPL ELPH-MCNC: 4 G/DL — SIGNIFICANT CHANGE UP (ref 3.3–5)
ALP SERPL-CCNC: 77 U/L — SIGNIFICANT CHANGE UP (ref 40–120)
ALT FLD-CCNC: 25 U/L — SIGNIFICANT CHANGE UP (ref 10–45)
ANION GAP SERPL CALC-SCNC: 8 MMOL/L — SIGNIFICANT CHANGE UP (ref 5–17)
AST SERPL-CCNC: 22 U/L — SIGNIFICANT CHANGE UP (ref 10–40)
BILIRUB SERPL-MCNC: 0.5 MG/DL — SIGNIFICANT CHANGE UP (ref 0.2–1.2)
BUN SERPL-MCNC: 21 MG/DL — SIGNIFICANT CHANGE UP (ref 7–23)
CALCIUM SERPL-MCNC: 9.3 MG/DL — SIGNIFICANT CHANGE UP (ref 8.4–10.5)
CHLORIDE SERPL-SCNC: 108 MMOL/L — SIGNIFICANT CHANGE UP (ref 96–108)
CO2 SERPL-SCNC: 25 MMOL/L — SIGNIFICANT CHANGE UP (ref 22–31)
CREAT SERPL-MCNC: 0.56 MG/DL — SIGNIFICANT CHANGE UP (ref 0.5–1.3)
EGFR: 102 ML/MIN/1.73M2 — SIGNIFICANT CHANGE UP
GLUCOSE SERPL-MCNC: 100 MG/DL — HIGH (ref 70–99)
HCT VFR BLD CALC: 39.7 % — SIGNIFICANT CHANGE UP (ref 34.5–45)
HGB BLD-MCNC: 13 G/DL — SIGNIFICANT CHANGE UP (ref 11.5–15.5)
HGB BLDA-MCNC: 13.2 G/DL — SIGNIFICANT CHANGE UP (ref 11.7–16.1)
MAGNESIUM SERPL-MCNC: 2.1 MG/DL — SIGNIFICANT CHANGE UP (ref 1.6–2.6)
MCHC RBC-ENTMCNC: 28.3 PG — SIGNIFICANT CHANGE UP (ref 27–34)
MCHC RBC-ENTMCNC: 32.7 GM/DL — SIGNIFICANT CHANGE UP (ref 32–36)
MCV RBC AUTO: 86.5 FL — SIGNIFICANT CHANGE UP (ref 80–100)
NRBC # BLD: 0 /100 WBCS — SIGNIFICANT CHANGE UP (ref 0–0)
OXYHGB MFR BLDA: 95.5 % — HIGH (ref 90–95)
PLATELET # BLD AUTO: 151 K/UL — SIGNIFICANT CHANGE UP (ref 150–400)
POTASSIUM SERPL-MCNC: 3.5 MMOL/L — SIGNIFICANT CHANGE UP (ref 3.5–5.3)
POTASSIUM SERPL-SCNC: 3.5 MMOL/L — SIGNIFICANT CHANGE UP (ref 3.5–5.3)
PROT SERPL-MCNC: 6.7 G/DL — SIGNIFICANT CHANGE UP (ref 6–8.3)
RBC # BLD: 4.59 M/UL — SIGNIFICANT CHANGE UP (ref 3.8–5.2)
RBC # FLD: 14.4 % — SIGNIFICANT CHANGE UP (ref 10.3–14.5)
SAO2 % BLDA: 97.3 % — SIGNIFICANT CHANGE UP (ref 94–98)
SODIUM SERPL-SCNC: 141 MMOL/L — SIGNIFICANT CHANGE UP (ref 135–145)
WBC # BLD: 4.76 K/UL — SIGNIFICANT CHANGE UP (ref 3.8–10.5)
WBC # FLD AUTO: 4.76 K/UL — SIGNIFICANT CHANGE UP (ref 3.8–10.5)

## 2022-11-28 PROCEDURE — 80053 COMPREHEN METABOLIC PANEL: CPT

## 2022-11-28 PROCEDURE — 93567 NJX CAR CTH SPRVLV AORTGRPHY: CPT

## 2022-11-28 PROCEDURE — C1769: CPT

## 2022-11-28 PROCEDURE — C1894: CPT

## 2022-11-28 PROCEDURE — 82803 BLOOD GASES ANY COMBINATION: CPT

## 2022-11-28 PROCEDURE — 93456 R HRT CORONARY ARTERY ANGIO: CPT | Mod: 26

## 2022-11-28 PROCEDURE — 83735 ASSAY OF MAGNESIUM: CPT

## 2022-11-28 PROCEDURE — 93005 ELECTROCARDIOGRAM TRACING: CPT

## 2022-11-28 PROCEDURE — 85027 COMPLETE CBC AUTOMATED: CPT

## 2022-11-28 PROCEDURE — 93010 ELECTROCARDIOGRAM REPORT: CPT | Mod: 59

## 2022-11-28 PROCEDURE — 99152 MOD SED SAME PHYS/QHP 5/>YRS: CPT

## 2022-11-28 PROCEDURE — 93456 R HRT CORONARY ARTERY ANGIO: CPT

## 2022-11-28 PROCEDURE — C1887: CPT

## 2022-11-28 PROCEDURE — 36415 COLL VENOUS BLD VENIPUNCTURE: CPT

## 2022-11-28 RX ORDER — ASPIRIN/CALCIUM CARB/MAGNESIUM 324 MG
1 TABLET ORAL
Qty: 0 | Refills: 0 | DISCHARGE

## 2022-11-28 NOTE — ASU DISCHARGE PLAN (ADULT/PEDIATRIC) - ASU DC SPECIAL INSTRUCTIONSFT

## 2022-11-28 NOTE — ASU DISCHARGE PLAN (ADULT/PEDIATRIC) - CARE PROVIDER_API CALL
Vimal Echevarria (MD)  Surgery; Thoracic and Cardiac Surgery  [address], [address] [place] [address]  Phone: [phone]  Fax: [phone]  Follow Up Time:

## 2022-11-28 NOTE — ASU DISCHARGE PLAN (ADULT/PEDIATRIC) - NS MD DC FALL RISK RISK
For information on Fall & Injury Prevention, visit: [email or web address] OR  [email or web address] OR  https://www.cdc.gov/steadi/patient.html

## 2022-12-01 ENCOUNTER — APPOINTMENT (OUTPATIENT)
Dept: ULTRASOUND IMAGING | Facility: HOSPITAL | Age: 62
End: 2022-12-01

## 2022-12-01 ENCOUNTER — OUTPATIENT (OUTPATIENT)
Dept: OUTPATIENT SERVICES | Facility: HOSPITAL | Age: 62
LOS: 1 days | Discharge: HOME | End: 2022-12-01

## 2022-12-01 DIAGNOSIS — Q23.1 CONGENITAL INSUFFICIENCY OF AORTIC VALVE: ICD-10-CM

## 2022-12-01 DIAGNOSIS — Z98.890 OTHER SPECIFIED POSTPROCEDURAL STATES: Chronic | ICD-10-CM

## 2022-12-01 DIAGNOSIS — O00.90 UNSPECIFIED ECTOPIC PREGNANCY WITHOUT INTRAUTERINE PREGNANCY: Chronic | ICD-10-CM

## 2022-12-01 PROCEDURE — 93880 EXTRACRANIAL BILAT STUDY: CPT | Mod: 26

## 2022-12-06 ENCOUNTER — NON-APPOINTMENT (OUTPATIENT)
Age: 62
End: 2022-12-06

## 2022-12-06 DIAGNOSIS — Z01.818 ENCOUNTER FOR OTHER PREPROCEDURAL EXAMINATION: ICD-10-CM

## 2022-12-09 ENCOUNTER — LABORATORY RESULT (OUTPATIENT)
Age: 62
End: 2022-12-09

## 2022-12-11 ENCOUNTER — INPATIENT (INPATIENT)
Facility: HOSPITAL | Age: 62
LOS: 8 days | Discharge: HOME HEALTH | DRG: 219 | End: 2022-12-20
Attending: THORACIC SURGERY (CARDIOTHORACIC VASCULAR SURGERY) | Admitting: THORACIC SURGERY (CARDIOTHORACIC VASCULAR SURGERY)
Payer: COMMERCIAL

## 2022-12-11 ENCOUNTER — TRANSCRIPTION ENCOUNTER (OUTPATIENT)
Age: 62
End: 2022-12-11

## 2022-12-11 VITALS — OXYGEN SATURATION: 96 % | RESPIRATION RATE: 15 BRPM | HEART RATE: 67 BPM

## 2022-12-11 DIAGNOSIS — O00.90 UNSPECIFIED ECTOPIC PREGNANCY WITHOUT INTRAUTERINE PREGNANCY: Chronic | ICD-10-CM

## 2022-12-11 DIAGNOSIS — Z98.890 OTHER SPECIFIED POSTPROCEDURAL STATES: Chronic | ICD-10-CM

## 2022-12-11 DIAGNOSIS — Z98.49 CATARACT EXTRACTION STATUS, UNSPECIFIED EYE: Chronic | ICD-10-CM

## 2022-12-11 LAB
A1C WITH ESTIMATED AVERAGE GLUCOSE RESULT: 5.2 % — SIGNIFICANT CHANGE UP (ref 4–5.6)
ALBUMIN SERPL ELPH-MCNC: 4.6 G/DL — SIGNIFICANT CHANGE UP (ref 3.3–5)
ALP SERPL-CCNC: 85 U/L — SIGNIFICANT CHANGE UP (ref 40–120)
ALT FLD-CCNC: 19 U/L — SIGNIFICANT CHANGE UP (ref 10–45)
ANION GAP SERPL CALC-SCNC: 15 MMOL/L — SIGNIFICANT CHANGE UP (ref 5–17)
APPEARANCE UR: CLEAR — SIGNIFICANT CHANGE UP
APTT BLD: 35.7 SEC — HIGH (ref 27.5–35.5)
AST SERPL-CCNC: 23 U/L — SIGNIFICANT CHANGE UP (ref 10–40)
BILIRUB SERPL-MCNC: 0.6 MG/DL — SIGNIFICANT CHANGE UP (ref 0.2–1.2)
BILIRUB UR-MCNC: NEGATIVE — SIGNIFICANT CHANGE UP
BLD GP AB SCN SERPL QL: NEGATIVE — SIGNIFICANT CHANGE UP
BLD GP AB SCN SERPL QL: NEGATIVE — SIGNIFICANT CHANGE UP
BUN SERPL-MCNC: 14 MG/DL — SIGNIFICANT CHANGE UP (ref 7–23)
CALCIUM SERPL-MCNC: 10 MG/DL — SIGNIFICANT CHANGE UP (ref 8.4–10.5)
CHLORIDE SERPL-SCNC: 105 MMOL/L — SIGNIFICANT CHANGE UP (ref 96–108)
CHOLEST SERPL-MCNC: 283 MG/DL — HIGH
CO2 SERPL-SCNC: 22 MMOL/L — SIGNIFICANT CHANGE UP (ref 22–31)
COLOR SPEC: YELLOW — SIGNIFICANT CHANGE UP
CREAT SERPL-MCNC: 0.58 MG/DL — SIGNIFICANT CHANGE UP (ref 0.5–1.3)
DIFF PNL FLD: NEGATIVE — SIGNIFICANT CHANGE UP
EGFR: 102 ML/MIN/1.73M2 — SIGNIFICANT CHANGE UP
ESTIMATED AVERAGE GLUCOSE: 103 MG/DL — SIGNIFICANT CHANGE UP (ref 68–114)
GLUCOSE SERPL-MCNC: 108 MG/DL — HIGH (ref 70–99)
GLUCOSE UR QL: NEGATIVE — SIGNIFICANT CHANGE UP
HCT VFR BLD CALC: 43.3 % — SIGNIFICANT CHANGE UP (ref 34.5–45)
HDLC SERPL-MCNC: 75 MG/DL — SIGNIFICANT CHANGE UP
HGB BLD-MCNC: 14.1 G/DL — SIGNIFICANT CHANGE UP (ref 11.5–15.5)
INR BLD: 1.08 — SIGNIFICANT CHANGE UP (ref 0.88–1.16)
KETONES UR-MCNC: NEGATIVE — SIGNIFICANT CHANGE UP
LEUKOCYTE ESTERASE UR-ACNC: NEGATIVE — SIGNIFICANT CHANGE UP
LIPID PNL WITH DIRECT LDL SERPL: 187 MG/DL — HIGH
MAGNESIUM SERPL-MCNC: 2.1 MG/DL — SIGNIFICANT CHANGE UP (ref 1.6–2.6)
MCHC RBC-ENTMCNC: 28.2 PG — SIGNIFICANT CHANGE UP (ref 27–34)
MCHC RBC-ENTMCNC: 32.6 GM/DL — SIGNIFICANT CHANGE UP (ref 32–36)
MCV RBC AUTO: 86.6 FL — SIGNIFICANT CHANGE UP (ref 80–100)
NITRITE UR-MCNC: NEGATIVE — SIGNIFICANT CHANGE UP
NON HDL CHOLESTEROL: 208 MG/DL — HIGH
NRBC # BLD: 0 /100 WBCS — SIGNIFICANT CHANGE UP (ref 0–0)
NT-PROBNP SERPL-SCNC: 52 PG/ML — SIGNIFICANT CHANGE UP (ref 0–300)
PH UR: 7 — SIGNIFICANT CHANGE UP (ref 5–8)
PHOSPHATE SERPL-MCNC: 3.6 MG/DL — SIGNIFICANT CHANGE UP (ref 2.5–4.5)
PLATELET # BLD AUTO: 147 K/UL — LOW (ref 150–400)
POTASSIUM SERPL-MCNC: 3.4 MMOL/L — LOW (ref 3.5–5.3)
POTASSIUM SERPL-SCNC: 3.4 MMOL/L — LOW (ref 3.5–5.3)
PROT SERPL-MCNC: 7.2 G/DL — SIGNIFICANT CHANGE UP (ref 6–8.3)
PROT UR-MCNC: NEGATIVE MG/DL — SIGNIFICANT CHANGE UP
PROTHROM AB SERPL-ACNC: 12.9 SEC — SIGNIFICANT CHANGE UP (ref 10.5–13.4)
RBC # BLD: 5 M/UL — SIGNIFICANT CHANGE UP (ref 3.8–5.2)
RBC # FLD: 14.5 % — SIGNIFICANT CHANGE UP (ref 10.3–14.5)
RH IG SCN BLD-IMP: NEGATIVE — SIGNIFICANT CHANGE UP
RH IG SCN BLD-IMP: NEGATIVE — SIGNIFICANT CHANGE UP
SODIUM SERPL-SCNC: 142 MMOL/L — SIGNIFICANT CHANGE UP (ref 135–145)
SP GR SPEC: 1.01 — SIGNIFICANT CHANGE UP (ref 1–1.03)
T4 AB SER-ACNC: 7.16 UG/DL — SIGNIFICANT CHANGE UP (ref 4.5–11.7)
TRIGL SERPL-MCNC: 103 MG/DL — SIGNIFICANT CHANGE UP
TROPONIN T SERPL-MCNC: 0.01 NG/ML — SIGNIFICANT CHANGE UP (ref 0–0.01)
TSH SERPL-MCNC: 2.84 UIU/ML — SIGNIFICANT CHANGE UP (ref 0.27–4.2)
UROBILINOGEN FLD QL: 0.2 E.U./DL — SIGNIFICANT CHANGE UP
WBC # BLD: 6.49 K/UL — SIGNIFICANT CHANGE UP (ref 3.8–10.5)
WBC # FLD AUTO: 6.49 K/UL — SIGNIFICANT CHANGE UP (ref 3.8–10.5)

## 2022-12-11 PROCEDURE — 71046 X-RAY EXAM CHEST 2 VIEWS: CPT | Mod: 26

## 2022-12-11 PROCEDURE — 93010 ELECTROCARDIOGRAM REPORT: CPT

## 2022-12-11 RX ORDER — SENNA PLUS 8.6 MG/1
2 TABLET ORAL AT BEDTIME
Refills: 0 | Status: DISCONTINUED | OUTPATIENT
Start: 2022-12-11 | End: 2022-12-12

## 2022-12-11 RX ORDER — CHLORHEXIDINE GLUCONATE 213 G/1000ML
10 SOLUTION TOPICAL ONCE
Refills: 0 | Status: COMPLETED | OUTPATIENT
Start: 2022-12-11 | End: 2022-12-12

## 2022-12-11 RX ORDER — CHLORHEXIDINE GLUCONATE 213 G/1000ML
1 SOLUTION TOPICAL ONCE
Refills: 0 | Status: COMPLETED | OUTPATIENT
Start: 2022-12-12 | End: 2022-12-12

## 2022-12-11 RX ORDER — CHLORHEXIDINE GLUCONATE 213 G/1000ML
1 SOLUTION TOPICAL ONCE
Refills: 0 | Status: COMPLETED | OUTPATIENT
Start: 2022-12-11 | End: 2022-12-11

## 2022-12-11 RX ORDER — HEPARIN SODIUM 5000 [USP'U]/ML
5000 INJECTION INTRAVENOUS; SUBCUTANEOUS EVERY 8 HOURS
Refills: 0 | Status: DISCONTINUED | OUTPATIENT
Start: 2022-12-11 | End: 2022-12-12

## 2022-12-11 RX ORDER — CITALOPRAM 10 MG/1
20 TABLET, FILM COATED ORAL AT BEDTIME
Refills: 0 | Status: DISCONTINUED | OUTPATIENT
Start: 2022-12-11 | End: 2022-12-12

## 2022-12-11 RX ORDER — PANTOPRAZOLE SODIUM 20 MG/1
40 TABLET, DELAYED RELEASE ORAL
Refills: 0 | Status: DISCONTINUED | OUTPATIENT
Start: 2022-12-12 | End: 2022-12-12

## 2022-12-11 RX ORDER — SODIUM CHLORIDE 9 MG/ML
3 INJECTION INTRAMUSCULAR; INTRAVENOUS; SUBCUTANEOUS EVERY 8 HOURS
Refills: 0 | Status: DISCONTINUED | OUTPATIENT
Start: 2022-12-11 | End: 2022-12-12

## 2022-12-11 RX ORDER — ACETAZOLAMIDE 250 MG/1
250 TABLET ORAL DAILY
Refills: 0 | Status: DISCONTINUED | OUTPATIENT
Start: 2022-12-12 | End: 2022-12-12

## 2022-12-11 RX ORDER — POLYETHYLENE GLYCOL 3350 17 G/17G
17 POWDER, FOR SOLUTION ORAL DAILY
Refills: 0 | Status: DISCONTINUED | OUTPATIENT
Start: 2022-12-11 | End: 2022-12-12

## 2022-12-11 RX ADMIN — CHLORHEXIDINE GLUCONATE 1 APPLICATION(S): 213 SOLUTION TOPICAL at 20:00

## 2022-12-11 RX ADMIN — HEPARIN SODIUM 5000 UNIT(S): 5000 INJECTION INTRAVENOUS; SUBCUTANEOUS at 22:27

## 2022-12-11 RX ADMIN — SODIUM CHLORIDE 3 MILLILITER(S): 9 INJECTION INTRAMUSCULAR; INTRAVENOUS; SUBCUTANEOUS at 22:32

## 2022-12-11 RX ADMIN — CHLORHEXIDINE GLUCONATE 1 APPLICATION(S): 213 SOLUTION TOPICAL at 22:40

## 2022-12-11 RX ADMIN — CITALOPRAM 20 MILLIGRAM(S): 10 TABLET, FILM COATED ORAL at 23:15

## 2022-12-11 NOTE — H&P ADULT - ASSESSMENT
62 year old female with PMHx hyperkalemic periodic paralysis (diagnosed at age 36, controlled on diamox, last flare 2 years ago), bicuspid aortic valve with severe AS, aortic aneurysm, splenic vein thrombosis, portal vein thrombosis, macular degeneration, ITP, esophageal varices GERD, basal cell carcinoma, T11 fracture (current), cataracts, presents for direct admission for pre-op optimization prior to AVR and ascending aorta replacement scheduled for 12/12/22 with Dr. Echevarria. Patient states she had intermittent chest pain (occurs at rest) and GUEVARA (walking up 1 flight of stairs) increasing in intensity over the last 1-2 years. Today, she denies CP/SOB/N/V/D/dizziness/cough/fever/chills..    Neurovascular  #Hyperkalemic periodic paralysis  - Seen by Dr. Prado outpatient. Evaluated by inpatient neurology service, recommendations appreciated  - AVOID ADMINISTRATION OF POTASSIUM  - Anesthesia notified*  - Patient will have diamox prior to OR    Cardiovascular   #Bicuspid AV  #Severe AS  #Aortic aneurysm  - OR 12/12 with Dr. Echevarria  - Obtaining PA/lat CXR pre-op  - Pre-op labs  - Diamox to be given prior to OR  - Allergy to calcium channel blockers    Respiratory  #JO on CPAP  - Will order nocturnal CPAP  - PA.lat cxr to be completed tonight    GI   - Stable.  - Tolerating DASH diet  - Patient says she can have foods with potassium, just not in excess  - Protonix for GI prophylaxis  - NPO midnight     Renal/  - BUN/Cr stable at 14/0.58  - Continue to monitor renal function.  - Monitor I/O's  - Replete electrolytes PRN    Endocrine    - A1c 5.2  - TSH pending    Hematologic  - H/H stable at 14.1/43.3 with no obvious signs of bleeding  - PTT 35  - INR 1.08    ID:  - Pt remains afebrile with no elevation in WBC or signs of infection  - Continue to monitor CBC  - Observe for SIRS/Sepsis Syndrome.  - Needs pre-op UA    Prophylaxis:  - DVT prophylaxis with 5000 SubQ Heparin q8h.  - SCD's    Disposition:  - OR 12/12 62 year old female with PMHx hyperkalemic periodic paralysis (diagnosed at age 36, controlled on diamox, last flare 2 years ago), bicuspid aortic valve with severe AS, aortic aneurysm, splenic vein thrombosis, portal vein thrombosis, macular degeneration, ITP, esophageal varices GERD, basal cell carcinoma, T11 fracture (current), cataracts, presents for direct admission for pre-op optimization prior to AVR and ascending aorta replacement scheduled for 12/12/22 with Dr. Echevarria. Patient states she had intermittent chest pain (occurs at rest) and GUEVARA (walking up 1 flight of stairs) increasing in intensity over the last 1-2 years. Today, she denies CP/SOB/N/V/D/dizziness/cough/fever/chills..    Neurovascular  #Hyperkalemic periodic paralysis  - Seen by Dr. Prado outpatient. Evaluated by inpatient neurology service, recommendations appreciated  - AVOID ADMINISTRATION OF POTASSIUM  - Anesthesia notified*  - Patient will have diamox prior to OR    Cardiovascular   #Bicuspid AV  #Severe AS  #Aortic aneurysm  - OR 12/12 with Dr. Echevarria  - Obtaining PA/lat CXR pre-op  - Pre-op labs  - Diamox to be given prior to OR  - Allergy to calcium channel blockers    Respiratory  #JO on CPAP  - Will order nocturnal CPAP  - PA.lat cxr to be completed tonight    GI   - Stable.  - Tolerating DASH diet  - Patient says she can have foods with potassium, just not in excess  - Protonix for GI prophylaxis  - NPO midnight     Renal/  - BUN/Cr stable at 14/0.58  - Continue to monitor renal function.  - Monitor I/O's  - Replete electrolytes PRN    Endocrine    - A1c 5.2  - TSH pending    Hematologic  - H/H stable at 14.1/43.3 with no obvious signs of bleeding  - PTT 35  - INR 1.08    ID:  #Hx COVID, still + test  - COVID over 30 days ago, asymptomatic  - No longer needs isolation  - Pt remains afebrile with no elevation in WBC or signs of infection  - Continue to monitor CBC  - Observe for SIRS/Sepsis Syndrome.  - Needs pre-op UA    Prophylaxis:  - DVT prophylaxis with 5000 SubQ Heparin q8h.  - SCD's    Disposition:  - OR 12/12

## 2022-12-11 NOTE — H&P ADULT - NSHPREVIEWOFSYSTEMS_GEN_ALL_CORE
CONSTITUTIONAL:  Denies Fevers / chills, sweats, fatigue, weight loss, weight gain                                      NEURO:  Denies paresthesias, seizures, syncope, confusion                                                                                EYES:  Denies Blurry vision, discharge, pain, loss of vision                                                                                    CARDIAC: + chest pain + GUEVARA walking up 1 flight of stairs                                                                                  RESPIRATORY:  Denies Wheezing, SOB, cough / sputum, hemoptysis                                                                GI:  Denies Nausea, vomiting, diarrhea, constipation, melena, difficulty swallowing                                               : Denies Hematuria, dysuria, urgency, incontinence                                                                                         MUSCULOSKELETAL:  Denies arthritis, joint swelling, muscle weakness                                                             SKIN/BREAST:  Denies rash, itching, hair loss, masses                                                                                            PSYCH:  Denies depression, anxiety, suicidal ideation                                                                                               HEME/LYMPH:  Denies bruises easily, enlarged lymph nodes, tender lymph nodes                                        ENDOCRINE:  Denies cold intolerance, heat intolerance, polydipsia

## 2022-12-11 NOTE — H&P ADULT - NSHPLABSRESULTS_GEN_ALL_CORE
TTE 9/27/22  Observations:  Mitral Valve: Normal mitral valve. Minimal mitral  regurgitation.  Aortic Valve/Aorta: Moderate-severe aortic stenosis:  ---LVOTd 2.17 cm. DVI = 21.5/81.5 = 0.26.  ---Peak/mean gradient 44.0/24.3 mmHg.  ---Aortic valve area by continuity 1.0 cm 2.  Mild aortic regurgitation.  Normal aortic root size.  Left Atrium: Moderately dilated left atrium.  Left Ventricle: Normal left ventricular internal dimensions  and wall thicknesses.  Normal left ventricular systolic function. No segmental  wall motion abnormalities.  Impaired LV-relaxation with normal filling pressure.  Right Heart: Normal right atrium. Normal right ventricular  size and function.  Normal tricuspid valve. Mild tricuspid regurgitation.  Normal pulmonic valve. Minimal pulmonic regurgitation.  Pericardium/Pleura: Normal pericardium with no pericardial  effusion.  Hemodynamic: Estimated right atrial pressure is normal.  Pulmonary arery systolic pressure is normal.  ------------------------------------------------------------------------  Conclusions:  Normal left ventricular systolic function. No segmental  wall motion abnormalities.  Moderate-severe aortic stenosis.        Carotid Dopplers 12/1/22  IMPRESSION: Intimal thickening without duplex evidence of hemodynamically   significant stenosis of either carotid artery.

## 2022-12-11 NOTE — CONSULT NOTE ADULT - SUBJECTIVE AND OBJECTIVE BOX
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 Neurology was consulted to follow up given the h/o hyperkalemic periodic paralysis as pt would need K load for the procedure.      MEDICATIONS  Home Medications:  CeleXA 20 mg oral tablet: 1 tab(s) orally once a day (at bedtime) (04 Oct 2021 14:31)  Diamox 250 mg oral tablet: 1 tab(s) orally once a day (04 Oct 2021 14:31)  omeprazole 40 mg oral delayed release capsule: 1 cap(s) orally once a day (28 Nov 2022 09:31)  Premarin 0.625 mg/g vaginal cream with applicator:  (04 Oct 2021 14:31)  PreserVision AREDS oral capsule: 1 cap(s) orally once a day (28 Nov 2022 09:29)    MEDICATIONS  (STANDING):  chlorhexidine 0.12% Liquid 10 milliLiter(s) Swish and Spit once  chlorhexidine 4% Liquid 1 Application(s) Topical once  chlorhexidine 4% Liquid 1 Application(s) Topical once  citalopram 20 milliGRAM(s) Oral at bedtime  heparin   Injectable 5000 Unit(s) SubCutaneous every 8 hours  polyethylene glycol 3350 17 Gram(s) Oral daily  senna 2 Tablet(s) Oral at bedtime  sodium chloride 0.9% lock flush 3 milliLiter(s) IV Push every 8 hours    MEDICATIONS  (PRN):      FAMILY HISTORY:  Family history of acute myocardial infarction (Sibling)      SOCIAL HISTORY: negative for tobacco, alcohol, or ilicit drug use.    Allergies    beta blockers (Muscle Pain; Joint Pain)  calcium channel blockers (Muscle Pain; Joint Pain)  Macrobid (Unknown)  succinylcholine (Other)    Intolerances        GEN: NAD, pleasant, cooperative    NEURO:   MENTAL STATUS: AAOx3  LANG/SPEECH: Fluent, intact naming, repetition & comprehension  CRANIAL NERVES:  II: Pupils equal and reactive, no RAPD, normal visual field and fundus  III, IV, VI: EOM intact, no gaze preference or deviation  V: normal  VII: no facial asymmetry  VIII: normal hearing to speech  MOTOR: 5/5 in both upper and lower extremities  REFLEXES: 2/4 throughout, bilateral flexor plantars  SENSORY: Normal to touch, temperature & pin prick in all extremiteis  COORD: Normal finger to nose and heel to shin, no tremor, no dysmetria    LABS:                        14.1   6.49  )-----------( 147      ( 11 Dec 2022 16:10 )             43.3           Hemoglobin A1C:   Vitamin B12   PT/INR - ( 11 Dec 2022 16:10 )   PT: 12.9 sec;   INR: 1.08          PTT - ( 11 Dec 2022 16:10 )  PTT:35.7 sec  CAPILLARY BLOOD GLUCOSE                  Microbiology:      RADIOLOGY, EKG AND ADDITIONAL TESTS: Reviewed.

## 2022-12-11 NOTE — H&P ADULT - NSHPSOCIALHISTORY_GEN_ALL_CORE
Social History  Smoker: former smoker  ETOH Use:   no  Illicit Drug Use:   no  Occupation: RN  Assistant Devices:  none, independent  Lives with: spouse

## 2022-12-11 NOTE — H&P ADULT - NSHPPHYSICALEXAM_GEN_ALL_CORE
GEN: NAD, looks comfortable  Psych: Mood appropriate  Neuro: A&Ox3.  No focal deficits.  Moving all extremities.   HEENT: No obvious abnormalities  CV: S1S2, regular, + murmur RUSB.  No carotid bruits.  No JVD  Lungs: Clear B/L.  No wheezing, rales or rhonchi  ABD: Soft, non-tender, non-distended.  +Bowel sounds  EXT: Warm and well perfused.  No peripheral edema noted  Musculoskeletal: Moving all extremities with normal ROM, no joint swelling  PV: Pedal pulses palpable

## 2022-12-11 NOTE — H&P ADULT - HISTORY OF PRESENT ILLNESS
62 year old female with PMHx hyperkalemic periodic paralysis (diagnosed at age 36, controlled on diamox, last flare 2 years ago), bicuspid aortic valve with severe AS, aortic aneurysm, splenic vein thrombosis, portal vein thrombosis, macular degeneration, ITP, esophageal varices GERD, basal cell carcinoma, T11 fracture (current), cataracts, presents for direct admission for pre-op optimization prior to AVR and ascending aorta replacement scheduled for 12/12/22 with Dr. Echevarria. Patient states she had intermittent chest pain (occurs at rest) and GUEVARA (walking up 1 flight of stairs) increasing in intensity over the last 1-2 years. Today, she denies CP/SOB/N/V/D/dizziness/cough/fever/chills..

## 2022-12-11 NOTE — H&P ADULT - NSICDXPASTSURGICALHX_GEN_ALL_CORE_FT
PAST SURGICAL HISTORY:   delivery NOS     Ectopic pregnancy     H/O bilateral inguinal hernia repair age 5    H/O carpal tunnel repair     S/P cataract surgery

## 2022-12-11 NOTE — PATIENT PROFILE ADULT - FUNCTIONAL ASSESSMENT - DAILY ACTIVITY 4.
4 = No assist / stand by assistance V-Y Flap Text: The defect edges were debeveled with a #15 scalpel blade.  Given the location of the defect, shape of the defect and the proximity to free margins a V-Y flap was deemed most appropriate.  Using a sterile surgical marker, an appropriate advancement flap was drawn incorporating the defect and placing the expected incisions within the relaxed skin tension lines where possible.    The area thus outlined was incised deep to adipose tissue with a #15 scalpel blade.  The skin margins were undermined to an appropriate distance in all directions utilizing iris scissors.

## 2022-12-11 NOTE — H&P ADULT - NSICDXPASTMEDICALHX_GEN_ALL_CORE_FT
PAST MEDICAL HISTORY:  Anxiety     Bicuspid aortic valve     Bilateral carpal tunnel syndrome     Depression     GERD (gastroesophageal reflux disease)     H/O aortic aneurysm     History of ITP     History of macular degeneration     Hyperkalemic periodic paralysis     Portal vein thrombosis cavernous transformation of portal vein causing portal vein and splenic vein thrombosis    Severe aortic stenosis     Splenic vein thrombosis

## 2022-12-11 NOTE — CONSULT NOTE ADULT - ASSESSMENT
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 Neurology was consulted to follow up given the h/o hyperkalemic periodic paralysis as pt would need K load for the procedure.   Due to the Hyperkalemic periodic paralysis, post K load the patient can have weakness lasting upto 24 hrs, however this is reversible.  Please call us for any concern, neurology will continue to follow.  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 Neurology was consulted to follow up given the h/o hyperkalemic periodic paralysis as pt would need K load for the procedure.   Due to the Hyperkalemic periodic paralysis, post K load the patient can have weakness lasting upto 24 hrs, however this is reversible.    Recommendation  Slow extubation ---- assess for good neurologic functions prior to extubation >>> message left w/ anesthesia team  Please call us for any concern, neurology will continue to follow.

## 2022-12-11 NOTE — PATIENT PROFILE ADULT - FALL HARM RISK - HARM RISK INTERVENTIONS

## 2022-12-11 NOTE — PATIENT PROFILE ADULT - NSTRANSFERBELONGINGSDISPO_GEN_A_NUR
Impression: Primary open-angle glaucoma, mild stage, right eye Plan: Pt has Glaucoma    Gonio :   deferred     Pachs:  543/533     Today's IOP :  17, 11       // Tmax & date :  23, 17 Target IOP low to mid teens Pt denies Fhx of Glaucoma Left eye is the better seeing eye No VF today. C/D:  .9-.8 ou
OCT: (6/24/2020) 58, 54 Pt denies Sulfa Allergy   // Pt denies Lung /Heart dx Plan :
1. Cont:
Zioptan qhs ou Cosopt BID OD 2. Patient is doing well ; IOP improved despite patient stating it is getting harder to use drops. 3. Return in 3 month IOP check with optometry. 
4. Return in 6 months for VF, RNFL and IOP check
given to family

## 2022-12-12 ENCOUNTER — APPOINTMENT (OUTPATIENT)
Dept: CARDIOTHORACIC SURGERY | Facility: HOSPITAL | Age: 62
End: 2022-12-12

## 2022-12-12 ENCOUNTER — TRANSCRIPTION ENCOUNTER (OUTPATIENT)
Age: 62
End: 2022-12-12

## 2022-12-12 ENCOUNTER — RESULT REVIEW (OUTPATIENT)
Age: 62
End: 2022-12-12

## 2022-12-12 LAB
ALBUMIN SERPL ELPH-MCNC: 2.6 G/DL — LOW (ref 3.3–5)
ALBUMIN SERPL ELPH-MCNC: 3.8 G/DL — SIGNIFICANT CHANGE UP (ref 3.3–5)
ALBUMIN SERPL ELPH-MCNC: 4.4 G/DL — SIGNIFICANT CHANGE UP (ref 3.3–5)
ALP SERPL-CCNC: 34 U/L — LOW (ref 40–120)
ALP SERPL-CCNC: 39 U/L — LOW (ref 40–120)
ALP SERPL-CCNC: 47 U/L — SIGNIFICANT CHANGE UP (ref 40–120)
ALT FLD-CCNC: 15 U/L — SIGNIFICANT CHANGE UP (ref 10–45)
ALT FLD-CCNC: 16 U/L — SIGNIFICANT CHANGE UP (ref 10–45)
ALT FLD-CCNC: 16 U/L — SIGNIFICANT CHANGE UP (ref 10–45)
ANION GAP SERPL CALC-SCNC: 10 MMOL/L — SIGNIFICANT CHANGE UP (ref 5–17)
ANION GAP SERPL CALC-SCNC: 12 MMOL/L — SIGNIFICANT CHANGE UP (ref 5–17)
ANION GAP SERPL CALC-SCNC: 8 MMOL/L — SIGNIFICANT CHANGE UP (ref 5–17)
APTT BLD: 36.7 SEC — HIGH (ref 27.5–35.5)
APTT BLD: 36.9 SEC — HIGH (ref 27.5–35.5)
APTT BLD: 73.6 SEC — HIGH (ref 27.5–35.5)
AST SERPL-CCNC: 47 U/L — HIGH (ref 10–40)
AST SERPL-CCNC: 54 U/L — HIGH (ref 10–40)
AST SERPL-CCNC: 54 U/L — HIGH (ref 10–40)
BASE EXCESS BLDA CALC-SCNC: -2.1 MMOL/L — LOW (ref -2–3)
BASE EXCESS BLDA CALC-SCNC: -2.3 MMOL/L — LOW (ref -2–3)
BASE EXCESS BLDA CALC-SCNC: -3.3 MMOL/L — LOW (ref -2–3)
BASE EXCESS BLDA CALC-SCNC: -4.9 MMOL/L — LOW (ref -2–3)
BASE EXCESS BLDA CALC-SCNC: 0.5 MMOL/L — SIGNIFICANT CHANGE UP (ref -2–3)
BASE EXCESS BLDA CALC-SCNC: 0.8 MMOL/L — SIGNIFICANT CHANGE UP (ref -2–3)
BASE EXCESS BLDA CALC-SCNC: 2.1 MMOL/L — SIGNIFICANT CHANGE UP (ref -2–3)
BASE EXCESS BLDA CALC-SCNC: 3.1 MMOL/L — HIGH (ref -2–3)
BASE EXCESS BLDV CALC-SCNC: -5.2 MMOL/L — LOW (ref -2–3)
BASE EXCESS BLDV CALC-SCNC: -8.1 MMOL/L — LOW (ref -2–3)
BASE EXCESS BLDV CALC-SCNC: 1.3 MMOL/L — SIGNIFICANT CHANGE UP (ref -2–3)
BASE EXCESS BLDV CALC-SCNC: 2.6 MMOL/L — SIGNIFICANT CHANGE UP (ref -2–3)
BASOPHILS # BLD AUTO: 0.02 K/UL — SIGNIFICANT CHANGE UP (ref 0–0.2)
BASOPHILS NFR BLD AUTO: 0.1 % — SIGNIFICANT CHANGE UP (ref 0–2)
BILIRUB SERPL-MCNC: 0.8 MG/DL — SIGNIFICANT CHANGE UP (ref 0.2–1.2)
BILIRUB SERPL-MCNC: 1.5 MG/DL — HIGH (ref 0.2–1.2)
BILIRUB SERPL-MCNC: 1.6 MG/DL — HIGH (ref 0.2–1.2)
BUN SERPL-MCNC: 11 MG/DL — SIGNIFICANT CHANGE UP (ref 7–23)
CA-I BLDA-SCNC: 0.79 MMOL/L — LOW (ref 1.15–1.33)
CA-I BLDA-SCNC: 0.87 MMOL/L — LOW (ref 1.15–1.33)
CA-I BLDA-SCNC: 0.87 MMOL/L — LOW (ref 1.15–1.33)
CA-I BLDA-SCNC: 1.18 MMOL/L — SIGNIFICANT CHANGE UP (ref 1.15–1.33)
CA-I BLDA-SCNC: 1.2 MMOL/L — SIGNIFICANT CHANGE UP (ref 1.15–1.33)
CA-I BLDA-SCNC: 1.27 MMOL/L — SIGNIFICANT CHANGE UP (ref 1.15–1.33)
CA-I BLDA-SCNC: 1.41 MMOL/L — HIGH (ref 1.15–1.33)
CA-I BLDA-SCNC: 1.69 MMOL/L — CRITICAL HIGH (ref 1.15–1.33)
CA-I SERPL-SCNC: 0.82 MMOL/L — LOW (ref 1.15–1.33)
CA-I SERPL-SCNC: 1.13 MMOL/L — LOW (ref 1.15–1.33)
CALCIUM SERPL-MCNC: 7.6 MG/DL — LOW (ref 8.4–10.5)
CALCIUM SERPL-MCNC: 8 MG/DL — LOW (ref 8.4–10.5)
CALCIUM SERPL-MCNC: 9.1 MG/DL — SIGNIFICANT CHANGE UP (ref 8.4–10.5)
CHLORIDE SERPL-SCNC: 115 MMOL/L — HIGH (ref 96–108)
CHLORIDE SERPL-SCNC: 118 MMOL/L — HIGH (ref 96–108)
CHLORIDE SERPL-SCNC: 119 MMOL/L — HIGH (ref 96–108)
CO2 BLDA-SCNC: 20 MMOL/L — SIGNIFICANT CHANGE UP (ref 19–24)
CO2 BLDA-SCNC: 23 MMOL/L — SIGNIFICANT CHANGE UP (ref 19–24)
CO2 BLDA-SCNC: 24 MMOL/L — SIGNIFICANT CHANGE UP (ref 19–24)
CO2 BLDA-SCNC: 26 MMOL/L — HIGH (ref 19–24)
CO2 BLDA-SCNC: 27 MMOL/L — HIGH (ref 19–24)
CO2 BLDA-SCNC: 28 MMOL/L — HIGH (ref 19–24)
CO2 BLDV-SCNC: 20.2 MMOL/L — LOW (ref 22–26)
CO2 BLDV-SCNC: 21.8 MMOL/L — LOW (ref 22–26)
CO2 BLDV-SCNC: 26.1 MMOL/L — HIGH (ref 22–26)
CO2 BLDV-SCNC: 29.3 MMOL/L — HIGH (ref 22–26)
CO2 SERPL-SCNC: 21 MMOL/L — LOW (ref 22–31)
CO2 SERPL-SCNC: 22 MMOL/L — SIGNIFICANT CHANGE UP (ref 22–31)
CO2 SERPL-SCNC: 24 MMOL/L — SIGNIFICANT CHANGE UP (ref 22–31)
COHGB MFR BLDA: 0.4 % — SIGNIFICANT CHANGE UP
COHGB MFR BLDA: 0.6 % — SIGNIFICANT CHANGE UP
COHGB MFR BLDA: 0.7 % — SIGNIFICANT CHANGE UP
COHGB MFR BLDA: 0.8 % — SIGNIFICANT CHANGE UP
COHGB MFR BLDV: 1.5 % — SIGNIFICANT CHANGE UP
CREAT SERPL-MCNC: 0.56 MG/DL — SIGNIFICANT CHANGE UP (ref 0.5–1.3)
EGFR: 102 ML/MIN/1.73M2 — SIGNIFICANT CHANGE UP
EOSINOPHIL # BLD AUTO: 0.04 K/UL — SIGNIFICANT CHANGE UP (ref 0–0.5)
EOSINOPHIL NFR BLD AUTO: 0.3 % — SIGNIFICANT CHANGE UP (ref 0–6)
FIBRINOGEN PPP-MCNC: 142 MG/DL — LOW (ref 258–438)
FIBRINOGEN PPP-MCNC: 294 MG/DL — SIGNIFICANT CHANGE UP (ref 258–438)
GAS PNL BLDA: SIGNIFICANT CHANGE UP
GAS PNL BLDV: 140 MMOL/L — SIGNIFICANT CHANGE UP (ref 136–145)
GAS PNL BLDV: 144 MMOL/L — SIGNIFICANT CHANGE UP (ref 136–145)
GAS PNL BLDV: SIGNIFICANT CHANGE UP
GLUCOSE BLDA-MCNC: 100 MG/DL — HIGH (ref 70–99)
GLUCOSE BLDA-MCNC: 131 MG/DL — HIGH (ref 70–99)
GLUCOSE BLDA-MCNC: 139 MG/DL — HIGH (ref 70–99)
GLUCOSE BLDA-MCNC: 148 MG/DL — HIGH (ref 70–99)
GLUCOSE BLDA-MCNC: 166 MG/DL — HIGH (ref 70–99)
GLUCOSE BLDA-MCNC: 173 MG/DL — HIGH (ref 70–99)
GLUCOSE BLDA-MCNC: 186 MG/DL — HIGH (ref 70–99)
GLUCOSE BLDA-MCNC: 290 MG/DL — HIGH (ref 70–99)
GLUCOSE BLDC GLUCOMTR-MCNC: 101 MG/DL — HIGH (ref 70–99)
GLUCOSE BLDC GLUCOMTR-MCNC: 159 MG/DL — HIGH (ref 70–99)
GLUCOSE BLDC GLUCOMTR-MCNC: 174 MG/DL — HIGH (ref 70–99)
GLUCOSE BLDC GLUCOMTR-MCNC: 179 MG/DL — HIGH (ref 70–99)
GLUCOSE BLDC GLUCOMTR-MCNC: 185 MG/DL — HIGH (ref 70–99)
GLUCOSE BLDC GLUCOMTR-MCNC: 190 MG/DL — HIGH (ref 70–99)
GLUCOSE BLDV-MCNC: 141 MG/DL — HIGH (ref 70–99)
GLUCOSE SERPL-MCNC: 117 MG/DL — HIGH (ref 70–99)
GLUCOSE SERPL-MCNC: 181 MG/DL — HIGH (ref 70–99)
GLUCOSE SERPL-MCNC: 189 MG/DL — HIGH (ref 70–99)
HCO3 BLDA-SCNC: 19 MMOL/L — LOW (ref 21–28)
HCO3 BLDA-SCNC: 22 MMOL/L — SIGNIFICANT CHANGE UP (ref 21–28)
HCO3 BLDA-SCNC: 23 MMOL/L — SIGNIFICANT CHANGE UP (ref 21–28)
HCO3 BLDA-SCNC: 25 MMOL/L — SIGNIFICANT CHANGE UP (ref 21–28)
HCO3 BLDA-SCNC: 26 MMOL/L — SIGNIFICANT CHANGE UP (ref 21–28)
HCO3 BLDA-SCNC: 27 MMOL/L — SIGNIFICANT CHANGE UP (ref 21–28)
HCO3 BLDV-SCNC: 19 MMOL/L — LOW (ref 22–29)
HCO3 BLDV-SCNC: 21 MMOL/L — LOW (ref 22–29)
HCO3 BLDV-SCNC: 25 MMOL/L — SIGNIFICANT CHANGE UP (ref 22–29)
HCO3 BLDV-SCNC: 28 MMOL/L — SIGNIFICANT CHANGE UP (ref 22–29)
HCT VFR BLD CALC: 20.9 % — CRITICAL LOW (ref 34.5–45)
HCT VFR BLD CALC: 28.4 % — LOW (ref 34.5–45)
HCT VFR BLD CALC: 31.4 % — LOW (ref 34.5–45)
HCV AB S/CO SERPL IA: 0.04 S/CO — SIGNIFICANT CHANGE UP
HCV AB SERPL-IMP: SIGNIFICANT CHANGE UP
HGB BLD CALC-MCNC: 10.9 G/DL — LOW (ref 11.7–16.1)
HGB BLD-MCNC: 10.5 G/DL — LOW (ref 11.5–15.5)
HGB BLD-MCNC: 6.9 G/DL — CRITICAL LOW (ref 11.5–15.5)
HGB BLD-MCNC: 9.3 G/DL — LOW (ref 11.5–15.5)
HGB BLDA-MCNC: 10.3 G/DL — LOW (ref 11.7–16.1)
HGB BLDA-MCNC: 10.4 G/DL — LOW (ref 11.7–16.1)
HGB BLDA-MCNC: 10.6 G/DL — LOW (ref 11.7–16.1)
HGB BLDA-MCNC: 11 G/DL — LOW (ref 11.7–16.1)
HGB BLDA-MCNC: 13.2 G/DL — SIGNIFICANT CHANGE UP (ref 11.7–16.1)
HGB BLDA-MCNC: 14.1 G/DL — SIGNIFICANT CHANGE UP (ref 11.7–16.1)
HGB BLDA-MCNC: 8.8 G/DL — LOW (ref 11.7–16.1)
HGB BLDA-MCNC: 9.5 G/DL — LOW (ref 11.7–16.1)
HOROWITZ INDEX BLDV+IHG-RTO: 80 — SIGNIFICANT CHANGE UP
IMM GRANULOCYTES NFR BLD AUTO: 0.7 % — SIGNIFICANT CHANGE UP (ref 0–0.9)
INR BLD: 1.07 — SIGNIFICANT CHANGE UP (ref 0.88–1.16)
INR BLD: 1.3 — HIGH (ref 0.88–1.16)
INR BLD: 1.37 — HIGH (ref 0.88–1.16)
ISTAT ARTERIAL BE: -3 MMOL/L — LOW (ref -2–3)
ISTAT ARTERIAL GLUCOSE: 167 MG/DL — HIGH (ref 70–99)
ISTAT ARTERIAL HCO3: 22 MMOL/L — SIGNIFICANT CHANGE UP (ref 22–26)
ISTAT ARTERIAL HEMATOCRIT: 27 % — LOW (ref 34.5–45)
ISTAT ARTERIAL HEMOGLOBIN: 9.2 G/DL — LOW (ref 11.5–15.5)
ISTAT ARTERIAL IONIZED CALCIUM: 1.21 MMOL/L — SIGNIFICANT CHANGE UP (ref 1.12–1.3)
ISTAT ARTERIAL PCO2: 37 MMHG — SIGNIFICANT CHANGE UP (ref 35–45)
ISTAT ARTERIAL PH: 7.38 — SIGNIFICANT CHANGE UP (ref 7.35–7.45)
ISTAT ARTERIAL PO2: 214 MMHG — HIGH (ref 80–105)
ISTAT ARTERIAL POTASSIUM: 3.1 MMOL/L — LOW (ref 3.5–5.3)
ISTAT ARTERIAL SO2: 100 % — HIGH (ref 95–98)
ISTAT ARTERIAL SODIUM: 148 MMOL/L — HIGH (ref 135–145)
ISTAT ARTERIAL TCO2: 23 MMOL/L — SIGNIFICANT CHANGE UP (ref 22–31)
LACTATE SERPL-SCNC: 0.9 MMOL/L — SIGNIFICANT CHANGE UP (ref 0.5–2)
LACTATE SERPL-SCNC: 1.6 MMOL/L — SIGNIFICANT CHANGE UP (ref 0.5–2)
LACTATE SERPL-SCNC: 2.2 MMOL/L — HIGH (ref 0.5–2)
LYMPHOCYTES # BLD AUTO: 0.92 K/UL — LOW (ref 1–3.3)
LYMPHOCYTES # BLD AUTO: 6.5 % — LOW (ref 13–44)
MAGNESIUM SERPL-MCNC: 2.6 MG/DL — SIGNIFICANT CHANGE UP (ref 1.6–2.6)
MAGNESIUM SERPL-MCNC: 2.8 MG/DL — HIGH (ref 1.6–2.6)
MCHC RBC-ENTMCNC: 28.6 PG — SIGNIFICANT CHANGE UP (ref 27–34)
MCHC RBC-ENTMCNC: 29.1 PG — SIGNIFICANT CHANGE UP (ref 27–34)
MCHC RBC-ENTMCNC: 29.2 PG — SIGNIFICANT CHANGE UP (ref 27–34)
MCHC RBC-ENTMCNC: 32.7 GM/DL — SIGNIFICANT CHANGE UP (ref 32–36)
MCHC RBC-ENTMCNC: 33 GM/DL — SIGNIFICANT CHANGE UP (ref 32–36)
MCHC RBC-ENTMCNC: 33.4 GM/DL — SIGNIFICANT CHANGE UP (ref 32–36)
MCV RBC AUTO: 86.7 FL — SIGNIFICANT CHANGE UP (ref 80–100)
MCV RBC AUTO: 87 FL — SIGNIFICANT CHANGE UP (ref 80–100)
MCV RBC AUTO: 89 FL — SIGNIFICANT CHANGE UP (ref 80–100)
METHGB MFR BLDA: 0.7 % — SIGNIFICANT CHANGE UP
METHGB MFR BLDA: 0.8 % — SIGNIFICANT CHANGE UP
METHGB MFR BLDA: 0.8 % — SIGNIFICANT CHANGE UP
METHGB MFR BLDA: 0.9 % — SIGNIFICANT CHANGE UP
METHGB MFR BLDA: 0.9 % — SIGNIFICANT CHANGE UP
METHGB MFR BLDA: 1 % — SIGNIFICANT CHANGE UP
METHGB MFR BLDA: 1 % — SIGNIFICANT CHANGE UP
METHGB MFR BLDA: 1.1 % — SIGNIFICANT CHANGE UP
METHGB MFR BLDV: 0.8 % — SIGNIFICANT CHANGE UP
MONOCYTES # BLD AUTO: 1.37 K/UL — HIGH (ref 0–0.9)
MONOCYTES NFR BLD AUTO: 9.7 % — SIGNIFICANT CHANGE UP (ref 2–14)
NEUTROPHILS # BLD AUTO: 11.71 K/UL — HIGH (ref 1.8–7.4)
NEUTROPHILS NFR BLD AUTO: 82.7 % — HIGH (ref 43–77)
NRBC # BLD: 0 /100 WBCS — SIGNIFICANT CHANGE UP (ref 0–0)
OXYHGB MFR BLDA: 97.5 % — HIGH (ref 90–95)
OXYHGB MFR BLDA: 97.6 % — HIGH (ref 90–95)
OXYHGB MFR BLDA: 97.7 % — HIGH (ref 90–95)
OXYHGB MFR BLDA: 97.8 % — HIGH (ref 90–95)
OXYHGB MFR BLDA: 97.8 % — HIGH (ref 90–95)
OXYHGB MFR BLDA: 98.1 % — HIGH (ref 90–95)
PCO2 BLDA: 29 MMHG — LOW (ref 32–45)
PCO2 BLDA: 32 MMHG — SIGNIFICANT CHANGE UP (ref 32–45)
PCO2 BLDA: 35 MMHG — SIGNIFICANT CHANGE UP (ref 32–45)
PCO2 BLDA: 36 MMHG — SIGNIFICANT CHANGE UP (ref 32–45)
PCO2 BLDA: 38 MMHG — SIGNIFICANT CHANGE UP (ref 32–45)
PCO2 BLDA: 39 MMHG — SIGNIFICANT CHANGE UP (ref 32–45)
PCO2 BLDA: 42 MMHG — SIGNIFICANT CHANGE UP (ref 32–45)
PCO2 BLDA: 44 MMHG — SIGNIFICANT CHANGE UP (ref 32–45)
PCO2 BLDV: 36 MMHG — LOW (ref 39–42)
PCO2 BLDV: 40 MMHG — SIGNIFICANT CHANGE UP (ref 39–42)
PCO2 BLDV: 43 MMHG — HIGH (ref 39–42)
PCO2 BLDV: 45 MMHG — HIGH (ref 39–42)
PH BLDA: 7.32 — LOW (ref 7.35–7.45)
PH BLDA: 7.35 — SIGNIFICANT CHANGE UP (ref 7.35–7.45)
PH BLDA: 7.39 — SIGNIFICANT CHANGE UP (ref 7.35–7.45)
PH BLDA: 7.4 — SIGNIFICANT CHANGE UP (ref 7.35–7.45)
PH BLDA: 7.42 — SIGNIFICANT CHANGE UP (ref 7.35–7.45)
PH BLDA: 7.46 — HIGH (ref 7.35–7.45)
PH BLDA: 7.47 — HIGH (ref 7.35–7.45)
PH BLDA: 7.51 — HIGH (ref 7.35–7.45)
PH BLDV: 7.25 — LOW (ref 7.32–7.43)
PH BLDV: 7.32 — SIGNIFICANT CHANGE UP (ref 7.32–7.43)
PH BLDV: 7.4 — SIGNIFICANT CHANGE UP (ref 7.32–7.43)
PH BLDV: 7.45 — HIGH (ref 7.32–7.43)
PHOSPHATE SERPL-MCNC: 0.4 MG/DL — CRITICAL LOW (ref 2.5–4.5)
PHOSPHATE SERPL-MCNC: 2.3 MG/DL — LOW (ref 2.5–4.5)
PLATELET # BLD AUTO: 54 K/UL — LOW (ref 150–400)
PLATELET # BLD AUTO: 60 K/UL — LOW (ref 150–400)
PLATELET # BLD AUTO: 95 K/UL — LOW (ref 150–400)
PO2 BLDA: 324 MMHG — HIGH (ref 83–108)
PO2 BLDA: 386 MMHG — HIGH (ref 83–108)
PO2 BLDA: 400 MMHG — HIGH (ref 83–108)
PO2 BLDA: 408 MMHG — HIGH (ref 83–108)
PO2 BLDA: 416 MMHG — HIGH (ref 83–108)
PO2 BLDA: 490 MMHG — HIGH (ref 83–108)
PO2 BLDA: 531 MMHG — HIGH (ref 83–108)
PO2 BLDA: 564 MMHG — HIGH (ref 83–108)
PO2 BLDV: 35 MMHG — SIGNIFICANT CHANGE UP (ref 25–45)
PO2 BLDV: 50 MMHG — HIGH (ref 25–45)
PO2 BLDV: 55 MMHG — HIGH (ref 25–45)
PO2 BLDV: 56 MMHG — HIGH (ref 25–45)
POTASSIUM BLDA-SCNC: 3.1 MMOL/L — LOW (ref 3.5–5.1)
POTASSIUM BLDA-SCNC: 3.1 MMOL/L — LOW (ref 3.5–5.1)
POTASSIUM BLDA-SCNC: 3.3 MMOL/L — LOW (ref 3.5–5.1)
POTASSIUM BLDA-SCNC: 4.4 MMOL/L — SIGNIFICANT CHANGE UP (ref 3.5–5.1)
POTASSIUM BLDA-SCNC: 4.9 MMOL/L — SIGNIFICANT CHANGE UP (ref 3.5–5.1)
POTASSIUM BLDA-SCNC: 5.9 MMOL/L — HIGH (ref 3.5–5.1)
POTASSIUM BLDA-SCNC: 5.9 MMOL/L — HIGH (ref 3.5–5.1)
POTASSIUM BLDA-SCNC: 6.7 MMOL/L — CRITICAL HIGH (ref 3.5–5.1)
POTASSIUM BLDV-SCNC: 3.1 MMOL/L — LOW (ref 3.5–5.1)
POTASSIUM BLDV-SCNC: 4.2 MMOL/L — SIGNIFICANT CHANGE UP (ref 3.5–5.1)
POTASSIUM SERPL-MCNC: 2.4 MMOL/L — CRITICAL LOW (ref 3.5–5.3)
POTASSIUM SERPL-MCNC: 3.3 MMOL/L — LOW (ref 3.5–5.3)
POTASSIUM SERPL-MCNC: 3.4 MMOL/L — LOW (ref 3.5–5.3)
POTASSIUM SERPL-SCNC: 2.4 MMOL/L — CRITICAL LOW (ref 3.5–5.3)
POTASSIUM SERPL-SCNC: 3.3 MMOL/L — LOW (ref 3.5–5.3)
POTASSIUM SERPL-SCNC: 3.4 MMOL/L — LOW (ref 3.5–5.3)
PROT SERPL-MCNC: 4.1 G/DL — LOW (ref 6–8.3)
PROT SERPL-MCNC: 4.9 G/DL — LOW (ref 6–8.3)
PROT SERPL-MCNC: 5.9 G/DL — LOW (ref 6–8.3)
PROTHROM AB SERPL-ACNC: 12.8 SEC — SIGNIFICANT CHANGE UP (ref 10.5–13.4)
PROTHROM AB SERPL-ACNC: 15.5 SEC — HIGH (ref 10.5–13.4)
PROTHROM AB SERPL-ACNC: 16.3 SEC — HIGH (ref 10.5–13.4)
RBC # BLD: 2.41 M/UL — LOW (ref 3.8–5.2)
RBC # BLD: 3.19 M/UL — LOW (ref 3.8–5.2)
RBC # BLD: 3.61 M/UL — LOW (ref 3.8–5.2)
RBC # FLD: 14.5 % — SIGNIFICANT CHANGE UP (ref 10.3–14.5)
RBC # FLD: 14.6 % — HIGH (ref 10.3–14.5)
RBC # FLD: 14.6 % — HIGH (ref 10.3–14.5)
SAO2 % BLDA: 99 % — HIGH (ref 94–98)
SAO2 % BLDA: 99.1 % — HIGH (ref 94–98)
SAO2 % BLDA: 99.2 % — HIGH (ref 94–98)
SAO2 % BLDA: 99.3 % — HIGH (ref 94–98)
SAO2 % BLDA: 99.3 % — HIGH (ref 94–98)
SAO2 % BLDA: 99.4 % — HIGH (ref 94–98)
SAO2 % BLDV: 64.3 % — LOW (ref 67–88)
SAO2 % BLDV: 81.2 % — SIGNIFICANT CHANGE UP (ref 67–88)
SAO2 % BLDV: 85.7 % — SIGNIFICANT CHANGE UP (ref 67–88)
SAO2 % BLDV: 89 % — HIGH (ref 67–88)
SODIUM BLDA-SCNC: 138 MMOL/L — SIGNIFICANT CHANGE UP (ref 136–145)
SODIUM BLDA-SCNC: 140 MMOL/L — SIGNIFICANT CHANGE UP (ref 136–145)
SODIUM BLDA-SCNC: 141 MMOL/L — SIGNIFICANT CHANGE UP (ref 136–145)
SODIUM BLDA-SCNC: 142 MMOL/L — SIGNIFICANT CHANGE UP (ref 136–145)
SODIUM BLDA-SCNC: 146 MMOL/L — HIGH (ref 136–145)
SODIUM SERPL-SCNC: 149 MMOL/L — HIGH (ref 135–145)
SODIUM SERPL-SCNC: 149 MMOL/L — HIGH (ref 135–145)
SODIUM SERPL-SCNC: 151 MMOL/L — HIGH (ref 135–145)
WBC # BLD: 14.16 K/UL — HIGH (ref 3.8–10.5)
WBC # BLD: 3.96 K/UL — SIGNIFICANT CHANGE UP (ref 3.8–10.5)
WBC # BLD: 6.75 K/UL — SIGNIFICANT CHANGE UP (ref 3.8–10.5)
WBC # FLD AUTO: 14.16 K/UL — HIGH (ref 3.8–10.5)
WBC # FLD AUTO: 3.96 K/UL — SIGNIFICANT CHANGE UP (ref 3.8–10.5)
WBC # FLD AUTO: 6.75 K/UL — SIGNIFICANT CHANGE UP (ref 3.8–10.5)

## 2022-12-12 PROCEDURE — 99291 CRITICAL CARE FIRST HOUR: CPT

## 2022-12-12 PROCEDURE — 33405 REPLACEMENT AORTIC VALVE OPN: CPT | Mod: 80

## 2022-12-12 PROCEDURE — 71045 X-RAY EXAM CHEST 1 VIEW: CPT | Mod: 26,77

## 2022-12-12 PROCEDURE — 33859 AS-AORT GRF F/DS OTH/THN DSJ: CPT | Mod: 80

## 2022-12-12 PROCEDURE — 71045 X-RAY EXAM CHEST 1 VIEW: CPT | Mod: 26

## 2022-12-12 PROCEDURE — 33405 REPLACEMENT AORTIC VALVE OPN: CPT

## 2022-12-12 PROCEDURE — 88311 DECALCIFY TISSUE: CPT | Mod: 26

## 2022-12-12 PROCEDURE — 88305 TISSUE EXAM BY PATHOLOGIST: CPT | Mod: 26

## 2022-12-12 PROCEDURE — 99292 CRITICAL CARE ADDL 30 MIN: CPT

## 2022-12-12 PROCEDURE — 93010 ELECTROCARDIOGRAM REPORT: CPT

## 2022-12-12 PROCEDURE — 33859 AS-AORT GRF F/DS OTH/THN DSJ: CPT

## 2022-12-12 DEVICE — CANNULA CORONARY SILICONE OSTIAL 15FR: Type: IMPLANTABLE DEVICE | Status: FUNCTIONAL

## 2022-12-12 DEVICE — CANNULA AORTIC ROOT 14G X 14CM FLANGED: Type: IMPLANTABLE DEVICE | Status: FUNCTIONAL

## 2022-12-12 DEVICE — KIT CVC 2LUM MAC 9FR CHG: Type: IMPLANTABLE DEVICE | Status: FUNCTIONAL

## 2022-12-12 DEVICE — BONE WAX 2.5GM: Type: IMPLANTABLE DEVICE | Status: FUNCTIONAL

## 2022-12-12 DEVICE — COR-KNOT MINI DEVICE COMBO KIT: Type: IMPLANTABLE DEVICE | Status: FUNCTIONAL

## 2022-12-12 DEVICE — VALVE AORTIC INSPIRIS RESILIA 23MM: Type: IMPLANTABLE DEVICE | Status: FUNCTIONAL

## 2022-12-12 DEVICE — SURGIFLO HEMOSTATIC MATRIX KIT: Type: IMPLANTABLE DEVICE | Status: FUNCTIONAL

## 2022-12-12 DEVICE — BIOGLUE 10ML SYR: Type: IMPLANTABLE DEVICE | Status: FUNCTIONAL

## 2022-12-12 DEVICE — PACING WIRE STREAMLINE BIPOLAR MYOCARDIAL: Type: IMPLANTABLE DEVICE | Status: FUNCTIONAL

## 2022-12-12 DEVICE — CANNULA RETROGRADE CARDIOPLEGIA SELF-INFLATING 14FR PRE-SHAPED STYLET/HANDLE: Type: IMPLANTABLE DEVICE | Status: FUNCTIONAL

## 2022-12-12 DEVICE — CATH VENT LEFT HEART SILICONE 16FR NON-VENTED: Type: IMPLANTABLE DEVICE | Status: FUNCTIONAL

## 2022-12-12 DEVICE — INTRODUCER PERCUTANEOUS INSERTION KIT: Type: IMPLANTABLE DEVICE | Status: FUNCTIONAL

## 2022-12-12 DEVICE — CANNULA ARTERIOTOMY 2MM X 1.3": Type: IMPLANTABLE DEVICE | Status: FUNCTIONAL

## 2022-12-12 DEVICE — IMPLANTABLE DEVICE: Type: IMPLANTABLE DEVICE | Status: FUNCTIONAL

## 2022-12-12 DEVICE — LIGATING CLIPS WECK HEMOCLIP TANTALUM LARGE (ORANGE) 10: Type: IMPLANTABLE DEVICE | Status: FUNCTIONAL

## 2022-12-12 DEVICE — CANNULA VENOUS 2 STAGE MC2 32/40FR X 1/2" OVAL BODY NON-VENTED: Type: IMPLANTABLE DEVICE | Status: FUNCTIONAL

## 2022-12-12 DEVICE — CANNULA ARTERIAL OPTISITE 18FR X 3/8" VENTED: Type: IMPLANTABLE DEVICE | Status: FUNCTIONAL

## 2022-12-12 DEVICE — CANNULA CORONARY OSTIAL 14FR X 6" BASKET TIP: Type: IMPLANTABLE DEVICE | Status: FUNCTIONAL

## 2022-12-12 DEVICE — CANNULA CORONARY OSTIAL 12FR X 6" BASKET TIP: Type: IMPLANTABLE DEVICE | Status: FUNCTIONAL

## 2022-12-12 DEVICE — FELT PTFE 1 X 6": Type: IMPLANTABLE DEVICE | Status: FUNCTIONAL

## 2022-12-12 DEVICE — PATCH PERI-GUARD RPR 6 CM X 8 CM: Type: IMPLANTABLE DEVICE | Status: FUNCTIONAL

## 2022-12-12 DEVICE — CHEST DRAIN THORACIC PVC 32FR RIGHT ANGLE: Type: IMPLANTABLE DEVICE | Status: FUNCTIONAL

## 2022-12-12 DEVICE — CANNULA CORONARY SILICONE OSTIAL 17FR: Type: IMPLANTABLE DEVICE | Status: FUNCTIONAL

## 2022-12-12 DEVICE — COR-KNOT QUICK LOAD SINGLES: Type: IMPLANTABLE DEVICE | Status: FUNCTIONAL

## 2022-12-12 RX ORDER — DEXMEDETOMIDINE HYDROCHLORIDE IN 0.9% SODIUM CHLORIDE 4 UG/ML
0.1 INJECTION INTRAVENOUS
Qty: 400 | Refills: 0 | Status: DISCONTINUED | OUTPATIENT
Start: 2022-12-12 | End: 2022-12-13

## 2022-12-12 RX ORDER — NITROGLYCERIN 6.5 MG
50 CAPSULE, EXTENDED RELEASE ORAL
Qty: 50 | Refills: 0 | Status: DISCONTINUED | OUTPATIENT
Start: 2022-12-12 | End: 2022-12-13

## 2022-12-12 RX ORDER — ACETAZOLAMIDE 250 MG/1
250 TABLET ORAL DAILY
Refills: 0 | Status: DISCONTINUED | OUTPATIENT
Start: 2022-12-12 | End: 2022-12-13

## 2022-12-12 RX ORDER — ASPIRIN/CALCIUM CARB/MAGNESIUM 324 MG
81 TABLET ORAL DAILY
Refills: 0 | Status: DISCONTINUED | OUTPATIENT
Start: 2022-12-13 | End: 2022-12-20

## 2022-12-12 RX ORDER — ALBUMIN HUMAN 25 %
250 VIAL (ML) INTRAVENOUS ONCE
Refills: 0 | Status: COMPLETED | OUTPATIENT
Start: 2022-12-12 | End: 2022-12-12

## 2022-12-12 RX ORDER — FENTANYL CITRATE 50 UG/ML
50 INJECTION INTRAVENOUS ONCE
Refills: 0 | Status: DISCONTINUED | OUTPATIENT
Start: 2022-12-12 | End: 2022-12-12

## 2022-12-12 RX ORDER — ALBUMIN HUMAN 25 %
250 VIAL (ML) INTRAVENOUS
Refills: 0 | Status: COMPLETED | OUTPATIENT
Start: 2022-12-12 | End: 2022-12-12

## 2022-12-12 RX ORDER — SODIUM BICARBONATE 1 MEQ/ML
100 SYRINGE (ML) INTRAVENOUS ONCE
Refills: 0 | Status: COMPLETED | OUTPATIENT
Start: 2022-12-12 | End: 2022-12-12

## 2022-12-12 RX ORDER — PROTAMINE SULFATE 10 MG/ML
25 AMPUL (ML) INTRAVENOUS ONCE
Refills: 0 | Status: COMPLETED | OUTPATIENT
Start: 2022-12-12 | End: 2022-12-12

## 2022-12-12 RX ORDER — CISATRACURIUM BESYLATE 2 MG/ML
10 INJECTION INTRAVENOUS ONCE
Refills: 0 | Status: COMPLETED | OUTPATIENT
Start: 2022-12-12 | End: 2022-12-12

## 2022-12-12 RX ORDER — SODIUM BICARBONATE 1 MEQ/ML
50 SYRINGE (ML) INTRAVENOUS
Refills: 0 | Status: DISCONTINUED | OUTPATIENT
Start: 2022-12-12 | End: 2022-12-13

## 2022-12-12 RX ORDER — POTASSIUM CHLORIDE 20 MEQ
20 PACKET (EA) ORAL ONCE
Refills: 0 | Status: COMPLETED | OUTPATIENT
Start: 2022-12-12 | End: 2022-12-12

## 2022-12-12 RX ORDER — CEFAZOLIN SODIUM 1 G
2000 VIAL (EA) INJECTION EVERY 8 HOURS
Refills: 0 | Status: COMPLETED | OUTPATIENT
Start: 2022-12-12 | End: 2022-12-13

## 2022-12-12 RX ORDER — POTASSIUM CHLORIDE 20 MEQ
10 PACKET (EA) ORAL ONCE
Refills: 0 | Status: COMPLETED | OUTPATIENT
Start: 2022-12-12 | End: 2022-12-12

## 2022-12-12 RX ORDER — NOREPINEPHRINE BITARTRATE/D5W 8 MG/250ML
0.05 PLASTIC BAG, INJECTION (ML) INTRAVENOUS
Qty: 8 | Refills: 0 | Status: DISCONTINUED | OUTPATIENT
Start: 2022-12-12 | End: 2022-12-13

## 2022-12-12 RX ORDER — ALBUMIN HUMAN 25 %
500 VIAL (ML) INTRAVENOUS ONCE
Refills: 0 | Status: COMPLETED | OUTPATIENT
Start: 2022-12-12 | End: 2022-12-12

## 2022-12-12 RX ORDER — DEXTROSE 50 % IN WATER 50 %
25 SYRINGE (ML) INTRAVENOUS
Refills: 0 | Status: DISCONTINUED | OUTPATIENT
Start: 2022-12-12 | End: 2022-12-20

## 2022-12-12 RX ORDER — SODIUM CHLORIDE 9 MG/ML
1000 INJECTION INTRAMUSCULAR; INTRAVENOUS; SUBCUTANEOUS
Refills: 0 | Status: DISCONTINUED | OUTPATIENT
Start: 2022-12-12 | End: 2022-12-20

## 2022-12-12 RX ORDER — FENTANYL CITRATE 50 UG/ML
25 INJECTION INTRAVENOUS ONCE
Refills: 0 | Status: DISCONTINUED | OUTPATIENT
Start: 2022-12-12 | End: 2022-12-12

## 2022-12-12 RX ORDER — PANTOPRAZOLE SODIUM 20 MG/1
40 TABLET, DELAYED RELEASE ORAL DAILY
Refills: 0 | Status: DISCONTINUED | OUTPATIENT
Start: 2022-12-12 | End: 2022-12-13

## 2022-12-12 RX ORDER — FUROSEMIDE 40 MG
20 TABLET ORAL ONCE
Refills: 0 | Status: COMPLETED | OUTPATIENT
Start: 2022-12-12 | End: 2022-12-12

## 2022-12-12 RX ORDER — PROPOFOL 10 MG/ML
5 INJECTION, EMULSION INTRAVENOUS
Qty: 1000 | Refills: 0 | Status: DISCONTINUED | OUTPATIENT
Start: 2022-12-12 | End: 2022-12-13

## 2022-12-12 RX ORDER — HEPARIN SODIUM 5000 [USP'U]/ML
5000 INJECTION INTRAVENOUS; SUBCUTANEOUS EVERY 8 HOURS
Refills: 0 | Status: DISCONTINUED | OUTPATIENT
Start: 2022-12-12 | End: 2022-12-14

## 2022-12-12 RX ORDER — CHLORHEXIDINE GLUCONATE 213 G/1000ML
5 SOLUTION TOPICAL
Refills: 0 | Status: DISCONTINUED | OUTPATIENT
Start: 2022-12-12 | End: 2022-12-12

## 2022-12-12 RX ORDER — CHLORHEXIDINE GLUCONATE 213 G/1000ML
1 SOLUTION TOPICAL DAILY
Refills: 0 | Status: DISCONTINUED | OUTPATIENT
Start: 2022-12-13 | End: 2022-12-17

## 2022-12-12 RX ORDER — DEXTROSE 50 % IN WATER 50 %
50 SYRINGE (ML) INTRAVENOUS
Refills: 0 | Status: DISCONTINUED | OUTPATIENT
Start: 2022-12-12 | End: 2022-12-20

## 2022-12-12 RX ORDER — CHLORHEXIDINE GLUCONATE 213 G/1000ML
15 SOLUTION TOPICAL EVERY 12 HOURS
Refills: 0 | Status: DISCONTINUED | OUTPATIENT
Start: 2022-12-12 | End: 2022-12-14

## 2022-12-12 RX ORDER — DOBUTAMINE HCL 250MG/20ML
2.5 VIAL (ML) INTRAVENOUS
Qty: 500 | Refills: 0 | Status: DISCONTINUED | OUTPATIENT
Start: 2022-12-12 | End: 2022-12-14

## 2022-12-12 RX ORDER — MILRINONE LACTATE 1 MG/ML
0.38 INJECTION, SOLUTION INTRAVENOUS
Qty: 20 | Refills: 0 | Status: DISCONTINUED | OUTPATIENT
Start: 2022-12-12 | End: 2022-12-16

## 2022-12-12 RX ORDER — ALBUMIN HUMAN 25 %
500 VIAL (ML) INTRAVENOUS
Refills: 0 | Status: COMPLETED | OUTPATIENT
Start: 2022-12-12 | End: 2022-12-12

## 2022-12-12 RX ORDER — FUROSEMIDE 40 MG
10 TABLET ORAL
Qty: 500 | Refills: 0 | Status: DISCONTINUED | OUTPATIENT
Start: 2022-12-12 | End: 2022-12-13

## 2022-12-12 RX ORDER — CALCIUM GLUCONATE 100 MG/ML
2 VIAL (ML) INTRAVENOUS ONCE
Refills: 0 | Status: COMPLETED | OUTPATIENT
Start: 2022-12-12 | End: 2022-12-12

## 2022-12-12 RX ORDER — MEPERIDINE HYDROCHLORIDE 50 MG/ML
25 INJECTION INTRAMUSCULAR; INTRAVENOUS; SUBCUTANEOUS ONCE
Refills: 0 | Status: DISCONTINUED | OUTPATIENT
Start: 2022-12-12 | End: 2022-12-12

## 2022-12-12 RX ORDER — INSULIN HUMAN 100 [IU]/ML
1 INJECTION, SOLUTION SUBCUTANEOUS
Qty: 50 | Refills: 0 | Status: DISCONTINUED | OUTPATIENT
Start: 2022-12-12 | End: 2022-12-13

## 2022-12-12 RX ADMIN — CHLORHEXIDINE GLUCONATE 1 APPLICATION(S): 213 SOLUTION TOPICAL at 05:09

## 2022-12-12 RX ADMIN — Medication 250 MILLILITER(S): at 20:29

## 2022-12-12 RX ADMIN — Medication 20 MILLIGRAM(S): at 17:00

## 2022-12-12 RX ADMIN — MILRINONE LACTATE 6.5 MICROGRAM(S)/KG/MIN: 1 INJECTION, SOLUTION INTRAVENOUS at 17:32

## 2022-12-12 RX ADMIN — CHLORHEXIDINE GLUCONATE 15 MILLILITER(S): 213 SOLUTION TOPICAL at 20:11

## 2022-12-12 RX ADMIN — INSULIN HUMAN 1 UNIT(S)/HR: 100 INJECTION, SOLUTION SUBCUTANEOUS at 20:41

## 2022-12-12 RX ADMIN — Medication 250 MILLILITER(S): at 20:21

## 2022-12-12 RX ADMIN — HEPARIN SODIUM 5000 UNIT(S): 5000 INJECTION INTRAVENOUS; SUBCUTANEOUS at 22:45

## 2022-12-12 RX ADMIN — Medication 200 GRAM(S): at 19:32

## 2022-12-12 RX ADMIN — PANTOPRAZOLE SODIUM 40 MILLIGRAM(S): 20 TABLET, DELAYED RELEASE ORAL at 05:29

## 2022-12-12 RX ADMIN — ACETAZOLAMIDE 250 MILLIGRAM(S): 250 TABLET ORAL at 05:30

## 2022-12-12 RX ADMIN — CHLORHEXIDINE GLUCONATE 10 MILLILITER(S): 213 SOLUTION TOPICAL at 05:30

## 2022-12-12 RX ADMIN — Medication 100 MILLIEQUIVALENT(S): at 17:00

## 2022-12-12 RX ADMIN — PANTOPRAZOLE SODIUM 40 MILLIGRAM(S): 20 TABLET, DELAYED RELEASE ORAL at 20:10

## 2022-12-12 RX ADMIN — Medication 100 MILLIEQUIVALENT(S): at 23:42

## 2022-12-12 RX ADMIN — Medication 200 GRAM(S): at 21:02

## 2022-12-12 RX ADMIN — FENTANYL CITRATE 25 MICROGRAM(S): 50 INJECTION INTRAVENOUS at 16:45

## 2022-12-12 RX ADMIN — PROPOFOL 1.73 MICROGRAM(S)/KG/MIN: 10 INJECTION, EMULSION INTRAVENOUS at 17:34

## 2022-12-12 RX ADMIN — Medication 25 MILLIGRAM(S): at 16:00

## 2022-12-12 RX ADMIN — Medication 500 MILLILITER(S): at 18:51

## 2022-12-12 RX ADMIN — Medication 4.34 MICROGRAM(S)/KG/MIN: at 17:33

## 2022-12-12 RX ADMIN — FENTANYL CITRATE 25 MICROGRAM(S): 50 INJECTION INTRAVENOUS at 16:30

## 2022-12-12 RX ADMIN — FENTANYL CITRATE 50 MICROGRAM(S): 50 INJECTION INTRAVENOUS at 18:40

## 2022-12-12 RX ADMIN — PROPOFOL 1.73 MICROGRAM(S)/KG/MIN: 10 INJECTION, EMULSION INTRAVENOUS at 20:21

## 2022-12-12 RX ADMIN — Medication 50 MILLIEQUIVALENT(S): at 17:00

## 2022-12-12 RX ADMIN — FENTANYL CITRATE 50 MICROGRAM(S): 50 INJECTION INTRAVENOUS at 18:55

## 2022-12-12 RX ADMIN — Medication 100 MILLIEQUIVALENT(S): at 19:13

## 2022-12-12 RX ADMIN — Medication 315 MILLIGRAM(S): at 17:00

## 2022-12-12 RX ADMIN — FENTANYL CITRATE 25 MICROGRAM(S): 50 INJECTION INTRAVENOUS at 20:50

## 2022-12-12 RX ADMIN — Medication 125 MILLILITER(S): at 18:42

## 2022-12-12 RX ADMIN — SODIUM CHLORIDE 3 MILLILITER(S): 9 INJECTION INTRAMUSCULAR; INTRAVENOUS; SUBCUTANEOUS at 06:03

## 2022-12-12 RX ADMIN — Medication 100 MILLIEQUIVALENT(S): at 18:38

## 2022-12-12 RX ADMIN — HEPARIN SODIUM 5000 UNIT(S): 5000 INJECTION INTRAVENOUS; SUBCUTANEOUS at 05:30

## 2022-12-12 RX ADMIN — CISATRACURIUM BESYLATE 10 MILLIGRAM(S): 2 INJECTION INTRAVENOUS at 17:00

## 2022-12-12 RX ADMIN — Medication 100 MILLIGRAM(S): at 20:45

## 2022-12-12 RX ADMIN — Medication 500 MILLILITER(S): at 20:33

## 2022-12-12 RX ADMIN — FENTANYL CITRATE 25 MICROGRAM(S): 50 INJECTION INTRAVENOUS at 21:20

## 2022-12-12 NOTE — BRIEF OPERATIVE NOTE - NSICDXBRIEFPREOP_GEN_ALL_CORE_FT
PRE-OP DIAGNOSIS:  Calcific aortic stenosis of bicuspid valve 12-Dec-2022 13:26:54  Casey Rosas  Aortic aneurysm 12-Dec-2022 13:27:14  Casey Rosas

## 2022-12-12 NOTE — PRE-ANESTHESIA EVALUATION ADULT - LAST ECHOCARDIOGRAM
report reviewed.  NL LV systolic function, NL RV, mod-severe AS, PG 44mmHg, MG 24mmHg, MAYCO 1.06cm2. 11/20/22 report reviewed.  NL LV systolic function, NL RV, mod-severe AS, PG 44mmHg, MG 24mmHg, MAYCO 1.06cm2.

## 2022-12-12 NOTE — PRE-ANESTHESIA EVALUATION ADULT - LAST CARDIAC ANGIOGRAM/IMAGING
11/28 s/p cath report reviewed.  nonobstructive CAD 11/2822 s/p cath report reviewed.  nonobstructive CAD, moderate AI

## 2022-12-12 NOTE — BRIEF OPERATIVE NOTE - NSICDXBRIEFPOSTOP_GEN_ALL_CORE_FT
POST-OP DIAGNOSIS:  Calcific aortic stenosis of bicuspid valve 12-Dec-2022 13:27:27  Casey Rosas  Aortic aneurysm 12-Dec-2022 13:27:41  Casey Rosas

## 2022-12-12 NOTE — BRIEF OPERATIVE NOTE - NSICDXBRIEFPROCEDURE_GEN_ALL_CORE_FT
PROCEDURES:  Replacement, aortic valve, and ascending aorta 12-Dec-2022 13:25:48   23mm bio  26mm gelweave graft   Total bypass time:   aortic clamp time: Casey Rosas

## 2022-12-12 NOTE — BRIEF OPERATIVE NOTE - COMMENTS
Dr. Germain was the first assistant for this case including but not limited to sternotomy, aortic valve replacement and ascending aorta replacement     I was present for this procedure and participated as first assistant as described by the PA above, unless otherwise noted below.

## 2022-12-13 LAB
ALBUMIN SERPL ELPH-MCNC: 3.6 G/DL — SIGNIFICANT CHANGE UP (ref 3.3–5)
ALBUMIN SERPL ELPH-MCNC: 3.8 G/DL — SIGNIFICANT CHANGE UP (ref 3.3–5)
ALBUMIN SERPL ELPH-MCNC: 4 G/DL — SIGNIFICANT CHANGE UP (ref 3.3–5)
ALBUMIN SERPL ELPH-MCNC: 4.2 G/DL — SIGNIFICANT CHANGE UP (ref 3.3–5)
ALP SERPL-CCNC: 35 U/L — LOW (ref 40–120)
ALP SERPL-CCNC: 35 U/L — LOW (ref 40–120)
ALP SERPL-CCNC: 41 U/L — SIGNIFICANT CHANGE UP (ref 40–120)
ALP SERPL-CCNC: 43 U/L — SIGNIFICANT CHANGE UP (ref 40–120)
ALT FLD-CCNC: 14 U/L — SIGNIFICANT CHANGE UP (ref 10–45)
ALT FLD-CCNC: 15 U/L — SIGNIFICANT CHANGE UP (ref 10–45)
ANION GAP SERPL CALC-SCNC: 10 MMOL/L — SIGNIFICANT CHANGE UP (ref 5–17)
ANION GAP SERPL CALC-SCNC: 10 MMOL/L — SIGNIFICANT CHANGE UP (ref 5–17)
ANION GAP SERPL CALC-SCNC: 11 MMOL/L — SIGNIFICANT CHANGE UP (ref 5–17)
ANION GAP SERPL CALC-SCNC: 9 MMOL/L — SIGNIFICANT CHANGE UP (ref 5–17)
APTT BLD: 32.2 SEC — SIGNIFICANT CHANGE UP (ref 27.5–35.5)
APTT BLD: 32.3 SEC — SIGNIFICANT CHANGE UP (ref 27.5–35.5)
APTT BLD: 33.3 SEC — SIGNIFICANT CHANGE UP (ref 27.5–35.5)
APTT BLD: 36.3 SEC — HIGH (ref 27.5–35.5)
AST SERPL-CCNC: 44 U/L — HIGH (ref 10–40)
AST SERPL-CCNC: 48 U/L — HIGH (ref 10–40)
AST SERPL-CCNC: 48 U/L — HIGH (ref 10–40)
AST SERPL-CCNC: 49 U/L — HIGH (ref 10–40)
BASE EXCESS BLDV CALC-SCNC: -1.8 MMOL/L — SIGNIFICANT CHANGE UP (ref -2–3)
BASE EXCESS BLDV CALC-SCNC: -1.8 MMOL/L — SIGNIFICANT CHANGE UP (ref -2–3)
BASE EXCESS BLDV CALC-SCNC: 0.5 MMOL/L — SIGNIFICANT CHANGE UP (ref -2–3)
BASE EXCESS BLDV CALC-SCNC: 1.7 MMOL/L — SIGNIFICANT CHANGE UP (ref -2–3)
BILIRUB SERPL-MCNC: 0.7 MG/DL — SIGNIFICANT CHANGE UP (ref 0.2–1.2)
BILIRUB SERPL-MCNC: 0.8 MG/DL — SIGNIFICANT CHANGE UP (ref 0.2–1.2)
BILIRUB SERPL-MCNC: 1.1 MG/DL — SIGNIFICANT CHANGE UP (ref 0.2–1.2)
BILIRUB SERPL-MCNC: 1.8 MG/DL — HIGH (ref 0.2–1.2)
BUN SERPL-MCNC: 10 MG/DL — SIGNIFICANT CHANGE UP (ref 7–23)
BUN SERPL-MCNC: 11 MG/DL — SIGNIFICANT CHANGE UP (ref 7–23)
CALCIUM SERPL-MCNC: 7.8 MG/DL — LOW (ref 8.4–10.5)
CALCIUM SERPL-MCNC: 8.1 MG/DL — LOW (ref 8.4–10.5)
CALCIUM SERPL-MCNC: 8.4 MG/DL — SIGNIFICANT CHANGE UP (ref 8.4–10.5)
CALCIUM SERPL-MCNC: 8.6 MG/DL — SIGNIFICANT CHANGE UP (ref 8.4–10.5)
CHLORIDE SERPL-SCNC: 106 MMOL/L — SIGNIFICANT CHANGE UP (ref 96–108)
CHLORIDE SERPL-SCNC: 112 MMOL/L — HIGH (ref 96–108)
CHLORIDE SERPL-SCNC: 114 MMOL/L — HIGH (ref 96–108)
CHLORIDE SERPL-SCNC: 116 MMOL/L — HIGH (ref 96–108)
CO2 BLDV-SCNC: 24.3 MMOL/L — SIGNIFICANT CHANGE UP (ref 22–26)
CO2 BLDV-SCNC: 25 MMOL/L — SIGNIFICANT CHANGE UP (ref 22–26)
CO2 BLDV-SCNC: 25.2 MMOL/L — SIGNIFICANT CHANGE UP (ref 22–26)
CO2 BLDV-SCNC: 26.7 MMOL/L — HIGH (ref 22–26)
CO2 SERPL-SCNC: 22 MMOL/L — SIGNIFICANT CHANGE UP (ref 22–31)
CO2 SERPL-SCNC: 24 MMOL/L — SIGNIFICANT CHANGE UP (ref 22–31)
CO2 SERPL-SCNC: 24 MMOL/L — SIGNIFICANT CHANGE UP (ref 22–31)
CO2 SERPL-SCNC: 25 MMOL/L — SIGNIFICANT CHANGE UP (ref 22–31)
CREAT SERPL-MCNC: 0.52 MG/DL — SIGNIFICANT CHANGE UP (ref 0.5–1.3)
CREAT SERPL-MCNC: 0.54 MG/DL — SIGNIFICANT CHANGE UP (ref 0.5–1.3)
CREAT SERPL-MCNC: 0.56 MG/DL — SIGNIFICANT CHANGE UP (ref 0.5–1.3)
CREAT SERPL-MCNC: 0.58 MG/DL — SIGNIFICANT CHANGE UP (ref 0.5–1.3)
EGFR: 102 ML/MIN/1.73M2 — SIGNIFICANT CHANGE UP
EGFR: 102 ML/MIN/1.73M2 — SIGNIFICANT CHANGE UP
EGFR: 103 ML/MIN/1.73M2 — SIGNIFICANT CHANGE UP
EGFR: 104 ML/MIN/1.73M2 — SIGNIFICANT CHANGE UP
GAS PNL BLDA: SIGNIFICANT CHANGE UP
GAS PNL BLDV: SIGNIFICANT CHANGE UP
GLUCOSE BLDC GLUCOMTR-MCNC: 102 MG/DL — HIGH (ref 70–99)
GLUCOSE BLDC GLUCOMTR-MCNC: 106 MG/DL — HIGH (ref 70–99)
GLUCOSE BLDC GLUCOMTR-MCNC: 109 MG/DL — HIGH (ref 70–99)
GLUCOSE BLDC GLUCOMTR-MCNC: 112 MG/DL — HIGH (ref 70–99)
GLUCOSE BLDC GLUCOMTR-MCNC: 117 MG/DL — HIGH (ref 70–99)
GLUCOSE BLDC GLUCOMTR-MCNC: 122 MG/DL — HIGH (ref 70–99)
GLUCOSE BLDC GLUCOMTR-MCNC: 158 MG/DL — HIGH (ref 70–99)
GLUCOSE BLDC GLUCOMTR-MCNC: 169 MG/DL — HIGH (ref 70–99)
GLUCOSE BLDC GLUCOMTR-MCNC: 81 MG/DL — SIGNIFICANT CHANGE UP (ref 70–99)
GLUCOSE BLDC GLUCOMTR-MCNC: 83 MG/DL — SIGNIFICANT CHANGE UP (ref 70–99)
GLUCOSE SERPL-MCNC: 120 MG/DL — HIGH (ref 70–99)
GLUCOSE SERPL-MCNC: 125 MG/DL — HIGH (ref 70–99)
GLUCOSE SERPL-MCNC: 193 MG/DL — HIGH (ref 70–99)
GLUCOSE SERPL-MCNC: 92 MG/DL — SIGNIFICANT CHANGE UP (ref 70–99)
HCO3 BLDV-SCNC: 23 MMOL/L — SIGNIFICANT CHANGE UP (ref 22–29)
HCO3 BLDV-SCNC: 24 MMOL/L — SIGNIFICANT CHANGE UP (ref 22–29)
HCO3 BLDV-SCNC: 24 MMOL/L — SIGNIFICANT CHANGE UP (ref 22–29)
HCO3 BLDV-SCNC: 25 MMOL/L — SIGNIFICANT CHANGE UP (ref 22–29)
HCT VFR BLD CALC: 24.4 % — LOW (ref 34.5–45)
HCT VFR BLD CALC: 25.7 % — LOW (ref 34.5–45)
HCT VFR BLD CALC: 26.6 % — LOW (ref 34.5–45)
HCT VFR BLD CALC: 27.5 % — LOW (ref 34.5–45)
HGB BLD-MCNC: 8.3 G/DL — LOW (ref 11.5–15.5)
HGB BLD-MCNC: 8.6 G/DL — LOW (ref 11.5–15.5)
HGB BLD-MCNC: 8.8 G/DL — LOW (ref 11.5–15.5)
HGB BLD-MCNC: 9 G/DL — LOW (ref 11.5–15.5)
INR BLD: 1.15 — SIGNIFICANT CHANGE UP (ref 0.88–1.16)
INR BLD: 1.27 — HIGH (ref 0.88–1.16)
INR BLD: 1.3 — HIGH (ref 0.88–1.16)
INR BLD: 1.4 — HIGH (ref 0.88–1.16)
LACTATE SERPL-SCNC: 0.9 MMOL/L — SIGNIFICANT CHANGE UP (ref 0.5–2)
LACTATE SERPL-SCNC: 0.9 MMOL/L — SIGNIFICANT CHANGE UP (ref 0.5–2)
LACTATE SERPL-SCNC: 1.4 MMOL/L — SIGNIFICANT CHANGE UP (ref 0.5–2)
MAGNESIUM SERPL-MCNC: 2 MG/DL — SIGNIFICANT CHANGE UP (ref 1.6–2.6)
MAGNESIUM SERPL-MCNC: 2.1 MG/DL — SIGNIFICANT CHANGE UP (ref 1.6–2.6)
MAGNESIUM SERPL-MCNC: 2.4 MG/DL — SIGNIFICANT CHANGE UP (ref 1.6–2.6)
MAGNESIUM SERPL-MCNC: 2.4 MG/DL — SIGNIFICANT CHANGE UP (ref 1.6–2.6)
MCHC RBC-ENTMCNC: 28.5 PG — SIGNIFICANT CHANGE UP (ref 27–34)
MCHC RBC-ENTMCNC: 28.8 PG — SIGNIFICANT CHANGE UP (ref 27–34)
MCHC RBC-ENTMCNC: 28.8 PG — SIGNIFICANT CHANGE UP (ref 27–34)
MCHC RBC-ENTMCNC: 29.3 PG — SIGNIFICANT CHANGE UP (ref 27–34)
MCHC RBC-ENTMCNC: 32.7 GM/DL — SIGNIFICANT CHANGE UP (ref 32–36)
MCHC RBC-ENTMCNC: 33.1 GM/DL — SIGNIFICANT CHANGE UP (ref 32–36)
MCHC RBC-ENTMCNC: 33.5 GM/DL — SIGNIFICANT CHANGE UP (ref 32–36)
MCHC RBC-ENTMCNC: 34 GM/DL — SIGNIFICANT CHANGE UP (ref 32–36)
MCV RBC AUTO: 84.7 FL — SIGNIFICANT CHANGE UP (ref 80–100)
MCV RBC AUTO: 86.9 FL — SIGNIFICANT CHANGE UP (ref 80–100)
MCV RBC AUTO: 87 FL — SIGNIFICANT CHANGE UP (ref 80–100)
MCV RBC AUTO: 87.4 FL — SIGNIFICANT CHANGE UP (ref 80–100)
NRBC # BLD: 0 /100 WBCS — SIGNIFICANT CHANGE UP (ref 0–0)
PCO2 BLDV: 31 MMHG — LOW (ref 39–42)
PCO2 BLDV: 39 MMHG — SIGNIFICANT CHANGE UP (ref 39–42)
PCO2 BLDV: 41 MMHG — SIGNIFICANT CHANGE UP (ref 39–42)
PCO2 BLDV: 42 MMHG — SIGNIFICANT CHANGE UP (ref 39–42)
PH BLDV: 7.36 — SIGNIFICANT CHANGE UP (ref 7.32–7.43)
PH BLDV: 7.38 — SIGNIFICANT CHANGE UP (ref 7.32–7.43)
PH BLDV: 7.4 — SIGNIFICANT CHANGE UP (ref 7.32–7.43)
PH BLDV: 7.5 — HIGH (ref 7.32–7.43)
PHOSPHATE SERPL-MCNC: 1.7 MG/DL — LOW (ref 2.5–4.5)
PHOSPHATE SERPL-MCNC: 3.6 MG/DL — SIGNIFICANT CHANGE UP (ref 2.5–4.5)
PHOSPHATE SERPL-MCNC: 3.7 MG/DL — SIGNIFICANT CHANGE UP (ref 2.5–4.5)
PHOSPHATE SERPL-MCNC: 4.9 MG/DL — HIGH (ref 2.5–4.5)
PLATELET # BLD AUTO: 61 K/UL — LOW (ref 150–400)
PLATELET # BLD AUTO: 61 K/UL — LOW (ref 150–400)
PLATELET # BLD AUTO: 65 K/UL — LOW (ref 150–400)
PLATELET # BLD AUTO: 65 K/UL — LOW (ref 150–400)
PO2 BLDV: 39 MMHG — SIGNIFICANT CHANGE UP (ref 25–45)
PO2 BLDV: 40 MMHG — SIGNIFICANT CHANGE UP (ref 25–45)
PO2 BLDV: 42 MMHG — SIGNIFICANT CHANGE UP (ref 25–45)
PO2 BLDV: 44 MMHG — SIGNIFICANT CHANGE UP (ref 25–45)
POTASSIUM SERPL-MCNC: 3.1 MMOL/L — LOW (ref 3.5–5.3)
POTASSIUM SERPL-MCNC: 3.2 MMOL/L — LOW (ref 3.5–5.3)
POTASSIUM SERPL-MCNC: 3.2 MMOL/L — LOW (ref 3.5–5.3)
POTASSIUM SERPL-MCNC: 3.4 MMOL/L — LOW (ref 3.5–5.3)
POTASSIUM SERPL-SCNC: 3.1 MMOL/L — LOW (ref 3.5–5.3)
POTASSIUM SERPL-SCNC: 3.2 MMOL/L — LOW (ref 3.5–5.3)
POTASSIUM SERPL-SCNC: 3.2 MMOL/L — LOW (ref 3.5–5.3)
POTASSIUM SERPL-SCNC: 3.4 MMOL/L — LOW (ref 3.5–5.3)
PROT SERPL-MCNC: 5.3 G/DL — LOW (ref 6–8.3)
PROT SERPL-MCNC: 5.4 G/DL — LOW (ref 6–8.3)
PROT SERPL-MCNC: 5.6 G/DL — LOW (ref 6–8.3)
PROT SERPL-MCNC: 5.8 G/DL — LOW (ref 6–8.3)
PROTHROM AB SERPL-ACNC: 13.7 SEC — HIGH (ref 10.5–13.4)
PROTHROM AB SERPL-ACNC: 15.1 SEC — HIGH (ref 10.5–13.4)
PROTHROM AB SERPL-ACNC: 15.5 SEC — HIGH (ref 10.5–13.4)
PROTHROM AB SERPL-ACNC: 16.7 SEC — HIGH (ref 10.5–13.4)
RBC # BLD: 2.88 M/UL — LOW (ref 3.8–5.2)
RBC # BLD: 2.94 M/UL — LOW (ref 3.8–5.2)
RBC # BLD: 3.06 M/UL — LOW (ref 3.8–5.2)
RBC # BLD: 3.16 M/UL — LOW (ref 3.8–5.2)
RBC # FLD: 14.4 % — SIGNIFICANT CHANGE UP (ref 10.3–14.5)
RBC # FLD: 14.7 % — HIGH (ref 10.3–14.5)
RBC # FLD: 15.2 % — HIGH (ref 10.3–14.5)
RBC # FLD: 15.5 % — HIGH (ref 10.3–14.5)
SAO2 % BLDV: 69.3 % — SIGNIFICANT CHANGE UP (ref 67–88)
SAO2 % BLDV: 73.3 % — SIGNIFICANT CHANGE UP (ref 67–88)
SAO2 % BLDV: 75.1 % — SIGNIFICANT CHANGE UP (ref 67–88)
SAO2 % BLDV: 75.3 % — SIGNIFICANT CHANGE UP (ref 67–88)
SODIUM SERPL-SCNC: 140 MMOL/L — SIGNIFICANT CHANGE UP (ref 135–145)
SODIUM SERPL-SCNC: 145 MMOL/L — SIGNIFICANT CHANGE UP (ref 135–145)
SODIUM SERPL-SCNC: 148 MMOL/L — HIGH (ref 135–145)
SODIUM SERPL-SCNC: 150 MMOL/L — HIGH (ref 135–145)
WBC # BLD: 5.79 K/UL — SIGNIFICANT CHANGE UP (ref 3.8–10.5)
WBC # BLD: 7.04 K/UL — SIGNIFICANT CHANGE UP (ref 3.8–10.5)
WBC # BLD: 8.09 K/UL — SIGNIFICANT CHANGE UP (ref 3.8–10.5)
WBC # BLD: 9.43 K/UL — SIGNIFICANT CHANGE UP (ref 3.8–10.5)
WBC # FLD AUTO: 5.79 K/UL — SIGNIFICANT CHANGE UP (ref 3.8–10.5)
WBC # FLD AUTO: 7.04 K/UL — SIGNIFICANT CHANGE UP (ref 3.8–10.5)
WBC # FLD AUTO: 8.09 K/UL — SIGNIFICANT CHANGE UP (ref 3.8–10.5)
WBC # FLD AUTO: 9.43 K/UL — SIGNIFICANT CHANGE UP (ref 3.8–10.5)

## 2022-12-13 PROCEDURE — 99292 CRITICAL CARE ADDL 30 MIN: CPT

## 2022-12-13 PROCEDURE — 99291 CRITICAL CARE FIRST HOUR: CPT

## 2022-12-13 PROCEDURE — 71045 X-RAY EXAM CHEST 1 VIEW: CPT | Mod: 26

## 2022-12-13 RX ORDER — FUROSEMIDE 40 MG
20 TABLET ORAL ONCE
Refills: 0 | Status: COMPLETED | OUTPATIENT
Start: 2022-12-13 | End: 2022-12-13

## 2022-12-13 RX ORDER — OXYCODONE HYDROCHLORIDE 5 MG/1
5 TABLET ORAL EVERY 6 HOURS
Refills: 0 | Status: DISCONTINUED | OUTPATIENT
Start: 2022-12-13 | End: 2022-12-19

## 2022-12-13 RX ORDER — ACETAMINOPHEN 500 MG
1000 TABLET ORAL ONCE
Refills: 0 | Status: COMPLETED | OUTPATIENT
Start: 2022-12-13 | End: 2022-12-13

## 2022-12-13 RX ORDER — CALCIUM GLUCONATE 100 MG/ML
2 VIAL (ML) INTRAVENOUS ONCE
Refills: 0 | Status: COMPLETED | OUTPATIENT
Start: 2022-12-13 | End: 2022-12-13

## 2022-12-13 RX ORDER — SODIUM CHLORIDE 9 MG/ML
1000 INJECTION, SOLUTION INTRAVENOUS
Refills: 0 | Status: DISCONTINUED | OUTPATIENT
Start: 2022-12-13 | End: 2022-12-17

## 2022-12-13 RX ORDER — SODIUM CHLORIDE 9 MG/ML
250 INJECTION, SOLUTION INTRAVENOUS
Refills: 0 | Status: DISCONTINUED | OUTPATIENT
Start: 2022-12-13 | End: 2022-12-15

## 2022-12-13 RX ORDER — POTASSIUM CHLORIDE 20 MEQ
20 PACKET (EA) ORAL
Refills: 0 | Status: COMPLETED | OUTPATIENT
Start: 2022-12-13 | End: 2022-12-13

## 2022-12-13 RX ORDER — GLUCAGON INJECTION, SOLUTION 0.5 MG/.1ML
1 INJECTION, SOLUTION SUBCUTANEOUS ONCE
Refills: 0 | Status: DISCONTINUED | OUTPATIENT
Start: 2022-12-13 | End: 2022-12-17

## 2022-12-13 RX ORDER — POTASSIUM PHOSPHATE, MONOBASIC POTASSIUM PHOSPHATE, DIBASIC 236; 224 MG/ML; MG/ML
30 INJECTION, SOLUTION INTRAVENOUS ONCE
Refills: 0 | Status: COMPLETED | OUTPATIENT
Start: 2022-12-13 | End: 2022-12-13

## 2022-12-13 RX ORDER — DEXTROSE 50 % IN WATER 50 %
15 SYRINGE (ML) INTRAVENOUS ONCE
Refills: 0 | Status: DISCONTINUED | OUTPATIENT
Start: 2022-12-13 | End: 2022-12-17

## 2022-12-13 RX ORDER — POTASSIUM CHLORIDE 20 MEQ
20 PACKET (EA) ORAL
Refills: 0 | Status: COMPLETED | OUTPATIENT
Start: 2022-12-13 | End: 2022-12-14

## 2022-12-13 RX ORDER — CALCIUM GLUCONATE 100 MG/ML
2 VIAL (ML) INTRAVENOUS ONCE
Refills: 0 | Status: COMPLETED | OUTPATIENT
Start: 2022-12-13 | End: 2022-12-12

## 2022-12-13 RX ORDER — POTASSIUM PHOSPHATE, MONOBASIC POTASSIUM PHOSPHATE, DIBASIC 236; 224 MG/ML; MG/ML
15 INJECTION, SOLUTION INTRAVENOUS ONCE
Refills: 0 | Status: COMPLETED | OUTPATIENT
Start: 2022-12-13 | End: 2022-12-13

## 2022-12-13 RX ORDER — PANTOPRAZOLE SODIUM 20 MG/1
40 TABLET, DELAYED RELEASE ORAL
Refills: 0 | Status: DISCONTINUED | OUTPATIENT
Start: 2022-12-13 | End: 2022-12-20

## 2022-12-13 RX ORDER — ACETAZOLAMIDE 250 MG/1
250 TABLET ORAL DAILY
Refills: 0 | Status: DISCONTINUED | OUTPATIENT
Start: 2022-12-14 | End: 2022-12-20

## 2022-12-13 RX ORDER — FENTANYL CITRATE 50 UG/ML
25 INJECTION INTRAVENOUS ONCE
Refills: 0 | Status: DISCONTINUED | OUTPATIENT
Start: 2022-12-13 | End: 2022-12-12

## 2022-12-13 RX ORDER — INSULIN LISPRO 100/ML
VIAL (ML) SUBCUTANEOUS
Refills: 0 | Status: DISCONTINUED | OUTPATIENT
Start: 2022-12-13 | End: 2022-12-17

## 2022-12-13 RX ORDER — OXYCODONE HYDROCHLORIDE 5 MG/1
10 TABLET ORAL EVERY 6 HOURS
Refills: 0 | Status: DISCONTINUED | OUTPATIENT
Start: 2022-12-13 | End: 2022-12-20

## 2022-12-13 RX ADMIN — OXYCODONE HYDROCHLORIDE 5 MILLIGRAM(S): 5 TABLET ORAL at 20:23

## 2022-12-13 RX ADMIN — Medication 100 MILLIEQUIVALENT(S): at 09:27

## 2022-12-13 RX ADMIN — Medication 20 MILLIGRAM(S): at 15:17

## 2022-12-13 RX ADMIN — Medication 1 DROP(S): at 18:41

## 2022-12-13 RX ADMIN — HEPARIN SODIUM 5000 UNIT(S): 5000 INJECTION INTRAVENOUS; SUBCUTANEOUS at 07:01

## 2022-12-13 RX ADMIN — CHLORHEXIDINE GLUCONATE 15 MILLILITER(S): 213 SOLUTION TOPICAL at 18:42

## 2022-12-13 RX ADMIN — Medication 1 DROP(S): at 13:53

## 2022-12-13 RX ADMIN — OXYCODONE HYDROCHLORIDE 5 MILLIGRAM(S): 5 TABLET ORAL at 19:31

## 2022-12-13 RX ADMIN — Medication 1000 MILLIGRAM(S): at 13:30

## 2022-12-13 RX ADMIN — Medication 100 MILLIEQUIVALENT(S): at 10:40

## 2022-12-13 RX ADMIN — HEPARIN SODIUM 5000 UNIT(S): 5000 INJECTION INTRAVENOUS; SUBCUTANEOUS at 22:30

## 2022-12-13 RX ADMIN — Medication 20 MILLIGRAM(S): at 02:35

## 2022-12-13 RX ADMIN — HEPARIN SODIUM 5000 UNIT(S): 5000 INJECTION INTRAVENOUS; SUBCUTANEOUS at 14:25

## 2022-12-13 RX ADMIN — CHLORHEXIDINE GLUCONATE 15 MILLILITER(S): 213 SOLUTION TOPICAL at 07:01

## 2022-12-13 RX ADMIN — Medication 400 MILLIGRAM(S): at 21:14

## 2022-12-13 RX ADMIN — Medication 100 MILLIGRAM(S): at 11:26

## 2022-12-13 RX ADMIN — Medication 100 MILLIEQUIVALENT(S): at 23:30

## 2022-12-13 RX ADMIN — Medication 20 MILLIGRAM(S): at 09:36

## 2022-12-13 RX ADMIN — Medication 1000 MILLIGRAM(S): at 21:30

## 2022-12-13 RX ADMIN — Medication 100 MILLIGRAM(S): at 05:22

## 2022-12-13 RX ADMIN — PANTOPRAZOLE SODIUM 40 MILLIGRAM(S): 20 TABLET, DELAYED RELEASE ORAL at 11:26

## 2022-12-13 RX ADMIN — PROPOFOL 1.73 MICROGRAM(S)/KG/MIN: 10 INJECTION, EMULSION INTRAVENOUS at 01:45

## 2022-12-13 RX ADMIN — Medication 100 MILLIEQUIVALENT(S): at 01:01

## 2022-12-13 RX ADMIN — Medication 15 MICROGRAM(S)/MIN: at 01:45

## 2022-12-13 RX ADMIN — POTASSIUM PHOSPHATE, MONOBASIC POTASSIUM PHOSPHATE, DIBASIC 83.33 MILLIMOLE(S): 236; 224 INJECTION, SOLUTION INTRAVENOUS at 01:16

## 2022-12-13 RX ADMIN — Medication 81 MILLIGRAM(S): at 13:51

## 2022-12-13 RX ADMIN — ACETAZOLAMIDE 105 MILLIGRAM(S): 250 TABLET ORAL at 11:27

## 2022-12-13 RX ADMIN — SODIUM CHLORIDE 100 MILLILITER(S): 9 INJECTION, SOLUTION INTRAVENOUS at 10:37

## 2022-12-13 RX ADMIN — Medication 100 MILLIGRAM(S): at 19:20

## 2022-12-13 RX ADMIN — Medication 200 GRAM(S): at 15:17

## 2022-12-13 RX ADMIN — CHLORHEXIDINE GLUCONATE 1 APPLICATION(S): 213 SOLUTION TOPICAL at 11:29

## 2022-12-13 RX ADMIN — Medication 400 MILLIGRAM(S): at 11:45

## 2022-12-13 NOTE — PROGRESS NOTE ADULT - ASSESSMENT
62 F w/PMHx hyperkalemic periodic paralysis, bicuspid AV with severe AS, aortic aneurysm, ITP, splenic and portal vein thrombosis, current T11 fx admitted for pre-op optimization prior to scheduled AVR and ascending aortic replacement.    S/p Replacement, aortic valve, and ascending aorta   12/12  Post-Op RV dysfx and coagulopathy improved with inotrops and blood product transfusion  Extubated POD#1    ASSESSMENT/PLAN  HDS and in Sinus/ Came off levo prior to extubation  On 0.25 Milr and  3 with normal am lactate and stable LFTs  Cardiac indices in good range Mixed Venous 76%--> wean  down to 2 --Continue to monitor parameters for organ perfusion  Warm on exam with brisk diuretic response   Normal kidney fx/ good UOP/ lasix prn for Neg FB 1-2L   Gentle K repletion along w/diuresis given HPP--> follow neuro recs  Cont daily home Diamox   to replete Mg>2   HyperNa--> FWD ~ 2.5 L / D5 infusion started   Bedside swallow eval and starting diet--encourage oral hydration  Ppx: Sq hep/ PPI/HOB 45     Slow inotrop taper/ Neg FB   OOB to the chair if tolerated     ATTENDING: I have personally and independently provided 90 Min of critical care services. This excludes time spent on teaching or procedures.

## 2022-12-14 LAB
ALBUMIN SERPL ELPH-MCNC: 3.8 G/DL — SIGNIFICANT CHANGE UP (ref 3.3–5)
ALBUMIN SERPL ELPH-MCNC: 3.9 G/DL — SIGNIFICANT CHANGE UP (ref 3.3–5)
ALBUMIN SERPL ELPH-MCNC: 4 G/DL — SIGNIFICANT CHANGE UP (ref 3.3–5)
ALBUMIN SERPL ELPH-MCNC: 4.2 G/DL — SIGNIFICANT CHANGE UP (ref 3.3–5)
ALP SERPL-CCNC: 49 U/L — SIGNIFICANT CHANGE UP (ref 40–120)
ALP SERPL-CCNC: 53 U/L — SIGNIFICANT CHANGE UP (ref 40–120)
ALP SERPL-CCNC: 54 U/L — SIGNIFICANT CHANGE UP (ref 40–120)
ALP SERPL-CCNC: 55 U/L — SIGNIFICANT CHANGE UP (ref 40–120)
ALT FLD-CCNC: 16 U/L — SIGNIFICANT CHANGE UP (ref 10–45)
ALT FLD-CCNC: 19 U/L — SIGNIFICANT CHANGE UP (ref 10–45)
ANION GAP SERPL CALC-SCNC: 10 MMOL/L — SIGNIFICANT CHANGE UP (ref 5–17)
ANION GAP SERPL CALC-SCNC: 11 MMOL/L — SIGNIFICANT CHANGE UP (ref 5–17)
ANION GAP SERPL CALC-SCNC: 9 MMOL/L — SIGNIFICANT CHANGE UP (ref 5–17)
ANION GAP SERPL CALC-SCNC: 9 MMOL/L — SIGNIFICANT CHANGE UP (ref 5–17)
APTT BLD: 33.2 SEC — SIGNIFICANT CHANGE UP (ref 27.5–35.5)
APTT BLD: 35.8 SEC — HIGH (ref 27.5–35.5)
APTT BLD: 38.2 SEC — HIGH (ref 27.5–35.5)
APTT BLD: 38.4 SEC — HIGH (ref 27.5–35.5)
AST SERPL-CCNC: 39 U/L — SIGNIFICANT CHANGE UP (ref 10–40)
AST SERPL-CCNC: 42 U/L — HIGH (ref 10–40)
AST SERPL-CCNC: 42 U/L — HIGH (ref 10–40)
AST SERPL-CCNC: 45 U/L — HIGH (ref 10–40)
BASE EXCESS BLDV CALC-SCNC: -0.5 MMOL/L — SIGNIFICANT CHANGE UP (ref -2–3)
BASE EXCESS BLDV CALC-SCNC: -1.3 MMOL/L — SIGNIFICANT CHANGE UP (ref -2–3)
BASE EXCESS BLDV CALC-SCNC: -1.5 MMOL/L — SIGNIFICANT CHANGE UP (ref -2–3)
BASE EXCESS BLDV CALC-SCNC: 0.8 MMOL/L — SIGNIFICANT CHANGE UP (ref -2–3)
BILIRUB SERPL-MCNC: 0.7 MG/DL — SIGNIFICANT CHANGE UP (ref 0.2–1.2)
BILIRUB SERPL-MCNC: 0.8 MG/DL — SIGNIFICANT CHANGE UP (ref 0.2–1.2)
BUN SERPL-MCNC: 11 MG/DL — SIGNIFICANT CHANGE UP (ref 7–23)
BUN SERPL-MCNC: 12 MG/DL — SIGNIFICANT CHANGE UP (ref 7–23)
BUN SERPL-MCNC: 14 MG/DL — SIGNIFICANT CHANGE UP (ref 7–23)
BUN SERPL-MCNC: 14 MG/DL — SIGNIFICANT CHANGE UP (ref 7–23)
CALCIUM SERPL-MCNC: 8.5 MG/DL — SIGNIFICANT CHANGE UP (ref 8.4–10.5)
CALCIUM SERPL-MCNC: 8.7 MG/DL — SIGNIFICANT CHANGE UP (ref 8.4–10.5)
CHLORIDE SERPL-SCNC: 102 MMOL/L — SIGNIFICANT CHANGE UP (ref 96–108)
CHLORIDE SERPL-SCNC: 104 MMOL/L — SIGNIFICANT CHANGE UP (ref 96–108)
CHLORIDE SERPL-SCNC: 105 MMOL/L — SIGNIFICANT CHANGE UP (ref 96–108)
CHLORIDE SERPL-SCNC: 107 MMOL/L — SIGNIFICANT CHANGE UP (ref 96–108)
CO2 BLDV-SCNC: 24.9 MMOL/L — SIGNIFICANT CHANGE UP (ref 22–26)
CO2 BLDV-SCNC: 25.4 MMOL/L — SIGNIFICANT CHANGE UP (ref 22–26)
CO2 BLDV-SCNC: 26.2 MMOL/L — HIGH (ref 22–26)
CO2 BLDV-SCNC: 28.6 MMOL/L — HIGH (ref 22–26)
CO2 SERPL-SCNC: 24 MMOL/L — SIGNIFICANT CHANGE UP (ref 22–31)
CO2 SERPL-SCNC: 25 MMOL/L — SIGNIFICANT CHANGE UP (ref 22–31)
CREAT SERPL-MCNC: 0.49 MG/DL — LOW (ref 0.5–1.3)
CREAT SERPL-MCNC: 0.54 MG/DL — SIGNIFICANT CHANGE UP (ref 0.5–1.3)
CREAT SERPL-MCNC: 0.58 MG/DL — SIGNIFICANT CHANGE UP (ref 0.5–1.3)
CREAT SERPL-MCNC: 0.59 MG/DL — SIGNIFICANT CHANGE UP (ref 0.5–1.3)
EGFR: 101 ML/MIN/1.73M2 — SIGNIFICANT CHANGE UP
EGFR: 102 ML/MIN/1.73M2 — SIGNIFICANT CHANGE UP
EGFR: 103 ML/MIN/1.73M2 — SIGNIFICANT CHANGE UP
EGFR: 106 ML/MIN/1.73M2 — SIGNIFICANT CHANGE UP
GAS PNL BLDA: SIGNIFICANT CHANGE UP
GAS PNL BLDV: SIGNIFICANT CHANGE UP
GAS PNL BLDV: SIGNIFICANT CHANGE UP
GLUCOSE BLDC GLUCOMTR-MCNC: 118 MG/DL — HIGH (ref 70–99)
GLUCOSE BLDC GLUCOMTR-MCNC: 119 MG/DL — HIGH (ref 70–99)
GLUCOSE BLDC GLUCOMTR-MCNC: 128 MG/DL — HIGH (ref 70–99)
GLUCOSE SERPL-MCNC: 124 MG/DL — HIGH (ref 70–99)
GLUCOSE SERPL-MCNC: 145 MG/DL — HIGH (ref 70–99)
GLUCOSE SERPL-MCNC: 150 MG/DL — HIGH (ref 70–99)
GLUCOSE SERPL-MCNC: 159 MG/DL — HIGH (ref 70–99)
HCO3 BLDV-SCNC: 24 MMOL/L — SIGNIFICANT CHANGE UP (ref 22–29)
HCO3 BLDV-SCNC: 24 MMOL/L — SIGNIFICANT CHANGE UP (ref 22–29)
HCO3 BLDV-SCNC: 25 MMOL/L — SIGNIFICANT CHANGE UP (ref 22–29)
HCO3 BLDV-SCNC: 27 MMOL/L — SIGNIFICANT CHANGE UP (ref 22–29)
HCT VFR BLD CALC: 25.7 % — LOW (ref 34.5–45)
HCT VFR BLD CALC: 25.8 % — LOW (ref 34.5–45)
HCT VFR BLD CALC: 26.5 % — LOW (ref 34.5–45)
HCT VFR BLD CALC: 27.1 % — LOW (ref 34.5–45)
HEPARIN-PF4 AB RESULT: <0.6 U/ML — SIGNIFICANT CHANGE UP (ref 0–0.9)
HGB BLD-MCNC: 8.3 G/DL — LOW (ref 11.5–15.5)
HGB BLD-MCNC: 8.5 G/DL — LOW (ref 11.5–15.5)
HGB BLD-MCNC: 8.8 G/DL — LOW (ref 11.5–15.5)
HGB BLD-MCNC: 9 G/DL — LOW (ref 11.5–15.5)
INR BLD: 1.17 — HIGH (ref 0.88–1.16)
INR BLD: 1.18 — HIGH (ref 0.88–1.16)
INR BLD: 1.24 — HIGH (ref 0.88–1.16)
INR BLD: 1.34 — HIGH (ref 0.88–1.16)
LACTATE SERPL-SCNC: 1.2 MMOL/L — SIGNIFICANT CHANGE UP (ref 0.5–2)
LACTATE SERPL-SCNC: 1.4 MMOL/L — SIGNIFICANT CHANGE UP (ref 0.5–2)
LACTATE SERPL-SCNC: 1.5 MMOL/L — SIGNIFICANT CHANGE UP (ref 0.5–2)
MAGNESIUM SERPL-MCNC: 1.9 MG/DL — SIGNIFICANT CHANGE UP (ref 1.6–2.6)
MAGNESIUM SERPL-MCNC: 2 MG/DL — SIGNIFICANT CHANGE UP (ref 1.6–2.6)
MAGNESIUM SERPL-MCNC: 2.2 MG/DL — SIGNIFICANT CHANGE UP (ref 1.6–2.6)
MAGNESIUM SERPL-MCNC: 2.5 MG/DL — SIGNIFICANT CHANGE UP (ref 1.6–2.6)
MCHC RBC-ENTMCNC: 28.6 PG — SIGNIFICANT CHANGE UP (ref 27–34)
MCHC RBC-ENTMCNC: 29.1 PG — SIGNIFICANT CHANGE UP (ref 27–34)
MCHC RBC-ENTMCNC: 29.2 PG — SIGNIFICANT CHANGE UP (ref 27–34)
MCHC RBC-ENTMCNC: 29.2 PG — SIGNIFICANT CHANGE UP (ref 27–34)
MCHC RBC-ENTMCNC: 32.2 GM/DL — SIGNIFICANT CHANGE UP (ref 32–36)
MCHC RBC-ENTMCNC: 33.1 GM/DL — SIGNIFICANT CHANGE UP (ref 32–36)
MCHC RBC-ENTMCNC: 33.2 GM/DL — SIGNIFICANT CHANGE UP (ref 32–36)
MCHC RBC-ENTMCNC: 33.2 GM/DL — SIGNIFICANT CHANGE UP (ref 32–36)
MCV RBC AUTO: 87.7 FL — SIGNIFICANT CHANGE UP (ref 80–100)
MCV RBC AUTO: 88 FL — SIGNIFICANT CHANGE UP (ref 80–100)
MCV RBC AUTO: 88.3 FL — SIGNIFICANT CHANGE UP (ref 80–100)
MCV RBC AUTO: 89 FL — SIGNIFICANT CHANGE UP (ref 80–100)
NRBC # BLD: 0 /100 WBCS — SIGNIFICANT CHANGE UP (ref 0–0)
PCO2 BLDV: 39 MMHG — SIGNIFICANT CHANGE UP (ref 39–42)
PCO2 BLDV: 40 MMHG — SIGNIFICANT CHANGE UP (ref 39–42)
PCO2 BLDV: 47 MMHG — HIGH (ref 39–42)
PCO2 BLDV: 49 MMHG — HIGH (ref 39–42)
PF4 HEPARIN CMPLX AB SER-ACNC: NEGATIVE — SIGNIFICANT CHANGE UP
PH BLDV: 7.33 — SIGNIFICANT CHANGE UP (ref 7.32–7.43)
PH BLDV: 7.35 — SIGNIFICANT CHANGE UP (ref 7.32–7.43)
PH BLDV: 7.38 — SIGNIFICANT CHANGE UP (ref 7.32–7.43)
PH BLDV: 7.4 — SIGNIFICANT CHANGE UP (ref 7.32–7.43)
PHOSPHATE SERPL-MCNC: 1.4 MG/DL — LOW (ref 2.5–4.5)
PHOSPHATE SERPL-MCNC: 1.8 MG/DL — LOW (ref 2.5–4.5)
PHOSPHATE SERPL-MCNC: 2.4 MG/DL — LOW (ref 2.5–4.5)
PHOSPHATE SERPL-MCNC: 3.1 MG/DL — SIGNIFICANT CHANGE UP (ref 2.5–4.5)
PLATELET # BLD AUTO: 59 K/UL — LOW (ref 150–400)
PLATELET # BLD AUTO: 65 K/UL — LOW (ref 150–400)
PLATELET # BLD AUTO: 72 K/UL — LOW (ref 150–400)
PLATELET # BLD AUTO: 74 K/UL — LOW (ref 150–400)
PO2 BLDV: 39 MMHG — SIGNIFICANT CHANGE UP (ref 25–45)
PO2 BLDV: 40 MMHG — SIGNIFICANT CHANGE UP (ref 25–45)
PO2 BLDV: 42 MMHG — SIGNIFICANT CHANGE UP (ref 25–45)
PO2 BLDV: 48 MMHG — HIGH (ref 25–45)
POTASSIUM SERPL-MCNC: 3.4 MMOL/L — LOW (ref 3.5–5.3)
POTASSIUM SERPL-MCNC: 3.6 MMOL/L — SIGNIFICANT CHANGE UP (ref 3.5–5.3)
POTASSIUM SERPL-SCNC: 3.4 MMOL/L — LOW (ref 3.5–5.3)
POTASSIUM SERPL-SCNC: 3.6 MMOL/L — SIGNIFICANT CHANGE UP (ref 3.5–5.3)
PROT SERPL-MCNC: 5.7 G/DL — LOW (ref 6–8.3)
PROT SERPL-MCNC: 5.9 G/DL — LOW (ref 6–8.3)
PROT SERPL-MCNC: 6 G/DL — SIGNIFICANT CHANGE UP (ref 6–8.3)
PROT SERPL-MCNC: 6.2 G/DL — SIGNIFICANT CHANGE UP (ref 6–8.3)
PROTHROM AB SERPL-ACNC: 14 SEC — HIGH (ref 10.5–13.4)
PROTHROM AB SERPL-ACNC: 14.1 SEC — HIGH (ref 10.5–13.4)
PROTHROM AB SERPL-ACNC: 14.8 SEC — HIGH (ref 10.5–13.4)
PROTHROM AB SERPL-ACNC: 16 SEC — HIGH (ref 10.5–13.4)
RBC # BLD: 2.9 M/UL — LOW (ref 3.8–5.2)
RBC # BLD: 2.91 M/UL — LOW (ref 3.8–5.2)
RBC # BLD: 3.01 M/UL — LOW (ref 3.8–5.2)
RBC # BLD: 3.09 M/UL — LOW (ref 3.8–5.2)
RBC # FLD: 15.6 % — HIGH (ref 10.3–14.5)
RBC # FLD: 15.8 % — HIGH (ref 10.3–14.5)
RBC # FLD: 15.8 % — HIGH (ref 10.3–14.5)
RBC # FLD: 15.9 % — HIGH (ref 10.3–14.5)
SAO2 % BLDV: 67 % — SIGNIFICANT CHANGE UP (ref 67–88)
SAO2 % BLDV: 70.4 % — SIGNIFICANT CHANGE UP (ref 67–88)
SAO2 % BLDV: 70.9 % — SIGNIFICANT CHANGE UP (ref 67–88)
SAO2 % BLDV: 80.3 % — SIGNIFICANT CHANGE UP (ref 67–88)
SODIUM SERPL-SCNC: 137 MMOL/L — SIGNIFICANT CHANGE UP (ref 135–145)
SODIUM SERPL-SCNC: 139 MMOL/L — SIGNIFICANT CHANGE UP (ref 135–145)
SODIUM SERPL-SCNC: 139 MMOL/L — SIGNIFICANT CHANGE UP (ref 135–145)
SODIUM SERPL-SCNC: 141 MMOL/L — SIGNIFICANT CHANGE UP (ref 135–145)
WBC # BLD: 10.36 K/UL — SIGNIFICANT CHANGE UP (ref 3.8–10.5)
WBC # BLD: 12.76 K/UL — HIGH (ref 3.8–10.5)
WBC # BLD: 12.78 K/UL — HIGH (ref 3.8–10.5)
WBC # BLD: 9.91 K/UL — SIGNIFICANT CHANGE UP (ref 3.8–10.5)
WBC # FLD AUTO: 10.36 K/UL — SIGNIFICANT CHANGE UP (ref 3.8–10.5)
WBC # FLD AUTO: 12.76 K/UL — HIGH (ref 3.8–10.5)
WBC # FLD AUTO: 12.78 K/UL — HIGH (ref 3.8–10.5)
WBC # FLD AUTO: 9.91 K/UL — SIGNIFICANT CHANGE UP (ref 3.8–10.5)

## 2022-12-14 PROCEDURE — 99292 CRITICAL CARE ADDL 30 MIN: CPT

## 2022-12-14 PROCEDURE — 99233 SBSQ HOSP IP/OBS HIGH 50: CPT | Mod: GC

## 2022-12-14 PROCEDURE — 71045 X-RAY EXAM CHEST 1 VIEW: CPT | Mod: 26

## 2022-12-14 PROCEDURE — 99291 CRITICAL CARE FIRST HOUR: CPT

## 2022-12-14 RX ORDER — FUROSEMIDE 40 MG
20 TABLET ORAL ONCE
Refills: 0 | Status: COMPLETED | OUTPATIENT
Start: 2022-12-14 | End: 2022-12-14

## 2022-12-14 RX ORDER — POTASSIUM CHLORIDE 20 MEQ
20 PACKET (EA) ORAL
Refills: 0 | Status: COMPLETED | OUTPATIENT
Start: 2022-12-14 | End: 2022-12-14

## 2022-12-14 RX ORDER — POLYETHYLENE GLYCOL 3350 17 G/17G
17 POWDER, FOR SOLUTION ORAL DAILY
Refills: 0 | Status: DISCONTINUED | OUTPATIENT
Start: 2022-12-14 | End: 2022-12-20

## 2022-12-14 RX ORDER — ACETAMINOPHEN 500 MG
650 TABLET ORAL EVERY 6 HOURS
Refills: 0 | Status: DISCONTINUED | OUTPATIENT
Start: 2022-12-14 | End: 2022-12-20

## 2022-12-14 RX ORDER — MAGNESIUM SULFATE 500 MG/ML
2 VIAL (ML) INJECTION ONCE
Refills: 0 | Status: COMPLETED | OUTPATIENT
Start: 2022-12-14 | End: 2022-12-14

## 2022-12-14 RX ORDER — CITALOPRAM 10 MG/1
20 TABLET, FILM COATED ORAL DAILY
Refills: 0 | Status: DISCONTINUED | OUTPATIENT
Start: 2022-12-14 | End: 2022-12-20

## 2022-12-14 RX ORDER — ALBUMIN HUMAN 25 %
500 VIAL (ML) INTRAVENOUS
Refills: 0 | Status: COMPLETED | OUTPATIENT
Start: 2022-12-14 | End: 2022-12-14

## 2022-12-14 RX ORDER — SENNA PLUS 8.6 MG/1
2 TABLET ORAL AT BEDTIME
Refills: 0 | Status: DISCONTINUED | OUTPATIENT
Start: 2022-12-14 | End: 2022-12-20

## 2022-12-14 RX ORDER — FUROSEMIDE 40 MG
20 TABLET ORAL ONCE
Refills: 0 | Status: DISCONTINUED | OUTPATIENT
Start: 2022-12-14 | End: 2022-12-14

## 2022-12-14 RX ORDER — DOBUTAMINE HCL 250MG/20ML
2 VIAL (ML) INTRAVENOUS
Qty: 500 | Refills: 0 | Status: DISCONTINUED | OUTPATIENT
Start: 2022-12-14 | End: 2022-12-16

## 2022-12-14 RX ORDER — FUROSEMIDE 40 MG
20 TABLET ORAL EVERY 8 HOURS
Refills: 0 | Status: DISCONTINUED | OUTPATIENT
Start: 2022-12-14 | End: 2022-12-14

## 2022-12-14 RX ORDER — SODIUM,POTASSIUM PHOSPHATES 278-250MG
1 POWDER IN PACKET (EA) ORAL ONCE
Refills: 0 | Status: COMPLETED | OUTPATIENT
Start: 2022-12-14 | End: 2022-12-14

## 2022-12-14 RX ORDER — HEPARIN SODIUM 5000 [USP'U]/ML
5000 INJECTION INTRAVENOUS; SUBCUTANEOUS EVERY 12 HOURS
Refills: 0 | Status: DISCONTINUED | OUTPATIENT
Start: 2022-12-14 | End: 2022-12-20

## 2022-12-14 RX ORDER — POTASSIUM CHLORIDE 20 MEQ
10 PACKET (EA) ORAL ONCE
Refills: 0 | Status: COMPLETED | OUTPATIENT
Start: 2022-12-14 | End: 2022-12-14

## 2022-12-14 RX ADMIN — ACETAZOLAMIDE 250 MILLIGRAM(S): 250 TABLET ORAL at 12:18

## 2022-12-14 RX ADMIN — POLYETHYLENE GLYCOL 3350 17 GRAM(S): 17 POWDER, FOR SOLUTION ORAL at 12:13

## 2022-12-14 RX ADMIN — Medication 250 MILLILITER(S): at 14:39

## 2022-12-14 RX ADMIN — Medication 25 GRAM(S): at 10:32

## 2022-12-14 RX ADMIN — Medication 20 MILLIGRAM(S): at 02:13

## 2022-12-14 RX ADMIN — OXYCODONE HYDROCHLORIDE 10 MILLIGRAM(S): 5 TABLET ORAL at 13:44

## 2022-12-14 RX ADMIN — Medication 81 MILLIGRAM(S): at 12:58

## 2022-12-14 RX ADMIN — OXYCODONE HYDROCHLORIDE 10 MILLIGRAM(S): 5 TABLET ORAL at 19:06

## 2022-12-14 RX ADMIN — Medication 20 MILLIGRAM(S): at 23:47

## 2022-12-14 RX ADMIN — Medication 650 MILLIGRAM(S): at 05:57

## 2022-12-14 RX ADMIN — CHLORHEXIDINE GLUCONATE 1 APPLICATION(S): 213 SOLUTION TOPICAL at 12:07

## 2022-12-14 RX ADMIN — Medication 1 PACKET(S): at 20:50

## 2022-12-14 RX ADMIN — HEPARIN SODIUM 5000 UNIT(S): 5000 INJECTION INTRAVENOUS; SUBCUTANEOUS at 19:06

## 2022-12-14 RX ADMIN — Medication 100 MILLIEQUIVALENT(S): at 00:33

## 2022-12-14 RX ADMIN — OXYCODONE HYDROCHLORIDE 10 MILLIGRAM(S): 5 TABLET ORAL at 12:14

## 2022-12-14 RX ADMIN — Medication 1 DROP(S): at 00:29

## 2022-12-14 RX ADMIN — Medication 650 MILLIGRAM(S): at 07:50

## 2022-12-14 RX ADMIN — OXYCODONE HYDROCHLORIDE 10 MILLIGRAM(S): 5 TABLET ORAL at 07:30

## 2022-12-14 RX ADMIN — CHLORHEXIDINE GLUCONATE 15 MILLILITER(S): 213 SOLUTION TOPICAL at 05:57

## 2022-12-14 RX ADMIN — Medication 650 MILLIGRAM(S): at 13:44

## 2022-12-14 RX ADMIN — MILRINONE LACTATE 6.5 MICROGRAM(S)/KG/MIN: 1 INJECTION, SOLUTION INTRAVENOUS at 05:12

## 2022-12-14 RX ADMIN — OXYCODONE HYDROCHLORIDE 10 MILLIGRAM(S): 5 TABLET ORAL at 00:37

## 2022-12-14 RX ADMIN — Medication 100 MILLIEQUIVALENT(S): at 12:00

## 2022-12-14 RX ADMIN — Medication 1 DROP(S): at 05:57

## 2022-12-14 RX ADMIN — Medication 650 MILLIGRAM(S): at 12:13

## 2022-12-14 RX ADMIN — Medication 20 MILLIGRAM(S): at 07:35

## 2022-12-14 RX ADMIN — OXYCODONE HYDROCHLORIDE 10 MILLIGRAM(S): 5 TABLET ORAL at 01:40

## 2022-12-14 RX ADMIN — HEPARIN SODIUM 5000 UNIT(S): 5000 INJECTION INTRAVENOUS; SUBCUTANEOUS at 05:57

## 2022-12-14 RX ADMIN — SENNA PLUS 2 TABLET(S): 8.6 TABLET ORAL at 22:01

## 2022-12-14 RX ADMIN — CITALOPRAM 20 MILLIGRAM(S): 10 TABLET, FILM COATED ORAL at 20:50

## 2022-12-14 RX ADMIN — PANTOPRAZOLE SODIUM 40 MILLIGRAM(S): 20 TABLET, DELAYED RELEASE ORAL at 10:32

## 2022-12-14 RX ADMIN — OXYCODONE HYDROCHLORIDE 10 MILLIGRAM(S): 5 TABLET ORAL at 06:28

## 2022-12-14 NOTE — PHYSICAL THERAPY INITIAL EVALUATION ADULT - GENERAL OBSERVATIONS, REHAB EVAL
RN Di and RN Cat present throughout session. Patient received sitting in chair in NAD with +ECG +central line +bale x 3 +CT x 2 +hou +TPM +mark +prevena +HFNC 40/40

## 2022-12-14 NOTE — PROGRESS NOTE ADULT - ASSESSMENT
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 Neurology was consulted to follow up given the h/o hyperkalemic periodic paralysis as pt would need K load for the procedure. she was extubated safely w/o exacerbation of her Hyperkalemic PP. Her K levels has been maintained on the lower side of normal w/ Furosemide and Acetazolamide.     Recommendation  Continue daily neurologic assessment   Cont daily BMP to monitor K levels  Cont daily EKG to monitor QTc and T-wave    We will follow PRN. Please call for any neurologic concerns

## 2022-12-14 NOTE — PHYSICAL THERAPY INITIAL EVALUATION ADULT - PERTINENT HX OF CURRENT PROBLEM, REHAB EVAL
Patient is a 62 year old female presents for direct admission for pre-op optimization prior to AVR and ascending aorta replacement scheduled for 12/12/22 with Dr. Echevarria. Patient states she had intermittent chest pain (occurs at rest) and GUEVARA (walking up 1 flight of stairs) increasing in intensity over the last 1-2 years.

## 2022-12-14 NOTE — PHYSICAL THERAPY INITIAL EVALUATION ADULT - GAIT DEVIATIONS NOTED, PT EVAL
patient with fairly steady gait, no LOB, decreased step height/decreased florentin/decreased step length

## 2022-12-14 NOTE — PROGRESS NOTE ADULT - ATTENDING COMMENTS
I was physically present for the key portions of the evaluation and managemnent (E/M) service provided.  I agree with the above history, physical, and plan which I have reviewed and edited where appropriate, with the exceptions as per my note.    pt seen and examined.    at least 4+ in all muscle groups and stronger in distal UEs and LEs.    AP: continue to monitor neurological status and lytes as above.

## 2022-12-14 NOTE — PROGRESS NOTE ADULT - ASSESSMENT
62 F w/PMHx hyperkalemic periodic paralysis, bicuspid AV with severe AS, aortic aneurysm, ITP, splenic and portal vein thrombosis, current T11 fx admitted for pre-op optimization prior to scheduled AVR and ascending aortic replacement.    S/p Replacement, aortic valve, and ascending aorta     Post-Op RV dysfx and coagulopathy improved with inotrops and blood product transfusion  Extubated   [place] dc'd     ASSESSMENT/PLAN  HDS and in Sinus  On 0.25 Milr and  2 with normal lactate and stable LFTs  Brisk diuretic response/ Warm on exam--> Milrinone titrated to half will continue to monitor organ perfusion parameters and adjust inotrops accordingly   Normal kidney fx/Lytes in good range, permissive mild hypok for HPP-- gently repleted along with lasix   Cont daily home Diamox   To replete Mg>2   HyperNa-->improved   Ppx: Sq hep/ PPI/HOB 45     Xray clear with good oxygenation transitioning HF to NC in am   Slow inotrop wean   OOB/IS  and mobilizing as tolerated     ATTENDING: I have personally and independently provided 30 Min of critical care services. 62 F w/PMHx hyperkalemic periodic paralysis, bicuspid AV with severe AS, aortic aneurysm, ITP, splenic and portal vein thrombosis, current T11 fx admitted for pre-op optimization prior to scheduled AVR and ascending aortic replacement.    S/p Replacement, aortic valve, and ascending aorta     Post-Op RV dysfx and coagulopathy improved with inotrops and blood product transfusion  Extubated   [place] dc'd     ASSESSMENT/PLAN  HDS and in Sinus  On 0.25 Milr and  2 with normal lactate and stable LFTs  Brisk diuretic response/ Warm on exam--> Milrinone titrated to half will continue to monitor organ perfusion parameters and adjust inotrops accordingly   Normal kidney fx/Lytes in good range, permissive mild hypok for HPP-- gently repleted along with lasix, To replete Mg>2   HyperNa-->improved  Cont daily home Diamox   H&& stable, PLT down to 50 with neg AntiPF4--> CTs and blakes with serosang drainage    Ppx: Sq hep/ PPI/HOB 45     Xray clear with good oxygenation transitioning HF to NC in am   Slow inotrop wean   OOB/IS  and mobilizing as tolerated     ATTENDING: I have personally and independently provided 30 Min of critical care services.

## 2022-12-14 NOTE — PHYSICAL THERAPY INITIAL EVALUATION ADULT - ADDITIONAL COMMENTS
Patient reports she lives with her  in private home with 3 KULDEEP and 1 flight inside. Patient was independent with all mobility and ADLs PTA. Patient works as RN.

## 2022-12-15 LAB
ALBUMIN SERPL ELPH-MCNC: 4 G/DL — SIGNIFICANT CHANGE UP (ref 3.3–5)
ALBUMIN SERPL ELPH-MCNC: 4.1 G/DL — SIGNIFICANT CHANGE UP (ref 3.3–5)
ALBUMIN SERPL ELPH-MCNC: 4.4 G/DL — SIGNIFICANT CHANGE UP (ref 3.3–5)
ALP SERPL-CCNC: 60 U/L — SIGNIFICANT CHANGE UP (ref 40–120)
ALP SERPL-CCNC: 67 U/L — SIGNIFICANT CHANGE UP (ref 40–120)
ALP SERPL-CCNC: 81 U/L — SIGNIFICANT CHANGE UP (ref 40–120)
ALT FLD-CCNC: 18 U/L — SIGNIFICANT CHANGE UP (ref 10–45)
ALT FLD-CCNC: 21 U/L — SIGNIFICANT CHANGE UP (ref 10–45)
ALT FLD-CCNC: 43 U/L — SIGNIFICANT CHANGE UP (ref 10–45)
ANION GAP SERPL CALC-SCNC: 8 MMOL/L — SIGNIFICANT CHANGE UP (ref 5–17)
APTT BLD: 29.8 SEC — SIGNIFICANT CHANGE UP (ref 27.5–35.5)
APTT BLD: 29.8 SEC — SIGNIFICANT CHANGE UP (ref 27.5–35.5)
APTT BLD: 32.8 SEC — SIGNIFICANT CHANGE UP (ref 27.5–35.5)
AST SERPL-CCNC: 39 U/L — SIGNIFICANT CHANGE UP (ref 10–40)
AST SERPL-CCNC: 40 U/L — SIGNIFICANT CHANGE UP (ref 10–40)
AST SERPL-CCNC: 79 U/L — HIGH (ref 10–40)
BASE EXCESS BLDV CALC-SCNC: 1.5 MMOL/L — SIGNIFICANT CHANGE UP (ref -2–3)
BASE EXCESS BLDV CALC-SCNC: 3.7 MMOL/L — HIGH (ref -2–3)
BILIRUB SERPL-MCNC: 0.8 MG/DL — SIGNIFICANT CHANGE UP (ref 0.2–1.2)
BUN SERPL-MCNC: 12 MG/DL — SIGNIFICANT CHANGE UP (ref 7–23)
BUN SERPL-MCNC: 13 MG/DL — SIGNIFICANT CHANGE UP (ref 7–23)
BUN SERPL-MCNC: 13 MG/DL — SIGNIFICANT CHANGE UP (ref 7–23)
CA-I SERPL-SCNC: 1.21 MMOL/L — SIGNIFICANT CHANGE UP (ref 1.15–1.33)
CALCIUM SERPL-MCNC: 8.6 MG/DL — SIGNIFICANT CHANGE UP (ref 8.4–10.5)
CALCIUM SERPL-MCNC: 8.6 MG/DL — SIGNIFICANT CHANGE UP (ref 8.4–10.5)
CALCIUM SERPL-MCNC: 9 MG/DL — SIGNIFICANT CHANGE UP (ref 8.4–10.5)
CHLORIDE SERPL-SCNC: 103 MMOL/L — SIGNIFICANT CHANGE UP (ref 96–108)
CHLORIDE SERPL-SCNC: 104 MMOL/L — SIGNIFICANT CHANGE UP (ref 96–108)
CHLORIDE SERPL-SCNC: 105 MMOL/L — SIGNIFICANT CHANGE UP (ref 96–108)
CO2 BLDV-SCNC: 27.9 MMOL/L — HIGH (ref 22–26)
CO2 BLDV-SCNC: 31.2 MMOL/L — HIGH (ref 22–26)
CO2 SERPL-SCNC: 26 MMOL/L — SIGNIFICANT CHANGE UP (ref 22–31)
CO2 SERPL-SCNC: 27 MMOL/L — SIGNIFICANT CHANGE UP (ref 22–31)
CO2 SERPL-SCNC: 28 MMOL/L — SIGNIFICANT CHANGE UP (ref 22–31)
CREAT SERPL-MCNC: 0.44 MG/DL — LOW (ref 0.5–1.3)
CREAT SERPL-MCNC: 0.47 MG/DL — LOW (ref 0.5–1.3)
CREAT SERPL-MCNC: 0.48 MG/DL — LOW (ref 0.5–1.3)
EGFR: 106 ML/MIN/1.73M2 — SIGNIFICANT CHANGE UP
EGFR: 107 ML/MIN/1.73M2 — SIGNIFICANT CHANGE UP
EGFR: 109 ML/MIN/1.73M2 — SIGNIFICANT CHANGE UP
GAS PNL BLDA: SIGNIFICANT CHANGE UP
GAS PNL BLDV: 135 MMOL/L — LOW (ref 136–145)
GAS PNL BLDV: SIGNIFICANT CHANGE UP
GAS PNL BLDV: SIGNIFICANT CHANGE UP
GLUCOSE BLDC GLUCOMTR-MCNC: 121 MG/DL — HIGH (ref 70–99)
GLUCOSE BLDC GLUCOMTR-MCNC: 121 MG/DL — HIGH (ref 70–99)
GLUCOSE BLDC GLUCOMTR-MCNC: 127 MG/DL — HIGH (ref 70–99)
GLUCOSE BLDC GLUCOMTR-MCNC: 133 MG/DL — HIGH (ref 70–99)
GLUCOSE SERPL-MCNC: 134 MG/DL — HIGH (ref 70–99)
GLUCOSE SERPL-MCNC: 141 MG/DL — HIGH (ref 70–99)
GLUCOSE SERPL-MCNC: 158 MG/DL — HIGH (ref 70–99)
HCO3 BLDV-SCNC: 27 MMOL/L — SIGNIFICANT CHANGE UP (ref 22–29)
HCO3 BLDV-SCNC: 30 MMOL/L — HIGH (ref 22–29)
HCT VFR BLD CALC: 24.7 % — LOW (ref 34.5–45)
HCT VFR BLD CALC: 25.3 % — LOW (ref 34.5–45)
HCT VFR BLD CALC: 25.7 % — LOW (ref 34.5–45)
HGB BLD-MCNC: 8 G/DL — LOW (ref 11.5–15.5)
HGB BLD-MCNC: 8.1 G/DL — LOW (ref 11.5–15.5)
HGB BLD-MCNC: 8.4 G/DL — LOW (ref 11.5–15.5)
INR BLD: 1.16 — SIGNIFICANT CHANGE UP (ref 0.88–1.16)
INR BLD: 1.18 — HIGH (ref 0.88–1.16)
INR BLD: 1.19 — HIGH (ref 0.88–1.16)
LACTATE SERPL-SCNC: 1 MMOL/L — SIGNIFICANT CHANGE UP (ref 0.5–2)
LACTATE SERPL-SCNC: 1 MMOL/L — SIGNIFICANT CHANGE UP (ref 0.5–2)
LACTATE SERPL-SCNC: 1.3 MMOL/L — SIGNIFICANT CHANGE UP (ref 0.5–2)
MAGNESIUM SERPL-MCNC: 1.8 MG/DL — SIGNIFICANT CHANGE UP (ref 1.6–2.6)
MAGNESIUM SERPL-MCNC: 2 MG/DL — SIGNIFICANT CHANGE UP (ref 1.6–2.6)
MAGNESIUM SERPL-MCNC: 2.2 MG/DL — SIGNIFICANT CHANGE UP (ref 1.6–2.6)
MCHC RBC-ENTMCNC: 28.7 PG — SIGNIFICANT CHANGE UP (ref 27–34)
MCHC RBC-ENTMCNC: 29.2 PG — SIGNIFICANT CHANGE UP (ref 27–34)
MCHC RBC-ENTMCNC: 29.4 PG — SIGNIFICANT CHANGE UP (ref 27–34)
MCHC RBC-ENTMCNC: 32 GM/DL — SIGNIFICANT CHANGE UP (ref 32–36)
MCHC RBC-ENTMCNC: 32.4 GM/DL — SIGNIFICANT CHANGE UP (ref 32–36)
MCHC RBC-ENTMCNC: 32.7 GM/DL — SIGNIFICANT CHANGE UP (ref 32–36)
MCV RBC AUTO: 89.7 FL — SIGNIFICANT CHANGE UP (ref 80–100)
MCV RBC AUTO: 89.9 FL — SIGNIFICANT CHANGE UP (ref 80–100)
MCV RBC AUTO: 90.1 FL — SIGNIFICANT CHANGE UP (ref 80–100)
NRBC # BLD: 0 /100 WBCS — SIGNIFICANT CHANGE UP (ref 0–0)
PCO2 BLDV: 43 MMHG — HIGH (ref 39–42)
PCO2 BLDV: 49 MMHG — HIGH (ref 39–42)
PH BLDV: 7.39 — SIGNIFICANT CHANGE UP (ref 7.32–7.43)
PH BLDV: 7.4 — SIGNIFICANT CHANGE UP (ref 7.32–7.43)
PHOSPHATE SERPL-MCNC: 1.7 MG/DL — LOW (ref 2.5–4.5)
PHOSPHATE SERPL-MCNC: 1.7 MG/DL — LOW (ref 2.5–4.5)
PHOSPHATE SERPL-MCNC: 1.9 MG/DL — LOW (ref 2.5–4.5)
PLATELET # BLD AUTO: 56 K/UL — LOW (ref 150–400)
PLATELET # BLD AUTO: 59 K/UL — LOW (ref 150–400)
PLATELET # BLD AUTO: 64 K/UL — LOW (ref 150–400)
PO2 BLDV: 39 MMHG — SIGNIFICANT CHANGE UP (ref 25–45)
PO2 BLDV: 46 MMHG — HIGH (ref 25–45)
POTASSIUM BLDV-SCNC: 3.7 MMOL/L — SIGNIFICANT CHANGE UP (ref 3.5–5.1)
POTASSIUM SERPL-MCNC: 3.3 MMOL/L — LOW (ref 3.5–5.3)
POTASSIUM SERPL-MCNC: 3.5 MMOL/L — SIGNIFICANT CHANGE UP (ref 3.5–5.3)
POTASSIUM SERPL-MCNC: 3.5 MMOL/L — SIGNIFICANT CHANGE UP (ref 3.5–5.3)
POTASSIUM SERPL-SCNC: 3.3 MMOL/L — LOW (ref 3.5–5.3)
POTASSIUM SERPL-SCNC: 3.5 MMOL/L — SIGNIFICANT CHANGE UP (ref 3.5–5.3)
POTASSIUM SERPL-SCNC: 3.5 MMOL/L — SIGNIFICANT CHANGE UP (ref 3.5–5.3)
PROT SERPL-MCNC: 5.7 G/DL — LOW (ref 6–8.3)
PROT SERPL-MCNC: 5.9 G/DL — LOW (ref 6–8.3)
PROT SERPL-MCNC: 6.2 G/DL — SIGNIFICANT CHANGE UP (ref 6–8.3)
PROTHROM AB SERPL-ACNC: 13.8 SEC — HIGH (ref 10.5–13.4)
PROTHROM AB SERPL-ACNC: 14.1 SEC — HIGH (ref 10.5–13.4)
PROTHROM AB SERPL-ACNC: 14.2 SEC — HIGH (ref 10.5–13.4)
RBC # BLD: 2.74 M/UL — LOW (ref 3.8–5.2)
RBC # BLD: 2.82 M/UL — LOW (ref 3.8–5.2)
RBC # BLD: 2.86 M/UL — LOW (ref 3.8–5.2)
RBC # FLD: 15.4 % — HIGH (ref 10.3–14.5)
RBC # FLD: 15.4 % — HIGH (ref 10.3–14.5)
RBC # FLD: 15.6 % — HIGH (ref 10.3–14.5)
SAO2 % BLDV: 69.2 % — SIGNIFICANT CHANGE UP (ref 67–88)
SAO2 % BLDV: 72.8 % — SIGNIFICANT CHANGE UP (ref 67–88)
SODIUM SERPL-SCNC: 139 MMOL/L — SIGNIFICANT CHANGE UP (ref 135–145)
WBC # BLD: 7.31 K/UL — SIGNIFICANT CHANGE UP (ref 3.8–10.5)
WBC # BLD: 8.66 K/UL — SIGNIFICANT CHANGE UP (ref 3.8–10.5)
WBC # BLD: 8.97 K/UL — SIGNIFICANT CHANGE UP (ref 3.8–10.5)
WBC # FLD AUTO: 7.31 K/UL — SIGNIFICANT CHANGE UP (ref 3.8–10.5)
WBC # FLD AUTO: 8.66 K/UL — SIGNIFICANT CHANGE UP (ref 3.8–10.5)
WBC # FLD AUTO: 8.97 K/UL — SIGNIFICANT CHANGE UP (ref 3.8–10.5)

## 2022-12-15 PROCEDURE — 99292 CRITICAL CARE ADDL 30 MIN: CPT

## 2022-12-15 PROCEDURE — 99232 SBSQ HOSP IP/OBS MODERATE 35: CPT | Mod: GC

## 2022-12-15 PROCEDURE — 71045 X-RAY EXAM CHEST 1 VIEW: CPT | Mod: 26

## 2022-12-15 PROCEDURE — 99291 CRITICAL CARE FIRST HOUR: CPT

## 2022-12-15 RX ORDER — POTASSIUM PHOSPHATE, MONOBASIC POTASSIUM PHOSPHATE, DIBASIC 236; 224 MG/ML; MG/ML
15 INJECTION, SOLUTION INTRAVENOUS ONCE
Refills: 0 | Status: COMPLETED | OUTPATIENT
Start: 2022-12-15 | End: 2022-12-15

## 2022-12-15 RX ORDER — POTASSIUM CHLORIDE 20 MEQ
20 PACKET (EA) ORAL ONCE
Refills: 0 | Status: COMPLETED | OUTPATIENT
Start: 2022-12-15 | End: 2022-12-15

## 2022-12-15 RX ORDER — ALBUMIN HUMAN 25 %
250 VIAL (ML) INTRAVENOUS ONCE
Refills: 0 | Status: COMPLETED | OUTPATIENT
Start: 2022-12-15 | End: 2022-12-15

## 2022-12-15 RX ADMIN — POTASSIUM PHOSPHATE, MONOBASIC POTASSIUM PHOSPHATE, DIBASIC 62.5 MILLIMOLE(S): 236; 224 INJECTION, SOLUTION INTRAVENOUS at 21:14

## 2022-12-15 RX ADMIN — OXYCODONE HYDROCHLORIDE 10 MILLIGRAM(S): 5 TABLET ORAL at 10:00

## 2022-12-15 RX ADMIN — Medication 1 DROP(S): at 23:20

## 2022-12-15 RX ADMIN — OXYCODONE HYDROCHLORIDE 5 MILLIGRAM(S): 5 TABLET ORAL at 19:42

## 2022-12-15 RX ADMIN — Medication 650 MILLIGRAM(S): at 01:47

## 2022-12-15 RX ADMIN — POTASSIUM PHOSPHATE, MONOBASIC POTASSIUM PHOSPHATE, DIBASIC 62.5 MILLIMOLE(S): 236; 224 INJECTION, SOLUTION INTRAVENOUS at 06:44

## 2022-12-15 RX ADMIN — CHLORHEXIDINE GLUCONATE 1 APPLICATION(S): 213 SOLUTION TOPICAL at 12:16

## 2022-12-15 RX ADMIN — Medication 100 MILLIEQUIVALENT(S): at 00:00

## 2022-12-15 RX ADMIN — Medication 1 DROP(S): at 06:51

## 2022-12-15 RX ADMIN — Medication 650 MILLIGRAM(S): at 02:50

## 2022-12-15 RX ADMIN — POLYETHYLENE GLYCOL 3350 17 GRAM(S): 17 POWDER, FOR SOLUTION ORAL at 12:13

## 2022-12-15 RX ADMIN — MILRINONE LACTATE 6.5 MICROGRAM(S)/KG/MIN: 1 INJECTION, SOLUTION INTRAVENOUS at 08:33

## 2022-12-15 RX ADMIN — OXYCODONE HYDROCHLORIDE 5 MILLIGRAM(S): 5 TABLET ORAL at 21:57

## 2022-12-15 RX ADMIN — PANTOPRAZOLE SODIUM 40 MILLIGRAM(S): 20 TABLET, DELAYED RELEASE ORAL at 06:44

## 2022-12-15 RX ADMIN — CITALOPRAM 20 MILLIGRAM(S): 10 TABLET, FILM COATED ORAL at 21:41

## 2022-12-15 RX ADMIN — Medication 81 MILLIGRAM(S): at 12:19

## 2022-12-15 RX ADMIN — OXYCODONE HYDROCHLORIDE 10 MILLIGRAM(S): 5 TABLET ORAL at 09:31

## 2022-12-15 RX ADMIN — ACETAZOLAMIDE 250 MILLIGRAM(S): 250 TABLET ORAL at 12:13

## 2022-12-15 RX ADMIN — HEPARIN SODIUM 5000 UNIT(S): 5000 INJECTION INTRAVENOUS; SUBCUTANEOUS at 19:38

## 2022-12-15 RX ADMIN — Medication 125 MILLILITER(S): at 09:37

## 2022-12-15 RX ADMIN — OXYCODONE HYDROCHLORIDE 10 MILLIGRAM(S): 5 TABLET ORAL at 01:47

## 2022-12-15 RX ADMIN — Medication 650 MILLIGRAM(S): at 09:32

## 2022-12-15 RX ADMIN — Medication 1 DROP(S): at 12:19

## 2022-12-15 RX ADMIN — Medication 1 DROP(S): at 19:28

## 2022-12-15 RX ADMIN — Medication 650 MILLIGRAM(S): at 10:00

## 2022-12-15 RX ADMIN — SENNA PLUS 2 TABLET(S): 8.6 TABLET ORAL at 21:14

## 2022-12-15 RX ADMIN — Medication 3.47 MICROGRAM(S)/KG/MIN: at 12:15

## 2022-12-15 RX ADMIN — HEPARIN SODIUM 5000 UNIT(S): 5000 INJECTION INTRAVENOUS; SUBCUTANEOUS at 06:44

## 2022-12-15 RX ADMIN — OXYCODONE HYDROCHLORIDE 10 MILLIGRAM(S): 5 TABLET ORAL at 02:50

## 2022-12-15 NOTE — DIETITIAN INITIAL EVALUATION ADULT - OTHER INFO
62 year old female with PMHx hyperkalemic periodic paralysis (diagnosed at age 36, controlled on diamox, last flare 2 years ago), bicuspid aortic valve with severe AS, aortic aneurysm, splenic vein thrombosis, portal vein thrombosis, macular degeneration, ITP, esophageal varices GERD, basal cell carcinoma, T11 fracture (current), cataracts, presents for direct admission. S/p Replacement, aortic valve, and ascending aorta  12/12. Post-Op RV dysfx and coagulopathy improved with inotrops and blood product transfusion. Extubated 12/13. [place] dc'd 12/14.    Pt seen at bedside for initial assessment-NC w/humidification on Dobutamine drip- MAP 89. No n/v/d/c documented at this time. Last documented bowel movement 12/11. Confirms NKFA. Denies change in appetite PTA. Reports usual body weight 127 pounds (consistent with dosing weight 127 pounds). Notes weight has been stable. Generalized edema 1+. No pressure ulcers documented at this time. Surgical incision per chart. Ryley score=19. Labs reviewed 12/15; pt hyponatremic. Fingersticks 12/14-12/15: 118-128 mg/dL; insulin regimen ordered. Observed pt with no overt signs of muscle or fat wasting. Based on ASPEN guidelines, pt does not meet criteria for malnutrition at this time. Provided pt with diet education regarding importance of meeting nutritional needs post operatively ; amenable to education. Discussed current diet order DASH/TLC; provided diet education in depth, discussed sources of high versus low fat and low sodium foods. Pt reports not feeling hungry post op, able to complete ~50% of meals. Pt does note appetite has been slowly improving; likely in setting of recent surgery. Discussed trial of Ensure Enlive 2x/day (350 kcal, 20 g protein per serving) if poor PO intake persists; wiling to trial. No cultural, ethnic, Confucianist food preferences noted. Made aware RD remains available. RD to follow up. See nutrition recommendations below.

## 2022-12-15 NOTE — DIETITIAN INITIAL EVALUATION ADULT - ADD RECOMMEND
1. Continue with current diet order + Ensure Enlive 2x/day (350 kcal, 20 g protein per serving) 2. Encourage pt to meet nutritional needs as able 3. Encourage adherence to diet education (reinforce as able) 4. Pain and bowel regimen per team 5. Will assess/honor preferences as able 6. Monitor electrolytes; replete PRN

## 2022-12-15 NOTE — DIETITIAN INITIAL EVALUATION ADULT - OTHER CALCULATIONS
Based on Standards of Care pt within % IBW thus actual body weight used for all calculations. Needs adjusted for post op.

## 2022-12-15 NOTE — DIETITIAN INITIAL EVALUATION ADULT - NSFNSGIIOFT_GEN_A_CORE
12-14-22 @ 07:01  -  12-15-22 @ 07:00  --------------------------------------------------------  OUT:    Chest Tube (mL): 184 mL    Chest Tube (mL): 300 mL  Total OUT: 484 mL    Total NET: -484 mL      12-15-22 @ 07:01  -  12-15-22 @ 15:20  --------------------------------------------------------  OUT:    Chest Tube (mL): 120 mL    Chest Tube (mL): 40 mL  Total OUT: 160 mL    Total NET: -160 mL

## 2022-12-15 NOTE — PROGRESS NOTE ADULT - ASSESSMENT
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 Neurology was consulted to follow up given the h/o hyperkalemic periodic paralysis as pt would need K load for the procedure. she was extubated safely w/o exacerbation of her Hyperkalemic PP. Her K levels has been maintained on the lower side of normal w/ Furosemide and Acetazolamide. Now off Furosemide. There is improve b/l deltoid strength today.     Recommendation  Continue daily neurologic assessment   Cont daily BMP to monitor K levels  Cont daily EKG to monitor QTc and T-wave    We will follow PRN. Please call for any neurologic concerns

## 2022-12-15 NOTE — PROGRESS NOTE ADULT - ASSESSMENT
62 F w/PMHx hyperkalemic periodic paralysis, bicuspid AV with severe AS, aortic aneurysm, ITP, splenic and portal vein thrombosis, current T11 fx admitted for pre-op optimization prior to scheduled AVR and ascending aortic replacement.    S/p Replacement, aortic valve, and ascending aorta     Post-Op RV dysfx and coagulopathy improved with inotrops and blood product transfusion  Extubated   [place] dc'd     ASSESSMENT/PLAN  HDS and in Sinus  Milrinone tapered off/ Slow  wean tonight  Cont to monitor organ perfusion parameters i.e lactate/ LFTs /UOP  Normal kidney fx/Lytes in good range, permissive mild hypok for HPP-- gently repleted with phos   Cont daily home Diamox   H&H stable, PLT down to 50 with neg AntiPF4--> CTs dc'd --med blakes with low output   Ppx: Sq hep/ PPI/HOB 45     Slow inotrop wean   OOB/IS  and mobilizing as tolerated     ATTENDING: I have personally and independently provided 30 Min of critical care services.

## 2022-12-15 NOTE — DIETITIAN INITIAL EVALUATION ADULT - PERTINENT MEDS FT
MEDICATIONS  (STANDING):  acetaZOLAMIDE    Tablet 250 milliGRAM(s) Oral daily  albumin human  5% IVPB 500 milliLiter(s) IV Intermittent every 30 minutes  artificial tears (preservative free) Ophthalmic Solution 1 Drop(s) Both EYES every 6 hours  aspirin  chewable 81 milliGRAM(s) Enteral Tube daily  chlorhexidine 2% Cloths 1 Application(s) Topical daily  citalopram 20 milliGRAM(s) Oral daily  dextrose 5%. 1000 milliLiter(s) (50 mL/Hr) IV Continuous <Continuous>  dextrose 5%. 1000 milliLiter(s) (100 mL/Hr) IV Continuous <Continuous>  dextrose 50% Injectable 50 milliLiter(s) IV Push every 15 minutes  dextrose 50% Injectable 25 milliLiter(s) IV Push every 15 minutes  DOBUTamine Infusion 2 MICROgram(s)/kG/Min (3.47 mL/Hr) IV Continuous <Continuous>  glucagon  Injectable 1 milliGRAM(s) IntraMuscular once  heparin   Injectable 5000 Unit(s) SubCutaneous every 12 hours  insulin lispro (ADMELOG) corrective regimen sliding scale   SubCutaneous Before meals and at bedtime  milrinone Infusion 0.375 MICROgram(s)/kG/Min (6.5 mL/Hr) IV Continuous <Continuous>  pantoprazole    Tablet 40 milliGRAM(s) Oral before breakfast  polyethylene glycol 3350 17 Gram(s) Oral daily  senna 2 Tablet(s) Oral at bedtime  sodium chloride 0.9%. 1000 milliLiter(s) (10 mL/Hr) IV Continuous <Continuous>    MEDICATIONS  (PRN):  acetaminophen     Tablet .. 650 milliGRAM(s) Oral every 6 hours PRN Temp greater or equal to 38C (100.4F), Mild Pain (1 - 3)  dextrose Oral Gel 15 Gram(s) Oral once PRN Blood Glucose LESS THAN 70 milliGRAM(s)/deciliter  oxyCODONE    IR 5 milliGRAM(s) Oral every 6 hours PRN Moderate Pain (4 - 6)  oxyCODONE    IR 10 milliGRAM(s) Oral every 6 hours PRN Severe Pain (7 - 10)

## 2022-12-15 NOTE — PROGRESS NOTE ADULT - ATTENDING COMMENTS
I was physically present for the key portions of the evaluation and managemnent (E/M) service provided.  I agree with the above history, physical, and plan which I have reviewed and edited where appropriate, with the exceptions as per my note.    Pt seen and examined. she feels more strong.    she is at least 4+ in all muscle groups, somewhat generally improved compared to yesterday.    continue to monitor neurological status. please call with any changes in neurological status.

## 2022-12-16 LAB
ALBUMIN SERPL ELPH-MCNC: 3.7 G/DL — SIGNIFICANT CHANGE UP (ref 3.3–5)
ALBUMIN SERPL ELPH-MCNC: 3.8 G/DL — SIGNIFICANT CHANGE UP (ref 3.3–5)
ALBUMIN SERPL ELPH-MCNC: 3.8 G/DL — SIGNIFICANT CHANGE UP (ref 3.3–5)
ALP SERPL-CCNC: 100 U/L — SIGNIFICANT CHANGE UP (ref 40–120)
ALP SERPL-CCNC: 107 U/L — SIGNIFICANT CHANGE UP (ref 40–120)
ALP SERPL-CCNC: 90 U/L — SIGNIFICANT CHANGE UP (ref 40–120)
ALT FLD-CCNC: 44 U/L — SIGNIFICANT CHANGE UP (ref 10–45)
ALT FLD-CCNC: 51 U/L — HIGH (ref 10–45)
ALT FLD-CCNC: 57 U/L — HIGH (ref 10–45)
ANION GAP SERPL CALC-SCNC: 7 MMOL/L — SIGNIFICANT CHANGE UP (ref 5–17)
ANION GAP SERPL CALC-SCNC: 8 MMOL/L — SIGNIFICANT CHANGE UP (ref 5–17)
ANION GAP SERPL CALC-SCNC: 9 MMOL/L — SIGNIFICANT CHANGE UP (ref 5–17)
APTT BLD: 27.6 SEC — SIGNIFICANT CHANGE UP (ref 27.5–35.5)
APTT BLD: 29.8 SEC — SIGNIFICANT CHANGE UP (ref 27.5–35.5)
APTT BLD: 30.3 SEC — SIGNIFICANT CHANGE UP (ref 27.5–35.5)
AST SERPL-CCNC: 71 U/L — HIGH (ref 10–40)
AST SERPL-CCNC: 73 U/L — HIGH (ref 10–40)
AST SERPL-CCNC: 81 U/L — HIGH (ref 10–40)
BASE EXCESS BLDV CALC-SCNC: 0 MMOL/L — SIGNIFICANT CHANGE UP (ref -2–3)
BILIRUB SERPL-MCNC: 0.7 MG/DL — SIGNIFICANT CHANGE UP (ref 0.2–1.2)
BILIRUB SERPL-MCNC: 0.8 MG/DL — SIGNIFICANT CHANGE UP (ref 0.2–1.2)
BILIRUB SERPL-MCNC: 0.8 MG/DL — SIGNIFICANT CHANGE UP (ref 0.2–1.2)
BUN SERPL-MCNC: 10 MG/DL — SIGNIFICANT CHANGE UP (ref 7–23)
BUN SERPL-MCNC: 11 MG/DL — SIGNIFICANT CHANGE UP (ref 7–23)
BUN SERPL-MCNC: 12 MG/DL — SIGNIFICANT CHANGE UP (ref 7–23)
CA-I SERPL-SCNC: 1.2 MMOL/L — SIGNIFICANT CHANGE UP (ref 1.15–1.33)
CALCIUM SERPL-MCNC: 8.5 MG/DL — SIGNIFICANT CHANGE UP (ref 8.4–10.5)
CALCIUM SERPL-MCNC: 8.8 MG/DL — SIGNIFICANT CHANGE UP (ref 8.4–10.5)
CALCIUM SERPL-MCNC: 8.8 MG/DL — SIGNIFICANT CHANGE UP (ref 8.4–10.5)
CHLORIDE SERPL-SCNC: 101 MMOL/L — SIGNIFICANT CHANGE UP (ref 96–108)
CHLORIDE SERPL-SCNC: 105 MMOL/L — SIGNIFICANT CHANGE UP (ref 96–108)
CHLORIDE SERPL-SCNC: 106 MMOL/L — SIGNIFICANT CHANGE UP (ref 96–108)
CO2 BLDV-SCNC: 26 MMOL/L — SIGNIFICANT CHANGE UP (ref 22–26)
CO2 SERPL-SCNC: 25 MMOL/L — SIGNIFICANT CHANGE UP (ref 22–31)
CO2 SERPL-SCNC: 25 MMOL/L — SIGNIFICANT CHANGE UP (ref 22–31)
CO2 SERPL-SCNC: 26 MMOL/L — SIGNIFICANT CHANGE UP (ref 22–31)
CREAT SERPL-MCNC: 0.41 MG/DL — LOW (ref 0.5–1.3)
CREAT SERPL-MCNC: 0.41 MG/DL — LOW (ref 0.5–1.3)
CREAT SERPL-MCNC: 0.43 MG/DL — LOW (ref 0.5–1.3)
EGFR: 109 ML/MIN/1.73M2 — SIGNIFICANT CHANGE UP
EGFR: 110 ML/MIN/1.73M2 — SIGNIFICANT CHANGE UP
EGFR: 110 ML/MIN/1.73M2 — SIGNIFICANT CHANGE UP
GAS PNL BLDA: SIGNIFICANT CHANGE UP
GAS PNL BLDV: 135 MMOL/L — LOW (ref 136–145)
GAS PNL BLDV: SIGNIFICANT CHANGE UP
GAS PNL BLDV: SIGNIFICANT CHANGE UP
GLUCOSE BLDC GLUCOMTR-MCNC: 104 MG/DL — HIGH (ref 70–99)
GLUCOSE BLDC GLUCOMTR-MCNC: 105 MG/DL — HIGH (ref 70–99)
GLUCOSE BLDC GLUCOMTR-MCNC: 111 MG/DL — HIGH (ref 70–99)
GLUCOSE BLDC GLUCOMTR-MCNC: 138 MG/DL — HIGH (ref 70–99)
GLUCOSE SERPL-MCNC: 115 MG/DL — HIGH (ref 70–99)
GLUCOSE SERPL-MCNC: 133 MG/DL — HIGH (ref 70–99)
GLUCOSE SERPL-MCNC: 147 MG/DL — HIGH (ref 70–99)
HCO3 BLDV-SCNC: 25 MMOL/L — SIGNIFICANT CHANGE UP (ref 22–29)
HCT VFR BLD CALC: 24.6 % — LOW (ref 34.5–45)
HCT VFR BLD CALC: 24.9 % — LOW (ref 34.5–45)
HCT VFR BLD CALC: 25 % — LOW (ref 34.5–45)
HGB BLD-MCNC: 8 G/DL — LOW (ref 11.5–15.5)
HGB BLD-MCNC: 8 G/DL — LOW (ref 11.5–15.5)
HGB BLD-MCNC: 8.1 G/DL — LOW (ref 11.5–15.5)
INR BLD: 1.19 — HIGH (ref 0.88–1.16)
INR BLD: 1.22 — HIGH (ref 0.88–1.16)
INR BLD: 1.22 — HIGH (ref 0.88–1.16)
LACTATE SERPL-SCNC: 0.9 MMOL/L — SIGNIFICANT CHANGE UP (ref 0.5–2)
LACTATE SERPL-SCNC: 0.9 MMOL/L — SIGNIFICANT CHANGE UP (ref 0.5–2)
MAGNESIUM SERPL-MCNC: 1.8 MG/DL — SIGNIFICANT CHANGE UP (ref 1.6–2.6)
MAGNESIUM SERPL-MCNC: 1.8 MG/DL — SIGNIFICANT CHANGE UP (ref 1.6–2.6)
MAGNESIUM SERPL-MCNC: 2.6 MG/DL — SIGNIFICANT CHANGE UP (ref 1.6–2.6)
MCHC RBC-ENTMCNC: 28.7 PG — SIGNIFICANT CHANGE UP (ref 27–34)
MCHC RBC-ENTMCNC: 28.9 PG — SIGNIFICANT CHANGE UP (ref 27–34)
MCHC RBC-ENTMCNC: 28.9 PG — SIGNIFICANT CHANGE UP (ref 27–34)
MCHC RBC-ENTMCNC: 32.1 GM/DL — SIGNIFICANT CHANGE UP (ref 32–36)
MCHC RBC-ENTMCNC: 32.4 GM/DL — SIGNIFICANT CHANGE UP (ref 32–36)
MCHC RBC-ENTMCNC: 32.5 GM/DL — SIGNIFICANT CHANGE UP (ref 32–36)
MCV RBC AUTO: 88.7 FL — SIGNIFICANT CHANGE UP (ref 80–100)
MCV RBC AUTO: 88.8 FL — SIGNIFICANT CHANGE UP (ref 80–100)
MCV RBC AUTO: 89.9 FL — SIGNIFICANT CHANGE UP (ref 80–100)
NRBC # BLD: 0 /100 WBCS — SIGNIFICANT CHANGE UP (ref 0–0)
PCO2 BLDV: 40 MMHG — SIGNIFICANT CHANGE UP (ref 39–42)
PH BLDV: 7.4 — SIGNIFICANT CHANGE UP (ref 7.32–7.43)
PHOSPHATE SERPL-MCNC: 1.8 MG/DL — LOW (ref 2.5–4.5)
PHOSPHATE SERPL-MCNC: 2 MG/DL — LOW (ref 2.5–4.5)
PHOSPHATE SERPL-MCNC: 2.3 MG/DL — LOW (ref 2.5–4.5)
PLATELET # BLD AUTO: 67 K/UL — LOW (ref 150–400)
PLATELET # BLD AUTO: 68 K/UL — LOW (ref 150–400)
PLATELET # BLD AUTO: 72 K/UL — LOW (ref 150–400)
PO2 BLDV: 40 MMHG — SIGNIFICANT CHANGE UP (ref 25–45)
POTASSIUM BLDV-SCNC: 3.3 MMOL/L — LOW (ref 3.5–5.1)
POTASSIUM SERPL-MCNC: 3.3 MMOL/L — LOW (ref 3.5–5.3)
POTASSIUM SERPL-MCNC: 3.6 MMOL/L — SIGNIFICANT CHANGE UP (ref 3.5–5.3)
POTASSIUM SERPL-MCNC: 3.7 MMOL/L — SIGNIFICANT CHANGE UP (ref 3.5–5.3)
POTASSIUM SERPL-SCNC: 3.3 MMOL/L — LOW (ref 3.5–5.3)
POTASSIUM SERPL-SCNC: 3.6 MMOL/L — SIGNIFICANT CHANGE UP (ref 3.5–5.3)
POTASSIUM SERPL-SCNC: 3.7 MMOL/L — SIGNIFICANT CHANGE UP (ref 3.5–5.3)
PROT SERPL-MCNC: 5.4 G/DL — LOW (ref 6–8.3)
PROT SERPL-MCNC: 5.7 G/DL — LOW (ref 6–8.3)
PROT SERPL-MCNC: 5.7 G/DL — LOW (ref 6–8.3)
PROTHROM AB SERPL-ACNC: 14.2 SEC — HIGH (ref 10.5–13.4)
PROTHROM AB SERPL-ACNC: 14.5 SEC — HIGH (ref 10.5–13.4)
PROTHROM AB SERPL-ACNC: 14.6 SEC — HIGH (ref 10.5–13.4)
RBC # BLD: 2.77 M/UL — LOW (ref 3.8–5.2)
RBC # BLD: 2.77 M/UL — LOW (ref 3.8–5.2)
RBC # BLD: 2.82 M/UL — LOW (ref 3.8–5.2)
RBC # FLD: 15.6 % — HIGH (ref 10.3–14.5)
RBC # FLD: 15.6 % — HIGH (ref 10.3–14.5)
RBC # FLD: 15.7 % — HIGH (ref 10.3–14.5)
SAO2 % BLDV: 70.5 % — SIGNIFICANT CHANGE UP (ref 67–88)
SODIUM SERPL-SCNC: 135 MMOL/L — SIGNIFICANT CHANGE UP (ref 135–145)
SODIUM SERPL-SCNC: 138 MMOL/L — SIGNIFICANT CHANGE UP (ref 135–145)
SODIUM SERPL-SCNC: 139 MMOL/L — SIGNIFICANT CHANGE UP (ref 135–145)
WBC # BLD: 7.63 K/UL — SIGNIFICANT CHANGE UP (ref 3.8–10.5)
WBC # BLD: 8.28 K/UL — SIGNIFICANT CHANGE UP (ref 3.8–10.5)
WBC # BLD: 8.47 K/UL — SIGNIFICANT CHANGE UP (ref 3.8–10.5)
WBC # FLD AUTO: 7.63 K/UL — SIGNIFICANT CHANGE UP (ref 3.8–10.5)
WBC # FLD AUTO: 8.28 K/UL — SIGNIFICANT CHANGE UP (ref 3.8–10.5)
WBC # FLD AUTO: 8.47 K/UL — SIGNIFICANT CHANGE UP (ref 3.8–10.5)

## 2022-12-16 PROCEDURE — 71045 X-RAY EXAM CHEST 1 VIEW: CPT | Mod: 26

## 2022-12-16 RX ORDER — POTASSIUM CHLORIDE 20 MEQ
20 PACKET (EA) ORAL ONCE
Refills: 0 | Status: COMPLETED | OUTPATIENT
Start: 2022-12-16 | End: 2022-12-16

## 2022-12-16 RX ORDER — POTASSIUM CHLORIDE 20 MEQ
40 PACKET (EA) ORAL ONCE
Refills: 0 | Status: COMPLETED | OUTPATIENT
Start: 2022-12-16 | End: 2022-12-16

## 2022-12-16 RX ORDER — POTASSIUM CHLORIDE 20 MEQ
20 PACKET (EA) ORAL
Refills: 0 | Status: DISCONTINUED | OUTPATIENT
Start: 2022-12-16 | End: 2022-12-16

## 2022-12-16 RX ORDER — SODIUM CHLORIDE 9 MG/ML
3 INJECTION INTRAMUSCULAR; INTRAVENOUS; SUBCUTANEOUS EVERY 8 HOURS
Refills: 0 | Status: DISCONTINUED | OUTPATIENT
Start: 2022-12-16 | End: 2022-12-20

## 2022-12-16 RX ORDER — MAGNESIUM SULFATE 500 MG/ML
2 VIAL (ML) INJECTION ONCE
Refills: 0 | Status: COMPLETED | OUTPATIENT
Start: 2022-12-16 | End: 2022-12-16

## 2022-12-16 RX ORDER — MAGNESIUM OXIDE 400 MG ORAL TABLET 241.3 MG
800 TABLET ORAL ONCE
Refills: 0 | Status: COMPLETED | OUTPATIENT
Start: 2022-12-16 | End: 2022-12-16

## 2022-12-16 RX ADMIN — POLYETHYLENE GLYCOL 3350 17 GRAM(S): 17 POWDER, FOR SOLUTION ORAL at 11:35

## 2022-12-16 RX ADMIN — SODIUM CHLORIDE 3 MILLILITER(S): 9 INJECTION INTRAMUSCULAR; INTRAVENOUS; SUBCUTANEOUS at 21:20

## 2022-12-16 RX ADMIN — Medication 81 MILLIGRAM(S): at 12:00

## 2022-12-16 RX ADMIN — Medication 100 MILLIEQUIVALENT(S): at 11:36

## 2022-12-16 RX ADMIN — SENNA PLUS 2 TABLET(S): 8.6 TABLET ORAL at 21:33

## 2022-12-16 RX ADMIN — OXYCODONE HYDROCHLORIDE 10 MILLIGRAM(S): 5 TABLET ORAL at 06:00

## 2022-12-16 RX ADMIN — Medication 1 DROP(S): at 18:48

## 2022-12-16 RX ADMIN — OXYCODONE HYDROCHLORIDE 10 MILLIGRAM(S): 5 TABLET ORAL at 16:00

## 2022-12-16 RX ADMIN — OXYCODONE HYDROCHLORIDE 10 MILLIGRAM(S): 5 TABLET ORAL at 06:57

## 2022-12-16 RX ADMIN — Medication 1 DROP(S): at 06:57

## 2022-12-16 RX ADMIN — OXYCODONE HYDROCHLORIDE 10 MILLIGRAM(S): 5 TABLET ORAL at 14:40

## 2022-12-16 RX ADMIN — ACETAZOLAMIDE 250 MILLIGRAM(S): 250 TABLET ORAL at 11:36

## 2022-12-16 RX ADMIN — MAGNESIUM OXIDE 400 MG ORAL TABLET 800 MILLIGRAM(S): 241.3 TABLET ORAL at 09:19

## 2022-12-16 RX ADMIN — OXYCODONE HYDROCHLORIDE 5 MILLIGRAM(S): 5 TABLET ORAL at 21:32

## 2022-12-16 RX ADMIN — Medication 62.5 MILLIMOLE(S): at 07:14

## 2022-12-16 RX ADMIN — CITALOPRAM 20 MILLIGRAM(S): 10 TABLET, FILM COATED ORAL at 21:33

## 2022-12-16 RX ADMIN — HEPARIN SODIUM 5000 UNIT(S): 5000 INJECTION INTRAVENOUS; SUBCUTANEOUS at 18:48

## 2022-12-16 RX ADMIN — OXYCODONE HYDROCHLORIDE 5 MILLIGRAM(S): 5 TABLET ORAL at 21:52

## 2022-12-16 RX ADMIN — PANTOPRAZOLE SODIUM 40 MILLIGRAM(S): 20 TABLET, DELAYED RELEASE ORAL at 06:00

## 2022-12-16 NOTE — OCCUPATIONAL THERAPY INITIAL EVALUATION ADULT - ADDITIONAL COMMENTS
Pt lives w/ her  in private house w/ 6 stairs to enter and 1 flight within. Pt states that she was independent prior to admission w/ no prior use of AEs.

## 2022-12-16 NOTE — PROGRESS NOTE ADULT - ASSESSMENT
62 F w/PMHx hyperkalemic periodic paralysis, bicuspid AV with severe AS, aortic aneurysm, ITP, splenic and portal vein thrombosis, current T11 fx admitted for pre-op optimization prior to scheduled AVR and ascending aortic replacement.    S/p Replacement, aortic valve, and ascending aorta     Post-Op RV dysfx and coagulopathy improved with inotrops and blood product transfusion  Extubated   [place] dc'd     ASSESSMENT/PLAN  HDS and in Sinus  Milrinone tapered off/ Slow  wean tonight  Cont to monitor organ perfusion parameters i.e lactate/ LFTs /UOP  Normal kidney fx/Lytes in good range, permissive mild hypok for HPP-- gently repleted with phos   Cont daily home Diamox   H&H stable, PLT down to 50 with neg AntiPF4--> CTs dc'd --med blakes with low output   Ppx: Sq hep/ PPI/HOB 45     Slow inotrop wean   OOB/IS  and mobilizing as tolerated     ATTENDING: I have personally and independently provided 30 Min of critical care services.

## 2022-12-16 NOTE — OCCUPATIONAL THERAPY INITIAL EVALUATION ADULT - GENERAL OBSERVATIONS, REHAB EVAL
Pt's RN Lauryn aware of intent to eval/tx; cleared Pt. Pt received in chair - +telemetry, heplock, 02 via NC 2L, radial A-line, TPM, sternal dressing intact, prevena vac, Nico x1. Pt w/ good affect, agreeable to OT.

## 2022-12-16 NOTE — OCCUPATIONAL THERAPY INITIAL EVALUATION ADULT - PERTINENT HX OF CURRENT PROBLEM, REHAB EVAL
62 year old female with PMHx hyperkalemic periodic paralysis (diagnosed at age 36, controlled on diamox, last flare 2 years ago), bicuspid aortic valve with severe AS, aortic aneurysm, splenic vein thrombosis, portal vein thrombosis, macular degeneration, ITP, esophageal varices GERD, basal cell carcinoma, T11 fracture (current), cataracts, presents for direct admission for pre-op optimization prior to AVR and ascending aorta replacement scheduled for 12/12/22

## 2022-12-16 NOTE — OCCUPATIONAL THERAPY INITIAL EVALUATION ADULT - MD ORDER
Calcific aortic stenosis of bicuspid valve  Aortic aneurysm   S/P Replacement, aortic valve, and ascending aorta on 12/12/22

## 2022-12-17 LAB
ANION GAP SERPL CALC-SCNC: 11 MMOL/L — SIGNIFICANT CHANGE UP (ref 5–17)
APTT BLD: 28.5 SEC — SIGNIFICANT CHANGE UP (ref 27.5–35.5)
BUN SERPL-MCNC: 11 MG/DL — SIGNIFICANT CHANGE UP (ref 7–23)
CALCIUM SERPL-MCNC: 9.2 MG/DL — SIGNIFICANT CHANGE UP (ref 8.4–10.5)
CHLORIDE SERPL-SCNC: 99 MMOL/L — SIGNIFICANT CHANGE UP (ref 96–108)
CO2 SERPL-SCNC: 25 MMOL/L — SIGNIFICANT CHANGE UP (ref 22–31)
CREAT SERPL-MCNC: 0.51 MG/DL — SIGNIFICANT CHANGE UP (ref 0.5–1.3)
EGFR: 105 ML/MIN/1.73M2 — SIGNIFICANT CHANGE UP
GLUCOSE BLDC GLUCOMTR-MCNC: 108 MG/DL — HIGH (ref 70–99)
GLUCOSE BLDC GLUCOMTR-MCNC: 113 MG/DL — HIGH (ref 70–99)
GLUCOSE SERPL-MCNC: 123 MG/DL — HIGH (ref 70–99)
HCT VFR BLD CALC: 26.6 % — LOW (ref 34.5–45)
HGB BLD-MCNC: 8.4 G/DL — LOW (ref 11.5–15.5)
INR BLD: 1.18 — HIGH (ref 0.88–1.16)
MAGNESIUM SERPL-MCNC: 2 MG/DL — SIGNIFICANT CHANGE UP (ref 1.6–2.6)
MCHC RBC-ENTMCNC: 29 PG — SIGNIFICANT CHANGE UP (ref 27–34)
MCHC RBC-ENTMCNC: 31.6 GM/DL — LOW (ref 32–36)
MCV RBC AUTO: 91.7 FL — SIGNIFICANT CHANGE UP (ref 80–100)
NRBC # BLD: 0 /100 WBCS — SIGNIFICANT CHANGE UP (ref 0–0)
PLATELET # BLD AUTO: 85 K/UL — LOW (ref 150–400)
POTASSIUM SERPL-MCNC: 3.7 MMOL/L — SIGNIFICANT CHANGE UP (ref 3.5–5.3)
POTASSIUM SERPL-SCNC: 3.7 MMOL/L — SIGNIFICANT CHANGE UP (ref 3.5–5.3)
PROTHROM AB SERPL-ACNC: 14.1 SEC — HIGH (ref 10.5–13.4)
RBC # BLD: 2.9 M/UL — LOW (ref 3.8–5.2)
RBC # FLD: 15.6 % — HIGH (ref 10.3–14.5)
SODIUM SERPL-SCNC: 135 MMOL/L — SIGNIFICANT CHANGE UP (ref 135–145)
WBC # BLD: 7.89 K/UL — SIGNIFICANT CHANGE UP (ref 3.8–10.5)
WBC # FLD AUTO: 7.89 K/UL — SIGNIFICANT CHANGE UP (ref 3.8–10.5)

## 2022-12-17 PROCEDURE — 71045 X-RAY EXAM CHEST 1 VIEW: CPT | Mod: 26

## 2022-12-17 RX ORDER — FUROSEMIDE 40 MG
20 TABLET ORAL DAILY
Refills: 0 | Status: DISCONTINUED | OUTPATIENT
Start: 2022-12-18 | End: 2022-12-20

## 2022-12-17 RX ORDER — FUROSEMIDE 40 MG
20 TABLET ORAL ONCE
Refills: 0 | Status: COMPLETED | OUTPATIENT
Start: 2022-12-17 | End: 2022-12-17

## 2022-12-17 RX ADMIN — Medication 650 MILLIGRAM(S): at 13:29

## 2022-12-17 RX ADMIN — OXYCODONE HYDROCHLORIDE 10 MILLIGRAM(S): 5 TABLET ORAL at 05:53

## 2022-12-17 RX ADMIN — SENNA PLUS 2 TABLET(S): 8.6 TABLET ORAL at 22:40

## 2022-12-17 RX ADMIN — OXYCODONE HYDROCHLORIDE 10 MILLIGRAM(S): 5 TABLET ORAL at 13:09

## 2022-12-17 RX ADMIN — CITALOPRAM 20 MILLIGRAM(S): 10 TABLET, FILM COATED ORAL at 22:39

## 2022-12-17 RX ADMIN — Medication 81 MILLIGRAM(S): at 13:09

## 2022-12-17 RX ADMIN — Medication 1 DROP(S): at 13:08

## 2022-12-17 RX ADMIN — POLYETHYLENE GLYCOL 3350 17 GRAM(S): 17 POWDER, FOR SOLUTION ORAL at 13:09

## 2022-12-17 RX ADMIN — HEPARIN SODIUM 5000 UNIT(S): 5000 INJECTION INTRAVENOUS; SUBCUTANEOUS at 17:49

## 2022-12-17 RX ADMIN — Medication 20 MILLIGRAM(S): at 13:08

## 2022-12-17 RX ADMIN — OXYCODONE HYDROCHLORIDE 10 MILLIGRAM(S): 5 TABLET ORAL at 06:23

## 2022-12-17 RX ADMIN — PANTOPRAZOLE SODIUM 40 MILLIGRAM(S): 20 TABLET, DELAYED RELEASE ORAL at 06:47

## 2022-12-17 RX ADMIN — HEPARIN SODIUM 5000 UNIT(S): 5000 INJECTION INTRAVENOUS; SUBCUTANEOUS at 05:26

## 2022-12-17 RX ADMIN — SODIUM CHLORIDE 3 MILLILITER(S): 9 INJECTION INTRAMUSCULAR; INTRAVENOUS; SUBCUTANEOUS at 22:51

## 2022-12-17 RX ADMIN — Medication 1 DROP(S): at 17:48

## 2022-12-17 RX ADMIN — CHLORHEXIDINE GLUCONATE 1 APPLICATION(S): 213 SOLUTION TOPICAL at 13:10

## 2022-12-17 RX ADMIN — Medication 1 DROP(S): at 05:27

## 2022-12-17 RX ADMIN — SODIUM CHLORIDE 3 MILLILITER(S): 9 INJECTION INTRAMUSCULAR; INTRAVENOUS; SUBCUTANEOUS at 05:25

## 2022-12-17 RX ADMIN — CHLORHEXIDINE GLUCONATE 1 APPLICATION(S): 213 SOLUTION TOPICAL at 05:26

## 2022-12-17 RX ADMIN — OXYCODONE HYDROCHLORIDE 10 MILLIGRAM(S): 5 TABLET ORAL at 14:53

## 2022-12-17 RX ADMIN — Medication 650 MILLIGRAM(S): at 14:53

## 2022-12-17 RX ADMIN — ACETAZOLAMIDE 250 MILLIGRAM(S): 250 TABLET ORAL at 13:08

## 2022-12-17 RX ADMIN — SODIUM CHLORIDE 3 MILLILITER(S): 9 INJECTION INTRAMUSCULAR; INTRAVENOUS; SUBCUTANEOUS at 13:06

## 2022-12-17 NOTE — PROGRESS NOTE ADULT - ASSESSMENT
62 F with a PMHx of hyperkalemic periodic paralysis (diagnosed at age 36, controlled on diamox, last flare 2 years ago), bicuspid aortic valve with severe AS, aortic aneurysm, splenic vein thrombosis, portal vein thrombosis, macular degeneration, ITP, esophageal varices GERD, basal cell carcinoma, T11 fracture (current), cataracts, presented initially for direct admission for pre-op optimization prior to AVR and ascending aorta replacement. On 22 pt underwent AVR ascending aorta replacement, EF normal. Intraoperative course uneventful. Arrived to CTICU on levo. CI noted to be low, started on Dobutamine and primacor with improvement in RV ejection and indices. POD1 given 1 unit pRBC, transfused with 1 unit for pRBC for low H/H. POD2 primacor decreased. POD3  decreased. Primacor weaned. CTs removed. Ambulated. POD4 transferred to floor. POD5 complaining of increased SOB. Started on Intermittent BiPaP. US of the chest revealed b/l small pleural effusions, not large enough for drainage. Gave 1 dose of lasix. Plan is for repeat POCUS tomorrow and reassess.      62 F with a PMHx of hyperkalemic periodic paralysis (diagnosed at age 36, controlled on diamox, last flare 2 years ago), bicuspid aortic valve with severe AS, aortic aneurysm, splenic vein thrombosis, portal vein thrombosis, macular degeneration, ITP, esophageal varices GERD, basal cell carcinoma, T11 fracture (current), cataracts, presented initially for direct admission for pre-op optimization prior to AVR and ascending aorta replacement. On 22 pt underwent AVR ascending aorta replacement, EF normal. Intraoperative course uneventful. Arrived to CTICU on levo. CI noted to be low, started on Dobutamine and primacor with improvement in RV ejection and indices. POD1 given 1 unit pRBC, transfused with 1 unit for pRBC for low H/H. POD2 primacor decreased. POD3  decreased. Primacor weaned. CTs removed. Ambulated. POD4 transferred to floor. POD5 complaining of increased SOB. Started on Intermittent BiPaP. US of the chest revealed b/l small pleural effusions, not large enough for drainage. Gave 1 dose of lasix. Plan is for repeat POCUS tomorrow and reassess.     Plan:    Neurovascular:   -Pain well controlled with current regimen. PRN's: acetaminophen. oxycodone  -home meds: Celebrex  -hyperkalemic periodic paralysis: diamox    Cardiovascular:   -POD5 AVR and ascending aorta replacement, EF normal     -continue with ASA     -holding off on beta blockers for SBP 90-100s, f/u when can start   -Hemodynamically stable.   -Monitor: BP, HR, tele    Respiratory:   -small b/l effusion: BiPap intermittent, IS use, frequent ambulation   -Oxygenating well on room air  -Encourage continued use of IS 10x/hr and frequent ambulation  -CXR: small b/l effusions     GI:  -hx of esophageal varices/GERD  -GI PPX: pantoprazole 40mg daily   -PO Diet: DASH/TLC   -Bowel Regimen: senna, miralax     Renal / :  -Continue to monitor renal function: BUN/Cr 11/0.51   -Monitor I/O's daily     Endocrine:    -No hx of DM or thyroid dx  -A1c: 5.2   -TSH: 2.84     Hematologic:  -hx ITP   -CBC: H/H- 8.4/26  -Coagulation Panel: WNL     ID:  -Temperature: afebrile   -CBC: WBC-7.8   -Continue to observe for SIRS/Sepsis Syndrome.    Prophylaxis:  -DVT prophylaxis with 5000 SubQ Heparin q8h.  -Continue with SCD's b/l while patient is at rest     Disposition:  -Discharge home once patient is medically ready

## 2022-12-17 NOTE — PROCEDURE NOTE - NSUS ED ADDITIONAL DETAIL1 FT
Very small, would not be able to drain. Pt improved with conservative mgt. Will repeat POCUS tomorrow

## 2022-12-17 NOTE — PROCEDURE NOTE - NSUS ED INDICATIONS1
Discharge Instructions    Low Risk CHEST PAIN:  Based on your visit today, the health care provider doesn’t know what is causing your chest pain. In most cases, people who come to the emergency department with chest pain don’t have a problem with their heart. Instead, the pain is caused by other conditions, including lungs, muscles, bones, digestive tract, nerves or mental health.     There are risk factors that can lead to heart disease in the future. There are some risk factors that cannot be changed like age, gender, family history and race. There are other risk factors that you can take measures to change them:    Manage Unhealthy cholesterol levels  Cholesterol is a fat-like substance in your blood. Know your numbers:  Good ranges: LDL (bad) <100, HDL (good) >50, Triglycerides <150    If you smoke, stop smoking  This is the most important risk factor you can change. Smoking causes inflammation and damages the smooth muscle that lines the arteries making them less flexible.   Smoking Cessation Counseling offered [place] Toll Fee Quit Line: [phone]    High blood pressure  High blood pressure raises your risk of heart attack and stroke. The brain tissue is especially sensitive to high blood pressure damage. You’re at risk if your blood pressure is 120/80 or higher.    Diabetes  Diabetes occurs when you have high levels of sugar (glucose) in your blood. This can damage arteries if not kept under control.     Excess weight  Excess weight makes other risk factors, such as diabetes, more likely. Excess weight around the waist or stomach increases your heart disease risk the most.    Lack of physical activity  When you’re not active, you’re more likely to develop diabetes, high blood pressure, high cholesterol levels, and excess weight. Exercise can improve your cholesterol, lower your blood pressure, control diabetes and reduce your risk of getting heart disease.      Good Nutrition Basics  Balance. Eat a mix  of different types of food. Make half your plate fruits and vegetables. Then fill your plate with one-quarter protein and one-quarter whole grains.  Variety. Choose a wide range of foods of different colors.  Flexibility. Find an eating plan that fits your schedule and tastes.  Moderation. Avoid too much of any one food.  Read food labels. (For more information on reading food labels visit www.heart.org or call 1-687.523.2170)    Medications:  See Medication List (keep list up to date and bring it to your doctor appointments)     Call 911 if any of these occur:   Chest discomfort in the center of the chest that lasts more than a few minutes, or that goes away and comes back. It can feel like uncomfortable pressure, squeezing, burning, fullness, tightness, or pain.   Discomfort in other areas of the upper body. Symptoms can include pain or discomfort in one or both arms, the back, neck, jaw or stomach.  Shortness of breath with or without chest discomfort.  Other signs may include breaking out in a cold sweat, nausea, or lightheadedness.  Symptoms of a heart attack can be different in women, diabetics or the elderly.     Dyspnea

## 2022-12-18 LAB
ANION GAP SERPL CALC-SCNC: 8 MMOL/L — SIGNIFICANT CHANGE UP (ref 5–17)
APTT BLD: 26.6 SEC — LOW (ref 27.5–35.5)
BUN SERPL-MCNC: 14 MG/DL — SIGNIFICANT CHANGE UP (ref 7–23)
CALCIUM SERPL-MCNC: 9.2 MG/DL — SIGNIFICANT CHANGE UP (ref 8.4–10.5)
CHLORIDE SERPL-SCNC: 100 MMOL/L — SIGNIFICANT CHANGE UP (ref 96–108)
CO2 SERPL-SCNC: 28 MMOL/L — SIGNIFICANT CHANGE UP (ref 22–31)
CREAT SERPL-MCNC: 0.56 MG/DL — SIGNIFICANT CHANGE UP (ref 0.5–1.3)
EGFR: 102 ML/MIN/1.73M2 — SIGNIFICANT CHANGE UP
GLUCOSE SERPL-MCNC: 111 MG/DL — HIGH (ref 70–99)
HCT VFR BLD CALC: 24.4 % — LOW (ref 34.5–45)
HCT VFR BLD CALC: 24.7 % — LOW (ref 34.5–45)
HGB BLD-MCNC: 7.9 G/DL — LOW (ref 11.5–15.5)
HGB BLD-MCNC: 8 G/DL — LOW (ref 11.5–15.5)
INR BLD: 1.17 — HIGH (ref 0.88–1.16)
MAGNESIUM SERPL-MCNC: 1.8 MG/DL — SIGNIFICANT CHANGE UP (ref 1.6–2.6)
MCHC RBC-ENTMCNC: 29.3 PG — SIGNIFICANT CHANGE UP (ref 27–34)
MCHC RBC-ENTMCNC: 29.4 PG — SIGNIFICANT CHANGE UP (ref 27–34)
MCHC RBC-ENTMCNC: 32.4 GM/DL — SIGNIFICANT CHANGE UP (ref 32–36)
MCHC RBC-ENTMCNC: 32.4 GM/DL — SIGNIFICANT CHANGE UP (ref 32–36)
MCV RBC AUTO: 90.5 FL — SIGNIFICANT CHANGE UP (ref 80–100)
MCV RBC AUTO: 90.7 FL — SIGNIFICANT CHANGE UP (ref 80–100)
NRBC # BLD: 0 /100 WBCS — SIGNIFICANT CHANGE UP (ref 0–0)
NRBC # BLD: 0 /100 WBCS — SIGNIFICANT CHANGE UP (ref 0–0)
PLATELET # BLD AUTO: 83 K/UL — LOW (ref 150–400)
PLATELET # BLD AUTO: 86 K/UL — LOW (ref 150–400)
POTASSIUM SERPL-MCNC: 3.6 MMOL/L — SIGNIFICANT CHANGE UP (ref 3.5–5.3)
POTASSIUM SERPL-SCNC: 3.6 MMOL/L — SIGNIFICANT CHANGE UP (ref 3.5–5.3)
PROTHROM AB SERPL-ACNC: 14 SEC — HIGH (ref 10.5–13.4)
RBC # BLD: 2.69 M/UL — LOW (ref 3.8–5.2)
RBC # BLD: 2.73 M/UL — LOW (ref 3.8–5.2)
RBC # FLD: 15.8 % — HIGH (ref 10.3–14.5)
RBC # FLD: 15.9 % — HIGH (ref 10.3–14.5)
SODIUM SERPL-SCNC: 136 MMOL/L — SIGNIFICANT CHANGE UP (ref 135–145)
WBC # BLD: 6.91 K/UL — SIGNIFICANT CHANGE UP (ref 3.8–10.5)
WBC # BLD: 8.39 K/UL — SIGNIFICANT CHANGE UP (ref 3.8–10.5)
WBC # FLD AUTO: 6.91 K/UL — SIGNIFICANT CHANGE UP (ref 3.8–10.5)
WBC # FLD AUTO: 8.39 K/UL — SIGNIFICANT CHANGE UP (ref 3.8–10.5)

## 2022-12-18 PROCEDURE — 71045 X-RAY EXAM CHEST 1 VIEW: CPT | Mod: 26,77

## 2022-12-18 PROCEDURE — 32557 INSERT CATH PLEURA W/ IMAGE: CPT | Mod: RT

## 2022-12-18 PROCEDURE — 71045 X-RAY EXAM CHEST 1 VIEW: CPT | Mod: 26

## 2022-12-18 RX ORDER — HYDROMORPHONE HYDROCHLORIDE 2 MG/ML
0.25 INJECTION INTRAMUSCULAR; INTRAVENOUS; SUBCUTANEOUS ONCE
Refills: 0 | Status: DISCONTINUED | OUTPATIENT
Start: 2022-12-18 | End: 2022-12-18

## 2022-12-18 RX ORDER — FUROSEMIDE 40 MG
20 TABLET ORAL ONCE
Refills: 0 | Status: COMPLETED | OUTPATIENT
Start: 2022-12-18 | End: 2022-12-18

## 2022-12-18 RX ADMIN — HEPARIN SODIUM 5000 UNIT(S): 5000 INJECTION INTRAVENOUS; SUBCUTANEOUS at 17:29

## 2022-12-18 RX ADMIN — HYDROMORPHONE HYDROCHLORIDE 0.25 MILLIGRAM(S): 2 INJECTION INTRAMUSCULAR; INTRAVENOUS; SUBCUTANEOUS at 12:43

## 2022-12-18 RX ADMIN — OXYCODONE HYDROCHLORIDE 5 MILLIGRAM(S): 5 TABLET ORAL at 22:45

## 2022-12-18 RX ADMIN — OXYCODONE HYDROCHLORIDE 5 MILLIGRAM(S): 5 TABLET ORAL at 23:45

## 2022-12-18 RX ADMIN — OXYCODONE HYDROCHLORIDE 10 MILLIGRAM(S): 5 TABLET ORAL at 06:15

## 2022-12-18 RX ADMIN — Medication 1 DROP(S): at 06:11

## 2022-12-18 RX ADMIN — HYDROMORPHONE HYDROCHLORIDE 0.25 MILLIGRAM(S): 2 INJECTION INTRAMUSCULAR; INTRAVENOUS; SUBCUTANEOUS at 16:46

## 2022-12-18 RX ADMIN — Medication 20 MILLIGRAM(S): at 16:49

## 2022-12-18 RX ADMIN — HEPARIN SODIUM 5000 UNIT(S): 5000 INJECTION INTRAVENOUS; SUBCUTANEOUS at 06:07

## 2022-12-18 RX ADMIN — Medication 1 DROP(S): at 23:16

## 2022-12-18 RX ADMIN — Medication 650 MILLIGRAM(S): at 14:01

## 2022-12-18 RX ADMIN — Medication 20 MILLIGRAM(S): at 06:07

## 2022-12-18 RX ADMIN — OXYCODONE HYDROCHLORIDE 10 MILLIGRAM(S): 5 TABLET ORAL at 14:01

## 2022-12-18 RX ADMIN — HYDROMORPHONE HYDROCHLORIDE 0.25 MILLIGRAM(S): 2 INJECTION INTRAMUSCULAR; INTRAVENOUS; SUBCUTANEOUS at 17:27

## 2022-12-18 RX ADMIN — SENNA PLUS 2 TABLET(S): 8.6 TABLET ORAL at 22:15

## 2022-12-18 RX ADMIN — OXYCODONE HYDROCHLORIDE 10 MILLIGRAM(S): 5 TABLET ORAL at 15:35

## 2022-12-18 RX ADMIN — Medication 1 DROP(S): at 17:29

## 2022-12-18 RX ADMIN — CITALOPRAM 20 MILLIGRAM(S): 10 TABLET, FILM COATED ORAL at 21:45

## 2022-12-18 RX ADMIN — HYDROMORPHONE HYDROCHLORIDE 0.25 MILLIGRAM(S): 2 INJECTION INTRAMUSCULAR; INTRAVENOUS; SUBCUTANEOUS at 15:34

## 2022-12-18 RX ADMIN — SODIUM CHLORIDE 3 MILLILITER(S): 9 INJECTION INTRAMUSCULAR; INTRAVENOUS; SUBCUTANEOUS at 06:12

## 2022-12-18 RX ADMIN — Medication 81 MILLIGRAM(S): at 15:16

## 2022-12-18 RX ADMIN — POLYETHYLENE GLYCOL 3350 17 GRAM(S): 17 POWDER, FOR SOLUTION ORAL at 15:14

## 2022-12-18 RX ADMIN — SODIUM CHLORIDE 3 MILLILITER(S): 9 INJECTION INTRAMUSCULAR; INTRAVENOUS; SUBCUTANEOUS at 15:35

## 2022-12-18 RX ADMIN — SODIUM CHLORIDE 3 MILLILITER(S): 9 INJECTION INTRAMUSCULAR; INTRAVENOUS; SUBCUTANEOUS at 21:08

## 2022-12-18 RX ADMIN — Medication 650 MILLIGRAM(S): at 15:35

## 2022-12-18 RX ADMIN — PANTOPRAZOLE SODIUM 40 MILLIGRAM(S): 20 TABLET, DELAYED RELEASE ORAL at 06:07

## 2022-12-18 RX ADMIN — Medication 1 DROP(S): at 15:13

## 2022-12-18 RX ADMIN — ACETAZOLAMIDE 250 MILLIGRAM(S): 250 TABLET ORAL at 15:13

## 2022-12-18 NOTE — PROGRESS NOTE ADULT - ASSESSMENT
62 F with a PMHx of hyperkalemic periodic paralysis (diagnosed at age 36, controlled on diamox, last flare 2 years ago), bicuspid aortic valve with severe AS, aortic aneurysm, splenic vein thrombosis, portal vein thrombosis, macular degeneration, ITP, esophageal varices GERD, basal cell carcinoma, T11 fracture (current), cataracts, presented initially for direct admission for pre-op optimization prior to AVR and ascending aorta replacement. On 22 pt underwent AVR ascending aorta replacement, EF normal. Intraoperative course uneventful. Arrived to CTICU on levo. CI noted to be low, started on Dobutamine and primacor with improvement in RV ejection and indices. POD1 given 1 unit pRBC, transfused with 1 unit for pRBC for low H/H. POD2 primacor decreased. POD3  decreased. Primacor weaned. CTs removed. Ambulated. POD4 transferred to floor. POD5 complaining of increased SOB. Started on Intermittent BiPaP. US of the chest revealed b/l small pleural effusions, not large enough for drainage. Gave 1 dose of lasix. Plan is for repeat POCUS tomorrow and reassess.     Plan:    Neurovascular:   -Pain well controlled with current regimen. PRN's: acetaminophen. oxycodone  -home meds: Celebrex  -hyperkalemic periodic paralysis: diamox    Cardiovascular:   -POD5 AVR and ascending aorta replacement, EF normal     -continue with ASA     -holding off on beta blockers for SBP 90-100s, f/u when can start   -Hemodynamically stable.   -Monitor: BP, HR, tele    Respiratory:   -small b/l effusion: BiPap intermittent, IS use, frequent ambulation   -Oxygenating well on room air  -Encourage continued use of IS 10x/hr and frequent ambulation  -CXR: small b/l effusions     GI:  -hx of esophageal varices/GERD  -GI PPX: pantoprazole 40mg daily   -PO Diet: DASH/TLC   -Bowel Regimen: senna, miralax     Renal / :  -Continue to monitor renal function: BUN/Cr 11/0.51   -Monitor I/O's daily     Endocrine:    -No hx of DM or thyroid dx  -A1c: 5.2   -TSH: 2.84     Hematologic:  -hx ITP   -CBC: H/H- 8.4/26  -Coagulation Panel: WNL     ID:  -Temperature: afebrile   -CBC: WBC-7.8   -Continue to observe for SIRS/Sepsis Syndrome.    Prophylaxis:  -DVT prophylaxis with 5000 SubQ Heparin q8h.  -Continue with SCD's b/l while patient is at rest     Disposition:  -Discharge home once patient is medically ready

## 2022-12-19 PROBLEM — I35.0 NONRHEUMATIC AORTIC (VALVE) STENOSIS: Chronic | Status: ACTIVE | Noted: 2022-12-11

## 2022-12-19 PROBLEM — Z86.79 PERSONAL HISTORY OF OTHER DISEASES OF THE CIRCULATORY SYSTEM: Chronic | Status: ACTIVE | Noted: 2022-12-11

## 2022-12-19 PROBLEM — Z86.69 PERSONAL HISTORY OF OTHER DISEASES OF THE NERVOUS SYSTEM AND SENSE ORGANS: Chronic | Status: ACTIVE | Noted: 2022-12-11

## 2022-12-19 PROBLEM — Z86.2 PERSONAL HISTORY OF DISEASES OF THE BLOOD AND BLOOD-FORMING ORGANS AND CERTAIN DISORDERS INVOLVING THE IMMUNE MECHANISM: Chronic | Status: ACTIVE | Noted: 2022-12-11

## 2022-12-19 PROBLEM — Q23.1 CONGENITAL INSUFFICIENCY OF AORTIC VALVE: Chronic | Status: ACTIVE | Noted: 2022-12-11

## 2022-12-19 PROBLEM — G72.3 PERIODIC PARALYSIS: Chronic | Status: ACTIVE | Noted: 2022-12-11

## 2022-12-19 LAB
ANION GAP SERPL CALC-SCNC: 9 MMOL/L — SIGNIFICANT CHANGE UP (ref 5–17)
APTT BLD: 25.8 SEC — LOW (ref 27.5–35.5)
BUN SERPL-MCNC: 11 MG/DL — SIGNIFICANT CHANGE UP (ref 7–23)
CALCIUM SERPL-MCNC: 9.1 MG/DL — SIGNIFICANT CHANGE UP (ref 8.4–10.5)
CHLORIDE SERPL-SCNC: 99 MMOL/L — SIGNIFICANT CHANGE UP (ref 96–108)
CO2 SERPL-SCNC: 27 MMOL/L — SIGNIFICANT CHANGE UP (ref 22–31)
CREAT SERPL-MCNC: 0.52 MG/DL — SIGNIFICANT CHANGE UP (ref 0.5–1.3)
EGFR: 104 ML/MIN/1.73M2 — SIGNIFICANT CHANGE UP
GLUCOSE SERPL-MCNC: 114 MG/DL — HIGH (ref 70–99)
HCT VFR BLD CALC: 24.4 % — LOW (ref 34.5–45)
HGB BLD-MCNC: 7.7 G/DL — LOW (ref 11.5–15.5)
INR BLD: 1.2 — HIGH (ref 0.88–1.16)
MAGNESIUM SERPL-MCNC: 1.8 MG/DL — SIGNIFICANT CHANGE UP (ref 1.6–2.6)
MCHC RBC-ENTMCNC: 28.9 PG — SIGNIFICANT CHANGE UP (ref 27–34)
MCHC RBC-ENTMCNC: 31.6 GM/DL — LOW (ref 32–36)
MCV RBC AUTO: 91.7 FL — SIGNIFICANT CHANGE UP (ref 80–100)
NRBC # BLD: 0 /100 WBCS — SIGNIFICANT CHANGE UP (ref 0–0)
PLATELET # BLD AUTO: 96 K/UL — LOW (ref 150–400)
POTASSIUM SERPL-MCNC: 3.4 MMOL/L — LOW (ref 3.5–5.3)
POTASSIUM SERPL-SCNC: 3.4 MMOL/L — LOW (ref 3.5–5.3)
PROTHROM AB SERPL-ACNC: 14.3 SEC — HIGH (ref 10.5–13.4)
RBC # BLD: 2.66 M/UL — LOW (ref 3.8–5.2)
RBC # FLD: 15.8 % — HIGH (ref 10.3–14.5)
SODIUM SERPL-SCNC: 135 MMOL/L — SIGNIFICANT CHANGE UP (ref 135–145)
WBC # BLD: 7.76 K/UL — SIGNIFICANT CHANGE UP (ref 3.8–10.5)
WBC # FLD AUTO: 7.76 K/UL — SIGNIFICANT CHANGE UP (ref 3.8–10.5)

## 2022-12-19 PROCEDURE — 93308 TTE F-UP OR LMTD: CPT | Mod: 26

## 2022-12-19 PROCEDURE — 71045 X-RAY EXAM CHEST 1 VIEW: CPT | Mod: 26

## 2022-12-19 PROCEDURE — 71045 X-RAY EXAM CHEST 1 VIEW: CPT | Mod: 26,77

## 2022-12-19 PROCEDURE — 99232 SBSQ HOSP IP/OBS MODERATE 35: CPT

## 2022-12-19 RX ORDER — HYDROMORPHONE HYDROCHLORIDE 2 MG/ML
0.2 INJECTION INTRAMUSCULAR; INTRAVENOUS; SUBCUTANEOUS ONCE
Refills: 0 | Status: DISCONTINUED | OUTPATIENT
Start: 2022-12-19 | End: 2022-12-19

## 2022-12-19 RX ORDER — MAGNESIUM OXIDE 400 MG ORAL TABLET 241.3 MG
800 TABLET ORAL ONCE
Refills: 0 | Status: COMPLETED | OUTPATIENT
Start: 2022-12-19 | End: 2022-12-19

## 2022-12-19 RX ADMIN — SENNA PLUS 2 TABLET(S): 8.6 TABLET ORAL at 21:41

## 2022-12-19 RX ADMIN — Medication 650 MILLIGRAM(S): at 08:50

## 2022-12-19 RX ADMIN — Medication 20 MILLIGRAM(S): at 06:23

## 2022-12-19 RX ADMIN — OXYCODONE HYDROCHLORIDE 10 MILLIGRAM(S): 5 TABLET ORAL at 16:02

## 2022-12-19 RX ADMIN — HYDROMORPHONE HYDROCHLORIDE 0.2 MILLIGRAM(S): 2 INJECTION INTRAMUSCULAR; INTRAVENOUS; SUBCUTANEOUS at 11:45

## 2022-12-19 RX ADMIN — PANTOPRAZOLE SODIUM 40 MILLIGRAM(S): 20 TABLET, DELAYED RELEASE ORAL at 06:22

## 2022-12-19 RX ADMIN — CITALOPRAM 20 MILLIGRAM(S): 10 TABLET, FILM COATED ORAL at 21:42

## 2022-12-19 RX ADMIN — HEPARIN SODIUM 5000 UNIT(S): 5000 INJECTION INTRAVENOUS; SUBCUTANEOUS at 17:35

## 2022-12-19 RX ADMIN — SODIUM CHLORIDE 3 MILLILITER(S): 9 INJECTION INTRAMUSCULAR; INTRAVENOUS; SUBCUTANEOUS at 14:05

## 2022-12-19 RX ADMIN — HEPARIN SODIUM 5000 UNIT(S): 5000 INJECTION INTRAVENOUS; SUBCUTANEOUS at 06:22

## 2022-12-19 RX ADMIN — OXYCODONE HYDROCHLORIDE 10 MILLIGRAM(S): 5 TABLET ORAL at 07:30

## 2022-12-19 RX ADMIN — HYDROMORPHONE HYDROCHLORIDE 0.2 MILLIGRAM(S): 2 INJECTION INTRAMUSCULAR; INTRAVENOUS; SUBCUTANEOUS at 11:19

## 2022-12-19 RX ADMIN — OXYCODONE HYDROCHLORIDE 5 MILLIGRAM(S): 5 TABLET ORAL at 21:42

## 2022-12-19 RX ADMIN — POLYETHYLENE GLYCOL 3350 17 GRAM(S): 17 POWDER, FOR SOLUTION ORAL at 11:34

## 2022-12-19 RX ADMIN — Medication 1 DROP(S): at 06:19

## 2022-12-19 RX ADMIN — MAGNESIUM OXIDE 400 MG ORAL TABLET 800 MILLIGRAM(S): 241.3 TABLET ORAL at 11:33

## 2022-12-19 RX ADMIN — SODIUM CHLORIDE 3 MILLILITER(S): 9 INJECTION INTRAMUSCULAR; INTRAVENOUS; SUBCUTANEOUS at 22:36

## 2022-12-19 RX ADMIN — OXYCODONE HYDROCHLORIDE 10 MILLIGRAM(S): 5 TABLET ORAL at 08:45

## 2022-12-19 RX ADMIN — SODIUM CHLORIDE 3 MILLILITER(S): 9 INJECTION INTRAMUSCULAR; INTRAVENOUS; SUBCUTANEOUS at 06:23

## 2022-12-19 RX ADMIN — Medication 81 MILLIGRAM(S): at 11:35

## 2022-12-19 RX ADMIN — ACETAZOLAMIDE 250 MILLIGRAM(S): 250 TABLET ORAL at 11:34

## 2022-12-19 RX ADMIN — Medication 650 MILLIGRAM(S): at 09:25

## 2022-12-19 NOTE — PROGRESS NOTE ADULT - ASSESSMENT
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 Neurology was consulted to follow up given the h/o hyperkalemic periodic paralysis as pt would need K load for the procedure. she was extubated safely w/o exacerbation of her Hyperkalemic PP. Her K levels has been maintained on the lower side of normal w/ Furosemide and Acetazolamide. Now off Furosemide.       Recommendation  Continue daily neurologic assessment   Cont daily BMP to monitor K levels  Cont daily EKG to monitor QTc and T-wave    Neurology will sing off

## 2022-12-19 NOTE — PROGRESS NOTE ADULT - SUBJECTIVE AND OBJECTIVE BOX
BILATERAL SCREENING MAMMOGRAM:

 

HISTORY: 

A 59-year-old female for screening mammography.

 

COMPARISON: 

8/22/16, 5/16/14, 3/19/13.

 

FINDINGS: 

Bilateral MLO and CC views of the breasts show scattered fibroglandular breast tissue.  There is a s
mall benign-appearing calcification in the left breast.  There is no evidence of suspicious mass, epperson
spicious clustered microcalcifications, or area of architectural distortion.  

 

Interpretation of this mammogram was performed with the assistance of computer-aided detection.

 

IMPRESSION: 

BI-RADS category 2 - benign findings.  Annual screening mammography is recommended.  

 

POS: [facility]
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        Patient is a 62y old  Female who presents with a chief complaint of Pre-op optimization for AVR ascending aorta replacement (14 Dec 2022 22:33)      14 system review limited by mentation and multiorgan morbidity     Vital signs, hemodynamic and respiratory parameters were reviewed from the bedside nursing flowsheet.  ICU Vital Signs Last 24 Hrs  T(C): 36.4 (15 Dec 2022 05:09), Max: 37.8 (14 Dec 2022 09:20)  T(F): 97.5 (15 Dec 2022 05:09), Max: 100 (14 Dec 2022 09:20)  HR: 85 (15 Dec 2022 07:00) (67 - 86)  BP: --  BP(mean): --  ABP: 147/63 (15 Dec 2022 07:00) (99/52 - 150/64)  ABP(mean): 90 (15 Dec 2022 07:00) (69 - 91)  RR: 19 (15 Dec 2022 07:00) (15 - 20)  SpO2: 98% (15 Dec 2022 07:00) (93% - 100%)    O2 Parameters below as of 15 Dec 2022 07:00  Patient On (Oxygen Delivery Method): nasal cannula w/ humidification  O2 Flow (L/min): 2        Adult Advanced Hemodynamics Last 24 Hrs  CVP(mm Hg): 25 (15 Dec 2022 07:00) (8 - 25)  CVP(cm H2O): --  CO: 3.4 (14 Dec 2022 09:00) (3.4 - 3.4)  CI: 2.2 (14 Dec 2022 09:00) (2.2 - 2.2)  PA: 20/9 (14 Dec 2022 10:00) (20/9 - 24/11)  PA(mean): 13 (14 Dec 2022 10:00) (13 - 17)  PCWP: --  SVR: 1410 (14 Dec 2022 09:00) (1410 - 1410)  SVRI: 2221 (14 Dec 2022 09:00) (2221 - 2221)  PVR: --  PVRI: --, ABG - ( 15 Dec 2022 02:40 )  pH, Arterial: 7.44  pH, Blood: x     /  pCO2: 38    /  pO2: 149   / HCO3: 26    / Base Excess: 1.7   /  SaO2: 99.3                Intake and output was reviewed and the fluid balance was calculated  Daily     Daily   I&O's Summary    14 Dec 2022 07:01  -  15 Dec 2022 07:00  --------------------------------------------------------  IN: 1836.8 mL / OUT: 3229 mL / NET: -1392.2 mL        All lines and drain sites were assessed  [facility]      POCT Blood Glucose.: 128 mg/dL (14 Dec 2022 21:51)    No acute change in focality  Auscultation of the chest reveals equal bs  Abdomen is soft  Extremities are warm and well perfused  Wounds appear clean and unremarkable  Antibiotics are periop    labs  CBC Full  -  ( 15 Dec 2022 02:45 )  WBC Count : 8.97 K/uL  RBC Count : 2.82 M/uL  Hemoglobin : 8.1 g/dL  Hematocrit : 25.3 %  Platelet Count - Automated : 59 K/uL  Mean Cell Volume : 89.7 fl  Mean Cell Hemoglobin : 28.7 pg  Mean Cell Hemoglobin Concentration : 32.0 gm/dL  Auto Neutrophil # : x  Auto Lymphocyte # : x  Auto Monocyte # : x  Auto Eosinophil # : x  Auto Basophil # : x  Auto Neutrophil % : x  Auto Lymphocyte % : x  Auto Monocyte % : x  Auto Eosinophil % : x  Auto Basophil % : x    12-15    139  |  103  |  13  ----------------------------<  141<H>  3.5   |  28  |  0.47<L>    Ca    8.6      15 Dec 2022 02:45  Phos  1.7     12-15  Mg     2.2     12-15    TPro  5.9<L>  /  Alb  4.1  /  TBili  0.8  /  DBili  x   /  AST  39  /  ALT  18  /  AlkPhos  60  12-15    PT/INR - ( 15 Dec 2022 02:45 )   PT: 14.1 sec;   INR: 1.18          PTT - ( 15 Dec 2022 02:45 )  PTT:32.8 sec  The current medications were reviewed   MEDICATIONS  (STANDING):  acetaZOLAMIDE    Tablet 250 milliGRAM(s) Oral daily  albumin human  5% IVPB 500 milliLiter(s) IV Intermittent every 30 minutes  artificial tears (preservative free) Ophthalmic Solution 1 Drop(s) Both EYES every 6 hours  aspirin  chewable 81 milliGRAM(s) Enteral Tube daily  chlorhexidine 2% Cloths 1 Application(s) Topical daily  citalopram 20 milliGRAM(s) Oral daily  dextrose 5%. 1000 milliLiter(s) (50 mL/Hr) IV Continuous <Continuous>  dextrose 5%. 1000 milliLiter(s) (100 mL/Hr) IV Continuous <Continuous>  dextrose 50% Injectable 50 milliLiter(s) IV Push every 15 minutes  dextrose 50% Injectable 25 milliLiter(s) IV Push every 15 minutes  DOBUTamine Infusion 2 MICROgram(s)/kG/Min (3.47 mL/Hr) IV Continuous <Continuous>  glucagon  Injectable 1 milliGRAM(s) IntraMuscular once  heparin   Injectable 5000 Unit(s) SubCutaneous every 12 hours  insulin lispro (ADMELOG) corrective regimen sliding scale   SubCutaneous Before meals and at bedtime  milrinone Infusion 0.375 MICROgram(s)/kG/Min (6.5 mL/Hr) IV Continuous <Continuous>  pantoprazole    Tablet 40 milliGRAM(s) Oral before breakfast  polyethylene glycol 3350 17 Gram(s) Oral daily  senna 2 Tablet(s) Oral at bedtime  sodium chloride 0.9%. 1000 milliLiter(s) (10 mL/Hr) IV Continuous <Continuous>    MEDICATIONS  (PRN):  acetaminophen     Tablet .. 650 milliGRAM(s) Oral every 6 hours PRN Temp greater or equal to 38C (100.4F), Mild Pain (1 - 3)  dextrose Oral Gel 15 Gram(s) Oral once PRN Blood Glucose LESS THAN 70 milliGRAM(s)/deciliter  oxyCODONE    IR 5 milliGRAM(s) Oral every 6 hours PRN Moderate Pain (4 - 6)  oxyCODONE    IR 10 milliGRAM(s) Oral every 6 hours PRN Severe Pain (7 - 10)       PROBLEM LIST/ ASSESSMENT:  HEALTH ISSUES - PROBLEM Dx:      ,   Patient is a 62y old  Female who presents with a chief complaint of Pre-op optimization for AVR ascending aorta replacement (14 Dec 2022 22:33)     s/p cardiac surgery                My plan includes :  close hemodynamic, ventilatory and drain monitoring and management per post op routine    Monitor for arrhythmias and monitor parameters for organ perfusion  beta blockade not administered due to hemodynamic instability and bradycardia  monitor neurologic status  Head of the bed should remain elevated to 45 deg .   chest PT and IS will be encouraged  monitor adequacy of oxygenation and ventilation and attempt to wean oxygen  antibiotic regimen will be tailored to the clinical, laboratory and microbiologic data  Nutritional goals will be met using po eventually , ensure adequate caloric intake and montior the same  Stress ulcer and VTE prophylaxis will be achieved    Glycemic control is satisfactory  Electrolytes have been repleted as necessary and wound care has been carried out. Pain control has been achieved.   agressive physical therapy and early mobility and ambulation goals will be met   The family was updated about the course and plan  CRITICAL CARE TIME personally provided by me  in evaluation and management, reassessments, review and interpretation of labs and x-rays, ventilator and hemodynamic management, formulating a plan and coordinating care: ___90____ MIN.  Time does not include procedural time. Time spent was non routine post-operarive caRE and included multiple and repeated evaluations at the bedside  CTICU ATTENDING     					    Tristan Sommer MD                        	
INTERVAL COURSE   resumed at 2 for low UOP, remained on 0.25 of milrinone   Lactate and LFTs remain normal/ PLT down to 52, Anti Pf4 Neg   Ambulated well   Received lasix for low UOP overnight with brisk response, K gently repleted     VITALS  Vital Signs Last 24 Hrs  T(C): 37.2 (14 Dec 2022 22:37), Max: 37.8 (14 Dec 2022 09:20)  T(F): 98.9 (14 Dec 2022 22:37), Max: 100 (14 Dec 2022 09:20)  HR: 80 (15 Dec 2022 00:00) (67 - 86)  BP: 142/65  BP(mean): 90  RR: 17 (15 Dec 2022 00:00) (15 - 20)  SpO2: 100% (15 Dec 2022 00:00) (93% - 100%)    Parameters below as of 15 Dec 2022 01:00  Patient On (Oxygen Delivery Method): nasal cannula, high flow    I&O's Summary  14 Dec 2022 07:01  -  15 Dec 2022 00:39  --------------------------------------------------------  IN: 1211.9 mL / OUT: 2379 mL / NET: -1167.1 mL    PHYSICAL EXAM  General: A&Ox 3; NAD  Respiratory: CTA B/L; no wheezes/crackles  Cardiovascular: Regular rhythm/rate  Gastrointestinal: Soft; NTND   Extremities: WWP; no edema   Neurological:  CNII-XII grossly intact; no obvious focal deficits    MEDICATIONS  (STANDING):  acetaZOLAMIDE    Tablet 250 milliGRAM(s) Oral daily  albumin human  5% IVPB 500 milliLiter(s) IV Intermittent every 30 minutes  artificial tears (preservative free) Ophthalmic Solution 1 Drop(s) Both EYES every 6 hours  aspirin  chewable 81 milliGRAM(s) Enteral Tube daily  chlorhexidine 2% Cloths 1 Application(s) Topical daily  citalopram 20 milliGRAM(s) Oral daily  dextrose 5%. 1000 milliLiter(s) (50 mL/Hr) IV Continuous <Continuous>  dextrose 5%. 1000 milliLiter(s) (100 mL/Hr) IV Continuous <Continuous>  dextrose 50% Injectable 50 milliLiter(s) IV Push every 15 minutes  dextrose 50% Injectable 25 milliLiter(s) IV Push every 15 minutes  DOBUTamine Infusion 2 MICROgram(s)/kG/Min (3.47 mL/Hr) IV Continuous <Continuous>  glucagon  Injectable 1 milliGRAM(s) IntraMuscular once  heparin   Injectable 5000 Unit(s) SubCutaneous every 12 hours  insulin lispro (ADMELOG) corrective regimen sliding scale   SubCutaneous Before meals and at bedtime  milrinone Infusion 0.375 MICROgram(s)/kG/Min (6.5 mL/Hr) IV Continuous <Continuous>  pantoprazole    Tablet 40 milliGRAM(s) Oral before breakfast  polyethylene glycol 3350 17 Gram(s) Oral daily  senna 2 Tablet(s) Oral at bedtime  sodium chloride 0.9%. 1000 milliLiter(s) (10 mL/Hr) IV Continuous <Continuous>    MEDICATIONS  (PRN):  acetaminophen     Tablet .. 650 milliGRAM(s) Oral every 6 hours PRN Temp greater or equal to 38C (100.4F), Mild Pain (1 - 3)  dextrose Oral Gel 15 Gram(s) Oral once PRN Blood Glucose LESS THAN 70 milliGRAM(s)/deciliter  oxyCODONE    IR 5 milliGRAM(s) Oral every 6 hours PRN Moderate Pain (4 - 6)  oxyCODONE    IR 10 milliGRAM(s) Oral every 6 hours PRN Severe Pain (7 - 10)      LABS                        8.3    10.36 )-----------( 59       ( 14 Dec 2022 22:21 )             25.8     12-14    139  |  104  |  14  ----------------------------<  145<H>  3.4<L>   |  25  |  0.49<L>    Ca    8.5      14 Dec 2022 22:21  Phos  1.4     12-14  Mg     2.2     12-    TPro  5.9<L>  /  Alb  4.0  /  TBili  0.8  /  DBili  x   /  AST  42<H>  /  ALT  19  /  AlkPhos  55  12-14    LIVER FUNCTIONS - ( 14 Dec 2022 22:21 )  Alb: 4.0 g/dL / Pro: 5.9 g/dL / ALK PHOS: 55 U/L / ALT: 19 U/L / AST: 42 U/L / GGT: x           PT/INR - ( 14 Dec 2022 22:21 )   PT: 14.0 sec;   INR: 1.17          PTT - ( 14 Dec 2022 22:21 )  PTT:35.8 sec      IMAGING/EKG/ETC  Reviewed.   
INTERVAL COURSE  POD # 3 AVR/Asc replacement    at 2, ambulated few times   FB -500/ autodiuresing   Lactate, normal, Central venous ~ 70%  AST in 70s, but rest of LFTs in normal range       VITALS  Vital Signs Last 24 Hrs  T(C): 36.3 (15 Dec 2022 22:48), Max: 37.3 (15 Dec 2022 17:12)  T(F): 97.3 (15 Dec 2022 22:48), Max: 99.2 (15 Dec 2022 17:12)  HR: 84 (15 Dec 2022 23:00) (76 - 98)  BP: 125/63  BP(mean): 84  RR: 18 (15 Dec 2022 23:00) (15 - 19)  SpO2: 96% (15 Dec 2022 23:00) (95% - 100%)    Parameters below as of 15 Dec 2022 23:00  Patient On (Oxygen Delivery Method): nasal cannula w/ humidification  O2 Flow (L/min): 2      I&O's Summary  15 Dec 2022 07:01  -  16 Dec 2022 00:35  --------------------------------------------------------  IN: 1116.1 mL / OUT: 1775 mL / NET: -658.9 mL    PHYSICAL EXAM  General: A&Ox 3; NAD  Respiratory: CTA B/L; no wheezes  Cardiovascular: Regular rhythm/rate  Gastrointestinal: Soft; NTND   Extremities: WWP; no edema   Neurological:  CNII-XII grossly intact; no obvious focal deficits    MEDICATIONS  (STANDING):  acetaZOLAMIDE    Tablet 250 milliGRAM(s) Oral daily  albumin human  5% IVPB 500 milliLiter(s) IV Intermittent every 30 minutes  artificial tears (preservative free) Ophthalmic Solution 1 Drop(s) Both EYES every 6 hours  aspirin  chewable 81 milliGRAM(s) Oral daily  chlorhexidine 2% Cloths 1 Application(s) Topical daily  citalopram 20 milliGRAM(s) Oral daily  dextrose 5%. 1000 milliLiter(s) (50 mL/Hr) IV Continuous <Continuous>  dextrose 5%. 1000 milliLiter(s) (100 mL/Hr) IV Continuous <Continuous>  dextrose 50% Injectable 50 milliLiter(s) IV Push every 15 minutes  dextrose 50% Injectable 25 milliLiter(s) IV Push every 15 minutes  DOBUTamine Infusion 2 MICROgram(s)/kG/Min (3.47 mL/Hr) IV Continuous <Continuous>  glucagon  Injectable 1 milliGRAM(s) IntraMuscular once  heparin   Injectable 5000 Unit(s) SubCutaneous every 12 hours  insulin lispro (ADMELOG) corrective regimen sliding scale   SubCutaneous Before meals and at bedtime  milrinone Infusion 0.375 MICROgram(s)/kG/Min (6.5 mL/Hr) IV Continuous <Continuous>  pantoprazole    Tablet 40 milliGRAM(s) Oral before breakfast  polyethylene glycol 3350 17 Gram(s) Oral daily  potassium chloride   Powder 20 milliEquivalent(s) Oral once  senna 2 Tablet(s) Oral at bedtime  sodium chloride 0.9%. 1000 milliLiter(s) (10 mL/Hr) IV Continuous <Continuous>    MEDICATIONS  (PRN):  acetaminophen     Tablet .. 650 milliGRAM(s) Oral every 6 hours PRN Temp greater or equal to 38C (100.4F), Mild Pain (1 - 3)  dextrose Oral Gel 15 Gram(s) Oral once PRN Blood Glucose LESS THAN 70 milliGRAM(s)/deciliter  oxyCODONE    IR 5 milliGRAM(s) Oral every 6 hours PRN Moderate Pain (4 - 6)  oxyCODONE    IR 10 milliGRAM(s) Oral every 6 hours PRN Severe Pain (7 - 10)      LABS                        8.0    7.31  )-----------( 56       ( 15 Dec 2022 22:28 )             24.7     12-15    139  |  105  |  12  ----------------------------<  134<H>  3.5   |  26  |  0.48<L>    Ca    8.6      15 Dec 2022 22:29  Phos  1.7     12-15  Mg     1.8     12-15    TPro  5.7<L>  /  Alb  4.0  /  TBili  0.8  /  DBili  x   /  AST  79<H>  /  ALT  43  /  AlkPhos  81  12-15    LIVER FUNCTIONS - ( 15 Dec 2022 22:29 )  Alb: 4.0 g/dL / Pro: 5.7 g/dL / ALK PHOS: 81 U/L / ALT: 43 U/L / AST: 79 U/L / GGT: x           PT/INR - ( 15 Dec 2022 22:28 )   PT: 14.2 sec;   INR: 1.19          PTT - ( 15 Dec 2022 22:28 )  PTT:29.8 sec    IMAGING/EKG/ETC  Reviewed. bibasilar atelectasis 
INTERVAL COURSE  S/p AVR/Asc repl     Post-op RV dysfx requiring inotrops with improving indices   one round of blood products with improving coagulopathy   Extubated successfully in am    ICU Vital Signs Last 24 Hrs  T(C): 37.3 (13 Dec 2022 10:01), Max: 37.3 (13 Dec 2022 10:01)  T(F): 99.1 (13 Dec 2022 10:01), Max: 99.1 (13 Dec 2022 10:01)  HR: 76 (13 Dec 2022 11:00) (58 - 88)  ABP: 131/59 (13 Dec 2022 11:00) (90/56 - 147/72)  ABP(mean): 83 (13 Dec 2022 11:00) (65 - 101)  RR: 16 (13 Dec 2022 11:00) (10 - 19)  SpO2: 99% (13 Dec 2022 11:00) (90% - 100%)  O2 Parameters below as of 13 Dec 2022 11:00  Patient On (Oxygen Delivery Method): mask, aerosol  O2 Concentration (%): 40    Adult Advanced Hemodynamics Last 24 Hrs  CVP(mm Hg): 17 (13 Dec 2022 11:00) (0 - 27)  CO: 4.6 (13 Dec 2022 09:00) (3 - 6.1)  CI: 2.9 (13 Dec 2022 09:00) (1.9 - 3.9)  PA: 28/11 (13 Dec 2022 11:00) (11/6 - 40/)  PA(mean): 17 (13 Dec 2022 11:00) (6 - 30)  SVR: 1147 (13 Dec 2022 09:00) (799 - 1811)  SVRI: 1806 (13 Dec 2022 09:00) (1249 - 2859)  ABG - ( 13 Dec 2022 09:08 )  pH, Arterial: 7.42  pH, Blood: x     /  pCO2: 37    /  pO2: 145   / HCO3: 24    / Base Excess: -0.2  /  SaO2: 99.5      I&O's Summary  13 Dec 2022 07:01  -  13 Dec 2022 11:12  --------------------------------------------------------  IN: 746 mL / OUT: 705 mL / NET: 41 mL    PHYSICAL EXAM  General: A&Ox 3; NAD  Respiratory: CTA B/L; no wheezes/crackles  Cardiovascular: Regular rhythm/rate  Gastrointestinal: Soft; NTND   Extremities: WWP; no edema   Neurological:  CNII-XII grossly intact; no obvious focal deficits    MEDICATIONS  (STANDING):  acetaZOLAMIDE  IVPB 250 milliGRAM(s) IV Intermittent daily  albumin human  5% IVPB 500 milliLiter(s) IV Intermittent every 30 minutes  aspirin  chewable 81 milliGRAM(s) Enteral Tube daily  ceFAZolin   IVPB 2000 milliGRAM(s) IV Intermittent every 8 hours  chlorhexidine 0.12% Liquid 15 milliLiter(s) Oral Mucosa every 12 hours  chlorhexidine 2% Cloths 1 Application(s) Topical daily  dexMEDEtomidine Infusion 0.1 MICROgram(s)/kG/Hr (1.45 mL/Hr) IV Continuous <Continuous>  dextrose 5%. 250 milliLiter(s) (100 mL/Hr) IV Continuous <Continuous>  dextrose 50% Injectable 50 milliLiter(s) IV Push every 15 minutes  dextrose 50% Injectable 25 milliLiter(s) IV Push every 15 minutes  DOBUTamine Infusion 2.5 MICROgram(s)/kG/Min (4.34 mL/Hr) IV Continuous <Continuous>  furosemide Infusion 10 mG/Hr (5 mL/Hr) IV Continuous <Continuous>  heparin   Injectable 5000 Unit(s) SubCutaneous every 8 hours  insulin regular Infusion 1 Unit(s)/Hr (1 mL/Hr) IV Continuous <Continuous>  milrinone Infusion 0.375 MICROgram(s)/kG/Min (6.5 mL/Hr) IV Continuous <Continuous>  nitroglycerin  Infusion 50 MICROgram(s)/Min (15 mL/Hr) IV Continuous <Continuous>  norepinephrine Infusion 0.05 MICROgram(s)/kG/Min (5.42 mL/Hr) IV Continuous <Continuous>  pantoprazole  Injectable 40 milliGRAM(s) IV Push daily  propofol Infusion 5 MICROgram(s)/kG/Min (1.73 mL/Hr) IV Continuous <Continuous>  sodium bicarbonate  Injectable 50 milliEquivalent(s) IV Push every 10 minutes  sodium chloride 0.9%. 1000 milliLiter(s) (10 mL/Hr) IV Continuous <Continuous>    MEDICATIONS  (PRN):      LABS                        8.8    7.04  )-----------( 65       ( 13 Dec 2022 09:16 )             26.6     12-13    150<H>  |  116<H>  |  10  ----------------------------<  92  3.1<L>   |  25  |  0.52    Ca    8.1<L>      13 Dec 2022 09:16  Phos  3.7       Mg     2.4         TPro  5.6<L>  /  Alb  4.0  /  TBili  1.1  /  DBili  x   /  AST  48<H>  /  ALT  14  /  AlkPhos  35<L>      LIVER FUNCTIONS - ( 13 Dec 2022 09:16 )  Alb: 4.0 g/dL / Pro: 5.6 g/dL / ALK PHOS: 35 U/L / ALT: 14 U/L / AST: 48 U/L / GGT: x           PT/INR - ( 13 Dec 2022 09:16 )   PT: 15.1 sec;   INR: 1.27          PTT - ( 13 Dec 2022 09:16 )  PTT:32.2 sec  Urinalysis Basic - ( 11 Dec 2022 20:19 )    Color: Yellow / Appearance: Clear / S.010 / pH: x  Gluc: x / Ketone: NEGATIVE  / Bili: Negative / Urobili: 0.2 E.U./dL   Blood: x / Protein: NEGATIVE mg/dL / Nitrite: NEGATIVE   Leuk Esterase: NEGATIVE / RBC: x / WBC x   Sq Epi: x / Non Sq Epi: x / Bacteria: x      CARDIAC MARKERS ( 11 Dec 2022 16:10 )  x     / 0.01 ng/mL / x     / x     / x          IMAGING/EKG/ETC  Reviewed.                   	
Neurology Progress Note    Interval History:    No reported overnight neurological events.     She expressed that she is experience generalized weakness but more in her upper body strength. She denies muscle stiffness, pain or specific limb weakness.     Medications:  acetaminophen     Tablet .. 650 milliGRAM(s) Oral every 6 hours PRN  acetaZOLAMIDE    Tablet 250 milliGRAM(s) Oral daily  albumin human  5% IVPB 500 milliLiter(s) IV Intermittent every 30 minutes  artificial tears (preservative free) Ophthalmic Solution 1 Drop(s) Both EYES every 6 hours  aspirin  chewable 81 milliGRAM(s) Enteral Tube daily  chlorhexidine 2% Cloths 1 Application(s) Topical daily  citalopram 20 milliGRAM(s) Oral daily  dextrose 5%. 1000 milliLiter(s) IV Continuous <Continuous>  dextrose 5%. 1000 milliLiter(s) IV Continuous <Continuous>  dextrose 5%. 250 milliLiter(s) IV Continuous <Continuous>  dextrose 50% Injectable 50 milliLiter(s) IV Push every 15 minutes  dextrose 50% Injectable 25 milliLiter(s) IV Push every 15 minutes  dextrose Oral Gel 15 Gram(s) Oral once PRN  DOBUTamine Infusion 2.5 MICROgram(s)/kG/Min IV Continuous <Continuous>  glucagon  Injectable 1 milliGRAM(s) IntraMuscular once  heparin   Injectable 5000 Unit(s) SubCutaneous every 8 hours  insulin lispro (ADMELOG) corrective regimen sliding scale   SubCutaneous Before meals and at bedtime  milrinone Infusion 0.375 MICROgram(s)/kG/Min IV Continuous <Continuous>  oxyCODONE    IR 5 milliGRAM(s) Oral every 6 hours PRN  oxyCODONE    IR 10 milliGRAM(s) Oral every 6 hours PRN  pantoprazole    Tablet 40 milliGRAM(s) Oral before breakfast  polyethylene glycol 3350 17 Gram(s) Oral daily  senna 2 Tablet(s) Oral at bedtime  sodium chloride 0.9%. 1000 milliLiter(s) IV Continuous <Continuous>      Vital Signs Last 24 Hrs  T(C): 37.8 (14 Dec 2022 09:20), Max: 37.8 (14 Dec 2022 09:20)  T(F): 100 (14 Dec 2022 09:20), Max: 100 (14 Dec 2022 09:20)  HR: 73 (14 Dec 2022 12:00) (68 - 76)  BP: 107/62 (13 Dec 2022 13:23) (107/62 - 107/62)  BP(mean): 79 (13 Dec 2022 13:23) (79 - 79)  RR: 20 (14 Dec 2022 12:00) (16 - 20)  SpO2: 97% (14 Dec 2022 12:00) (95% - 99%)    Parameters below as of 14 Dec 2022 12:00  Patient On (Oxygen Delivery Method): nasal cannula w/ humidification  O2 Flow (L/min): 2      Neurological Examination:  General:  Appearance is inconsistent with chronologic age. fatigue appearing  Cognitive/Language:  AAOx3.  Fluent, follows command. Nondysarthric.    Cranial Nerves  - Eyes: Visual acuity intact, Visual fields full.  EOMI w/o nystagmus or reported double vision.  PERRL.  No ptosis/weakness of eyelid closure.    - Face:  Facial sensation normal V1 - 3 R=L, no facial asymmetry.    - Ears/Nose/Throat:  Hearing grossly intact b/l to finger rub.  Palate elevates midline.  Tongue and uvula midline. SCM/Traps 4-/5 L=R  Motor examination:  (MRC grade R/L) 4-/5 deltoid and remaining 5/5 UE; 5/5 LE prox/distal.  Normal tone and bulk. No tenderness, twitching, tremors or involuntary movements.  Sensory examination:  LT, R=L;    Reflexes:   2+ R=L UE/LE. Plantar response downgoing b/l. clonus absent.  Cerebellum:   deferred. Finger tapping R=L  Gait deferred     Labs:  CBC Full  -  ( 14 Dec 2022 09:39 )  WBC Count : 12.76 K/uL  RBC Count : 3.09 M/uL  Hemoglobin : 9.0 g/dL  Hematocrit : 27.1 %  Platelet Count - Automated : 74 K/uL  Mean Cell Volume : 87.7 fl  Mean Cell Hemoglobin : 29.1 pg  Mean Cell Hemoglobin Concentration : 33.2 gm/dL    12-14    139  |  105  |  12  ----------------------------<  159<H>  3.4<L>   |  25  |  0.58    Ca    8.5      14 Dec 2022 09:39  Phos  2.4     12-14  Mg     1.9     12-14    TPro  6.0  /  Alb  3.9  /  TBili  0.7  /  DBili  x   /  AST  45<H>  /  ALT  16  /  AlkPhos  53  12-14    LIVER FUNCTIONS - ( 14 Dec 2022 09:39 )  Alb: 3.9 g/dL / Pro: 6.0 g/dL / ALK PHOS: 53 U/L / ALT: 16 U/L / AST: 45 U/L / GGT: x           PT/INR - ( 14 Dec 2022 09:39 )   PT: 14.8 sec;   INR: 1.24          PTT - ( 14 Dec 2022 09:39 )  PTT:38.4 sec      RADIOLOGY & ADDITIONAL TESTS:    
Patient discussed on morning rounds with Dr. Echevarria    OPERATION & DATE:   12/12/22 -- AVR and ascending aorta replacement, sternotomy, EF normal    SUBJECTIVE ASSESSMENT:  Pt is feeling well but states that her breathing is worse than yesterday. Denies any chest pain, palpitations, orthopnea, dyspnea on exertion, shortness of breath, wheezing, abd pain, nausea, vomiting, constipation, lightheadedness, headaches, fevers, or chills.    VITAL SIGNS:  Vital Signs Last 24 Hrs  T(C): 36.9 (17 Dec 2022 14:45), Max: 36.9 (17 Dec 2022 09:34)  T(F): 98.4 (17 Dec 2022 14:45), Max: 98.4 (17 Dec 2022 09:34)  HR: 72 (17 Dec 2022 14:06) (68 - 78)  BP: 102/58 (17 Dec 2022 14:06) (102/58 - 118/69)  BP(mean): 76 (17 Dec 2022 14:06) (76 - 88)  RR: 20 (17 Dec 2022 14:06) (18 - 20)  SpO2: 100% (17 Dec 2022 14:06) (95% - 100%)    Parameters below as of 17 Dec 2022 14:06  Patient On (Oxygen Delivery Method): BiPAP/CPAP    O2 Concentration (%): 40  I&O's Detail    16 Dec 2022 07:01  -  17 Dec 2022 07:00  --------------------------------------------------------  IN:    DOBUTamine: 6.9 mL    IV PiggyBack: 187.5 mL    IV PiggyBack: 150 mL    Oral Fluid: 360 mL    sodium chloride 0.9%: 100 mL  Total IN: 804.4 mL    OUT:    Bulb (mL): 25 mL    Indwelling Catheter - Urethral (mL): 500 mL    Voided (mL): 1000 mL  Total OUT: 1525 mL    Total NET: -720.6 mL    CHEST TUBE:  no  JOHN PAUL DRAIN:  yes  EPICARDIAL WIRES:  yes   STITCHES: yes  STAPLES: no  VALENTE:  no  CENTRAL LINE: no  MIDLINE/PICC: no  WOUND VAC: no    PHYSICAL EXAM:  General: well appearing sitting in chair in NAD   Neurological: AOx3. Motor skills grossly intact  Cardiovascular: Normal S1/S2. Regular rate/rhythm. No murmurs  Respiratory: b/l crackles a bases.   Gastrointestinal: +BS in all 4 quadrants. Non-distended. Soft. Non-tender  Extremities: Strength 5/5 b/l upper/lower extremities. Sensation grossly intact upper/lower extremities. No edema. No calf tenderness.  Vascular: Radial 2+bilaterally, DP 2+ b/l  Incision Sites: sternotomy healing well no erythema, purulence or ecchymosis.     LABS:             8.4    7.89  )-----------( 85       ( 17 Dec 2022 05:30 )             26.6     PT/INR - ( 17 Dec 2022 05:30 )   PT: 14.1 sec;   INR: 1.18     PTT - ( 17 Dec 2022 05:30 )  PTT:28.5 sec    12-17    135  |  99  |  11  ----------------------------<  123<H>  3.7   |  25  |  0.51    Ca    9.2      17 Dec 2022 05:30  Phos  2.0     12-16  Mg     2.0     12-17    TPro  5.7<L>  /  Alb  3.8  /  TBili  0.8  /  DBili  x   /  AST  81<H>  /  ALT  57<H>  /  AlkPhos  107  12-16      MEDICATIONS  (STANDING):  acetaZOLAMIDE    Tablet 250 milliGRAM(s) Oral daily  artificial tears (preservative free) Ophthalmic Solution 1 Drop(s) Both EYES every 6 hours  aspirin  chewable 81 milliGRAM(s) Oral daily  chlorhexidine 2% Cloths 1 Application(s) Topical daily  citalopram 20 milliGRAM(s) Oral daily  heparin   Injectable 5000 Unit(s) SubCutaneous every 12 hours  pantoprazole    Tablet 40 milliGRAM(s) Oral before breakfast  polyethylene glycol 3350 17 Gram(s) Oral daily  senna 2 Tablet(s) Oral at bedtime  sodium chloride 0.9% lock flush 3 milliLiter(s) IV Push every 8 hours  sodium chloride 0.9%. 1000 milliLiter(s) (10 mL/Hr) IV Continuous <Continuous>    MEDICATIONS  (PRN):  acetaminophen     Tablet .. 650 milliGRAM(s) Oral every 6 hours PRN Temp greater or equal to 38C (100.4F), Mild Pain (1 - 3)  bisacodyl 5 milliGRAM(s) Oral daily PRN Constipation  oxyCODONE    IR 5 milliGRAM(s) Oral every 6 hours PRN Moderate Pain (4 - 6)  oxyCODONE    IR 10 milliGRAM(s) Oral every 6 hours PRN Severe Pain (7 - 10)    RADIOLOGY & ADDITIONAL TESTS:  < from: Xray Chest 1 View- PORTABLE-Routine (Xray Chest 1 View- PORTABLE-Routine in AM.) (12.17.22 @ 05:32) >  IMPRESSION: Congestion. Bilateral effusions  < end of copied text >    
Patient discussed on morning rounds with Dr. Echevarria    OPERATION & DATE:   12/12/22 -- AVR and ascending aorta replacement, sternotomy, EF normal    SUBJECTIVE ASSESSMENT: Pt states he breathing was improved again yesterday evening but worsened again this morning. Agreeable to pigtail on the right side today. Otherwise feels like she is progressing well. Denies any chest pain, palpitations, orthopnea, dyspnea on exertion, shortness of breath, wheezing, abd pain, nausea, vomiting, constipation, lightheadedness, headaches, fevers, or chills.    VITAL SIGNS:  Vital Signs Last 24 Hrs  T(C): 36.1 (18 Dec 2022 10:07), Max: 37.2 (17 Dec 2022 22:11)  T(F): 97 (18 Dec 2022 10:07), Max: 99 (17 Dec 2022 22:11)  HR: 78 (18 Dec 2022 09:08) (66 - 90)  BP: 108/58 (18 Dec 2022 09:00) (88/56 - 127/70)  BP(mean): 77 (18 Dec 2022 09:00) (65 - 108)  RR: 16 (18 Dec 2022 09:00) (16 - 20)  SpO2: 97% (18 Dec 2022 09:08) (81% - 100%)    Parameters below as of 18 Dec 2022 09:08  Patient On (Oxygen Delivery Method): room air    I&O's Detail    17 Dec 2022 07:01  -  18 Dec 2022 07:00  --------------------------------------------------------  IN:  Total IN: 0 mL    OUT:    Bulb (mL): 75 mL  Total OUT: 75 mL    Total NET: -75 mL    CHEST TUBE:  no  JOHN PAUL DRAIN:   yes   EPICARDIAL WIRES: yes  STITCHES: yes  STAPLES: no    VALENTE:  no  CENTRAL LINE: no   MIDLINE/PICC: no  WOUND VAC: no    PHYSICAL EXAM:  General: well appearing sitting in chair in NAD   Neurological: AOx3. Motor skills grossly intact  Cardiovascular: Normal S1/S2. Regular rate/rhythm. No murmurs  Respiratory: b/l crackles at bases of lungs, no wheezing   Gastrointestinal: +BS in all 4 quadrants. Non-distended. Soft. Non-tender  Extremities: Strength 5/5 b/l upper/lower extremities. Sensation grossly intact upper/lower extremities. Trace LE edema. No calf tenderness.  Vascular: Radial 2+bilaterally, DP 2+ b/l  Incision Sites: sternotomy with prevena in place     LABS:                        8.0    8.39  )-----------( 86       ( 18 Dec 2022 10:00 )             24.7     PT/INR - ( 18 Dec 2022 06:09 )   PT: 14.0 sec;   INR: 1.17     PTT - ( 18 Dec 2022 06:09 )  PTT:26.6 sec    12-18    136  |  100  |  14  ----------------------------<  111<H>  3.6   |  28  |  0.56    Ca    9.2      18 Dec 2022 06:09  Phos  2.0     12-16  Mg     1.8     12-18    TPro  5.7<L>  /  Alb  3.8  /  TBili  0.8  /  DBili  x   /  AST  81<H>  /  ALT  57<H>  /  AlkPhos  107  12-16      MEDICATIONS  (STANDING):  acetaZOLAMIDE    Tablet 250 milliGRAM(s) Oral daily  artificial tears (preservative free) Ophthalmic Solution 1 Drop(s) Both EYES every 6 hours  aspirin  chewable 81 milliGRAM(s) Oral daily  citalopram 20 milliGRAM(s) Oral daily  dextrose 50% Injectable 50 milliLiter(s) IV Push every 15 minutes  dextrose 50% Injectable 25 milliLiter(s) IV Push every 15 minutes  furosemide    Tablet 20 milliGRAM(s) Oral daily  heparin   Injectable 5000 Unit(s) SubCutaneous every 12 hours  pantoprazole    Tablet 40 milliGRAM(s) Oral before breakfast  polyethylene glycol 3350 17 Gram(s) Oral daily  senna 2 Tablet(s) Oral at bedtime  sodium chloride 0.9% lock flush 3 milliLiter(s) IV Push every 8 hours  sodium chloride 0.9%. 1000 milliLiter(s) (10 mL/Hr) IV Continuous <Continuous>    MEDICATIONS  (PRN):  acetaminophen     Tablet .. 650 milliGRAM(s) Oral every 6 hours PRN Temp greater or equal to 38C (100.4F), Mild Pain (1 - 3)  bisacodyl 5 milliGRAM(s) Oral daily PRN Constipation  oxyCODONE    IR 5 milliGRAM(s) Oral every 6 hours PRN Moderate Pain (4 - 6)  oxyCODONE    IR 10 milliGRAM(s) Oral every 6 hours PRN Severe Pain (7 - 10)    RADIOLOGY & ADDITIONAL TESTS:< from: Xray Chest 1 View- PORTABLE-Routine (Xray Chest 1 View- PORTABLE-Routine in AM.) (12.18.22 @ 05:59) >  IMPRESSION: Congestion and/or infiltrates. Bilateral effusions  < end of copied text >      
CTICU  CRITICAL  CARE  attending     Hand off received 					   Pertinent clinical, laboratory, radiographic, hemodynamic, echocardiographic, respiratory data, microbiologic data and chart were reviewed and analyzed frequently throughout the course of the day  Patient seen and examined with CTS/ SH attending at bedside  Pt is a 62yr old female with PMH hyperkalemic periodic paralysis, bicuspid aortic valve with severe AS, aortic aneurysm, splenic vein thrombosis, portal vein thrombosis, GERD, esophageal varices, basal cell carcinoma, cataracts admitted for surgical mgmt.   Underwent aortic valve replacement with ascending aorta replacement with Dr. Echevarria . Extubated today, given 1 unit pRBC,  weaned. Remains on milirinone.     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      14 system review was unremarkable    Vital signs, hemodynamic and respiratory parameters were reviewed from the bedside nursing flowsheet.  ICU Vital Signs Last 24 Hrs  T(C): 37 (13 Dec 2022 22:35), Max: 37.7 (13 Dec 2022 14:53)  T(F): 98.6 (13 Dec 2022 22:35), Max: 99.9 (13 Dec 2022 14:53)  HR: 69 (14 Dec 2022 00:00) (65 - 83)  BP: 107/62 (13 Dec 2022 13:23) (90/56 - 127/73)  BP(mean): 79 (13 Dec 2022 13:23) (67 - 94)  ABP: 109/54 (14 Dec 2022 00:00) (86/53 - 141/54)  ABP(mean): 71 (14 Dec 2022 00:00) (65 - 89)  RR: 18 (14 Dec 2022 00:00) (14 - 19)  SpO2: 98% (14 Dec 2022 00:00) (97% - 100%)    O2 Parameters below as of 14 Dec 2022 01:00  Patient On (Oxygen Delivery Method): nasal cannula w/ humidification          Adult Advanced Hemodynamics Last 24 Hrs  CVP(mm Hg): 13 (14 Dec 2022 00:00) (0 - 27)  CVP(cm H2O): --  CO: 4.7 (14 Dec 2022 00:00) (4.1 - 6)  CI: 3 (14 Dec 2022 00:00) (2.6 - 3.8)  PA: 31/16 (14 Dec 2022 00:00) (11/6 - 35/19)  PA(mean): 23 (14 Dec 2022 00:00) (8 - 25)  PCWP: --  SVR: 1020 (14 Dec 2022 00:00) (799 - 1147)  SVRI: 1607 (14 Dec 2022 00:00) (1259 - 1806)  PVR: --  PVRI: --, ABG - ( 13 Dec 2022 22:28 )  pH, Arterial: 7.41  pH, Blood: x     /  pCO2: 34    /  pO2: 120   / HCO3: 22    / Base Excess: -2.4  /  SaO2: 99.6          Intake and output was reviewed and the fluid balance was calculated  Daily     Daily   I&O's Summary    12 Dec 2022 07:01  -  13 Dec 2022 07:00  --------------------------------------------------------  IN: 5487.6 mL / OUT: 3890 mL / NET: 1597.6 mL    13 Dec 2022 07:01  -  14 Dec 2022 00:44  --------------------------------------------------------  IN: 3066.1 mL / OUT: 2275 mL / NET: 791.1 mL        All lines and drain sites were assessed  Glycemic trend was reviewed  POCT Blood Glucose.: 109 mg/dL (13 Dec 2022 22:20)      Neuro: nad  HEENT: mmm  Heart: s1 s2  Lungs: clear bl  Abdomen: soft nt nd  Extremities: 2+dp    Lines:  RIJ TLC   RIj swan   R radial arterial line     Tubes:  bl pleural  meds x 3      labs  CBC Full  -  ( 13 Dec 2022 22:24 )  WBC Count : 9.43 K/uL  RBC Count : 2.94 M/uL  Hemoglobin : 8.6 g/dL  Hematocrit : 25.7 %  Platelet Count - Automated : 61 K/uL  Mean Cell Volume : 87.4 fl  Mean Cell Hemoglobin : 29.3 pg  Mean Cell Hemoglobin Concentration : 33.5 gm/dL  Auto Neutrophil # : x  Auto Lymphocyte # : x  Auto Monocyte # : x  Auto Eosinophil # : x  Auto Basophil # : x  Auto Neutrophil % : x  Auto Lymphocyte % : x  Auto Monocyte % : x  Auto Eosinophil % : x  Auto Basophil % : x        140  |  106  |  11  ----------------------------<  120<H>  3.2<L>   |  24  |  0.58    Ca    8.4      13 Dec 2022 22:24  Phos  3.6     12-  Mg     2.0         TPro  5.3<L>  /  Alb  3.6  /  TBili  0.7  /  DBili  x   /  AST  44<H>  /  ALT  14  /  AlkPhos  43      PT/INR - ( 13 Dec 2022 22:24 )   PT: 16.7 sec;   INR: 1.40          PTT - ( 13 Dec 2022 22:24 )  PTT:36.3 sec  The current medications were reviewed   MEDICATIONS  (STANDING):  acetaZOLAMIDE    Tablet 250 milliGRAM(s) Oral daily  albumin human  5% IVPB 500 milliLiter(s) IV Intermittent every 30 minutes  artificial tears (preservative free) Ophthalmic Solution 1 Drop(s) Both EYES every 6 hours  aspirin  chewable 81 milliGRAM(s) Enteral Tube daily  chlorhexidine 0.12% Liquid 15 milliLiter(s) Oral Mucosa every 12 hours  chlorhexidine 2% Cloths 1 Application(s) Topical daily  citalopram 20 milliGRAM(s) Oral daily  dextrose 5%. 250 milliLiter(s) (100 mL/Hr) IV Continuous <Continuous>  dextrose 5%. 1000 milliLiter(s) (50 mL/Hr) IV Continuous <Continuous>  dextrose 5%. 1000 milliLiter(s) (100 mL/Hr) IV Continuous <Continuous>  dextrose 50% Injectable 50 milliLiter(s) IV Push every 15 minutes  dextrose 50% Injectable 25 milliLiter(s) IV Push every 15 minutes  DOBUTamine Infusion 2.5 MICROgram(s)/kG/Min (4.34 mL/Hr) IV Continuous <Continuous>  glucagon  Injectable 1 milliGRAM(s) IntraMuscular once  heparin   Injectable 5000 Unit(s) SubCutaneous every 8 hours  insulin lispro (ADMELOG) corrective regimen sliding scale   SubCutaneous Before meals and at bedtime  milrinone Infusion 0.375 MICROgram(s)/kG/Min (6.5 mL/Hr) IV Continuous <Continuous>  pantoprazole    Tablet 40 milliGRAM(s) Oral before breakfast  polyethylene glycol 3350 17 Gram(s) Oral daily  senna 2 Tablet(s) Oral at bedtime  sodium chloride 0.9%. 1000 milliLiter(s) (10 mL/Hr) IV Continuous <Continuous>    MEDICATIONS  (PRN):  acetaminophen     Tablet .. 650 milliGRAM(s) Oral every 6 hours PRN Temp greater or equal to 38C (100.4F), Mild Pain (1 - 3)  dextrose Oral Gel 15 Gram(s) Oral once PRN Blood Glucose LESS THAN 70 milliGRAM(s)/deciliter  oxyCODONE    IR 5 milliGRAM(s) Oral every 6 hours PRN Moderate Pain (4 - 6)  oxyCODONE    IR 10 milliGRAM(s) Oral every 6 hours PRN Severe Pain (7 - 10)      Assessment/Plan:  62yr old female with PMH hyperkalemic periodic paralysis, bicuspid aortic valve with severe AS, aortic aneurysm, splenic vein thrombosis, portal vein thrombosis, GERD, esophageal varices, basal cell carcinoma, cataracts admitted for surgical mgmt.     POD 1 aortic valve replacement with ascending aorta replacement ([facility], )  Cardiogenic shock on dobutamine-wean  Cardiogenic shock on primacor-keep  Acute post operative anemia-monitor H/H  Thrombocytopenia-monitor  Diamox for hyperK  Diet as tolerated  Replete lytes prn  Monitor CT output  GI/DVT PPX  Bowel Regimen  Pain control  OOB with PT  Close hemodynamic, ventilatory and drain monitoring and management per post op routine  Monitor for arrhythmias and monitor parameters for organ perfusion  Beta blockade not administered due to hemodynamic instability and bradycardia  Monitor neurologic status  Head of the bed should remain elevated to 45 deg   Chest PT and IS will be encouraged  Monitor adequacy of oxygenation and ventilation and attempt to wean oxygen  Antibiotic regimen will be tailored to the clinical, laboratory and microbiologic data  Nutritional goals will be met using po eventually, ensure adequate caloric intake and monitor the same  Stress ulcer and VTE prophylaxis will be achieved    Glycemic control is satisfactory  Electrolytes have been repleted as necessary and wound care has been carried out   Pain control has been achieved.   Aggressive physical therapy and early mobility and ambulation goals will be met   The family was updated about the course and plan.    CRITICAL CARE TIME personally provided by me  in evaluation and management, reassessments, review and interpretation of labs and x-rays, ventilator and hemodynamic management, formulating a plan and coordinating care: _30___ MIN.  Time does not include procedural time.    CTICU ATTENDING     					  Justo Jung MD
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        Patient is a 62y old  Female who presents with a chief complaint of Pre-op optimization for AVR ascending aorta replacement (14 Dec 2022 12:28)      14 system review limited by mentation and multiorgan morbidity     Vital signs, hemodynamic and respiratory parameters were reviewed from the bedside nursing flowsheet.  ICU Vital Signs Last 24 Hrs  T(C): 36.7 (14 Dec 2022 17:17), Max: 37.8 (14 Dec 2022 09:20)  T(F): 98.1 (14 Dec 2022 17:17), Max: 100 (14 Dec 2022 09:20)  HR: 80 (14 Dec 2022 22:00) (67 - 86)  BP: --  BP(mean): --  ABP: 136/60 (14 Dec 2022 22:00) (90/50 - 150/64)  ABP(mean): 83 (14 Dec 2022 22:00) (63 - 91)  RR: 15 (14 Dec 2022 22:00) (15 - 20)  SpO2: 100% (14 Dec 2022 22:00) (93% - 100%)    O2 Parameters below as of 14 Dec 2022 23:00  Patient On (Oxygen Delivery Method): nasal cannula, high flow  O2 Flow (L/min): 40  O2 Concentration (%): 50      Adult Advanced Hemodynamics Last 24 Hrs  CVP(mm Hg): 17 (14 Dec 2022 22:00) (8 - 21)  CVP(cm H2O): --  CO: 3.4 (14 Dec 2022 09:00) (3.4 - 5.2)  CI: 2.2 (14 Dec 2022 09:00) (2.2 - 3.3)  PA: 20/9 (14 Dec 2022 10:00) ( - )  PA(mean): 13 (14 Dec 2022 10:00) (13 - 23)  PCWP: --  SVR: 1410 (14 Dec 2022 09:00) (891 - 1410)  SVRI: 2221 (14 Dec 2022 09:00) (1404 - 2221)  PVR: --  PVRI: --, ABG - ( 14 Dec 2022 16:02 )  pH, Arterial: 7.40  pH, Blood: x     /  pCO2: 37    /  pO2: 264   / HCO3: 23    / Base Excess: -1.5  /  SaO2: 100.0               Intake and output was reviewed and the fluid balance was calculated  Daily     Daily   I&O's Summary    13 Dec 2022 07:01  -  14 Dec 2022 07:00  --------------------------------------------------------  IN: 3533 mL / OUT: 3631 mL / NET: -98 mL    14 Dec 2022 07:01  -  14 Dec 2022 22:33  --------------------------------------------------------  IN: 1117.7 mL / OUT: 1729 mL / NET: -611.3 mL        All lines and drain sites were assessed  Glycemic trend was reviewedCAPILLARY BLOOD GLUCOSE      POCT Blood Glucose.: 128 mg/dL (14 Dec 2022 21:51)    No acute change in focality  Auscultation of the chest reveals equal bs  Abdomen is soft  Extremities are warm and well perfused  Wounds appear clean and unremarkable  Antibiotics are periop    labs  CBC Full  -  ( 14 Dec 2022 22:21 )  WBC Count : 10.36 K/uL  RBC Count : 2.90 M/uL  Hemoglobin : 8.3 g/dL  Hematocrit : 25.8 %  Platelet Count - Automated : 59 K/uL  Mean Cell Volume : 89.0 fl  Mean Cell Hemoglobin : 28.6 pg  Mean Cell Hemoglobin Concentration : 32.2 gm/dL  Auto Neutrophil # : x  Auto Lymphocyte # : x  Auto Monocyte # : x  Auto Eosinophil # : x  Auto Basophil # : x  Auto Neutrophil % : x  Auto Lymphocyte % : x  Auto Monocyte % : x  Auto Eosinophil % : x  Auto Basophil % : x    1214    137  |  102  |  14  ----------------------------<  150<H>  3.4<L>   |  24  |  0.54    Ca    8.5      14 Dec 2022 16:08  Phos  1.8       Mg     2.5         TPro  6.2  /  Alb  4.2  /  TBili  0.8  /  DBili  x   /  AST  39  /  ALT  16  /  AlkPhos  54  14    PT/INR - ( 14 Dec 2022 16:08 )   PT: 14.1 sec;   INR: 1.18          PTT - ( 14 Dec 2022 16:08 )  PTT:33.2 sec  The current medications were reviewed   MEDICATIONS  (STANDING):  acetaZOLAMIDE    Tablet 250 milliGRAM(s) Oral daily  albumin human  5% IVPB 500 milliLiter(s) IV Intermittent every 30 minutes  artificial tears (preservative free) Ophthalmic Solution 1 Drop(s) Both EYES every 6 hours  aspirin  chewable 81 milliGRAM(s) Enteral Tube daily  chlorhexidine 2% Cloths 1 Application(s) Topical daily  citalopram 20 milliGRAM(s) Oral daily  dextrose 5%. 250 milliLiter(s) (100 mL/Hr) IV Continuous <Continuous>  dextrose 5%. 1000 milliLiter(s) (50 mL/Hr) IV Continuous <Continuous>  dextrose 5%. 1000 milliLiter(s) (100 mL/Hr) IV Continuous <Continuous>  dextrose 50% Injectable 50 milliLiter(s) IV Push every 15 minutes  dextrose 50% Injectable 25 milliLiter(s) IV Push every 15 minutes  DOBUTamine Infusion 2 MICROgram(s)/kG/Min (3.47 mL/Hr) IV Continuous <Continuous>  glucagon  Injectable 1 milliGRAM(s) IntraMuscular once  heparin   Injectable 5000 Unit(s) SubCutaneous every 12 hours  insulin lispro (ADMELOG) corrective regimen sliding scale   SubCutaneous Before meals and at bedtime  milrinone Infusion 0.375 MICROgram(s)/kG/Min (6.5 mL/Hr) IV Continuous <Continuous>  pantoprazole    Tablet 40 milliGRAM(s) Oral before breakfast  polyethylene glycol 3350 17 Gram(s) Oral daily  senna 2 Tablet(s) Oral at bedtime  sodium chloride 0.9%. 1000 milliLiter(s) (10 mL/Hr) IV Continuous <Continuous>    MEDICATIONS  (PRN):  acetaminophen     Tablet .. 650 milliGRAM(s) Oral every 6 hours PRN Temp greater or equal to 38C (100.4F), Mild Pain (1 - 3)  dextrose Oral Gel 15 Gram(s) Oral once PRN Blood Glucose LESS THAN 70 milliGRAM(s)/deciliter  oxyCODONE    IR 5 milliGRAM(s) Oral every 6 hours PRN Moderate Pain (4 - 6)  oxyCODONE    IR 10 milliGRAM(s) Oral every 6 hours PRN Severe Pain (7 - 10)       PROBLEM LIST/ ASSESSMENT:  HEALTH ISSUES - PROBLEM Dx:      ,   Patient is a 62y old  Female who presents with a chief complaint of Pre-op optimization for AVR ascending aorta replacement (14 Dec 2022 12:28)     s/p cardiac surgery                My plan includes :  close hemodynamic, ventilatory and drain monitoring and management per post op routine    Monitor for arrhythmias and monitor parameters for organ perfusion  beta blockade not administered due to hemodynamic instability and bradycardia  monitor neurologic status  Head of the bed should remain elevated to 45 deg .   chest PT and IS will be encouraged  monitor adequacy of oxygenation and ventilation and attempt to wean oxygen  antibiotic regimen will be tailored to the clinical, laboratory and microbiologic data  Nutritional goals will be met using po eventually , ensure adequate caloric intake and montior the same  Stress ulcer and VTE prophylaxis will be achieved    Glycemic control is satisfactory  Electrolytes have been repleted as necessary and wound care has been carried out. Pain control has been achieved.   agressive physical therapy and early mobility and ambulation goals will be met   The family was updated about the course and plan  CRITICAL CARE TIME personally provided by me  in evaluation and management, reassessments, review and interpretation of labs and x-rays, ventilator and hemodynamic management, formulating a plan and coordinating care: ___90____ MIN.  Time does not include procedural time. Time spent was non routine post-operarive caRE and included multiple and repeated evaluations at the bedside  CTICU ATTENDING     					    Tristan Sommer MD                        	
Neurology Progress Note    Interval History:  The patient was seen and examined at the chair.   The patient was doing good as per her she feels better. However, she states she is still have some weakness.       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 650 milliGRAM(s) Oral every 6 hours PRN  acetaZOLAMIDE    Tablet 250 milliGRAM(s) Oral daily  artificial tears (preservative free) Ophthalmic Solution 1 Drop(s) Both EYES every 6 hours  aspirin  chewable 81 milliGRAM(s) Oral daily  bisacodyl 5 milliGRAM(s) Oral daily PRN  citalopram 20 milliGRAM(s) Oral daily  dextrose 50% Injectable 50 milliLiter(s) IV Push every 15 minutes  dextrose 50% Injectable 25 milliLiter(s) IV Push every 15 minutes  furosemide    Tablet 20 milliGRAM(s) Oral daily  heparin   Injectable 5000 Unit(s) SubCutaneous every 12 hours  magnesium oxide 800 milliGRAM(s) Oral once  oxyCODONE    IR 10 milliGRAM(s) Oral every 6 hours PRN  oxyCODONE    IR 5 milliGRAM(s) Oral every 6 hours PRN  pantoprazole    Tablet 40 milliGRAM(s) Oral before breakfast  polyethylene glycol 3350 17 Gram(s) Oral daily  senna 2 Tablet(s) Oral at bedtime  sodium chloride 0.9% lock flush 3 milliLiter(s) IV Push every 8 hours  sodium chloride 0.9%. 1000 milliLiter(s) IV Continuous <Continuous>      Vital Signs Last 24 Hrs  T(C): 36.4 (19 Dec 2022 05:01), Max: 37.3 (18 Dec 2022 20:24)  T(F): 97.6 (19 Dec 2022 05:01), Max: 99.2 (18 Dec 2022 20:24)  HR: 71 (19 Dec 2022 09:36) (62 - 95)  BP: 98/57 (19 Dec 2022 05:40) (90/58 - 113/66)  BP(mean): 73 (19 Dec 2022 05:40) (69 - 83)  RR: 14 (19 Dec 2022 04:55) (14 - 25)  SpO2: 94% (19 Dec 2022 09:36) (91% - 100%)    Parameters below as of 19 Dec 2022 09:36  Patient On (Oxygen Delivery Method): room air        Neurological Exam:   Mental status: Awake, alert and oriented x3.  Naming, repetition and comprehension intact.  No dysarthria, no aphasia.    Cranial nerves: Pupils equally round and reactive to light, visual fields full, no nystagmus, extraocular muscles intact, V1 through V3 intact bilaterally and symmetric, face symmetric, hearing intact to finger rub, palate elevation symmetric, tongue was midline.  Motor:  MRC grading 4/5 b/l UE/LE.   strength 4/5.  Normal tone and bulk.  No abnormal movements. Myotonia in both hands.   Sensation: Intact to light touch, proprioception, and pinprick.   Coordination: No dysmetria on finger-to-nose and heel-to-shin.  No dysdiadokinesia.  Reflexes: 2+ in bilateral UE/LE, downgoing toes bilaterally.       Labs:  CBC Full  -  ( 19 Dec 2022 05:30 )  WBC Count : 7.76 K/uL  RBC Count : 2.66 M/uL  Hemoglobin : 7.7 g/dL  Hematocrit : 24.4 %  Platelet Count - Automated : 96 K/uL  Mean Cell Volume : 91.7 fl  Mean Cell Hemoglobin : 28.9 pg  Mean Cell Hemoglobin Concentration : 31.6 gm/dL  Auto Neutrophil # : x  Auto Lymphocyte # : x  Auto Monocyte # : x  Auto Eosinophil # : x  Auto Basophil # : x  Auto Neutrophil % : x  Auto Lymphocyte % : x  Auto Monocyte % : x  Auto Eosinophil % : x  Auto Basophil % : x    12-19    135  |  99  |  11  ----------------------------<  114<H>  3.4<L>   |  27  |  0.52    Ca    9.1      19 Dec 2022 05:30  Mg     1.8     12-19        PT/INR - ( 19 Dec 2022 05:30 )   PT: 14.3 sec;   INR: 1.20          PTT - ( 19 Dec 2022 05:30 )  PTT:25.8 sec      
CTICU  CRITICAL  CARE  attending     Hand off received 					   Pertinent clinical, laboratory, radiographic, hemodynamic, echocardiographic, respiratory data, microbiologic data and chart were reviewed and analyzed frequently throughout the course of the day and night  Patient seen and examined with CTS/ SH attending at bedside    Pt is a 62y , Female, operative day s/p AVR (t); replacement of ascending aorta;    post op:    Acute RV failure ( PD 8; CVP 20; CI 0.8)  Improved with inotrope support  gentle volume resuscitation  acute post H'gic anemia ( had gotten FFP; Plts; Cryo and FEIBA in the ICU for bleeding)  transfusing 1 unit PRBC  Hypokalemia; ( pt with Hx of hyperkalemic periodic paralysis)  fully vent supported    After Inotrope support initiated; CI 2.2; MVO2 64; SVR 1100  PAD 8  CVP 6        FAMILY HISTORY:  Family history of acute myocardial infarction (Sibling)    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        Patient is a 62y old  Female who presents for Pre-op optimization for AVR ascending aorta replacement (12 Dec 2022 23:39)      14 system review unable to assess    Vital signs, hemodynamic and respiratory parameters were reviewed from the bedside nursing flowsheet.  ICU Vital Signs Last 24 Hrs  T(C): 36.4 (12 Dec 2022 21:33), Max: 36.4 (12 Dec 2022 21:33)  T(F): 97.5 (12 Dec 2022 21:33), Max: 97.5 (12 Dec 2022 21:33)  HR: 83 (13 Dec 2022 01:00) (58 - 88)  BP: 127/60 (12 Dec 2022 07:19) (127/60 - 127/60)  BP(mean): 86 (12 Dec 2022 07:19) (86 - 86)  ABP: 139/50 (13 Dec 2022 01:00) (90/56 - 147/72)  ABP(mean): 73 (13 Dec 2022 01:00) (65 - 101)  RR: 16 (13 Dec 2022 01:) (10 - 18)  SpO2: 100% (13 Dec 2022 01:00) (90% - 100%)    O2 Parameters below as of 13 Dec 2022 01:00  Patient On (Oxygen Delivery Method): ventilator    O2 Concentration (%): 40      Adult Advanced Hemodynamics Last 24 Hrs  CVP(mm Hg): 8 (13 Dec 2022 01:00) (2 - 18)  CVP(cm H2O): --  CO: 6 (13 Dec 2022 01:00) (3 - 6.1)  CI: 3.8 (13 Dec 2022 01:00) (1.9 - 3.9)  PA: 14/7 (13 Dec 2022 01:00) (12 - )  PA(mean): 11 (13 Dec 2022 01:00) (6 - 30)  PCWP: --  SVR: 865 (13 Dec 2022 01:00) (799 - 1811)  SVRI: 1366 (13 Dec 2022 01:00) (1249 - 2859)  PVR: --  PVRI: --, ABG - ( 12 Dec 2022 22:50 )  pH, Arterial: 7.50  pH, Blood: x     /  pCO2: 30    /  pO2: 171   / HCO3: 23    / Base Excess: 1.1   /  SaO2: 99.6              Mode: AC/ CMV (Assist Control/ Continuous Mandatory Ventilation)  RR (machine): 16  TV (machine): 500  FiO2: 40  PEEP: 5  ITime: 1  MAP: 12  PIP: 24    Intake and output was reviewed and the fluid balance was calculated  Daily     Daily   I&O's Summary    11 Dec 2022 07:01  -  12 Dec 2022 07:00  --------------------------------------------------------  IN: 140 mL / OUT: 0 mL / NET: 140 mL    12 Dec 2022 07:01  -  13 Dec 2022 01:30  --------------------------------------------------------  IN: 4630.3 mL / OUT: 3110 mL / NET: 1520.3 mL        All lines and drain sites were assessed  [facility]      POCT Blood Glucose.: 122 mg/dL (13 Dec 2022 01:11)    No acute change in mental status  (+) Antonia-tracheally intubated  Auscultation of the chest reveals equal bs  Abdomen is soft  Extremities are warm and well perfused  Wounds appear clean and unremarkable  Antibiotics are periop    labs  CBC Full  -  ( 12 Dec 2022 22:46 )  WBC Count : 3.96 K/uL  RBC Count : 2.41 M/uL  Hemoglobin : 6.9 g/dL  Hematocrit : 20.9 %  Platelet Count - Automated : 60 K/uL  Mean Cell Volume : 86.7 fl  Mean Cell Hemoglobin : 28.6 pg  Mean Cell Hemoglobin Concentration : 33.0 gm/dL  Auto Neutrophil # : x  Auto Lymphocyte # : x  Auto Monocyte # : x  Auto Eosinophil # : x  Auto Basophil # : x  Auto Neutrophil % : x  Auto Lymphocyte % : x  Auto Monocyte % : x  Auto Eosinophil % : x  Auto Basophil % : x    12-12    151<H>  |  115<H>  |  11  ----------------------------<  117<H>  2.4<LL>   |  24  |  0.56    Ca    9.1      12 Dec 2022 22:46  Phos  0.4     12  Mg     2.8         TPro  5.9<L>  /  Alb  4.4  /  TBili  1.5<H>  /  DBili  x   /  AST  47<H>  /  ALT  15  /  AlkPhos  34<L>  12-12    PT/INR - ( 12 Dec 2022 22:46 )   PT: 12.8 sec;   INR: 1.07          PTT - ( 12 Dec 2022 22:46 )  PTT:36.9 sec  The current medications were reviewed   MEDICATIONS  (STANDING):  acetaZOLAMIDE  IVPB 250 milliGRAM(s) IV Intermittent daily  albumin human  5% IVPB 500 milliLiter(s) IV Intermittent every 30 minutes  aspirin  chewable 81 milliGRAM(s) Enteral Tube daily  ceFAZolin   IVPB 2000 milliGRAM(s) IV Intermittent every 8 hours  chlorhexidine 0.12% Liquid 15 milliLiter(s) Oral Mucosa every 12 hours  chlorhexidine 2% Cloths 1 Application(s) Topical daily  dexMEDEtomidine Infusion 0.1 MICROgram(s)/kG/Hr (1.45 mL/Hr) IV Continuous <Continuous>  dextrose 50% Injectable 50 milliLiter(s) IV Push every 15 minutes  dextrose 50% Injectable 25 milliLiter(s) IV Push every 15 minutes  DOBUTamine Infusion 2.5 MICROgram(s)/kG/Min (4.34 mL/Hr) IV Continuous <Continuous>  furosemide Infusion 10 mG/Hr (5 mL/Hr) IV Continuous <Continuous>  heparin   Injectable 5000 Unit(s) SubCutaneous every 8 hours  insulin regular Infusion 1 Unit(s)/Hr (1 mL/Hr) IV Continuous <Continuous>  milrinone Infusion 0.375 MICROgram(s)/kG/Min (6.5 mL/Hr) IV Continuous <Continuous>  nitroglycerin  Infusion 50 MICROgram(s)/Min (15 mL/Hr) IV Continuous <Continuous>  norepinephrine Infusion 0.05 MICROgram(s)/kG/Min (5.42 mL/Hr) IV Continuous <Continuous>  pantoprazole  Injectable 40 milliGRAM(s) IV Push daily  propofol Infusion 5 MICROgram(s)/kG/Min (1.73 mL/Hr) IV Continuous <Continuous>  sodium bicarbonate  Injectable 50 milliEquivalent(s) IV Push every 10 minutes  sodium chloride 0.9%. 1000 milliLiter(s) (10 mL/Hr) IV Continuous <Continuous>    MEDICATIONS  (PRN):       PROBLEM LIST/ ASSESSMENT:  HEALTH ISSUES - PROBLEM Dx:      ,   Patient is a 62y old  Female who presents for Pre-op optimization for AVR ascending aorta replacement (12 Dec 2022 23:39)     s/p AVR (t); replacement of ascending aorta      My plan includes :    Titrate inotrope support to maintain CI > 2.2/MVO2 >60 %  trend lactate levels  Continue full Vent support  Titrate Fio2 to maintain Sao2 >95%  Serial ABGs  Maintain MAP >70  Trend H/H after PRBC transfusion  Strict blood sugar control    close hemodynamic, ventilatory and drain monitoring and management per post op routine    Monitor for arrhythmias and monitor parameters for organ perfusion  monitor neurologic status  Head of the bed should remain elevated to 45 deg .   chest PT and IS will be encouraged  monitor adequacy of oxygenation and ventilation and attempt to wean oxygen  Nutritional goals will be met using po eventually , ensure adequate caloric intake and montior the same  Stress ulcer and VTE prophylaxis will be achieved    Glycemic control is satisfactory  Electrolytes have been repleted as necessary and wound care has been carried out. Pain control has been achieved.   agressive physical therapy and early mobility and ambulation goals will be met   The family was updated about the course and plan  CRITICAL CARE TIME SPENT in evaluation and management, reassessments, review and interpretation of labs and x-rays, ventilator and hemodynamic management, formulating a plan and coordinating care: __60___ MIN.  Time does not include procedural time.  CTICU ATTENDING     					    Francesco Thrasher MD                        	
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        Patient is a 62y old  Female who presents with a chief complaint of Pre-op optimization for AVR ascending aorta replacement (11 Dec 2022 18:01)      14 system review limited by mentation and multiorgan morbidity     Vital signs, hemodynamic and respiratory parameters were reviewed from the bedside nursing flowsheet.  ICU Vital Signs Last 24 Hrs  T(C): 36.4 (12 Dec 2022 21:33), Max: 36.4 (12 Dec 2022 21:33)  T(F): 97.5 (12 Dec 2022 21:33), Max: 97.5 (12 Dec 2022 21:33)  HR: 79 (12 Dec 2022 23:29) (58 - 88)  BP: 127/60 (12 Dec 2022 07:19) (107/56 - 127/60)  BP(mean): 86 (12 Dec 2022 07:19) (77 - 86)  ABP: 133/50 (12 Dec 2022 23:29) (90/56 - 147/72)  ABP(mean): 71 (12 Dec 2022 23:29) (65 - 101)  RR: 16 (12 Dec 2022 23:29) (10 - 18)  SpO2: 100% (12 Dec 2022 23:29) (90% - 100%)    O2 Parameters below as of 12 Dec 2022 23:29  Patient On (Oxygen Delivery Method): ventilator    O2 Concentration (%): 40      Adult Advanced Hemodynamics Last 24 Hrs  CVP(mm Hg): 10 (12 Dec 2022 23:29) (2 - 261)  CVP(cm H2O): --  CO: 6.1 (12 Dec 2022 23:29) (3 - 6.1)  CI: 3.9 (12 Dec 2022 23:29) (1.9 - 3.9)  PA: 15/7 (12 Dec 2022 23:29) ( - 40/)  PA(mean): 11 (12 Dec 2022 23:29) (6 - 30)  PCWP: --  SVR: 799 (12 Dec 2022 23:29) (799 - 1811)  SVRI: 1249 (12 Dec 2022 23:29) (1249 - 3069)  PVR: --  PVRI: --, ABG - ( 12 Dec 2022 22:50 )  pH, Arterial: 7.50  pH, Blood: x     /  pCO2: 30    /  pO2: 171   / HCO3: 23    / Base Excess: 1.1   /  SaO2: 99.6              Mode: AC/ CMV (Assist Control/ Continuous Mandatory Ventilation)  RR (machine): 16  TV (machine): 500  FiO2: 40  PEEP: 5  ITime: 1  MAP: 15  PIP: 30    Intake and output was reviewed and the fluid balance was calculated  Daily     Daily   I&O's Summary    11 Dec 2022 07:01  -  12 Dec 2022 07:00  --------------------------------------------------------  IN: 140 mL / OUT: 0 mL / NET: 140 mL    12 Dec 2022 07:01  -  12 Dec 2022 23:39  --------------------------------------------------------  IN: 4351.6 mL / OUT: 2560 mL / NET: 1791.6 mL        All lines and drain sites were assessed  Glycemic trend was reviewedCAPILLARY BLOOD GLUCOSE      POCT Blood Glucose.: 101 mg/dL (12 Dec 2022 22:51)    No acute change in focality  Auscultation of the chest reveals equal bs  Abdomen is soft  Extremities are warm and well perfused  Wounds appear clean and unremarkable  Antibiotics are periop    labs  CBC Full  -  ( 12 Dec 2022 22:46 )  WBC Count : 3.96 K/uL  RBC Count : 2.41 M/uL  Hemoglobin : 6.9 g/dL  Hematocrit : 20.9 %  Platelet Count - Automated : 60 K/uL  Mean Cell Volume : 86.7 fl  Mean Cell Hemoglobin : 28.6 pg  Mean Cell Hemoglobin Concentration : 33.0 gm/dL  Auto Neutrophil # : x  Auto Lymphocyte # : x  Auto Monocyte # : x  Auto Eosinophil # : x  Auto Basophil # : x  Auto Neutrophil % : x  Auto Lymphocyte % : x  Auto Monocyte % : x  Auto Eosinophil % : x  Auto Basophil % : x    12-12    151<H>  |  115<H>  |  11  ----------------------------<  117<H>  2.4<LL>   |  24  |  0.56    Ca    9.1      12 Dec 2022 22:46  Phos  0.4       Mg     2.8         TPro  5.9<L>  /  Alb  4.4  /  TBili  1.5<H>  /  DBili  x   /  AST  47<H>  /  ALT  15  /  AlkPhos  34<L>  12-12    PT/INR - ( 12 Dec 2022 22:46 )   PT: 12.8 sec;   INR: 1.07          PTT - ( 12 Dec 2022 22:46 )  PTT:36.9 sec  The current medications were reviewed   MEDICATIONS  (STANDING):  acetaZOLAMIDE  IVPB 250 milliGRAM(s) IV Intermittent daily  albumin human  5% IVPB 500 milliLiter(s) IV Intermittent every 30 minutes  ceFAZolin   IVPB 2000 milliGRAM(s) IV Intermittent every 8 hours  chlorhexidine 0.12% Liquid 15 milliLiter(s) Oral Mucosa every 12 hours  dexMEDEtomidine Infusion 0.1 MICROgram(s)/kG/Hr (1.45 mL/Hr) IV Continuous <Continuous>  dextrose 50% Injectable 50 milliLiter(s) IV Push every 15 minutes  dextrose 50% Injectable 25 milliLiter(s) IV Push every 15 minutes  DOBUTamine Infusion 2.5 MICROgram(s)/kG/Min (4.34 mL/Hr) IV Continuous <Continuous>  furosemide Infusion 10 mG/Hr (5 mL/Hr) IV Continuous <Continuous>  heparin   Injectable 5000 Unit(s) SubCutaneous every 8 hours  insulin regular Infusion 1 Unit(s)/Hr (1 mL/Hr) IV Continuous <Continuous>  milrinone Infusion 0.375 MICROgram(s)/kG/Min (6.5 mL/Hr) IV Continuous <Continuous>  nitroglycerin  Infusion 50 MICROgram(s)/Min (15 mL/Hr) IV Continuous <Continuous>  norepinephrine Infusion 0.05 MICROgram(s)/kG/Min (5.42 mL/Hr) IV Continuous <Continuous>  pantoprazole  Injectable 40 milliGRAM(s) IV Push daily  potassium chloride  20 mEq/100 mL IVPB 20 milliEquivalent(s) IV Intermittent once  propofol Infusion 5 MICROgram(s)/kG/Min (1.73 mL/Hr) IV Continuous <Continuous>  sodium bicarbonate  Injectable 50 milliEquivalent(s) IV Push every 10 minutes  sodium chloride 0.9%. 1000 milliLiter(s) (10 mL/Hr) IV Continuous <Continuous>    MEDICATIONS  (PRN):       PROBLEM LIST/ ASSESSMENT:  HEALTH ISSUES - PROBLEM Dx:      ,   Patient is a 62y old  Female who presents with a chief complaint of Pre-op optimization for AVR ascending aorta replacement (11 Dec 2022 18:01)     s/p cardiac surgery                My plan includes :  close hemodynamic, ventilatory and drain monitoring and management per post op routine    Monitor for arrhythmias and monitor parameters for organ perfusion  beta blockade not administered due to hemodynamic instability and bradycardia  monitor neurologic status  Head of the bed should remain elevated to 45 deg .   chest PT and IS will be encouraged  monitor adequacy of oxygenation and ventilation and attempt to wean oxygen  antibiotic regimen will be tailored to the clinical, laboratory and microbiologic data  Nutritional goals will be met using po eventually , ensure adequate caloric intake and montior the same  Stress ulcer and VTE prophylaxis will be achieved    Glycemic control is satisfactory  Electrolytes have been repleted as necessary and wound care has been carried out. Pain control has been achieved.   agressive physical therapy and early mobility and ambulation goals will be met   The family was updated about the course and plan  CRITICAL CARE TIME personally provided by me  in evaluation and management, reassessments, review and interpretation of labs and x-rays, ventilator and hemodynamic management, formulating a plan and coordinating care: ___90____ MIN.  Time does not include procedural time. Time spent was non routine post-operarive caRE and included multiple and repeated evaluations at the bedside  CTICU ATTENDING     					    Tristan Sommer MD                        	
INTERVAL COURSE  POD # 3 AVR/Asc replacement    at 2, ambulated few times   FB -500/ autodiuresing   Lactate, normal, Central venous ~ 70%  AST in 70s, but rest of LFTs in normal range       VITALS  Vital Signs Last 24 Hrs  T(C): 36.3 (15 Dec 2022 22:48), Max: 37.3 (15 Dec 2022 17:12)  T(F): 97.3 (15 Dec 2022 22:48), Max: 99.2 (15 Dec 2022 17:12)  HR: 84 (15 Dec 2022 23:00) (76 - 98)  BP: 125/63  BP(mean): 84  RR: 18 (15 Dec 2022 23:00) (15 - 19)  SpO2: 96% (15 Dec 2022 23:00) (95% - 100%)    Parameters below as of 15 Dec 2022 23:00  Patient On (Oxygen Delivery Method): nasal cannula w/ humidification  O2 Flow (L/min): 2      I&O's Summary  15 Dec 2022 07:01  -  16 Dec 2022 00:35  --------------------------------------------------------  IN: 1116.1 mL / OUT: 1775 mL / NET: -658.9 mL    PHYSICAL EXAM  General: A&Ox 3; NAD  Respiratory: CTA B/L; no wheezes  Cardiovascular: Regular rhythm/rate  Gastrointestinal: Soft; NTND   Extremities: WWP; no edema   Neurological:  CNII-XII grossly intact; no obvious focal deficits    MEDICATIONS  (STANDING):  acetaZOLAMIDE    Tablet 250 milliGRAM(s) Oral daily  albumin human  5% IVPB 500 milliLiter(s) IV Intermittent every 30 minutes  artificial tears (preservative free) Ophthalmic Solution 1 Drop(s) Both EYES every 6 hours  aspirin  chewable 81 milliGRAM(s) Oral daily  chlorhexidine 2% Cloths 1 Application(s) Topical daily  citalopram 20 milliGRAM(s) Oral daily  dextrose 5%. 1000 milliLiter(s) (50 mL/Hr) IV Continuous <Continuous>  dextrose 5%. 1000 milliLiter(s) (100 mL/Hr) IV Continuous <Continuous>  dextrose 50% Injectable 50 milliLiter(s) IV Push every 15 minutes  dextrose 50% Injectable 25 milliLiter(s) IV Push every 15 minutes  DOBUTamine Infusion 2 MICROgram(s)/kG/Min (3.47 mL/Hr) IV Continuous <Continuous>  glucagon  Injectable 1 milliGRAM(s) IntraMuscular once  heparin   Injectable 5000 Unit(s) SubCutaneous every 12 hours  insulin lispro (ADMELOG) corrective regimen sliding scale   SubCutaneous Before meals and at bedtime  milrinone Infusion 0.375 MICROgram(s)/kG/Min (6.5 mL/Hr) IV Continuous <Continuous>  pantoprazole    Tablet 40 milliGRAM(s) Oral before breakfast  polyethylene glycol 3350 17 Gram(s) Oral daily  potassium chloride   Powder 20 milliEquivalent(s) Oral once  senna 2 Tablet(s) Oral at bedtime  sodium chloride 0.9%. 1000 milliLiter(s) (10 mL/Hr) IV Continuous <Continuous>    MEDICATIONS  (PRN):  acetaminophen     Tablet .. 650 milliGRAM(s) Oral every 6 hours PRN Temp greater or equal to 38C (100.4F), Mild Pain (1 - 3)  dextrose Oral Gel 15 Gram(s) Oral once PRN Blood Glucose LESS THAN 70 milliGRAM(s)/deciliter  oxyCODONE    IR 5 milliGRAM(s) Oral every 6 hours PRN Moderate Pain (4 - 6)  oxyCODONE    IR 10 milliGRAM(s) Oral every 6 hours PRN Severe Pain (7 - 10)      LABS                        8.0    7.31  )-----------( 56       ( 15 Dec 2022 22:28 )             24.7     12-15    139  |  105  |  12  ----------------------------<  134<H>  3.5   |  26  |  0.48<L>    Ca    8.6      15 Dec 2022 22:29  Phos  1.7     12-15  Mg     1.8     12-15    TPro  5.7<L>  /  Alb  4.0  /  TBili  0.8  /  DBili  x   /  AST  79<H>  /  ALT  43  /  AlkPhos  81  12-15    LIVER FUNCTIONS - ( 15 Dec 2022 22:29 )  Alb: 4.0 g/dL / Pro: 5.7 g/dL / ALK PHOS: 81 U/L / ALT: 43 U/L / AST: 79 U/L / GGT: x           PT/INR - ( 15 Dec 2022 22:28 )   PT: 14.2 sec;   INR: 1.19          PTT - ( 15 Dec 2022 22:28 )  PTT:29.8 sec    IMAGING/EKG/ETC  Reviewed. bibasilar atelectasis 
Neurology Progress Note    Interval History:    She reported pain discomfort but increase in strength. She denies exacerbation of her symptoms. No acute complaints     Medications:  acetaminophen     Tablet .. 650 milliGRAM(s) Oral every 6 hours PRN  acetaZOLAMIDE    Tablet 250 milliGRAM(s) Oral daily  albumin human  5% IVPB 500 milliLiter(s) IV Intermittent every 30 minutes  artificial tears (preservative free) Ophthalmic Solution 1 Drop(s) Both EYES every 6 hours  aspirin  chewable 81 milliGRAM(s) Enteral Tube daily  chlorhexidine 2% Cloths 1 Application(s) Topical daily  citalopram 20 milliGRAM(s) Oral daily  dextrose 5%. 1000 milliLiter(s) IV Continuous <Continuous>  dextrose 5%. 1000 milliLiter(s) IV Continuous <Continuous>  dextrose 50% Injectable 50 milliLiter(s) IV Push every 15 minutes  dextrose 50% Injectable 25 milliLiter(s) IV Push every 15 minutes  dextrose Oral Gel 15 Gram(s) Oral once PRN  DOBUTamine Infusion 2 MICROgram(s)/kG/Min IV Continuous <Continuous>  glucagon  Injectable 1 milliGRAM(s) IntraMuscular once  heparin   Injectable 5000 Unit(s) SubCutaneous every 12 hours  insulin lispro (ADMELOG) corrective regimen sliding scale   SubCutaneous Before meals and at bedtime  milrinone Infusion 0.375 MICROgram(s)/kG/Min IV Continuous <Continuous>  oxyCODONE    IR 5 milliGRAM(s) Oral every 6 hours PRN  oxyCODONE    IR 10 milliGRAM(s) Oral every 6 hours PRN  pantoprazole    Tablet 40 milliGRAM(s) Oral before breakfast  polyethylene glycol 3350 17 Gram(s) Oral daily  senna 2 Tablet(s) Oral at bedtime  sodium chloride 0.9%. 1000 milliLiter(s) IV Continuous <Continuous>      Vital Signs Last 24 Hrs  T(C): 36.9 (15 Dec 2022 10:00), Max: 37.2 (14 Dec 2022 22:37)  T(F): 98.5 (15 Dec 2022 10:00), Max: 98.9 (14 Dec 2022 22:37)  HR: 81 (15 Dec 2022 11:00) (67 - 89)  BP: --  BP(mean): --  RR: 15 (15 Dec 2022 11:00) (15 - 20)  SpO2: 97% (15 Dec 2022 11:00) (93% - 100%)    Parameters below as of 15 Dec 2022 11:00  Patient On (Oxygen Delivery Method): nasal cannula w/ humidification  O2 Flow (L/min): 2      Neurological Examination:  General:  Appearance is consistent with chronologic age.   Cognitive/Language:  AAOx3. Fluent, follows command. Nondysarthric.    Cranial Nerves  - Eyes: Visual acuity intact, Visual fields full.  EOMI w/o nystagmus, skew or reported double vision.  No ptosis/weakness of eyelid closure.    - Face: no facial asymmetry.    - Ears/Nose/Throat:  Hearing grossly intact. Neck w/ FROM  Motor examination: (MRC grade R/L) 4/5 Deltoid. other 5/5 UE; 5/5 LE prox/distal. Normal tone and bulk. No tenderness, twitching, tremors or involuntary movements.  Sensory examination:  LT,  R=L;  Reflexes:  deferred   Cerebellum: Finger tapping symmetric    Gait deferred     Labs:  CBC Full  -  ( 15 Dec 2022 02:45 )  WBC Count : 8.97 K/uL  RBC Count : 2.82 M/uL  Hemoglobin : 8.1 g/dL  Hematocrit : 25.3 %  Platelet Count - Automated : 59 K/uL  Mean Cell Volume : 89.7 fl  Mean Cell Hemoglobin : 28.7 pg  Mean Cell Hemoglobin Concentration : 32.0 gm/dL    12-15    139  |  103  |  13  ----------------------------<  141<H>  3.5   |  28  |  0.47<L>    Ca    8.6      15 Dec 2022 02:45  Phos  1.7     12-15  Mg     2.2     12-15    TPro  5.9<L>  /  Alb  4.1  /  TBili  0.8  /  DBili  x   /  AST  39  /  ALT  18  /  AlkPhos  60  12-15    LIVER FUNCTIONS - ( 15 Dec 2022 02:45 )  Alb: 4.1 g/dL / Pro: 5.9 g/dL / ALK PHOS: 60 U/L / ALT: 18 U/L / AST: 39 U/L / GGT: x           PT/INR - ( 15 Dec 2022 02:45 )   PT: 14.1 sec;   INR: 1.18          PTT - ( 15 Dec 2022 02:45 )  PTT:32.8 sec      RADIOLOGY & ADDITIONAL TESTS:  
Patient discussed on morning rounds with Dr. Echevarria    Operation / Date:  12/12/ AVR and ascending Aorta replacement     SUBJECTIVE ASSESSMENT:  62y Female reports feeling very short of breath this morning but felt like it improved after the chest tube was removed. States that she is still feeling very fatigued and not like herself. She has requested to stat in the hospital one more day.         Vital Signs Last 24 Hrs  T(C): 36.4 (19 Dec 2022 05:01), Max: 37.3 (18 Dec 2022 20:24)  T(F): 97.6 (19 Dec 2022 05:01), Max: 99.2 (18 Dec 2022 20:24)  HR: 71 (19 Dec 2022 09:36) (62 - 95)  BP: 98/57 (19 Dec 2022 05:40) (90/58 - 113/66)  BP(mean): 73 (19 Dec 2022 05:40) (69 - 83)  RR: 14 (19 Dec 2022 04:55) (14 - 25)  SpO2: 94% (19 Dec 2022 09:36) (91% - 100%)    Parameters below as of 19 Dec 2022 09:36  Patient On (Oxygen Delivery Method): room air      I&O's Detail    18 Dec 2022 07:01  -  19 Dec 2022 07:00  --------------------------------------------------------  IN:    Oral Fluid: 600 mL  Total IN: 600 mL    OUT:    Bulb (mL): 25 mL    Chest Tube (mL): 700 mL    Voided (mL): 1750 mL  Total OUT: 2475 mL    Total NET: -1875 mL      19 Dec 2022 07:01  -  19 Dec 2022 13:41  --------------------------------------------------------  IN:    Oral Fluid: 260 mL  Total IN: 260 mL    OUT:    Chest Tube (mL): 40 mL    Voided (mL): 300 mL  Total OUT: 340 mL    Total NET: -80 mL          CHEST TUBE:  No  JOHN PAUL DRAIN:  Yes.  EPICARDIAL WIRES: Yes  TIE DOWNS: Yes  VALENTE: No.    PHYSICAL EXAM:    GEN: NAD, looks uncomfortable  Psych: Mood appropriate  Neuro: A&Ox3.  No focal deficits.  Moving all extremities.   HEENT: No obvious abnormalities  CV: S1S2, regular, no murmurs appreciated.  No carotid bruits.  No JVD  Lungs:  decreased lung sounds at right base.   ABD: Soft, non-tender, non-distended.  +Bowel sounds  EXT: Warm and well perfused. trace peripheral edema noted  Musculoskeletal: Moving all extremities with normal ROM, no joint swelling  PV: Pedal pulses palpable  MSI: clean dry intact     LABS:                        7.7    7.76  )-----------( 96       ( 19 Dec 2022 05:30 )             24.4       COUMADIN:  No. REASON: .    PT/INR - ( 19 Dec 2022 05:30 )   PT: 14.3 sec;   INR: 1.20          PTT - ( 19 Dec 2022 05:30 )  PTT:25.8 sec    12-19    135  |  99  |  11  ----------------------------<  114<H>  3.4<L>   |  27  |  0.52    Ca    9.1      19 Dec 2022 05:30  Mg     1.8     12-19            MEDICATIONS  (STANDING):  acetaZOLAMIDE    Tablet 250 milliGRAM(s) Oral daily  artificial tears (preservative free) Ophthalmic Solution 1 Drop(s) Both EYES every 6 hours  aspirin  chewable 81 milliGRAM(s) Oral daily  citalopram 20 milliGRAM(s) Oral daily  dextrose 50% Injectable 50 milliLiter(s) IV Push every 15 minutes  dextrose 50% Injectable 25 milliLiter(s) IV Push every 15 minutes  furosemide    Tablet 20 milliGRAM(s) Oral daily  heparin   Injectable 5000 Unit(s) SubCutaneous every 12 hours  pantoprazole    Tablet 40 milliGRAM(s) Oral before breakfast  polyethylene glycol 3350 17 Gram(s) Oral daily  senna 2 Tablet(s) Oral at bedtime  sodium chloride 0.9% lock flush 3 milliLiter(s) IV Push every 8 hours  sodium chloride 0.9%. 1000 milliLiter(s) (10 mL/Hr) IV Continuous <Continuous>    MEDICATIONS  (PRN):  acetaminophen     Tablet .. 650 milliGRAM(s) Oral every 6 hours PRN Temp greater or equal to 38C (100.4F), Mild Pain (1 - 3)  bisacodyl 5 milliGRAM(s) Oral daily PRN Constipation  oxyCODONE    IR 5 milliGRAM(s) Oral every 6 hours PRN Moderate Pain (4 - 6)  oxyCODONE    IR 10 milliGRAM(s) Oral every 6 hours PRN Severe Pain (7 - 10)        RADIOLOGY & ADDITIONAL TESTS:  < from: Xray Chest 1 View-PORTABLE IMMEDIATE (Xray Chest 1 View-PORTABLE IMMEDIATE .) (12.18.22 @ 14:31) >  INTERPRETATION:  Indication: post pigtail insertion    A single portable view of the chest is submitted. Comparison is made to   the most recent prior chest radiograph dated earlier same date   12/18/2022. New basilar right pleural catheter. Decreased right pleural   effusion and decreased right lower lung atelectasis. Probably unchanged   small left pleural effusion. No pneumothorax.    Impression:  Since earlier today, decreased right pleural effusion. No pneumothorax.    --- End of Report ---    <

## 2022-12-19 NOTE — PROGRESS NOTE ADULT - ASSESSMENT
62 F with a PMHx of hyperkalemic periodic paralysis (diagnosed at age 36, controlled on diamox, last flare 2 years ago), bicuspid aortic valve with severe AS, aortic aneurysm, splenic vein thrombosis, portal vein thrombosis, macular degeneration, ITP, esophageal varices GERD, basal cell carcinoma, T11 fracture (current), cataracts, presented initially for direct admission for pre-op optimization prior to AVR and ascending aorta replacement. On 22 pt underwent AVR ascending aorta replacement, EF normal. Intraoperative course uneventful. Arrived to CTICU on levo. CI noted to be low, started on Dobutamine and primacor with improvement in RV ejection and indices. POD1 given 1 unit pRBC, transfused with 1 unit for pRBC for low H/H. POD2 primacor decreased. POD3  decreased. Primacor weaned. CTs removed. Ambulated. POD4 transferred to floor. POD5 complaining of increased SOB. Started on Intermittent BiPaP. US of the chest revealed b/l small pleural effusions, not large enough for drainage. POD#7 echo performed to rule out pericardial effusion, no effusion, normal Bi-V function. Pigtail was removed in afternoon with no incident. Patient will go home tomorrow morning.     Neuro: pain management, history of hyperkalemic periodic paralysis.   - Oxy PRN   - Received 1 dose of IV dilaudid for chest tube removal.  - Celexa for history of depression   - Neuro following for hyperkalemic periodic paralysis: on diamox.     CVS: s/p AVR ascending aortic replacement, NSR;  - Continue ASA,   - no statin indicated.   - Beta blocker when BP can tolerate for afib ppx     Respiratory: Saturating well on RA; Pigtail placed /18 for effusion. Removed today. No PTX,   - Will need Home Bipap instead of CPAP. Follow up with nursing management about getting new set up.   - IS and ambulation  - Chest PT.     GI: Last BM Saturday   - GI PPX   - Bowel regimen with Senna and Miralax     : BUN/ Creatinine 11/0.52 . - [place]   - monitor I/Os.   - continuing diamox   - Lasix 20 PO daily. Will continue to monitor K     Endo: FS well contolled.   - ISS     ID: afebrile WBC wnl.  - completed periop ABX   - continue to monitor fever curve     Heme: DVT PPX Hemoglobin trending down.  - consider 1units of blood?  - [facility]     Dispo plan: Home tomorrow.     Rebeca Rosenberg PA-C

## 2022-12-19 NOTE — PROGRESS NOTE ADULT - PROVIDER SPECIALTY LIST ADULT
Neurology
Critical Care
CT Surgery
Critical Care
CT Surgery
CT Surgery
Critical Care
Critical Care
Neurology
Neurology

## 2022-12-19 NOTE — PROGRESS NOTE ADULT - ATTENDING COMMENTS
she is doing well post-op.  examination with myopathic features and myotonia, typical with older patient's who have her condition    avoid K supplementation  monitor daily K    please re-consult if further assistance is needed

## 2022-12-19 NOTE — PROGRESS NOTE ADULT - REASON FOR ADMISSION
Pre-op optimization for AVR ascending aorta replacement

## 2022-12-20 ENCOUNTER — TRANSCRIPTION ENCOUNTER (OUTPATIENT)
Age: 62
End: 2022-12-20

## 2022-12-20 VITALS
DIASTOLIC BLOOD PRESSURE: 69 MMHG | SYSTOLIC BLOOD PRESSURE: 126 MMHG | TEMPERATURE: 99 F | HEART RATE: 808 BPM | OXYGEN SATURATION: 94 % | RESPIRATION RATE: 18 BRPM

## 2022-12-20 LAB
ALBUMIN SERPL ELPH-MCNC: 3.5 G/DL — SIGNIFICANT CHANGE UP (ref 3.3–5)
ALP SERPL-CCNC: 149 U/L — HIGH (ref 40–120)
ALT FLD-CCNC: 85 U/L — HIGH (ref 10–45)
ANION GAP SERPL CALC-SCNC: 10 MMOL/L — SIGNIFICANT CHANGE UP (ref 5–17)
ANISOCYTOSIS BLD QL: SLIGHT — SIGNIFICANT CHANGE UP
AST SERPL-CCNC: 60 U/L — HIGH (ref 10–40)
BASOPHILS # BLD AUTO: 0.07 K/UL — SIGNIFICANT CHANGE UP (ref 0–0.2)
BASOPHILS NFR BLD AUTO: 0.9 % — SIGNIFICANT CHANGE UP (ref 0–2)
BILIRUB SERPL-MCNC: 0.7 MG/DL — SIGNIFICANT CHANGE UP (ref 0.2–1.2)
BUN SERPL-MCNC: 10 MG/DL — SIGNIFICANT CHANGE UP (ref 7–23)
CALCIUM SERPL-MCNC: 8.8 MG/DL — SIGNIFICANT CHANGE UP (ref 8.4–10.5)
CHLORIDE SERPL-SCNC: 99 MMOL/L — SIGNIFICANT CHANGE UP (ref 96–108)
CO2 SERPL-SCNC: 26 MMOL/L — SIGNIFICANT CHANGE UP (ref 22–31)
CREAT SERPL-MCNC: 0.54 MG/DL — SIGNIFICANT CHANGE UP (ref 0.5–1.3)
EGFR: 103 ML/MIN/1.73M2 — SIGNIFICANT CHANGE UP
EOSINOPHIL # BLD AUTO: 0.42 K/UL — SIGNIFICANT CHANGE UP (ref 0–0.5)
EOSINOPHIL NFR BLD AUTO: 5.2 % — SIGNIFICANT CHANGE UP (ref 0–6)
GIANT PLATELETS BLD QL SMEAR: PRESENT — SIGNIFICANT CHANGE UP
GLUCOSE SERPL-MCNC: 119 MG/DL — HIGH (ref 70–99)
HCT VFR BLD CALC: 26 % — LOW (ref 34.5–45)
HGB BLD-MCNC: 8 G/DL — LOW (ref 11.5–15.5)
HYPOCHROMIA BLD QL: SIGNIFICANT CHANGE UP
LYMPHOCYTES # BLD AUTO: 1.5 K/UL — SIGNIFICANT CHANGE UP (ref 1–3.3)
LYMPHOCYTES # BLD AUTO: 18.3 % — SIGNIFICANT CHANGE UP (ref 13–44)
MAGNESIUM SERPL-MCNC: 1.8 MG/DL — SIGNIFICANT CHANGE UP (ref 1.6–2.6)
MANUAL SMEAR VERIFICATION: SIGNIFICANT CHANGE UP
MCHC RBC-ENTMCNC: 28.2 PG — SIGNIFICANT CHANGE UP (ref 27–34)
MCHC RBC-ENTMCNC: 30.8 GM/DL — LOW (ref 32–36)
MCV RBC AUTO: 91.5 FL — SIGNIFICANT CHANGE UP (ref 80–100)
METAMYELOCYTES # FLD: 1.7 % — HIGH (ref 0–0)
MICROCYTES BLD QL: SLIGHT — SIGNIFICANT CHANGE UP
MONOCYTES # BLD AUTO: 0.64 K/UL — SIGNIFICANT CHANGE UP (ref 0–0.9)
MONOCYTES NFR BLD AUTO: 7.8 % — SIGNIFICANT CHANGE UP (ref 2–14)
NEUTROPHILS # BLD AUTO: 5.4 K/UL — SIGNIFICANT CHANGE UP (ref 1.8–7.4)
NEUTROPHILS NFR BLD AUTO: 66.1 % — SIGNIFICANT CHANGE UP (ref 43–77)
NRBC # BLD: 2 /100 — HIGH (ref 0–0)
NRBC # BLD: SIGNIFICANT CHANGE UP /100 WBCS (ref 0–0)
OVALOCYTES BLD QL SMEAR: SLIGHT — SIGNIFICANT CHANGE UP
PHOSPHATE SERPL-MCNC: 3.4 MG/DL — SIGNIFICANT CHANGE UP (ref 2.5–4.5)
PLAT MORPH BLD: ABNORMAL
PLATELET # BLD AUTO: 119 K/UL — LOW (ref 150–400)
POIKILOCYTOSIS BLD QL AUTO: SLIGHT — SIGNIFICANT CHANGE UP
POLYCHROMASIA BLD QL SMEAR: SLIGHT — SIGNIFICANT CHANGE UP
POTASSIUM SERPL-MCNC: 3.3 MMOL/L — LOW (ref 3.5–5.3)
POTASSIUM SERPL-SCNC: 3.3 MMOL/L — LOW (ref 3.5–5.3)
PROT SERPL-MCNC: 6 G/DL — SIGNIFICANT CHANGE UP (ref 6–8.3)
RBC # BLD: 2.84 M/UL — LOW (ref 3.8–5.2)
RBC # FLD: 15.9 % — HIGH (ref 10.3–14.5)
RBC BLD AUTO: ABNORMAL
SODIUM SERPL-SCNC: 135 MMOL/L — SIGNIFICANT CHANGE UP (ref 135–145)
WBC # BLD: 8.17 K/UL — SIGNIFICANT CHANGE UP (ref 3.8–10.5)
WBC # FLD AUTO: 8.17 K/UL — SIGNIFICANT CHANGE UP (ref 3.8–10.5)

## 2022-12-20 PROCEDURE — 85025 COMPLETE CBC W/AUTO DIFF WBC: CPT

## 2022-12-20 PROCEDURE — 80048 BASIC METABOLIC PNL TOTAL CA: CPT

## 2022-12-20 PROCEDURE — C1889: CPT

## 2022-12-20 PROCEDURE — P9045: CPT

## 2022-12-20 PROCEDURE — 84100 ASSAY OF PHOSPHORUS: CPT

## 2022-12-20 PROCEDURE — 86850 RBC ANTIBODY SCREEN: CPT

## 2022-12-20 PROCEDURE — 84436 ASSAY OF TOTAL THYROXINE: CPT

## 2022-12-20 PROCEDURE — 88311 DECALCIFY TISSUE: CPT

## 2022-12-20 PROCEDURE — P9059: CPT

## 2022-12-20 PROCEDURE — 86891 AUTOLOGOUS BLOOD OP SALVAGE: CPT

## 2022-12-20 PROCEDURE — 83735 ASSAY OF MAGNESIUM: CPT

## 2022-12-20 PROCEDURE — 71045 X-RAY EXAM CHEST 1 VIEW: CPT | Mod: 26,77

## 2022-12-20 PROCEDURE — 97530 THERAPEUTIC ACTIVITIES: CPT

## 2022-12-20 PROCEDURE — 85027 COMPLETE CBC AUTOMATED: CPT

## 2022-12-20 PROCEDURE — 82803 BLOOD GASES ANY COMBINATION: CPT

## 2022-12-20 PROCEDURE — C1768: CPT

## 2022-12-20 PROCEDURE — 85610 PROTHROMBIN TIME: CPT

## 2022-12-20 PROCEDURE — 71046 X-RAY EXAM CHEST 2 VIEWS: CPT

## 2022-12-20 PROCEDURE — 86022 PLATELET ANTIBODIES: CPT

## 2022-12-20 PROCEDURE — C1769: CPT

## 2022-12-20 PROCEDURE — 84132 ASSAY OF SERUM POTASSIUM: CPT

## 2022-12-20 PROCEDURE — 84295 ASSAY OF SERUM SODIUM: CPT

## 2022-12-20 PROCEDURE — 88305 TISSUE EXAM BY PATHOLOGIST: CPT

## 2022-12-20 PROCEDURE — P9037: CPT

## 2022-12-20 PROCEDURE — 93005 ELECTROCARDIOGRAM TRACING: CPT

## 2022-12-20 PROCEDURE — 83036 HEMOGLOBIN GLYCOSYLATED A1C: CPT

## 2022-12-20 PROCEDURE — 85384 FIBRINOGEN ACTIVITY: CPT

## 2022-12-20 PROCEDURE — C1751: CPT

## 2022-12-20 PROCEDURE — 71045 X-RAY EXAM CHEST 1 VIEW: CPT | Mod: 26

## 2022-12-20 PROCEDURE — 86900 BLOOD TYPING SEROLOGIC ABO: CPT

## 2022-12-20 PROCEDURE — 71045 X-RAY EXAM CHEST 1 VIEW: CPT

## 2022-12-20 PROCEDURE — 84443 ASSAY THYROID STIM HORMONE: CPT

## 2022-12-20 PROCEDURE — 82947 ASSAY GLUCOSE BLOOD QUANT: CPT

## 2022-12-20 PROCEDURE — 84484 ASSAY OF TROPONIN QUANT: CPT

## 2022-12-20 PROCEDURE — 83880 ASSAY OF NATRIURETIC PEPTIDE: CPT

## 2022-12-20 PROCEDURE — 36415 COLL VENOUS BLD VENIPUNCTURE: CPT

## 2022-12-20 PROCEDURE — 85730 THROMBOPLASTIN TIME PARTIAL: CPT

## 2022-12-20 PROCEDURE — P9100: CPT

## 2022-12-20 PROCEDURE — 94002 VENT MGMT INPAT INIT DAY: CPT

## 2022-12-20 PROCEDURE — 97535 SELF CARE MNGMENT TRAINING: CPT

## 2022-12-20 PROCEDURE — 97116 GAIT TRAINING THERAPY: CPT

## 2022-12-20 PROCEDURE — 86901 BLOOD TYPING SEROLOGIC RH(D): CPT

## 2022-12-20 PROCEDURE — 81003 URINALYSIS AUTO W/O SCOPE: CPT

## 2022-12-20 PROCEDURE — 93308 TTE F-UP OR LMTD: CPT

## 2022-12-20 PROCEDURE — 82962 GLUCOSE BLOOD TEST: CPT

## 2022-12-20 PROCEDURE — C1781: CPT

## 2022-12-20 PROCEDURE — 80061 LIPID PANEL: CPT

## 2022-12-20 PROCEDURE — 94660 CPAP INITIATION&MGMT: CPT

## 2022-12-20 PROCEDURE — 86923 COMPATIBILITY TEST ELECTRIC: CPT

## 2022-12-20 PROCEDURE — 36430 TRANSFUSION BLD/BLD COMPNT: CPT

## 2022-12-20 PROCEDURE — 97163 PT EVAL HIGH COMPLEX 45 MIN: CPT

## 2022-12-20 PROCEDURE — 83605 ASSAY OF LACTIC ACID: CPT

## 2022-12-20 PROCEDURE — 86965 POOLING BLOOD PLATELETS: CPT

## 2022-12-20 PROCEDURE — 97165 OT EVAL LOW COMPLEX 30 MIN: CPT

## 2022-12-20 PROCEDURE — P9016: CPT

## 2022-12-20 PROCEDURE — 85014 HEMATOCRIT: CPT

## 2022-12-20 PROCEDURE — 86803 HEPATITIS C AB TEST: CPT

## 2022-12-20 PROCEDURE — 82330 ASSAY OF CALCIUM: CPT

## 2022-12-20 PROCEDURE — 80053 COMPREHEN METABOLIC PANEL: CPT

## 2022-12-20 PROCEDURE — P9012: CPT

## 2022-12-20 RX ORDER — ASPIRIN/CALCIUM CARB/MAGNESIUM 324 MG
1 TABLET ORAL
Qty: 30 | Refills: 0
Start: 2022-12-20 | End: 2023-01-18

## 2022-12-20 RX ORDER — FUROSEMIDE 40 MG
1 TABLET ORAL
Qty: 7 | Refills: 0
Start: 2022-12-20 | End: 2022-12-26

## 2022-12-20 RX ORDER — ACETAMINOPHEN 500 MG
2 TABLET ORAL
Qty: 240 | Refills: 0
Start: 2022-12-20 | End: 2023-01-18

## 2022-12-20 RX ORDER — CITALOPRAM 10 MG/1
1 TABLET, FILM COATED ORAL
Qty: 30 | Refills: 0
Start: 2022-12-20 | End: 2023-01-18

## 2022-12-20 RX ORDER — OMEPRAZOLE 10 MG/1
1 CAPSULE, DELAYED RELEASE ORAL
Qty: 0 | Refills: 0 | DISCHARGE

## 2022-12-20 RX ORDER — SENNA PLUS 8.6 MG/1
2 TABLET ORAL
Qty: 60 | Refills: 0
Start: 2022-12-20 | End: 2023-01-18

## 2022-12-20 RX ORDER — ESTROGENS, CONJUGATED 0.625 MG/G
0 CREAM WITH APPLICATOR VAGINAL
Qty: 0 | Refills: 0 | DISCHARGE

## 2022-12-20 RX ORDER — ACETAZOLAMIDE 250 MG/1
1 TABLET ORAL
Qty: 30 | Refills: 0
Start: 2022-12-20 | End: 2023-01-18

## 2022-12-20 RX ORDER — POLYETHYLENE GLYCOL 3350 17 G/17G
17 POWDER, FOR SOLUTION ORAL
Qty: 510 | Refills: 0
Start: 2022-12-20 | End: 2023-01-18

## 2022-12-20 RX ORDER — OXYCODONE HYDROCHLORIDE 5 MG/1
1 TABLET ORAL
Qty: 56 | Refills: 0
Start: 2022-12-20 | End: 2023-01-02

## 2022-12-20 RX ORDER — POTASSIUM CHLORIDE 20 MEQ
10 PACKET (EA) ORAL ONCE
Refills: 0 | Status: COMPLETED | OUTPATIENT
Start: 2022-12-20 | End: 2022-12-20

## 2022-12-20 RX ORDER — MULTIVIT-MIN/FERROUS GLUCONATE 9 MG/15 ML
1 LIQUID (ML) ORAL
Qty: 0 | Refills: 0 | DISCHARGE

## 2022-12-20 RX ORDER — OMEPRAZOLE 10 MG/1
1 CAPSULE, DELAYED RELEASE ORAL
Qty: 30 | Refills: 0
Start: 2022-12-20 | End: 2023-01-18

## 2022-12-20 RX ADMIN — OXYCODONE HYDROCHLORIDE 10 MILLIGRAM(S): 5 TABLET ORAL at 17:38

## 2022-12-20 RX ADMIN — SODIUM CHLORIDE 3 MILLILITER(S): 9 INJECTION INTRAMUSCULAR; INTRAVENOUS; SUBCUTANEOUS at 06:34

## 2022-12-20 RX ADMIN — POLYETHYLENE GLYCOL 3350 17 GRAM(S): 17 POWDER, FOR SOLUTION ORAL at 11:27

## 2022-12-20 RX ADMIN — Medication 1 DROP(S): at 17:37

## 2022-12-20 RX ADMIN — PANTOPRAZOLE SODIUM 40 MILLIGRAM(S): 20 TABLET, DELAYED RELEASE ORAL at 06:34

## 2022-12-20 RX ADMIN — OXYCODONE HYDROCHLORIDE 10 MILLIGRAM(S): 5 TABLET ORAL at 06:34

## 2022-12-20 RX ADMIN — OXYCODONE HYDROCHLORIDE 10 MILLIGRAM(S): 5 TABLET ORAL at 18:17

## 2022-12-20 RX ADMIN — Medication 650 MILLIGRAM(S): at 12:20

## 2022-12-20 RX ADMIN — OXYCODONE HYDROCHLORIDE 10 MILLIGRAM(S): 5 TABLET ORAL at 05:41

## 2022-12-20 RX ADMIN — OXYCODONE HYDROCHLORIDE 10 MILLIGRAM(S): 5 TABLET ORAL at 11:25

## 2022-12-20 RX ADMIN — Medication 10 MILLIEQUIVALENT(S): at 17:38

## 2022-12-20 RX ADMIN — SODIUM CHLORIDE 3 MILLILITER(S): 9 INJECTION INTRAMUSCULAR; INTRAVENOUS; SUBCUTANEOUS at 13:20

## 2022-12-20 RX ADMIN — OXYCODONE HYDROCHLORIDE 5 MILLIGRAM(S): 5 TABLET ORAL at 05:37

## 2022-12-20 RX ADMIN — Medication 81 MILLIGRAM(S): at 11:27

## 2022-12-20 RX ADMIN — Medication 1 DROP(S): at 11:26

## 2022-12-20 RX ADMIN — Medication 20 MILLIGRAM(S): at 05:42

## 2022-12-20 RX ADMIN — Medication 650 MILLIGRAM(S): at 11:26

## 2022-12-20 RX ADMIN — HEPARIN SODIUM 5000 UNIT(S): 5000 INJECTION INTRAVENOUS; SUBCUTANEOUS at 05:42

## 2022-12-20 RX ADMIN — ACETAZOLAMIDE 250 MILLIGRAM(S): 250 TABLET ORAL at 11:27

## 2022-12-20 RX ADMIN — OXYCODONE HYDROCHLORIDE 10 MILLIGRAM(S): 5 TABLET ORAL at 12:20

## 2022-12-20 NOTE — DISCHARGE NOTE PROVIDER - CARE PROVIDERS DIRECT ADDRESSES
,[email or web address]

## 2022-12-20 NOTE — CHART NOTE - NSCHARTNOTEFT_GEN_A_CORE
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 Last documented bowel movement .     Pain: No noted pain at this time.     Skin Integrity: Generalized edema 1+. Surgical incisions per chart. Ryley score: 20.     Labs:       135  |  99  |  10  ----------------------------<  119<H>  3.3<L>   |  26  |  0.54    Ca    8.8      20 Dec 2022 05:30  Phos  3.4     12-20  Mg     1.8     12-20    TPro  6.0  /  Alb  3.5  /  TBili  0.7  /  DBili  x   /  AST  60<H>  /  ALT  85<H>  /  AlkPhos  149<H>  12-20    CAPILLARY BLOOD GLUCOSE          Medications:  MEDICATIONS  (STANDING):  acetaZOLAMIDE    Tablet 250 milliGRAM(s) Oral daily  artificial tears (preservative free) Ophthalmic Solution 1 Drop(s) Both EYES every 6 hours  aspirin  chewable 81 milliGRAM(s) Oral daily  citalopram 20 milliGRAM(s) Oral daily  dextrose 50% Injectable 50 milliLiter(s) IV Push every 15 minutes  dextrose 50% Injectable 25 milliLiter(s) IV Push every 15 minutes  furosemide    Tablet 20 milliGRAM(s) Oral daily  heparin   Injectable 5000 Unit(s) SubCutaneous every 12 hours  pantoprazole    Tablet 40 milliGRAM(s) Oral before breakfast  polyethylene glycol 3350 17 Gram(s) Oral daily  senna 2 Tablet(s) Oral at bedtime  sodium chloride 0.9% lock flush 3 milliLiter(s) IV Push every 8 hours  sodium chloride 0.9%. 1000 milliLiter(s) (10 mL/Hr) IV Continuous <Continuous>    MEDICATIONS  (PRN):  acetaminophen     Tablet .. 650 milliGRAM(s) Oral every 6 hours PRN Temp greater or equal to 38C (100.4F), Mild Pain (1 - 3)  bisacodyl 5 milliGRAM(s) Oral daily PRN Constipation  oxyCODONE    IR 5 milliGRAM(s) Oral every 6 hours PRN Moderate Pain (4 - 6)  oxyCODONE    IR 10 milliGRAM(s) Oral every 6 hours PRN Severe Pain (7 - 10)      Dosing Anthropometrics:  Height for BMI (FEET)	5 Feet  Height for BMI (INCHES)	2 Inch(s)  Height for BMI (CENTIMETERS)	157.48 Centimeter(s)  Weight for BMI (lbs)	127 lb  Weight for BMI (kg)	57.6 kg  Body Mass Index	23.2  IBW: 110 pounds   %IBW: 115%    Weight Change: No new documented weights in EMR. Obtain biweekly weights to assess changes/trends.    Estimated energy needs: Based on Standards of Care pt within % IBW thus actual body weight used for all calculations. Needs adjusted for post op.  Estimated Energy Needs From (shreyas/kg) 25  Estimated Energy Needs To (shreyas/kg)	30  Estimated Energy Needs Calculated From (shreyas/kg) 1440  Estimated Energy Needs Calculated To (shreyas/kg)	1728    Estimated Protein Needs From (g/kg)	1  Estimated Protein Needs To (g/kg)	1.2  Estimated Protein Needs Calculated From (g/kg) 57.6  Estimated Protein Needs Calculated To (g/kg)	69.12    Estimated Fluid Needs From (ml/kg)	25  Estimated Fluid Needs To (ml/kg)	30  Estimated Fluid Needs Calculated From (ml/kg)	1440  Estimated Fluid Needs Calculated To (ml/kg)	1728    Subjective: 62 F with a PMHx of hyperkalemic periodic paralysis (diagnosed at age 36, controlled on diamox, last flare 2 years ago), bicuspid aortic valve with severe AS, aortic aneurysm, splenic vein thrombosis, portal vein thrombosis, macular degeneration, ITP, esophageal varices GERD, basal cell carcinoma, T11 fracture (current), cataracts, presented initially for direct admission for pre-op optimization prior to AVR and ascending aorta replacement. On 22 pt underwent AVR ascending aorta replacement, EF normal. Intraoperative course uneventful. Arrived to CTICU on levo. CI noted to be low, started on Dobutamine and primacor with improvement in RV ejection and indices. POD1 given 1 unit pRBC, transfused with 1 unit for pRBC for low H/H. POD2 primacor decreased. POD3  decreased. Primacor weaned. CTs removed. Ambulated. POD4 transferred to floor. POD5 complaining of increased SOB. Started on Intermittent BiPaP. US of the chest revealed b/l small pleural effusions, not large enough for drainage. POD#7 echo performed to rule out pericardial effusion, no effusion, normal Bi-V function. Pigtail was removed in afternoon with no incident.     Pt seen at bedside for follow up assessment- on NC at time of assessment. Labs reviewed ; pt hypokalemic and noted w/ elevated LFTs. Pt reports improvement in appetite since last RD interview; able to complete >50% of meals. Reinforced importance of adequate PO intake post op and ordering per preferences; amenable to education. Made aware RD remains available. RD to follow up. See nutrition recommendations below.     Previous Nutrition Diagnosis: Increased protein needs r/t physiological demand for nutrient AEB post op    Active [ x  ]  Resolved [   ]    If resolved, new PES:     Goal: Pt to meet at least 75% of nutritional needs during hospital stay consistently    Recommendations:  1. Continue with current diet order (monitor need for ONS)  2. Encourage pt to meet nutritional needs as able   3. Encourage adherence to diet education (reinforce as able)   4. Pain and bowel regimen per team   5. Will assess/honor preferences as able   6. Monitor electrolytes; replete PRN    Education: Adequate PO intake post op    Risk Level: High [   ] Moderate [ x  ] Low [   ]
Patient seen and examined at bedside.  Case discussed with Dr. Echevarria. Minimal output from CT.  No air leak appreciated.  Per Dr. Echevarria's request, CT removed and tie down secured without incident.  Occlusive DSD placed.  Patient tolerated procedure well.  Chest Xray pending.  1 epicardial wire and 1 myesha remain

## 2022-12-20 NOTE — DISCHARGE NOTE PROVIDER - CARE PROVIDER_API CALL
Vimal Echevarria (MD)  Surgery; Thoracic and Cardiac Surgery  [address] [place] [address]  Phone: [phone]  Fax: [phone]  Scheduled Appointment: 12/28/2022 09:15 AM    Celso Calix; PhD)  Cardiology; Internal Medicine; Vascular Medicine  [address] [place] [address], [address]  [place] [address]  Phone: [phone]  Fax: [phone]  Scheduled Appointment: 01/04/2023 09:30 AM

## 2022-12-20 NOTE — DISCHARGE NOTE PROVIDER - NSDCMRMEDTOKEN_GEN_ALL_CORE_FT
CeleXA 20 mg oral tablet: 1 tab(s) orally once a day (at bedtime)  Diamox 250 mg oral tablet: 1 tab(s) orally once a day  omeprazole 40 mg oral delayed release capsule: 1 cap(s) orally once a day  Premarin 0.625 mg/g vaginal cream with applicator:   PreserVision AREDS oral capsule: 1 cap(s) orally once a day   acetaminophen 325 mg oral tablet: 2 tab(s) orally every 6 hours, As needed, Temp greater or equal to 38C (100.4F), Mild Pain (1 - 3)  aspirin 81 mg oral tablet, chewable: 1 tab(s) orally once a day  citalopram 20 mg oral tablet: 1 tab(s) orally once a day  Diamox 250 mg oral tablet: 1 tab(s) orally once a day  furosemide 20 mg oral tablet: 1 tab(s) orally once a day  omeprazole 40 mg oral delayed release capsule: 1 cap(s) orally once a day  oxyCODONE 5 mg oral tablet: 1 tab(s) orally every 6 hours, As needed, Moderate Pain (4 - 6) MDD:4  polyethylene glycol 3350 oral powder for reconstitution: 17 gram(s) orally once a day  Premarin 0.625 mg/g vaginal cream with applicator:   PreserVision AREDS oral capsule: 1 cap(s) orally once a day  senna leaf extract oral tablet: 2 tab(s) orally once a day (at bedtime)   acetaminophen 325 mg oral tablet: 2 tab(s) orally every 6 hours, As needed, Temp greater or equal to 38C (100.4F), Mild Pain (1 - 3)  aspirin 81 mg oral tablet, chewable: 1 tab(s) orally once a day  aspirin 81 mg oral tablet, chewable: 1 tab(s) orally once a day  citalopram 20 mg oral tablet: 1 tab(s) orally once a day  citalopram 20 mg oral tablet: 1 tab(s) orally once a day  Diamox 250 mg oral tablet: 1 tab(s) orally once a day  Diamox 250 mg oral tablet: 1 tab(s) orally once a day  furosemide 20 mg oral tablet: 1 tab(s) orally once a day  omeprazole 40 mg oral delayed release capsule: 1 cap(s) orally once a day  oxyCODONE 5 mg oral tablet: 1 tab(s) orally every 6 hours, As needed, Moderate Pain (4 - 6) MDD:4  oxyCODONE 5 mg oral tablet: 1 tab(s) orally every 6 hours, As needed, Moderate Pain (4 - 6) MDD:4  polyethylene glycol 3350 oral powder for reconstitution: 17 gram(s) orally once a day  PreserVision AREDS oral capsule: 1 cap(s) orally once a day  senna leaf extract oral tablet: 2 tab(s) orally once a day (at bedtime)

## 2022-12-20 NOTE — DISCHARGE NOTE PROVIDER - NSDCCPCAREPLAN_GEN_ALL_CORE_FT
PRINCIPAL DISCHARGE DIAGNOSIS  Diagnosis: Atherosclerotic heart disease of native coronary artery without angina pectoris  Assessment and Plan of Treatment: Community Status: Active

## 2022-12-20 NOTE — DISCHARGE NOTE PROVIDER - NSDCFUADDAPPT_GEN_ALL_CORE_FT
You will meet with DR. Echevarria's NP on 12/28/22 at 9:15am and you will then meet with Dr. Echevarria on 1/11/23 at 7:45am. Both appointments with [facility] at [address] [place] [address]

## 2022-12-20 NOTE — DISCHARGE NOTE PROVIDER - HOSPITAL COURSE
62 F with a PMHx of hyperkalemic periodic paralysis (diagnosed at age 36, controlled on diamox, last flare 2 years ago), bicuspid aortic valve with severe AS, aortic aneurysm, splenic vein thrombosis, portal vein thrombosis, macular degeneration, ITP, esophageal varices GERD, basal cell carcinoma, T11 fracture (current), cataracts, presented initially for direct admission for pre-op optimization prior to AVR and ascending aorta replacement. On 22 pt underwent AVR ascending aorta replacement, EF normal. Intraoperative course uneventful. Arrived to CTICU on levo. CI noted to be low, started on Dobutamine and primacor with improvement in RV ejection and indices. POD1 given 1 unit pRBC, transfused with 1 unit for pRBC for low H/H. POD2 primacor decreased. POD3  decreased. Primacor weaned. CTs removed. Ambulated. POD4 transferred to floor. POD5 complaining of increased SOB. Started on Intermittent BiPaP. US of the chest revealed b/l small pleural effusions, not large enough for drainage. POD#7 echo performed to rule out pericardial effusion, no effusion, normal Bi-V function. Pigtail was removed in afternoon with no incident. Patient desaturated on walk in the afternoon with PT while on room air. It was recommended that she get discharged home with oxygen.       CARDIAC SURGERY DISCHARGE CHECKLIST:        Surgical Valve        [x ] Aspirin, [  ] Contraindicated, Reason_______________________________        [ x] Lasix, [  ] Contraindicated, Reason_______________________________             Duration: _____        [ ] Beta-Blocker, [  x] Contraindicated, Reason Blood pressure would not tolerate.           Physical Exam:   GEN: NAD, looks uncomfortable  Psych: Mood appropriate  Neuro: A&Ox3.  No focal deficits.  Moving all extremities.   HEENT: No obvious abnormalities  CV: S1S2, regular, no murmurs appreciated.  No carotid bruits.  No JVD  Lungs:  decreased lung sounds at right base.   ABD: Soft, non-tender, non-distended.  +Bowel sounds  EXT: Warm and well perfused. trace peripheral edema noted  Musculoskeletal: Moving all extremities with normal ROM, no joint swelling  PV: Pedal pulses palpable  MSI: clean dry intact     1 Tie down remains.    62 F with a PMHx of hyperkalemic periodic paralysis (diagnosed at age 36, controlled on diamox, last flare 2 years ago), bicuspid aortic valve with severe AS, aortic aneurysm, splenic vein thrombosis, portal vein thrombosis, macular degeneration, ITP, esophageal varices GERD, basal cell carcinoma, T11 fracture (current), cataracts, presented initially for direct admission for pre-op optimization prior to AVR and ascending aorta replacement. On 22 pt underwent AVR ascending aorta replacement, EF normal. Intraoperative course uneventful. Arrived to CTICU on levo. CI noted to be low, started on Dobutamine and primacor with improvement in RV ejection and indices. POD1 given 1 unit pRBC, transfused with 1 unit for pRBC for low H/H. POD2 primacor decreased. POD3  decreased. Primacor weaned. CTs removed. Ambulated. POD4 transferred to floor. POD5 complaining of increased SOB. Started on Intermittent BiPaP. US of the chest revealed b/l small pleural effusions, not large enough for drainage. POD#7 echo performed to rule out pericardial effusion, no effusion, normal Bi-V function. Pigtail was removed in afternoon with no incident. Patient desaturated on walk in the afternoon with PT while on room air. It was recommended that she get discharged home with oxygen.       CARDIAC SURGERY DISCHARGE CHECKLIST:        Surgical Valve        [x ] Aspirin, [  ] Contraindicated, Reason_______________________________        [ x] Lasix, [  ] Contraindicated, Reason_______________________________             Duration: _____        [ ] Beta-Blocker, [  x] Contraindicated, Reason Blood pressure would not tolerate.           Physical Exam:   GEN: NAD, looks uncomfortable  Psych: Mood appropriate  Neuro: A&Ox3.  No focal deficits.  Moving all extremities.   HEENT: No obvious abnormalities  CV: S1S2, regular, no murmurs appreciated.  No carotid bruits.  No JVD  Lungs:  decreased lung sounds at right base.   ABD: Soft, non-tender, non-distended.  +Bowel sounds  EXT: Warm and well perfused. trace peripheral edema noted  Musculoskeletal: Moving all extremities with normal ROM, no joint swelling  PV: Pedal pulses palpable  MSI: clean dry intact      1 Tie down remains.

## 2022-12-20 NOTE — DISCHARGE NOTE PROVIDER - NSDCCPTREATMENT_GEN_ALL_CORE_FT
PRINCIPAL PROCEDURE  Procedure: Replacement, aortic valve, and ascending aorta  Findings and Treatment:   23mm bio  26mm gelweave graft   Total bypass time:   aortic clamp time:

## 2022-12-20 NOTE — DISCHARGE NOTE NURSING/CASE MANAGEMENT/SOCIAL WORK - NSDCPEFALRISK_GEN_ALL_CORE
For information on Fall & Injury Prevention, visit: [email or web address] OR  [email or web address] OR  https://www.cdc.gov/steadi/patient.html

## 2022-12-20 NOTE — DISCHARGE NOTE PROVIDER - PROVIDER TOKENS
PROVIDER:[TOKEN:[[id number]],SCHEDULEDAPPT:[12/28/2022],SCHEDULEDAPPTTIME:[09:15 AM]],PROVIDER:[TOKEN:[[id number]],SCHEDULEDAPPT:[01/04/2023],SCHEDULEDAPPTTIME:[09:30 AM]]

## 2022-12-20 NOTE — DISCHARGE NOTE PROVIDER - NSDCFUSCHEDAPPT_GEN_ALL_CORE_FT
Kev Jimenez  [facility]  CTSURG [address]  Scheduled Appointment: 12/21/2022    Celso Calix  [facility]  CARDIOLOGY [address]  Scheduled Appointment: 01/04/2023    Vimal Echevarria  [facility]  CTSURG [address]  Scheduled Appointment: 01/11/2023     Kev Jimenez  [facility]  CTSURG [address]  Scheduled Appointment: 12/28/2022    Celso Calix  [facility]  CARDIOLOGY [address]  Scheduled Appointment: 01/04/2023    Vimal Echevarria  [facility]  CTSURG [address]  Scheduled Appointment: 01/11/2023

## 2022-12-20 NOTE — DISCHARGE NOTE NURSING/CASE MANAGEMENT/SOCIAL WORK - PATIENT PORTAL LINK FT
You can access the FollowMyHealth Patient Portal offered by [facility] by registering at the following website: [email or web address]. By joining [facility]s FollowMyHealth portal, you will also be able to view your health information using other applications (apps) compatible with our system.

## 2022-12-21 ENCOUNTER — NON-APPOINTMENT (OUTPATIENT)
Age: 62
End: 2022-12-21

## 2022-12-21 RX ORDER — CONJUGATED ESTROGENS 0.62 MG/G
0.62 CREAM VAGINAL
Refills: 0 | Status: DISCONTINUED | COMMUNITY
End: 2022-12-21

## 2022-12-21 RX ORDER — ASPIRIN ENTERIC COATED TABLETS 81 MG 81 MG/1
81 TABLET, DELAYED RELEASE ORAL DAILY
Refills: 0 | Status: ACTIVE | COMMUNITY
Start: 2022-12-21

## 2022-12-22 ENCOUNTER — APPOINTMENT (OUTPATIENT)
Dept: CARE COORDINATION | Facility: HOME HEALTH | Age: 62
End: 2022-12-22

## 2022-12-22 VITALS
SYSTOLIC BLOOD PRESSURE: 110 MMHG | RESPIRATION RATE: 12 BRPM | DIASTOLIC BLOOD PRESSURE: 70 MMHG | HEART RATE: 70 BPM | OXYGEN SATURATION: 96 %

## 2022-12-22 DIAGNOSIS — M62.838 OTHER MUSCLE SPASM: ICD-10-CM

## 2022-12-22 DIAGNOSIS — R19.7 DIARRHEA, UNSPECIFIED: ICD-10-CM

## 2022-12-22 PROCEDURE — 99024 POSTOP FOLLOW-UP VISIT: CPT

## 2022-12-22 NOTE — REVIEW OF SYSTEMS
[Feeling Tired] : feeling tired [Diarrhea] : diarrhea [Negative] : Heme/Lymph [FreeTextEntry7] : gas pain; bloating

## 2022-12-22 NOTE — PHYSICAL EXAM
[Sclera] : the sclera and conjunctiva were normal [Neck Appearance] : the appearance of the neck was normal [] : no respiratory distress [Respiration, Rhythm And Depth] : normal respiratory rhythm and effort [Exaggerated Use Of Accessory Muscles For Inspiration] : no accessory muscle use [Auscultation Breath Sounds / Voice Sounds] : lungs were clear to auscultation bilaterally [Apical Impulse] : the apical impulse was normal [Heart Rate And Rhythm] : heart rate was normal and rhythm regular [Heart Sounds] : normal S1 and S2 [Heart Sounds Gallop] : no gallops [Murmurs] : no murmurs [Heart Sounds Pericardial Friction Rub] : no pericardial rub [FreeTextEntry1] : MSI & CT sites without erythema, drainage or warmth, with edges well approximated. CT suture intact.  Sternum stable. BL LE - minimal edema. [Examination Of The Chest] : the chest was normal in appearance [Chest Visual Inspection Thoracic Asymmetry] : no chest asymmetry [Diminished Respiratory Excursion] : normal chest expansion [Bowel Sounds] : normal bowel sounds [Abdomen Soft] : soft [Abdomen Tenderness] : non-tender [Abnormal Walk] : normal gait [Skin Color & Pigmentation] : normal skin color and pigmentation [Sensation] : the sensory exam was normal to light touch and pinprick [Motor Exam] : the motor exam was normal [No Focal Deficits] : no focal deficits [Oriented To Time, Place, And Person] : oriented to person, place, and time [Impaired Insight] : insight and judgment were intact [Affect] : the affect was normal [Mood] : the mood was normal

## 2022-12-22 NOTE — ASSESSMENT
[FreeTextEntry1] : Pt recovering well at home s/p OHS. She has good support from her two sons and . Reviewed all medications and dosages with pt understanding. Pt has all medications in home and is taking as prescribed. Pt is aware of scheduled & recommended follow up appointments as listed below. Pt endorses gas discomfort & diarrhea. Recommended pt to hold off on taking Miralx, Senna, Colace while having diarrhea. She was advised to buy OTC Simethicone 80mg q8h for gas pain. \par

## 2022-12-22 NOTE — REASON FOR VISIT
[Post Hospitalization] : a post hospitalization visit [Family Member] : family member [FreeTextEntry1] : FOLLOW YOUR HEART - Transitional Care Management Program - [facility]

## 2022-12-22 NOTE — HISTORY OF PRESENT ILLNESS
[FreeTextEntry1] : 62 F with a PMHx of hyperkalemic periodic paralysis (diagnosed at age 36,\par controlled on diamox, last flare 2 years ago), bicuspid aortic valve with\par severe AS, aortic aneurysm, splenic vein thrombosis, portal vein thrombosis,\par macular degeneration, ITP, esophageal varices GERD, basal cell carcinoma, T11\par fracture (current), cataracts, presented initially for direct admission for\par pre-op optimization prior to AVR and ascending aorta replacement. On 22\par pt underwent AVR & ascending aorta replacement, EF normal. Intraoperative course\par uneventful. Arrived to CTICU on levo. CI noted to be low, started on Dobutamine\par and primacor with improvement in RV ejection and indices. POD1 given 1 unit\par pRBC, transfused with 1 unit for pRBC for low H/H. POD2 primacor decreased.\par POD3  decreased. Primacor weaned. CTs removed. Ambulated. POD4 transferred\par to floor. POD5 complaining of increased SOB. Started on Intermittent BiPaP. US\par of the chest revealed b/l small pleural effusions, not large enough for\par drainage. POD#7 echo performed to rule out pericardial effusion, no effusion,\par normal Bi-V function. Pigtail was removed in afternoon with no incident.\par Patient desaturated on walk in the afternoon with PT while on room air. It was\par recommended that she get discharged home with oxygen.\par 22- Seen by [facility] NP for a f/u visit. Emotional support and education provided. All questions answered. Pt overall is recovering well. She c/o gas pain and diarrhea today. Pt is not using supplemental O2 as her SpO2 is 96% at rest. \par \par

## 2022-12-23 ENCOUNTER — NON-APPOINTMENT (OUTPATIENT)
Age: 62
End: 2022-12-23

## 2022-12-25 ENCOUNTER — TRANSCRIPTION ENCOUNTER (OUTPATIENT)
Age: 62
End: 2022-12-25

## 2022-12-25 ENCOUNTER — INPATIENT (INPATIENT)
Facility: HOSPITAL | Age: 62
LOS: 1 days | Discharge: HOME HEALTH | DRG: 310 | End: 2022-12-27
Attending: STUDENT IN AN ORGANIZED HEALTH CARE EDUCATION/TRAINING PROGRAM | Admitting: STUDENT IN AN ORGANIZED HEALTH CARE EDUCATION/TRAINING PROGRAM
Payer: COMMERCIAL

## 2022-12-25 VITALS
SYSTOLIC BLOOD PRESSURE: 98 MMHG | HEIGHT: 61 IN | TEMPERATURE: 98 F | RESPIRATION RATE: 18 BRPM | OXYGEN SATURATION: 99 % | DIASTOLIC BLOOD PRESSURE: 62 MMHG | HEART RATE: 110 BPM | WEIGHT: 134.92 LBS

## 2022-12-25 DIAGNOSIS — Z98.890 OTHER SPECIFIED POSTPROCEDURAL STATES: Chronic | ICD-10-CM

## 2022-12-25 DIAGNOSIS — I48.91 UNSPECIFIED ATRIAL FIBRILLATION: ICD-10-CM

## 2022-12-25 DIAGNOSIS — O00.90 UNSPECIFIED ECTOPIC PREGNANCY WITHOUT INTRAUTERINE PREGNANCY: Chronic | ICD-10-CM

## 2022-12-25 DIAGNOSIS — Z98.49 CATARACT EXTRACTION STATUS, UNSPECIFIED EYE: Chronic | ICD-10-CM

## 2022-12-25 LAB
ALBUMIN SERPL ELPH-MCNC: 3.5 G/DL — SIGNIFICANT CHANGE UP (ref 3.3–5)
ALP SERPL-CCNC: 220 U/L — HIGH (ref 40–120)
ALT FLD-CCNC: 69 U/L — HIGH (ref 10–45)
ANION GAP SERPL CALC-SCNC: 12 MMOL/L — SIGNIFICANT CHANGE UP (ref 5–17)
APTT BLD: 27.1 SEC — LOW (ref 27.5–35.5)
AST SERPL-CCNC: 40 U/L — SIGNIFICANT CHANGE UP (ref 10–40)
BASOPHILS # BLD AUTO: 0.04 K/UL — SIGNIFICANT CHANGE UP (ref 0–0.2)
BASOPHILS NFR BLD AUTO: 0.4 % — SIGNIFICANT CHANGE UP (ref 0–2)
BILIRUB SERPL-MCNC: 0.5 MG/DL — SIGNIFICANT CHANGE UP (ref 0.2–1.2)
BUN SERPL-MCNC: 11 MG/DL — SIGNIFICANT CHANGE UP (ref 7–23)
CALCIUM SERPL-MCNC: 9 MG/DL — SIGNIFICANT CHANGE UP (ref 8.4–10.5)
CHLORIDE SERPL-SCNC: 109 MMOL/L — HIGH (ref 96–108)
CO2 SERPL-SCNC: 20 MMOL/L — LOW (ref 22–31)
CREAT SERPL-MCNC: 0.53 MG/DL — SIGNIFICANT CHANGE UP (ref 0.5–1.3)
EGFR: 104 ML/MIN/1.73M2 — SIGNIFICANT CHANGE UP
EOSINOPHIL # BLD AUTO: 0.17 K/UL — SIGNIFICANT CHANGE UP (ref 0–0.5)
EOSINOPHIL NFR BLD AUTO: 1.7 % — SIGNIFICANT CHANGE UP (ref 0–6)
GLUCOSE SERPL-MCNC: 115 MG/DL — HIGH (ref 70–99)
HCT VFR BLD CALC: 28.2 % — LOW (ref 34.5–45)
HGB BLD-MCNC: 8.5 G/DL — LOW (ref 11.5–15.5)
IMM GRANULOCYTES NFR BLD AUTO: 1.1 % — HIGH (ref 0–0.9)
INR BLD: 1.29 RATIO — HIGH (ref 0.88–1.16)
LYMPHOCYTES # BLD AUTO: 0.97 K/UL — LOW (ref 1–3.3)
LYMPHOCYTES # BLD AUTO: 9.6 % — LOW (ref 13–44)
MCHC RBC-ENTMCNC: 27.7 PG — SIGNIFICANT CHANGE UP (ref 27–34)
MCHC RBC-ENTMCNC: 30.1 GM/DL — LOW (ref 32–36)
MCV RBC AUTO: 91.9 FL — SIGNIFICANT CHANGE UP (ref 80–100)
MONOCYTES # BLD AUTO: 0.82 K/UL — SIGNIFICANT CHANGE UP (ref 0–0.9)
MONOCYTES NFR BLD AUTO: 8.1 % — SIGNIFICANT CHANGE UP (ref 2–14)
NEUTROPHILS # BLD AUTO: 8 K/UL — HIGH (ref 1.8–7.4)
NEUTROPHILS NFR BLD AUTO: 79.1 % — HIGH (ref 43–77)
NRBC # BLD: 0 /100 WBCS — SIGNIFICANT CHANGE UP (ref 0–0)
PLATELET # BLD AUTO: 243 K/UL — SIGNIFICANT CHANGE UP (ref 150–400)
POTASSIUM SERPL-MCNC: 3.8 MMOL/L — SIGNIFICANT CHANGE UP (ref 3.5–5.3)
POTASSIUM SERPL-SCNC: 3.8 MMOL/L — SIGNIFICANT CHANGE UP (ref 3.5–5.3)
PROT SERPL-MCNC: 6.1 G/DL — SIGNIFICANT CHANGE UP (ref 6–8.3)
PROTHROM AB SERPL-ACNC: 15 SEC — HIGH (ref 10.5–13.4)
RBC # BLD: 3.07 M/UL — LOW (ref 3.8–5.2)
RBC # FLD: 15.9 % — HIGH (ref 10.3–14.5)
SODIUM SERPL-SCNC: 141 MMOL/L — SIGNIFICANT CHANGE UP (ref 135–145)
TROPONIN T, HIGH SENSITIVITY RESULT: 37 NG/L — SIGNIFICANT CHANGE UP (ref 0–51)
TROPONIN T, HIGH SENSITIVITY RESULT: 37 NG/L — SIGNIFICANT CHANGE UP (ref 0–51)
UFH PPP CHRO-ACNC: 0 IU/ML — LOW (ref 0.3–0.7)
WBC # BLD: 10.11 K/UL — SIGNIFICANT CHANGE UP (ref 3.8–10.5)
WBC # FLD AUTO: 10.11 K/UL — SIGNIFICANT CHANGE UP (ref 3.8–10.5)

## 2022-12-25 PROCEDURE — 71045 X-RAY EXAM CHEST 1 VIEW: CPT | Mod: 26

## 2022-12-25 PROCEDURE — 93010 ELECTROCARDIOGRAM REPORT: CPT

## 2022-12-25 PROCEDURE — 99285 EMERGENCY DEPT VISIT HI MDM: CPT

## 2022-12-25 RX ORDER — CITALOPRAM 10 MG/1
20 TABLET, FILM COATED ORAL DAILY
Refills: 0 | Status: DISCONTINUED | OUTPATIENT
Start: 2022-12-25 | End: 2022-12-27

## 2022-12-25 RX ORDER — FUROSEMIDE 40 MG
20 TABLET ORAL DAILY
Refills: 0 | Status: DISCONTINUED | OUTPATIENT
Start: 2022-12-25 | End: 2022-12-26

## 2022-12-25 RX ORDER — ASPIRIN/CALCIUM CARB/MAGNESIUM 324 MG
81 TABLET ORAL DAILY
Refills: 0 | Status: DISCONTINUED | OUTPATIENT
Start: 2022-12-25 | End: 2022-12-27

## 2022-12-25 RX ORDER — PANTOPRAZOLE SODIUM 20 MG/1
40 TABLET, DELAYED RELEASE ORAL
Refills: 0 | Status: DISCONTINUED | OUTPATIENT
Start: 2022-12-25 | End: 2022-12-27

## 2022-12-25 RX ORDER — HEPARIN SODIUM 5000 [USP'U]/ML
1000 INJECTION INTRAVENOUS; SUBCUTANEOUS
Qty: 25000 | Refills: 0 | Status: DISCONTINUED | OUTPATIENT
Start: 2022-12-25 | End: 2022-12-26

## 2022-12-25 RX ORDER — SODIUM CHLORIDE 9 MG/ML
3 INJECTION INTRAMUSCULAR; INTRAVENOUS; SUBCUTANEOUS EVERY 8 HOURS
Refills: 0 | Status: DISCONTINUED | OUTPATIENT
Start: 2022-12-25 | End: 2022-12-27

## 2022-12-25 RX ORDER — ACETAZOLAMIDE 250 MG/1
250 TABLET ORAL DAILY
Refills: 0 | Status: DISCONTINUED | OUTPATIENT
Start: 2022-12-25 | End: 2022-12-27

## 2022-12-25 RX ORDER — MULTIVIT-MIN/FERROUS GLUCONATE 9 MG/15 ML
1 LIQUID (ML) ORAL DAILY
Refills: 0 | Status: DISCONTINUED | OUTPATIENT
Start: 2022-12-25 | End: 2022-12-27

## 2022-12-25 RX ORDER — SENNA PLUS 8.6 MG/1
2 TABLET ORAL AT BEDTIME
Refills: 0 | Status: DISCONTINUED | OUTPATIENT
Start: 2022-12-25 | End: 2022-12-27

## 2022-12-25 RX ORDER — POLYETHYLENE GLYCOL 3350 17 G/17G
17 POWDER, FOR SOLUTION ORAL DAILY
Refills: 0 | Status: DISCONTINUED | OUTPATIENT
Start: 2022-12-25 | End: 2022-12-27

## 2022-12-25 RX ORDER — METOPROLOL TARTRATE 50 MG
25 TABLET ORAL
Refills: 0 | Status: DISCONTINUED | OUTPATIENT
Start: 2022-12-25 | End: 2022-12-26

## 2022-12-25 RX ADMIN — Medication 25 MILLIGRAM(S): at 22:50

## 2022-12-25 RX ADMIN — HEPARIN SODIUM 10 UNIT(S)/HR: 5000 INJECTION INTRAVENOUS; SUBCUTANEOUS at 22:08

## 2022-12-25 NOTE — ED ADULT TRIAGE NOTE - CHIEF COMPLAINT QUOTE
Afib s/p aortic valve replacement at [facility] - discharged 4 days ago.  COVID+ 6 weeks ago

## 2022-12-25 NOTE — ED ADULT NURSE NOTE - NS ED NURSE TRANSPORT WITH
Cardiac Monitor/Defib/ACLS/Rescue Kit/O2/BVM/IV pump/ACLS Rescue Kit [employer], transport/Cardiac Monitor/Defib/ACLS/Rescue Kit/O2/BVM/IV pump

## 2022-12-25 NOTE — H&P ADULT - NSHPREVIEWOFSYSTEMS_GEN_ALL_CORE
REVIEW OF SYSTEMS:  CONSTITUTIONAL: No fever, weight loss, or fatigue  EYES: No eye pain, visual disturbances, or discharge  ENMT:  No difficulty hearing, tinnitus, vertigo; No sinus or throat pain  NECK: No pain or stiffness  RESPIRATORY: No non/productive cough, No wheezing, chills or hemoptysis; No shortness of breath  CARDIOVASCULAR: No chest pain, No leg swelling. +intermittent palpitations/dizziness  GASTROINTESTINAL: No abdominal or epigastric pain. No nausea, vomiting, or hematemesis; No diarrhea, No melena  GENITOURINARY: No dysuria, frequency, hematuria, or incontinence  NEUROLOGICAL: No headaches, memory loss, loss of strength, numbness, or tremors  SKIN: No itching, burning, rashes, or lesions   LYMPH NODES: No enlarged glands  ENDOCRINE: No heat or cold intolerance; No hair loss  MUSCULOSKELETAL: No joint pain or swelling; No muscle, back, or extremity pain  PSYCHIATRIC: No depression, anxiety, mood swings, or difficulty sleeping  HEME/LYMPH: No easy bruising, or bleeding gums  ALLERGY: No hives or eczema

## 2022-12-25 NOTE — H&P ADULT - NSHPPOADEEPVENOUSTHROMB_GEN_A_CORE
"Date of Implant with Heartmate 3 LVAD: 17    PATIENT ARRIVED IN CLINIC:  Ambulatory   Accompanied by: Wife    Vitals  Temperature, oral:   PAIN: 0 on 0-10 pain scale, location of pain: 0, description of pain: 0  Is patient currently on medications for pain? no What kind?  Does this help relieve the pain?     VAD Interrogation:  TXP SHERRI INTERROGATIONS 2018   Type HeartMate3   Flow 3.9   Speed 5200   PI 4.5   Power ([place]) 3.6   LSL 4800   Pulsatility Pulse       Flow in history:   History Lo.c3e  HCT:   Lab Results   Component Value Date    HCT 29.5 (L) 2018    HCT 29 (L) 2017         Problems / Issues / Alarms with VAD if any: None noted  Any Equipment Issues: None noted (Refer to  note for complete details)   Emergency Equipment With Patient: yes   VAD Binder With Patient: yes   Reviewed VAD Numbers In Binder: yes  VAD Sounds: HM3 sound Smooth  Manual & Visual Inspection Of Driveline: No kinks or tears noted    LVAD Dressing/DLES:  Appearance Of Driveline: "  Antibiotics: NO  Velour: no  Frequency of Dressing Changes: daily & soap and water dressing  Stabilization Device In Use: yes, delgado securement device    Modular Cable Connection Intact: No yellow exposed  Pt In Need Of Management Kits?:Yes - 1 box soap and water  It is medically necessary to have VAD management kits in order to prevent infection or to assist in the healing of an infected DLES.    Assessment:   Complaints/reason for visit today: routine  Complaints Of Nausea / Vomiting: None noted    Appearance and Frequency Of Stools: normal and formed without blood & daily  Color Of Urine: clear/yellow  Coping/Depression/Anxiety: coping okay  Sleep Habits: 8 hrs /night  Sleep Aids: None noted  Showering: No  Activity/Exercise: Walking   Driving: No.    Labs:    Chemistry        Component Value Date/Time     2018 0914     01/15/2018    K 4.1 2018 0914    K 3.8 01/15/2018     " 02/19/2018 0914     01/15/2018    CO2 24 02/19/2018 0914    CO2 25 01/15/2018    BUN 16 02/19/2018 0914    BUN 18 01/15/2018    CREATININE 1.2 02/19/2018 0914    CREATININE 0.9 01/15/2018    GLU 89 02/19/2018 0914        Component Value Date/Time    CALCIUM 9.5 02/19/2018 0914    CALCIUM 8.8 01/15/2018    ALKPHOS 99 02/19/2018 0914    AST 30 02/19/2018 0914    AST 17 01/15/2018    ALT 23 02/19/2018 0914    ALT 14 01/15/2018    BILITOT 0.6 02/19/2018 0914    ESTGFRAFRICA >60.0 02/19/2018 0914    EGFRNONAA >60.0 02/19/2018 0914            Magnesium   Date Value Ref Range Status   02/19/2018 1.9 1.6 - 2.6 mg/dL Final       Lab Results   Component Value Date    WBC 5.61 02/19/2018    HGB 9.2 (L) 02/19/2018    HCT 29.5 (L) 02/19/2018    MCV 85 02/19/2018     (L) 02/19/2018       Lab Results   Component Value Date    INR 2.7 (H) 02/19/2018    INR 2.2 02/15/2018    INR 1.8 02/12/2018       BNP   Date Value Ref Range Status   02/19/2018 685 (H) 0 - 99 pg/mL Final     Comment:     Values of less than 100 pg/ml are consistent with non-CHF populations.   02/07/2018 849 (H) 0 - 99 pg/mL Final     Comment:     Values of less than 100 pg/ml are consistent with non-CHF populations.   01/31/2018 539 (H) 0 - 99 pg/mL Final     Comment:     Values of less than 100 pg/ml are consistent with non-CHF populations.       LD   Date Value Ref Range Status   02/19/2018 200 110 - 260 U/L Final     Comment:     Results are increased in hemolyzed samples.   02/07/2018 170 110 - 260 U/L Final     Comment:     Results are increased in hemolyzed samples.   01/31/2018 146 110 - 260 U/L Final     Comment:     Results are increased in hemolyzed samples.       Labs reviewed with patient: YES     Patient Satisfaction Survey completed per patient: no  (explained about signature and box to check)  Medication reconciliation: per MA.  Purple card updated today: no  Coumadin Managed by: [facility]    Education: Reviewed driveline  care, emergency procedures, how to change the controller, alarms with patient, as well as discussed how to page the VAD coordinator in case of an emergency.      Plans/Needs:  RTC in one month. Doing well with no issues noted.      Hurricane Season: No       no

## 2022-12-25 NOTE — ED PROVIDER NOTE - ATTENDING CONTRIBUTION TO CARE
I, Nidhi Carl, performed a history and physical exam of the patient and discussed their management with the resident and /or advanced care provider. I reviewed the resident and /or ACP's note and agree with the documented findings and plan of care. I was present and available for all procedures.    62-year-old female history of hypokalemic periodic paralysis on Diamox, bicuspid aortic valve with severe AS status post valve replacement (12/12/2022 at [facility]) aortic aneurysm status post ascending aortic replacement (12/12/2022 on exam) presenting with 2 days of intermittent heart palpitations.  Her home nurse visiting that this palpitations were due to A. fib.  Also notes having worsening shortness of breath over this time.  Patient has been on aspirin, metoprolol and Lasix.  Admits advised to take metoprolol 12.5 after calling her CT surgeon for the first episode on the 23rd.  Nonsedated earlier this afternoon she began feeling short of breath at rest with palpitations, had called her doctor and was referred to the ED due to concerns for possible pericardial effusion.  Denies chest pain, nausea or vomiting, abdominal pain lightheadedness or dizziness.    Patient is well-appearing in no acute distress resting comfortably on stretcher.  irregular rhythm, no increased work of breathing, saturating well on room air lungs clear to auscultation bilaterally.  Abdomen is soft nontender no rebound or guarding.  No lower extremity edema.  Will obtain labs, cardiac enzymes, telemetry monitoring.  We will discussed with CT surgery.  For admission for echo and cardiology/cardiothoracic surgery evaluation.

## 2022-12-25 NOTE — ED PROVIDER NOTE - NS ED ROS FT
GENERAL: No fever or chills  EYES: No change in vision  HEENT: No trouble swallowing or speaking  CARDIAC: as per HPI  PULMONARY: as per HPI  GI: no nausea or no vomiting, no diarrhea or constipation  : No changes in urination  SKIN: No rashes  NEURO: No headache, no numbness  MSK: No joint pain  Otherwise as HPI or negative.

## 2022-12-25 NOTE — H&P ADULT - NSHPSOCIALHISTORY_GEN_ALL_CORE
Marital Status:   Occupation: ambulatory RN  Lives with:     Substance Use : denies  Tobacco Usage:  (   ) never smoked   ( x  ) former smoker   (   ) current smoker  (     ) pack year  (        ) last tobacco use date >40 years ago  Alcohol Usage: denies

## 2022-12-25 NOTE — ED PROVIDER NOTE - CLINICAL SUMMARY MEDICAL DECISION MAKING FREE TEXT BOX
61 yo F w/ hyperkalemic periodic paralysis on diamox, bicuspid aortic valve with severe AS s/p valve replacement ([facility]/22, [facility]), and [facility] presents for 2 days of intermittent heart palpitations thought to be afib by at home nurse and worsening SOB, concerning for pericardial effusion. Physical exam is remarkable for systolic murmur and multiple abdominal surgical incisions that are well healing. Concern for new onset afib in the setting of pericarditis vs LV overload vs infectious etiology. Plan for ACS labs, coags, TTE, and cardiothoracic surgery consult. Dispo pending results and CT consult.

## 2022-12-25 NOTE — ED PROVIDER NOTE - PROGRESS NOTE DETAILS
Alma PGY1: Consulted cardiothoracic surgery who was aware of patient and stated patient has follow-up w/ Dr. Vimal Echevarria on Wednesday. Cardiothoracic recs pending TTE which will require CDU or admission as TTE will not be performed today. CDU declined observation as patient had a recent CT intervention. Plan for medicine admission. Discussed with CT surgery. Will admit under their surgery.  -Silas William PGY-1

## 2022-12-25 NOTE — H&P ADULT - HISTORY OF PRESENT ILLNESS
61 yo F w/ hyperkalemic periodic paralysis on diamox, bicuspid aortic valve with severe AS s/p valve replacement (12/12/22, [facility]), and aortic aneurysm s/p ascendic aorta replacement (12/12/22, [facility]) presents for 2 days of intermittent heart palpitations thought to be afib by at home nurse and worsening SOB. Patient was placed on ASA 81, Mg, metoprolol, and lasix post-op and was asked to take metoprolol 12.5 after calling CT surgeon for first episode of afib on 12/23. Today (01:30, 12/25) patient reports SOB at rest w/ heart palpitations and was asked to present to the ED for concern of pericardial effusion. She denies any CP, abdominal pain, lightheadedness, dizziness, HA, vision changes. Both surgeries performed by Dr. Vimal Echevarria CT surgeon based in [facility] and [facility].    CT Surgery consulted for Afib s/p AVR/Asc Aorta replacement on 12/12/22 with Dr. Echevarria. Plan to r/o pericardial effusion with TTE in AM. All labs and imaging reviewed with on call CT surgeon, Dr. Thompson.

## 2022-12-25 NOTE — H&P ADULT - PROBLEM SELECTOR PLAN 2
Afib 90s-110s  Increase BB to Lopressor 25 BID  Start Heparin gtt with plan to transition to NOAC if no effusion   TTE in AM to r/o pericardial effusion

## 2022-12-25 NOTE — H&P ADULT - ASSESSMENT
61 yo F w/ hyperkalemic periodic paralysis on diamox, bicuspid aortic valve with severe AS s/p valve replacement (12/12/22, [facility]), and aortic aneurysm s/p ascendic aorta replacement (12/12/22, [facility]) presents for 2 days of intermittent heart palpitations thought to be afib by at home nurse and worsening SOB. Patient was placed on ASA 81, Mg, metoprolol, and lasix post-op and was asked to take metoprolol 12.5 after calling CT surgeon for first episode of afib on 12/23. Today (01:30, 12/25) patient reports SOB at rest w/ heart palpitations and was asked to present to the ED for concern of pericardial effusion. She denies any CP, abdominal pain, lightheadedness, dizziness, HA, vision changes. Both surgeries performed by Dr. Vimal Echevarria CT surgeon based in [facility] and [facility].    CT Surgery consulted for Afib s/p AVR/Asc Aorta replacement on 12/12/22 with Dr. Echevarria. Plan to r/o pericardial effusion with TTE in AM. All labs and imaging reviewed with on call CT surgeon, Dr. Thompson.     12/25 TTE in AM, Heparin gtt initiated with plan to start NOAC if no pericardial effusion 63 yo F w/ hyperkalemic periodic paralysis on diamox, bicuspid aortic valve with severe AS s/p valve replacement (12/12/22, [facility]), and aortic aneurysm s/p ascendic aorta replacement (12/12/22, [facility]) presents for 2 days of intermittent heart palpitations thought to be afib by at home nurse and worsening SOB. Patient was placed on ASA 81, Mg, metoprolol, and lasix post-op and was asked to take metoprolol 12.5 after calling CT surgeon for first episode of afib on 12/23. Today (01:30, 12/25) patient reports SOB at rest w/ heart palpitations and was asked to present to the ED for concern of pericardial effusion. She denies any CP, abdominal pain, lightheadedness, dizziness, HA, vision changes. Both surgeries performed by Dr. Vimal Echevarria CT surgeon based in [facility] and [facility].    CT Surgery consulted for Afib s/p AVR/Asc Aorta replacement on 12/12/22 with Dr. Echevarria. Plan to r/o pericardial effusion with TTE in AM. All labs and imaging reviewed with on call CT surgeon, Dr. Thompson.     12/25 TTE in AM, Heparin gtt initiated with plan to start NOAC if no pericardial effusion, hold lasix for r/o pericardial effusion

## 2022-12-25 NOTE — H&P ADULT - NSHPADDITIONALINFOADULT_GEN_ALL_CORE
Rebeca Vogel, AGACNP-BC  Cardiovascular & Thoracic Surgery  [address] [place] [address]  Office: [phone]    Available on Microsoft Teams  Spectra: [phone]  New Consults: [id number]

## 2022-12-25 NOTE — H&P ADULT - PROBLEM SELECTOR PLAN 1
s/p AVR/Asc aorta replacement 12/12/22 with Dr. Echevarria at [facility]  c/w Lasix 20mg QD  c/w Diamox 250 QD s/p AVR/Asc aorta replacement 12/12/22 with Dr. Echevarria at [facility]  Hold Lasix 20mg QD for r/o pericardial effusion  c/w Diamox 250 QD

## 2022-12-25 NOTE — ED CLERICAL - NS ED CLERK NOTE PRE-ARRIVAL INFORMATION; ADDITIONAL PRE-ARRIVAL INFORMATION
This patient is enrolled in the Follow Your Heart program and has undergone a cardiac surgery procedure within the last 30 days and has active care navigation.   This patient can be followed up by the care navigation team within 24 hours. To arrange close follow-up or to obtain additional clinical information about this patient, please call the contact number above.   Please call the cardiac surgery team once patient is registered at [phone] for consultation PRIOR to disposition decision.  The patient recently underwent a cardiac surgery procedure and the team can assist in acute medical management.

## 2022-12-25 NOTE — ED ADULT NURSE NOTE - OBJECTIVE STATEMENT
First encounter with the pt, pt received from of feeling and seeing Afib on apple watch.. Pt is a/ox4 and verbal. Speech is clear and able to speak in full sentences without distress. Airway is patent with no obstruction nor blocking secretions. Breathing is even and unlabored on room air. Pt has strong pulses palpated bilaterally. Neuro check is wnl. Full rom but requires assistance.. Pt denies dizziness, n/v and sob. No acute distress noted. Pt has call light within the reach of the pt at the bedside. Will continue to monitor the pt.

## 2022-12-25 NOTE — ED PROVIDER NOTE - OBJECTIVE STATEMENT
61 yo F w/ hyperkalemic periodic paralysis on diamox, bicuspid aortic valve with severe AS s/p valve replacement (12/12/22, [facility]), and aortic aneurysm s/p ascendic aorta replacement (12/12/22, [facility]) presents for 2 days of intermittent heart palpitations thought to be afib by at home nurse and worsening SOB. Patient was placed on ASA 81, Mg, metoprolol, and lasix post-op and was asked to take metoprolol 12.5 after calling CT surgeon for first episode of afib on 12/23. Today (01:30, 12/25) patient reports SOB at rest w/ heart palpitations and was asked to present to the ED for concern of pericardial effusion. She denies any CP, abdominal pain, lightheadedness, dizziness, HA, vision changes. Both surgeries performed by Dr. Vimal Echevarria CT surgeon based in [facility] and [facility].

## 2022-12-25 NOTE — ED PROVIDER NOTE - PHYSICAL EXAMINATION
GENERAL: no acute distress, ectomorphic body habitus  HEENT: atraumatic, normocephalic, vision grossly intact, EOMI, no conjunctivitis or discharge, hearing grossly intact, no nasal discharge or epistaxis, clear pharynx  CV: systolic murmur, regular rate, normal rhythm, normal S1/S2, no rubs, no cyanosis  PULM: normal work of breathing, normal O2 saturation on RA, clear breath sounds in b/l upper/lower lung fields, no crackles/rales/rhonchi/wheezing  GI: soft/non-tender/nondistended abdomen, no guarding or rebound tenderness, no palpable masses  NEURO: A&Ox4, follows commands, normal speech, no focal motor or sensory deficits  MSK: no joint tenderness/swelling/erythema, ranging all extremities with no appreciable loss of ROM  EXT: no peripheral edema, calf tenderness, redness or swelling  SKIN: multiple abdominal surgical incisions healing well, warm, dry, and intact, no rashes  PSYCH: appropriate mood and affect

## 2022-12-25 NOTE — ED ADULT NURSE REASSESSMENT NOTE - NS ED NURSE REASSESS COMMENT FT1
Report received from MILENA [place]. Pt A&Ox4. Pt does not appear to be in any acute distress. Pt taken to restroom in wheelchair. Pt on cardiac monitor reading afib. VS documented. MD Carl at bedside.

## 2022-12-25 NOTE — H&P ADULT - NSHPPHYSICALEXAM_GEN_ALL_CORE
General: NAD, well appearing female sitting in bed  HEENT:  NC/AT  Neuro: A&Ox4, gait steady, speech clear, no focal deficits noted  Respiratory: B/L BS CTA, no wheeze, no rhonchi, no crackles noted  Cardiovascular: RR, irregular, normal S1S2, no murmur noted  GI: Abd soft, NT/ND, +BSx4Q +BM  Peripheral Vascular:  B/L LE trace edema, 2+ peripheral pulses, no clubbing, cyanosis, varicosities/PVD noted  Musculoskeletal: B/L UE and LE 5/5 strength, groins stable   Psychiatric: Normal mood, normal affect observed  Skin: Normal exam to inspection and palpation. no bleeding, no hematoma.  Sternal Wound: CDI, stable, abdominal silk in place

## 2022-12-25 NOTE — H&P ADULT - NSHPLABSRESULTS_GEN_ALL_CORE
< from: Xray Chest 1 View AP/PA (12.25.22 @ 18:55) >    INTERPRETATION:  right pleural effusion and bibasilar atelactasis.

## 2022-12-26 DIAGNOSIS — I48.91 UNSPECIFIED ATRIAL FIBRILLATION: ICD-10-CM

## 2022-12-26 DIAGNOSIS — Z95.2 PRESENCE OF PROSTHETIC HEART VALVE: ICD-10-CM

## 2022-12-26 LAB
ALBUMIN SERPL ELPH-MCNC: 3.5 G/DL — SIGNIFICANT CHANGE UP (ref 3.3–5)
ALP SERPL-CCNC: 201 U/L — HIGH (ref 40–120)
ALT FLD-CCNC: 62 U/L — HIGH (ref 10–45)
ANION GAP SERPL CALC-SCNC: 12 MMOL/L — SIGNIFICANT CHANGE UP (ref 5–17)
APPEARANCE UR: CLEAR — SIGNIFICANT CHANGE UP
APTT BLD: 41 SEC — HIGH (ref 27.5–35.5)
APTT BLD: 57.1 SEC — HIGH (ref 27.5–35.5)
AST SERPL-CCNC: 34 U/L — SIGNIFICANT CHANGE UP (ref 10–40)
BASOPHILS # BLD AUTO: 0.07 K/UL — SIGNIFICANT CHANGE UP (ref 0–0.2)
BASOPHILS NFR BLD AUTO: 0.8 % — SIGNIFICANT CHANGE UP (ref 0–2)
BILIRUB SERPL-MCNC: 0.7 MG/DL — SIGNIFICANT CHANGE UP (ref 0.2–1.2)
BILIRUB UR-MCNC: NEGATIVE — SIGNIFICANT CHANGE UP
BLD GP AB SCN SERPL QL: NEGATIVE — SIGNIFICANT CHANGE UP
BUN SERPL-MCNC: 10 MG/DL — SIGNIFICANT CHANGE UP (ref 7–23)
CALCIUM SERPL-MCNC: 8.8 MG/DL — SIGNIFICANT CHANGE UP (ref 8.4–10.5)
CHLORIDE SERPL-SCNC: 108 MMOL/L — SIGNIFICANT CHANGE UP (ref 96–108)
CO2 SERPL-SCNC: 20 MMOL/L — LOW (ref 22–31)
COLOR SPEC: SIGNIFICANT CHANGE UP
CREAT SERPL-MCNC: 0.5 MG/DL — SIGNIFICANT CHANGE UP (ref 0.5–1.3)
DIFF PNL FLD: NEGATIVE — SIGNIFICANT CHANGE UP
EGFR: 105 ML/MIN/1.73M2 — SIGNIFICANT CHANGE UP
EOSINOPHIL # BLD AUTO: 0.13 K/UL — SIGNIFICANT CHANGE UP (ref 0–0.5)
EOSINOPHIL NFR BLD AUTO: 1.4 % — SIGNIFICANT CHANGE UP (ref 0–6)
GLUCOSE SERPL-MCNC: 107 MG/DL — HIGH (ref 70–99)
GLUCOSE UR QL: NEGATIVE — SIGNIFICANT CHANGE UP
HCT VFR BLD CALC: 29.7 % — LOW (ref 34.5–45)
HGB BLD-MCNC: 8.8 G/DL — LOW (ref 11.5–15.5)
IMM GRANULOCYTES NFR BLD AUTO: 1.2 % — HIGH (ref 0–0.9)
INR BLD: 1.37 RATIO — HIGH (ref 0.88–1.16)
KETONES UR-MCNC: SIGNIFICANT CHANGE UP
LEUKOCYTE ESTERASE UR-ACNC: NEGATIVE — SIGNIFICANT CHANGE UP
LYMPHOCYTES # BLD AUTO: 1.17 K/UL — SIGNIFICANT CHANGE UP (ref 1–3.3)
LYMPHOCYTES # BLD AUTO: 12.9 % — LOW (ref 13–44)
MCHC RBC-ENTMCNC: 27.5 PG — SIGNIFICANT CHANGE UP (ref 27–34)
MCHC RBC-ENTMCNC: 29.6 GM/DL — LOW (ref 32–36)
MCV RBC AUTO: 92.8 FL — SIGNIFICANT CHANGE UP (ref 80–100)
MONOCYTES # BLD AUTO: 0.76 K/UL — SIGNIFICANT CHANGE UP (ref 0–0.9)
MONOCYTES NFR BLD AUTO: 8.4 % — SIGNIFICANT CHANGE UP (ref 2–14)
NEUTROPHILS # BLD AUTO: 6.81 K/UL — SIGNIFICANT CHANGE UP (ref 1.8–7.4)
NEUTROPHILS NFR BLD AUTO: 75.3 % — SIGNIFICANT CHANGE UP (ref 43–77)
NITRITE UR-MCNC: NEGATIVE — SIGNIFICANT CHANGE UP
NRBC # BLD: 0 /100 WBCS — SIGNIFICANT CHANGE UP (ref 0–0)
PH UR: 6 — SIGNIFICANT CHANGE UP (ref 5–8)
PLATELET # BLD AUTO: 252 K/UL — SIGNIFICANT CHANGE UP (ref 150–400)
POTASSIUM SERPL-MCNC: 3.9 MMOL/L — SIGNIFICANT CHANGE UP (ref 3.5–5.3)
POTASSIUM SERPL-SCNC: 3.9 MMOL/L — SIGNIFICANT CHANGE UP (ref 3.5–5.3)
PROT SERPL-MCNC: 6 G/DL — SIGNIFICANT CHANGE UP (ref 6–8.3)
PROT UR-MCNC: NEGATIVE — SIGNIFICANT CHANGE UP
PROTHROM AB SERPL-ACNC: 16 SEC — HIGH (ref 10.5–13.4)
RBC # BLD: 3.2 M/UL — LOW (ref 3.8–5.2)
RBC # FLD: 15.9 % — HIGH (ref 10.3–14.5)
RH IG SCN BLD-IMP: NEGATIVE — SIGNIFICANT CHANGE UP
SARS-COV-2 RNA SPEC QL NAA+PROBE: SIGNIFICANT CHANGE UP
SODIUM SERPL-SCNC: 140 MMOL/L — SIGNIFICANT CHANGE UP (ref 135–145)
SP GR SPEC: 1.02 — SIGNIFICANT CHANGE UP (ref 1.01–1.02)
UROBILINOGEN FLD QL: NEGATIVE — SIGNIFICANT CHANGE UP
WBC # BLD: 9.05 K/UL — SIGNIFICANT CHANGE UP (ref 3.8–10.5)
WBC # FLD AUTO: 9.05 K/UL — SIGNIFICANT CHANGE UP (ref 3.8–10.5)

## 2022-12-26 PROCEDURE — 93306 TTE W/DOPPLER COMPLETE: CPT | Mod: 26

## 2022-12-26 PROCEDURE — 93010 ELECTROCARDIOGRAM REPORT: CPT

## 2022-12-26 PROCEDURE — 71045 X-RAY EXAM CHEST 1 VIEW: CPT | Mod: 26

## 2022-12-26 RX ORDER — APIXABAN 2.5 MG/1
5 TABLET, FILM COATED ORAL
Refills: 0 | Status: DISCONTINUED | OUTPATIENT
Start: 2022-12-26 | End: 2022-12-27

## 2022-12-26 RX ORDER — METOPROLOL TARTRATE 50 MG
25 TABLET ORAL THREE TIMES A DAY
Refills: 0 | Status: DISCONTINUED | OUTPATIENT
Start: 2022-12-26 | End: 2022-12-27

## 2022-12-26 RX ORDER — OXYCODONE HYDROCHLORIDE 5 MG/1
5 TABLET ORAL EVERY 4 HOURS
Refills: 0 | Status: DISCONTINUED | OUTPATIENT
Start: 2022-12-26 | End: 2022-12-27

## 2022-12-26 RX ORDER — SIMETHICONE 80 MG/1
80 TABLET, CHEWABLE ORAL ONCE
Refills: 0 | Status: COMPLETED | OUTPATIENT
Start: 2022-12-26 | End: 2022-12-26

## 2022-12-26 RX ORDER — FUROSEMIDE 40 MG
20 TABLET ORAL DAILY
Refills: 0 | Status: DISCONTINUED | OUTPATIENT
Start: 2022-12-26 | End: 2022-12-27

## 2022-12-26 RX ADMIN — OXYCODONE HYDROCHLORIDE 5 MILLIGRAM(S): 5 TABLET ORAL at 22:52

## 2022-12-26 RX ADMIN — SODIUM CHLORIDE 3 MILLILITER(S): 9 INJECTION INTRAMUSCULAR; INTRAVENOUS; SUBCUTANEOUS at 13:15

## 2022-12-26 RX ADMIN — OXYCODONE HYDROCHLORIDE 5 MILLIGRAM(S): 5 TABLET ORAL at 22:20

## 2022-12-26 RX ADMIN — Medication 1 TABLET(S): at 13:15

## 2022-12-26 RX ADMIN — PANTOPRAZOLE SODIUM 40 MILLIGRAM(S): 20 TABLET, DELAYED RELEASE ORAL at 06:18

## 2022-12-26 RX ADMIN — HEPARIN SODIUM 11 UNIT(S)/HR: 5000 INJECTION INTRAVENOUS; SUBCUTANEOUS at 06:16

## 2022-12-26 RX ADMIN — Medication 25 MILLIGRAM(S): at 13:14

## 2022-12-26 RX ADMIN — CITALOPRAM 20 MILLIGRAM(S): 10 TABLET, FILM COATED ORAL at 13:14

## 2022-12-26 RX ADMIN — APIXABAN 5 MILLIGRAM(S): 2.5 TABLET, FILM COATED ORAL at 17:20

## 2022-12-26 RX ADMIN — Medication 81 MILLIGRAM(S): at 13:14

## 2022-12-26 RX ADMIN — OXYCODONE HYDROCHLORIDE 5 MILLIGRAM(S): 5 TABLET ORAL at 06:19

## 2022-12-26 RX ADMIN — Medication 25 MILLIGRAM(S): at 22:21

## 2022-12-26 RX ADMIN — OXYCODONE HYDROCHLORIDE 5 MILLIGRAM(S): 5 TABLET ORAL at 07:19

## 2022-12-26 RX ADMIN — ACETAZOLAMIDE 250 MILLIGRAM(S): 250 TABLET ORAL at 13:14

## 2022-12-26 RX ADMIN — SODIUM CHLORIDE 3 MILLILITER(S): 9 INJECTION INTRAMUSCULAR; INTRAVENOUS; SUBCUTANEOUS at 05:39

## 2022-12-26 RX ADMIN — SODIUM CHLORIDE 3 MILLILITER(S): 9 INJECTION INTRAMUSCULAR; INTRAVENOUS; SUBCUTANEOUS at 22:22

## 2022-12-26 RX ADMIN — SIMETHICONE 80 MILLIGRAM(S): 80 TABLET, CHEWABLE ORAL at 21:38

## 2022-12-26 NOTE — PATIENT PROFILE ADULT - FUNCTIONAL ASSESSMENT - DAILY ACTIVITY SCORE.
PT CANCELLED HER APPT FOR NEXT WEEK AND HAS DECIDED TO STAY ON THE 10MG OF LEXAPRO/ ANY QUESTIONS PLEASE CALL THANKS 24

## 2022-12-26 NOTE — PROGRESS NOTE ADULT - PROBLEM SELECTOR PLAN 1
s/p AVR/Asc aorta replacement 12/12/22 with Dr. Echevarria at [facility]  Hold Lasix 20mg QD for r/o pericardial effusion  c/w Diamox 250 QD.

## 2022-12-26 NOTE — PROGRESS NOTE ADULT - ASSESSMENT
61 yo F w/ hyperkalemic periodic paralysis on diamox, bicuspid aortic valve with severe AS s/p valve replacement (12/12/22, [facility]), and aortic aneurysm s/p ascendic aorta replacement (12/12/22, [facility]) presents for 2 days of intermittent heart palpitations thought to be afib by at home nurse and worsening SOB. Patient was placed on ASA 81, Mg, metoprolol, and lasix post-op and was asked to take metoprolol 12.5 after calling CT surgeon for first episode of afib on 12/23. Today (01:30, 12/25) patient reports SOB at rest w/ heart palpitations and was asked to present to the ED for concern of pericardial effusion. She denies any CP, abdominal pain, lightheadedness, dizziness, HA, vision changes. Both surgeries performed by Dr. Vimal Echevarria CT surgeon based in [facility] and [facility].    CT Surgery consulted for Afib s/p AVR/Asc Aorta replacement on 12/12/22 with Dr. Echevarria. Plan to r/o pericardial effusion with TTE in AM. All labs and imaging reviewed with on call CT surgeon, Dr. Thompson.     12/25 TTE in AM, Heparin gtt initiated with plan to start NOAC if no pericardial effusion, hold lasix for r/o pericardial effusion  12/26 < from: TTE with Doppler (w/Cont) (12.26.22 @ 12:54) >  Conclusions:  1. Bioprosthetic aortic valve. Peak transaortic valve  gradient equals 27 mm Hg, mean transaortic valve gradient  equals 11 mm Hg, which is probably normal in the presence  of a bioprosthetic aortic valve. No aortic valve  regurgitation seen.  peak velocity: 2.5 m/s  peak gradient 27 mmHg  LVOT VTI: 18 cm  AV VTI: 32 cm  DVI: 0.4  2. Mildly dilated left atrium.  LA volume index = 39 cc/m2.  Increased relative wall thickness with normal left  ventricular mass index, consistent with concentric left  ventricular remodeling.Endocardial visualization enhanced  with intravenous injection of Ultrasonic Enhancing Agent  (Definity). Mild segmental left ventricular systolic  dysfunction. No left ventricular thrombus. LVEF calculated  using biplane Pace's method is 58%. Septal motion  consistent with prior cardiac surgery. There is hypokinesis  of the inferior wall and dyssynchrony of the septum.  Increased E/e'  is consistent with elevated left  ventricular filling pressure. Unable to determine diastolic  function due to merged E and A wave.  3. Severe right atrial enlargement. Right ventricular  enlargement with mild decreased right ventricular systolic  function.  4. Normal tricuspid valve. Moderate tricuspid  regurgitation.Estimated pulmonary artery systolic pressure  equals 38  mm Hg, assuming right atrial pressure equals 15  mm Hg, consistent with borderline pulmonary pressures.  5. No pericardial effusion seen.  *** Compared with echocardiogram of 9/27/2022, there is now  a bioprosthetic valve in the aortic position.  ADDENDUM 12/26/2022:  Also compared to the previous, there  is inferior hypokinesis and septal dyssynchrony. LV  function is preserved.    < end of copied text >    Lopressor increased 25mg tid for HR control. Anticoagulate with Eliquis 5 mg BID. Pt to follow up with Dr Calix as outpatient.

## 2022-12-26 NOTE — PATIENT PROFILE ADULT - SURGICAL SITE DESCRIPTION
Linear incision noted from Surgery,  4 small incisions noted to mid upper abdomen currently healing (drains and chest tube sites) redness noted to any of the sites. right side chest tube placement, some redness noted to site.

## 2022-12-26 NOTE — PATIENT PROFILE ADULT - SURGICAL SITE INCISION
rapid recovery Patient should transition to regular activity level. Resume regular diet. Please call your provider to schedule postpartum visit in 6 weeks. Take medications as directed. Exclusive breast feeding for the first 6 months is recommended. Nothing per vagina for 6 weeks (incl. sex, douching, etc).  If you have additional concerns, develop a fever of 100.4 or greater, or uncontrolled vaginal bleeding, please inform your provider. yes

## 2022-12-26 NOTE — PATIENT PROFILE ADULT - STATED REASON FOR ADMISSION
pt c/o SOb on exertion and at rest for couple days, and worsening at the night. I figured, I needed to go to the hospital

## 2022-12-26 NOTE — PROGRESS NOTE ADULT - SUBJECTIVE AND OBJECTIVE BOX
VITAL SIGNS    Telemetry:  afib 70's  Vital Signs Last 24 Hrs  T(C): 36.7 (22 @ 12:09), Max: 37.1 (22 @ 19:10)  T(F): 98.1 (22 @ 12:09), Max: 98.8 (22 @ 19:10)  HR: 98 (22 @ 12:09) (91 - 110)  BP: 105/67 (22 @ 12:09) (94/65 - 112/65)  RR: 18 (22 @ 12:09) (15 - 18)  SpO2: 97% (22 @ 12:09) (97% - 100%)             @ 07:01  -   @ 07:00  --------------------------------------------------------  IN: 0 mL / OUT: 200 mL / NET: -200 mL     @ 07:01  -   @ 15:48  --------------------------------------------------------  IN: 480 mL / OUT: 700 mL / NET: -220 mL       Daily Height in cm: 154.94 (25 Dec 2022 17:18)    Daily Weight in k.1 (26 Dec 2022 04:39)  Admit Wt: Drug Dosing Weight  Height (cm): 154.9 (25 Dec 2022 17:18)  Weight (kg): 60 (25 Dec 2022 19:10)  BMI (kg/m2): 25 (25 Dec 2022 19:10)  BSA (m2): 1.58 (25 Dec 2022 19:10)    Bilirubin Total, Serum: 0.7 mg/dL ( @ 04:47)  Bilirubin Total, Serum: 0.5 mg/dL ( @ 18:27)    CAPILLARY BLOOD GLUCOSE      MEDICATIONS  acetaZOLAMIDE    Tablet 250 milliGRAM(s) Oral daily  aspirin  chewable 81 milliGRAM(s) Oral daily  citalopram 20 milliGRAM(s) Oral daily  heparin  Infusion 1000 Unit(s)/Hr IV Continuous <Continuous>  metoprolol tartrate 25 milliGRAM(s) Oral three times a day  multivitamin/minerals 1 Tablet(s) Oral daily  oxyCODONE    IR 5 milliGRAM(s) Oral every 4 hours PRN  pantoprazole    Tablet 40 milliGRAM(s) Oral before breakfast  polyethylene glycol 3350 17 Gram(s) Oral daily  senna 2 Tablet(s) Oral at bedtime  sodium chloride 0.9% lock flush 3 milliLiter(s) IV Push every 8 hours      >>> <<<  PHYSICAL EXAM  Subjective: NAD reports dizziness when ambulating  Neurology: alert and oriented x 3, nonfocal, no gross deficits  CV : s1s2  Sternal Wound :  CDI , Stable  Lungs: CTA b/l  Abdomen: soft, NT,ND, (- )BM +flatus  :  voiding  Extremities:  trace edema     LABS      140  |  108  |  10  ----------------------------<  107<H>  3.9   |  20<L>  |  0.50    Ca    8.8      26 Dec 2022 04:47    TPro  6.0  /  Alb  3.5  /  TBili  0.7  /  DBili  x   /  AST  34  /  ALT  62<H>  /  AlkPhos  201<H>                                   8.8    9.05  )-----------( 252      ( 26 Dec 2022 04:47 )             29.7          PT/INR - ( 26 Dec 2022 04:47 )   PT: 16.0 sec;   INR: 1.37 ratio         PTT - ( 26 Dec 2022 14:46 )  PTT:57.1 sec       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

## 2022-12-27 ENCOUNTER — TRANSCRIPTION ENCOUNTER (OUTPATIENT)
Age: 62
End: 2022-12-27

## 2022-12-27 VITALS
SYSTOLIC BLOOD PRESSURE: 105 MMHG | DIASTOLIC BLOOD PRESSURE: 68 MMHG | TEMPERATURE: 98 F | RESPIRATION RATE: 18 BRPM | HEART RATE: 65 BPM | OXYGEN SATURATION: 97 %

## 2022-12-27 DIAGNOSIS — Z88.1 ALLERGY STATUS TO OTHER ANTIBIOTIC AGENTS STATUS: ICD-10-CM

## 2022-12-27 DIAGNOSIS — D69.6 THROMBOCYTOPENIA, UNSPECIFIED: ICD-10-CM

## 2022-12-27 DIAGNOSIS — I71.21 ANEURYSM OF THE ASCENDING AORTA, WITHOUT RUPTURE: ICD-10-CM

## 2022-12-27 DIAGNOSIS — G72.3 PERIODIC PARALYSIS: ICD-10-CM

## 2022-12-27 DIAGNOSIS — F41.9 ANXIETY DISORDER, UNSPECIFIED: ICD-10-CM

## 2022-12-27 DIAGNOSIS — E83.42 HYPOMAGNESEMIA: ICD-10-CM

## 2022-12-27 DIAGNOSIS — J90 PLEURAL EFFUSION, NOT ELSEWHERE CLASSIFIED: ICD-10-CM

## 2022-12-27 DIAGNOSIS — Z86.16 PERSONAL HISTORY OF COVID-19: ICD-10-CM

## 2022-12-27 DIAGNOSIS — K21.9 GASTRO-ESOPHAGEAL REFLUX DISEASE WITHOUT ESOPHAGITIS: ICD-10-CM

## 2022-12-27 DIAGNOSIS — R06.02 SHORTNESS OF BREATH: ICD-10-CM

## 2022-12-27 DIAGNOSIS — D68.9 COAGULATION DEFECT, UNSPECIFIED: ICD-10-CM

## 2022-12-27 DIAGNOSIS — Z82.49 FAMILY HISTORY OF ISCHEMIC HEART DISEASE AND OTHER DISEASES OF THE CIRCULATORY SYSTEM: ICD-10-CM

## 2022-12-27 DIAGNOSIS — Z85.828 PERSONAL HISTORY OF OTHER MALIGNANT NEOPLASM OF SKIN: ICD-10-CM

## 2022-12-27 DIAGNOSIS — I50.811 ACUTE RIGHT HEART FAILURE: ICD-10-CM

## 2022-12-27 DIAGNOSIS — G47.33 OBSTRUCTIVE SLEEP APNEA (ADULT) (PEDIATRIC): ICD-10-CM

## 2022-12-27 DIAGNOSIS — Z98.49 CATARACT EXTRACTION STATUS, UNSPECIFIED EYE: ICD-10-CM

## 2022-12-27 DIAGNOSIS — R57.0 CARDIOGENIC SHOCK: ICD-10-CM

## 2022-12-27 DIAGNOSIS — D62 ACUTE POSTHEMORRHAGIC ANEMIA: ICD-10-CM

## 2022-12-27 DIAGNOSIS — Z99.89 DEPENDENCE ON OTHER ENABLING MACHINES AND DEVICES: ICD-10-CM

## 2022-12-27 DIAGNOSIS — Q23.0 CONGENITAL STENOSIS OF AORTIC VALVE: ICD-10-CM

## 2022-12-27 DIAGNOSIS — F32.A DEPRESSION, UNSPECIFIED: ICD-10-CM

## 2022-12-27 DIAGNOSIS — Z87.891 PERSONAL HISTORY OF NICOTINE DEPENDENCE: ICD-10-CM

## 2022-12-27 DIAGNOSIS — Z88.8 ALLERGY STATUS TO OTHER DRUGS, MEDICAMENTS AND BIOLOGICAL SUBSTANCES STATUS: ICD-10-CM

## 2022-12-27 DIAGNOSIS — H35.30 UNSPECIFIED MACULAR DEGENERATION: ICD-10-CM

## 2022-12-27 DIAGNOSIS — Q23.1 CONGENITAL INSUFFICIENCY OF AORTIC VALVE: ICD-10-CM

## 2022-12-27 LAB
ALBUMIN SERPL ELPH-MCNC: 3.5 G/DL — SIGNIFICANT CHANGE UP (ref 3.3–5)
ALP SERPL-CCNC: 183 U/L — HIGH (ref 40–120)
ALT FLD-CCNC: 50 U/L — HIGH (ref 10–45)
ANION GAP SERPL CALC-SCNC: 11 MMOL/L — SIGNIFICANT CHANGE UP (ref 5–17)
AST SERPL-CCNC: 21 U/L — SIGNIFICANT CHANGE UP (ref 10–40)
BASOPHILS # BLD AUTO: 0.06 K/UL — SIGNIFICANT CHANGE UP (ref 0–0.2)
BASOPHILS NFR BLD AUTO: 0.8 % — SIGNIFICANT CHANGE UP (ref 0–2)
BILIRUB SERPL-MCNC: 0.7 MG/DL — SIGNIFICANT CHANGE UP (ref 0.2–1.2)
BUN SERPL-MCNC: 15 MG/DL — SIGNIFICANT CHANGE UP (ref 7–23)
CALCIUM SERPL-MCNC: 9.1 MG/DL — SIGNIFICANT CHANGE UP (ref 8.4–10.5)
CHLORIDE SERPL-SCNC: 106 MMOL/L — SIGNIFICANT CHANGE UP (ref 96–108)
CO2 SERPL-SCNC: 20 MMOL/L — LOW (ref 22–31)
CREAT SERPL-MCNC: 0.56 MG/DL — SIGNIFICANT CHANGE UP (ref 0.5–1.3)
EGFR: 102 ML/MIN/1.73M2 — SIGNIFICANT CHANGE UP
EOSINOPHIL # BLD AUTO: 0.22 K/UL — SIGNIFICANT CHANGE UP (ref 0–0.5)
EOSINOPHIL NFR BLD AUTO: 2.9 % — SIGNIFICANT CHANGE UP (ref 0–6)
GLUCOSE SERPL-MCNC: 104 MG/DL — HIGH (ref 70–99)
HCT VFR BLD CALC: 26.1 % — LOW (ref 34.5–45)
HGB BLD-MCNC: 7.8 G/DL — LOW (ref 11.5–15.5)
IMM GRANULOCYTES NFR BLD AUTO: 1 % — HIGH (ref 0–0.9)
LYMPHOCYTES # BLD AUTO: 1.27 K/UL — SIGNIFICANT CHANGE UP (ref 1–3.3)
LYMPHOCYTES # BLD AUTO: 16.6 % — SIGNIFICANT CHANGE UP (ref 13–44)
MCHC RBC-ENTMCNC: 27.5 PG — SIGNIFICANT CHANGE UP (ref 27–34)
MCHC RBC-ENTMCNC: 29.9 GM/DL — LOW (ref 32–36)
MCV RBC AUTO: 91.9 FL — SIGNIFICANT CHANGE UP (ref 80–100)
MONOCYTES # BLD AUTO: 1.15 K/UL — HIGH (ref 0–0.9)
MONOCYTES NFR BLD AUTO: 15 % — HIGH (ref 2–14)
NEUTROPHILS # BLD AUTO: 4.88 K/UL — SIGNIFICANT CHANGE UP (ref 1.8–7.4)
NEUTROPHILS NFR BLD AUTO: 63.7 % — SIGNIFICANT CHANGE UP (ref 43–77)
NRBC # BLD: 0 /100 WBCS — SIGNIFICANT CHANGE UP (ref 0–0)
PLATELET # BLD AUTO: 262 K/UL — SIGNIFICANT CHANGE UP (ref 150–400)
POTASSIUM SERPL-MCNC: 3.7 MMOL/L — SIGNIFICANT CHANGE UP (ref 3.5–5.3)
POTASSIUM SERPL-SCNC: 3.7 MMOL/L — SIGNIFICANT CHANGE UP (ref 3.5–5.3)
PROT SERPL-MCNC: 5.8 G/DL — LOW (ref 6–8.3)
RBC # BLD: 2.84 M/UL — LOW (ref 3.8–5.2)
RBC # FLD: 15.9 % — HIGH (ref 10.3–14.5)
SODIUM SERPL-SCNC: 137 MMOL/L — SIGNIFICANT CHANGE UP (ref 135–145)
WBC # BLD: 7.66 K/UL — SIGNIFICANT CHANGE UP (ref 3.8–10.5)
WBC # FLD AUTO: 7.66 K/UL — SIGNIFICANT CHANGE UP (ref 3.8–10.5)

## 2022-12-27 PROCEDURE — 84295 ASSAY OF SERUM SODIUM: CPT

## 2022-12-27 PROCEDURE — 83605 ASSAY OF LACTIC ACID: CPT

## 2022-12-27 PROCEDURE — 71045 X-RAY EXAM CHEST 1 VIEW: CPT

## 2022-12-27 PROCEDURE — C8929: CPT

## 2022-12-27 PROCEDURE — 99285 EMERGENCY DEPT VISIT HI MDM: CPT | Mod: 25

## 2022-12-27 PROCEDURE — 36415 COLL VENOUS BLD VENIPUNCTURE: CPT

## 2022-12-27 PROCEDURE — 94660 CPAP INITIATION&MGMT: CPT

## 2022-12-27 PROCEDURE — 82435 ASSAY OF BLOOD CHLORIDE: CPT

## 2022-12-27 PROCEDURE — 86901 BLOOD TYPING SEROLOGIC RH(D): CPT

## 2022-12-27 PROCEDURE — 81003 URINALYSIS AUTO W/O SCOPE: CPT

## 2022-12-27 PROCEDURE — 85025 COMPLETE CBC W/AUTO DIFF WBC: CPT

## 2022-12-27 PROCEDURE — 82803 BLOOD GASES ANY COMBINATION: CPT

## 2022-12-27 PROCEDURE — 93005 ELECTROCARDIOGRAM TRACING: CPT

## 2022-12-27 PROCEDURE — 86850 RBC ANTIBODY SCREEN: CPT

## 2022-12-27 PROCEDURE — 85610 PROTHROMBIN TIME: CPT

## 2022-12-27 PROCEDURE — 86900 BLOOD TYPING SEROLOGIC ABO: CPT

## 2022-12-27 PROCEDURE — 80053 COMPREHEN METABOLIC PANEL: CPT

## 2022-12-27 PROCEDURE — 84484 ASSAY OF TROPONIN QUANT: CPT

## 2022-12-27 PROCEDURE — 82947 ASSAY GLUCOSE BLOOD QUANT: CPT

## 2022-12-27 PROCEDURE — U0005: CPT

## 2022-12-27 PROCEDURE — 85730 THROMBOPLASTIN TIME PARTIAL: CPT

## 2022-12-27 PROCEDURE — 85520 HEPARIN ASSAY: CPT

## 2022-12-27 PROCEDURE — U0003: CPT

## 2022-12-27 PROCEDURE — 85014 HEMATOCRIT: CPT

## 2022-12-27 PROCEDURE — 82330 ASSAY OF CALCIUM: CPT

## 2022-12-27 PROCEDURE — 85018 HEMOGLOBIN: CPT

## 2022-12-27 PROCEDURE — 84132 ASSAY OF SERUM POTASSIUM: CPT

## 2022-12-27 PROCEDURE — 82565 ASSAY OF CREATININE: CPT

## 2022-12-27 RX ORDER — APIXABAN 2.5 MG/1
1 TABLET, FILM COATED ORAL
Qty: 60 | Refills: 0
Start: 2022-12-27 | End: 2023-01-25

## 2022-12-27 RX ORDER — FUROSEMIDE 40 MG
1 TABLET ORAL
Qty: 7 | Refills: 0
Start: 2022-12-27 | End: 2023-01-02

## 2022-12-27 RX ORDER — METOPROLOL TARTRATE 50 MG
1 TABLET ORAL
Qty: 90 | Refills: 0
Start: 2022-12-27 | End: 2023-01-25

## 2022-12-27 RX ADMIN — Medication 25 MILLIGRAM(S): at 06:21

## 2022-12-27 RX ADMIN — SODIUM CHLORIDE 3 MILLILITER(S): 9 INJECTION INTRAMUSCULAR; INTRAVENOUS; SUBCUTANEOUS at 06:16

## 2022-12-27 RX ADMIN — ACETAZOLAMIDE 250 MILLIGRAM(S): 250 TABLET ORAL at 11:29

## 2022-12-27 RX ADMIN — PANTOPRAZOLE SODIUM 40 MILLIGRAM(S): 20 TABLET, DELAYED RELEASE ORAL at 06:18

## 2022-12-27 RX ADMIN — Medication 81 MILLIGRAM(S): at 11:29

## 2022-12-27 RX ADMIN — SODIUM CHLORIDE 3 MILLILITER(S): 9 INJECTION INTRAMUSCULAR; INTRAVENOUS; SUBCUTANEOUS at 13:38

## 2022-12-27 RX ADMIN — Medication 20 MILLIGRAM(S): at 06:18

## 2022-12-27 RX ADMIN — OXYCODONE HYDROCHLORIDE 5 MILLIGRAM(S): 5 TABLET ORAL at 09:00

## 2022-12-27 RX ADMIN — APIXABAN 5 MILLIGRAM(S): 2.5 TABLET, FILM COATED ORAL at 06:19

## 2022-12-27 RX ADMIN — Medication 25 MILLIGRAM(S): at 13:38

## 2022-12-27 RX ADMIN — OXYCODONE HYDROCHLORIDE 5 MILLIGRAM(S): 5 TABLET ORAL at 09:30

## 2022-12-27 RX ADMIN — Medication 1 TABLET(S): at 11:28

## 2022-12-27 NOTE — DISCHARGE NOTE PROVIDER - NSDCPNSUBOBJ_GEN_ALL_CORE
Subjective: "I feel better now that I slept "  Patient denies chest pain/sob/palpitations     TELEMETRY: NSR 70s    VITAL SIGNS    Vital Signs Last 24 Hrs  T(C): 36.4 (22 @ 05:03), Max: 36.7 (22 @ 10:54)  T(F): 97.5 (22 @ 05:03), Max: 98.1 (22 @ 12:09)  HR: 78 (22 @ 06:15) (66 - 101)  BP: 98/58 (22 @ 05:03) (94/59 - 105/67)  RR: 18 (22 @ 06:15) (18 - 20)  SpO2: 98% (22 @ 06:15) (97% - 99%)             @ 07:01  -   @ 07:00  --------------------------------------------------------  IN: 845 mL / OUT: 1100 mL / NET: -255 mL       Daily     Daily Weight in k.2 (27 Dec 2022 07:56)  Admit Wt: Drug Dosing Weight  Height (cm): 154.9 (25 Dec 2022 17:18)  Weight (kg): 60 (25 Dec 2022 19:10)  BMI (kg/m2): 25 (25 Dec 2022 19:10)  BSA (m2): 1.58 (25 Dec 2022 19:10)    Bilirubin Total, Serum: 0.7 mg/dL ( @ 05:49)    CAPILLARY BLOOD GLUCOSE         PHYSICAL EXAM    Neurology: alert and oriented x 3, nonfocal, no gross deficits  CV : RRR +S1/S2  Sternal Wound :  CDI with dressing , Stable  Lungs: CTA BL. RR easy, unlabored   Abdomen: soft, nontender, nondistended, positive bowel sounds, +bowel movement   Neg N/V/D   :  pt voiding without difficulty   Extremities: Able to SANCHEZ; No peripheral edema, neg calf tenderness.   PPP bilaterally              acetaZOLAMIDE    Tablet 250 milliGRAM(s) Oral daily  apixaban 5 milliGRAM(s) Oral two times a day  aspirin  chewable 81 milliGRAM(s) Oral daily  citalopram 20 milliGRAM(s) Oral daily  furosemide    Tablet 20 milliGRAM(s) Oral daily  metoprolol tartrate 25 milliGRAM(s) Oral three times a day  multivitamin/minerals 1 Tablet(s) Oral daily  oxyCODONE    IR 5 milliGRAM(s) Oral every 4 hours PRN  pantoprazole    Tablet 40 milliGRAM(s) Oral before breakfast  polyethylene glycol 3350 17 Gram(s) Oral daily  senna 2 Tablet(s) Oral at bedtime  sodium chloride 0.9% lock flush 3 milliLiter(s) IV Push every 8 hours                         Subjective: "I feel better now that I slept "  Patient denies chest pain/sob/palpitations     TELEMETRY: NSR 70s    VITAL SIGNS    Vital Signs Last 24 Hrs  T(C): 36.4 (22 @ 05:03), Max: 36.7 (22 @ 10:54)  T(F): 97.5 (22 @ 05:03), Max: 98.1 (22 @ 12:09)  HR: 78 (22 @ 06:15) (66 - 101)  BP: 98/58 (22 @ 05:03) (94/59 - 105/67)  RR: 18 (22 @ 06:15) (18 - 20)  SpO2: 98% (22 @ 06:15) (97% - 99%)             @ 07:01  -   @ 07:00  --------------------------------------------------------  IN: 845 mL / OUT: 1100 mL / NET: -255 mL       Daily     Daily Weight in k.2 (27 Dec 2022 07:56)  Admit Wt: Drug Dosing Weight  Height (cm): 154.9 (25 Dec 2022 17:18)  Weight (kg): 60 (25 Dec 2022 19:10)  BMI (kg/m2): 25 (25 Dec 2022 19:10)  BSA (m2): 1.58 (25 Dec 2022 19:10)    Bilirubin Total, Serum: 0.7 mg/dL ( @ 05:49)    CAPILLARY BLOOD GLUCOSE         PHYSICAL EXAM    Neurology: alert and oriented x 3, nonfocal, no gross deficits  CV : RRR +S1/S2  Sternal Wound :  CDI with dressing , Stable  Lungs: CTA BL. RR easy, unlabored   Abdomen: soft, nontender, nondistended, positive bowel sounds, +bowel movement   Neg N/V/D   :  pt voiding without difficulty   Extremities: Able to SANCHEZ; No peripheral edema, neg calf tenderness.   PPP bilaterally              acetaZOLAMIDE    Tablet 250 milliGRAM(s) Oral daily  apixaban 5 milliGRAM(s) Oral two times a day  aspirin  chewable 81 milliGRAM(s) Oral daily  citalopram 20 milliGRAM(s) Oral daily  furosemide    Tablet 20 milliGRAM(s) Oral daily  metoprolol tartrate 25 milliGRAM(s) Oral three times a day  multivitamin/minerals 1 Tablet(s) Oral daily  oxyCODONE    IR 5 milliGRAM(s) Oral every 4 hours PRN  pantoprazole    Tablet 40 milliGRAM(s) Oral before breakfast  polyethylene glycol 3350 17 Gram(s) Oral daily  senna 2 Tablet(s) Oral at bedtime  sodium chloride 0.9% lock flush 3 milliLiter(s) IV Push every 8 hours      Patient has remained in normal sinus rhythm on tele.  Orthostatic BP's negative.

## 2022-12-27 NOTE — DISCHARGE NOTE PROVIDER - PROVIDER TOKENS
PROVIDER:[TOKEN:[[id number]],SCHEDULEDAPPT:[01/11/2023]] PROVIDER:[TOKEN:[[id number]],SCHEDULEDAPPT:[01/11/2023],SCHEDULEDAPPTTIME:[07:45 AM]],PROVIDER:[TOKEN:[[id number]],SCHEDULEDAPPT:[01/04/2023],SCHEDULEDAPPTTIME:[09:00 AM]]

## 2022-12-27 NOTE — DISCHARGE NOTE PROVIDER - NSDCFUSCHEDAPPT_GEN_ALL_CORE_FT
Kev Jimenez  [facility]  CTSURG [address]  Scheduled Appointment: 12/28/2022    Celso Calix  [facility]  CARDIOLOGY [address]  Scheduled Appointment: 01/04/2023    Vimal Echevarria  [facility]  CTSURG [address]  Scheduled Appointment: 01/11/2023     Celso Calix  [facility]  CARDIOLOGY [id number]  Scheduled Appointment: 01/04/2023    Vimal Echevarria  [facility]  CTSURG [address]  Scheduled Appointment: 01/11/2023    Vimal Echevarria  [facility]  CTSURG [address]  Scheduled Appointment: 01/11/2023

## 2022-12-27 NOTE — DISCHARGE NOTE PROVIDER - CARE PROVIDERS DIRECT ADDRESSES
,[email or web address]

## 2022-12-27 NOTE — DISCHARGE NOTE NURSING/CASE MANAGEMENT/SOCIAL WORK - NSDCFUADDAPPT_GEN_ALL_CORE_FT
Follow up appointment with Dr Echevarria on 1/11/2023 at Cardiac surgery office at [facility] entrance 3  first floor on left after entering lobby  Office telephone      Follow up Appointment with Dr. Celso Calix on 1/4/2023

## 2022-12-27 NOTE — DISCHARGE NOTE PROVIDER - NSDCFUADDAPPT_GEN_ALL_CORE_FT
Follow up appointment with Dr Echevarria on 1/11/2023 at Cardiac surgery office at [facility] entrance 3  first floor on left after entering lobby  Office telephone      Follow up Appointment with Dr. Celso Calix on 1/4/2023  Follow up appointment with Dr Echevarria on 1/11/2023 at 7:45AM  at Cardiac surgery office at [facility] entrance 3  first floor on left after entering lobby  Office telephone      Follow up Appointment with Dr. Celso Calix on 1/4/2023 at 9AM

## 2022-12-27 NOTE — DISCHARGE NOTE NURSING/CASE MANAGEMENT/SOCIAL WORK - NSDCPEFALRISK_GEN_ALL_CORE
For information on Fall & Injury Prevention, visit: [email or web address] OR  [email or web address] OR  https://www.cdc.gov/steadi/patient.html

## 2022-12-27 NOTE — DISCHARGE NOTE PROVIDER - HOSPITAL COURSE
63 yo F w/ hyperkalemic periodic paralysis on diamox, bicuspid aortic valve with severe AS s/p valve replacement (12/12/22, [facility]), and aortic aneurysm s/p ascendic aorta replacement (12/12/22, [facility]) presents for 2 days of intermittent heart palpitations thought to be afib by at home nurse and worsening SOB. Patient was placed on ASA 81, Mg, metoprolol, and lasix post-op and was asked to take metoprolol 12.5 after calling CT surgeon for first episode of afib on 12/23. Today (01:30, 12/25) patient reports SOB at rest w/ heart palpitations and was asked to present to the ED for concern of pericardial effusion. She denies any CP, abdominal pain, lightheadedness, dizziness, HA, vision changes. Both surgeries performed by Dr. Vimal Echevarria CT surgeon based in [facility] and [facility].    CT Surgery consulted for Afib s/p AVR/Asc Aorta replacement on 12/12/22 with Dr. Echevarria. Plan to r/o pericardial effusion with TTE in AM. All labs and imaging reviewed with on call CT surgeon, Dr. Thompson.     12/25 TTE in AM, Heparin gtt initiated with plan to start NOAC if no pericardial effusion, hold lasix for r/o pericardial effusion  12/26 < from: TTE with Doppler (w/Cont) (12.26.22 @ 12:54) >  Conclusions:  1. Bioprosthetic aortic valve. Peak transaortic valve  gradient equals 27 mm Hg, mean transaortic valve gradient  equals 11 mm Hg, which is probably normal in the presence  of a bioprosthetic aortic valve. No aortic valve  regurgitation seen.  peak velocity: 2.5 m/s  peak gradient 27 mmHg  LVOT VTI: 18 cm  AV VTI: 32 cm  DVI: 0.4  2. Mildly dilated left atrium.  LA volume index = 39 cc/m2.  Increased relative wall thickness with normal left  ventricular mass index, consistent with concentric left  ventricular remodeling.Endocardial visualization enhanced  with intravenous injection of Ultrasonic Enhancing Agent  (Definity). Mild segmental left ventricular systolic  dysfunction. No left ventricular thrombus. LVEF calculated  using biplane Pace's method is 58%. Septal motion  consistent with prior cardiac surgery. There is hypokinesis  of the inferior wall and dyssynchrony of the septum.  Increased E/e'  is consistent with elevated left  ventricular filling pressure. Unable to determine diastolic  function due to merged E and A wave.  3. Severe right atrial enlargement. Right ventricular  enlargement with mild decreased right ventricular systolic  function.  4. Normal tricuspid valve. Moderate tricuspid  regurgitation.Estimated pulmonary artery systolic pressure  equals 38  mm Hg, assuming right atrial pressure equals 15  mm Hg, consistent with borderline pulmonary pressures.  5. No pericardial effusion seen.  *** Compared with echocardiogram of 9/27/2022, there is now  a bioprosthetic valve in the aortic position.  ADDENDUM 12/26/2022:  Also compared to the previous, there  is inferior hypokinesis and septal dyssynchrony. LV  function is preserved.    < end of copied text >    Lopressor increased 25mg tid for HR control. Anticoagulate with Eliquis 5 mg BID. Pt to follow up with Dr Calix as outpatient.     63 yo F w/ hyperkalemic periodic paralysis on diamox, bicuspid aortic valve with severe AS s/p valve replacement (12/12/22, [facility]), and aortic aneurysm s/p ascendic aorta replacement (12/12/22, [facility]) presents for 2 days of intermittent heart palpitations thought to be afib by at home nurse and worsening SOB. Patient was placed on ASA 81, Mg, metoprolol, and lasix post-op and was asked to take metoprolol 12.5 after calling CT surgeon for first episode of afib on 12/23. Today (01:30, 12/25) patient reports SOB at rest w/ heart palpitations and was asked to present to the ED for concern of pericardial effusion. She denies any CP, abdominal pain, lightheadedness, dizziness, HA, vision changes. Both surgeries performed by Dr. Vimal Echevarria CT surgeon based in [facility] and [facility].    CT Surgery consulted for Afib s/p AVR/Asc Aorta replacement on 12/12/22 with Dr. Echevarria. Plan to r/o pericardial effusion with TTE in AM. All labs and imaging reviewed with on call CT surgeon, Dr. Thompson.     12/25 TTE in AM, Heparin gtt initiated with plan to start NOAC if no pericardial effusion, hold lasix for r/o pericardial effusion  12/26 < from: TTE with Doppler (w/Cont) (12.26.22 @ 12:54) >  Conclusions:  1. Bioprosthetic aortic valve. Peak transaortic valve  gradient equals 27 mm Hg, mean transaortic valve gradient  equals 11 mm Hg, which is probably normal in the presence  of a bioprosthetic aortic valve. No aortic valve  regurgitation seen.  peak velocity: 2.5 m/s  peak gradient 27 mmHg  LVOT VTI: 18 cm  AV VTI: 32 cm  DVI: 0.4  2. Mildly dilated left atrium.  LA volume index = 39 cc/m2.  Increased relative wall thickness with normal left  ventricular mass index, consistent with concentric left  ventricular remodeling.Endocardial visualization enhanced  with intravenous injection of Ultrasonic Enhancing Agent  (Definity). Mild segmental left ventricular systolic  dysfunction. No left ventricular thrombus. LVEF calculated  using biplane Pace's method is 58%. Septal motion  consistent with prior cardiac surgery. There is hypokinesis  of the inferior wall and dyssynchrony of the septum.  Increased E/e'  is consistent with elevated left  ventricular filling pressure. Unable to determine diastolic  function due to merged E and A wave.  3. Severe right atrial enlargement. Right ventricular  enlargement with mild decreased right ventricular systolic  function.  4. Normal tricuspid valve. Moderate tricuspid  regurgitation.Estimated pulmonary artery systolic pressure  equals 38  mm Hg, assuming right atrial pressure equals 15  mm Hg, consistent with borderline pulmonary pressures.  5. No pericardial effusion seen.  *** Compared with echocardiogram of 9/27/2022, there is now  a bioprosthetic valve in the aortic position.  ADDENDUM 12/26/2022:  Also compared to the previous, there  is inferior hypokinesis and septal dyssynchrony. LV  function is preserved.    < end of copied text >    Lopressor increased 25mg tid for HR control. Anticoagulate with Eliquis 5 mg BID. Pt to follow up with Dr Calix as outpatient.    12/27 Patient has remained in normal sinus rhythm on telemetry.  No complaints of chest pain/palpitations/dizziness.

## 2022-12-27 NOTE — DISCHARGE NOTE PROVIDER - NSDCMRMEDTOKEN_GEN_ALL_CORE_FT
apixaban 5 mg oral tablet: 1 tab(s) orally 2 times a day  aspirin 81 mg oral tablet, chewable: 1 tab(s) orally once a day  aspirin 81 mg oral tablet, chewable: 1 tab(s) orally once a day  citalopram 20 mg oral tablet: 1 tab(s) orally once a day  citalopram 20 mg oral tablet: 1 tab(s) orally once a day  Diamox 250 mg oral tablet: 1 tab(s) orally once a day  Diamox 250 mg oral tablet: 1 tab(s) orally once a day  furosemide 20 mg oral tablet: 1 tab(s) orally once a day  metoprolol tartrate 25 mg oral tablet: 1 tab(s) orally 3 times a day  omeprazole 40 mg oral delayed release capsule: 1 cap(s) orally once a day  oxyCODONE 5 mg oral tablet: 1 tab(s) orally every 6 hours, As needed, Moderate Pain (4 - 6) MDD:4  oxyCODONE 5 mg oral tablet: 1 tab(s) orally every 6 hours, As needed, Moderate Pain (4 - 6) MDD:4  polyethylene glycol 3350 oral powder for reconstitution: 17 gram(s) orally once a day  PreserVision AREDS oral capsule: 1 cap(s) orally once a day  senna leaf extract oral tablet: 2 tab(s) orally once a day (at bedtime)

## 2022-12-27 NOTE — DISCHARGE NOTE NURSING/CASE MANAGEMENT/SOCIAL WORK - PATIENT PORTAL LINK FT
You can access the FollowMyHealth Patient Portal offered by [facility] by registering at the following website: [email or web address]. By joining [employer]’s FollowMyHealth portal, you will also be able to view your health information using other applications (apps) compatible with our system.

## 2022-12-27 NOTE — DISCHARGE NOTE PROVIDER - CARE PROVIDER_API CALL
Vimal Echevarria (MD)  Surgery; Thoracic and Cardiac Surgery  [address] [place] [address]  Phone: [phone]  Fax: [phone]  Scheduled Appointment: 01/11/2023   Vimal Echevarria (MD)  Surgery; Thoracic and Cardiac Surgery  [address] [place] [address]  Phone: [phone]  Fax: [phone]  Scheduled Appointment: 01/11/2023 07:45 AM    Celso Calix; PhD)  Cardiology; Internal Medicine; Vascular Medicine  [address], [address]  [place] [address]  Phone: [phone]  Fax: [phone]  Scheduled Appointment: 01/04/2023 09:00 AM

## 2022-12-28 ENCOUNTER — NON-APPOINTMENT (OUTPATIENT)
Age: 62
End: 2022-12-28

## 2022-12-30 ENCOUNTER — NON-APPOINTMENT (OUTPATIENT)
Age: 62
End: 2022-12-30

## 2022-12-30 PROBLEM — R19.7 DIARRHEA: Status: ACTIVE | Noted: 2022-12-30

## 2022-12-30 PROBLEM — M62.838 MUSCLE SPASM: Status: ACTIVE | Noted: 2022-12-30

## 2022-12-30 LAB — SURGICAL PATHOLOGY STUDY: SIGNIFICANT CHANGE UP

## 2023-01-01 ENCOUNTER — TRANSCRIPTION ENCOUNTER (OUTPATIENT)
Age: 63
End: 2023-01-01

## 2023-01-02 ENCOUNTER — TRANSCRIPTION ENCOUNTER (OUTPATIENT)
Age: 63
End: 2023-01-02

## 2023-01-04 ENCOUNTER — NON-APPOINTMENT (OUTPATIENT)
Age: 63
End: 2023-01-04

## 2023-01-04 ENCOUNTER — TRANSCRIPTION ENCOUNTER (OUTPATIENT)
Age: 63
End: 2023-01-04

## 2023-01-04 ENCOUNTER — APPOINTMENT (OUTPATIENT)
Dept: CARDIOLOGY | Facility: CLINIC | Age: 63
End: 2023-01-04
Payer: COMMERCIAL

## 2023-01-04 VITALS
HEIGHT: 61 IN | DIASTOLIC BLOOD PRESSURE: 64 MMHG | BODY MASS INDEX: 23.6 KG/M2 | SYSTOLIC BLOOD PRESSURE: 97 MMHG | HEART RATE: 67 BPM | WEIGHT: 125 LBS | OXYGEN SATURATION: 98 %

## 2023-01-04 DIAGNOSIS — Z71.89 OTHER SPECIFIED COUNSELING: ICD-10-CM

## 2023-01-04 PROCEDURE — 99215 OFFICE O/P EST HI 40 MIN: CPT

## 2023-01-04 PROCEDURE — 93000 ELECTROCARDIOGRAM COMPLETE: CPT

## 2023-01-05 PROBLEM — Z09 POSTOPERATIVE FOLLOW-UP: Status: ACTIVE | Noted: 2023-01-05

## 2023-01-05 LAB
ALBUMIN SERPL ELPH-MCNC: 3.6 G/DL
ALP BLD-CCNC: 168 U/L
ALT SERPL-CCNC: 27 U/L
ANION GAP SERPL CALC-SCNC: 15 MMOL/L
AST SERPL-CCNC: 20 U/L
BASOPHILS # BLD AUTO: 0.05 K/UL
BASOPHILS NFR BLD AUTO: 0.7 %
BILIRUB SERPL-MCNC: 0.5 MG/DL
BUN SERPL-MCNC: 12 MG/DL
CALCIUM SERPL-MCNC: 9.1 MG/DL
CHLORIDE SERPL-SCNC: 104 MMOL/L
CO2 SERPL-SCNC: 21 MMOL/L
CREAT SERPL-MCNC: 0.57 MG/DL
EGFR: 102 ML/MIN/1.73M2
EOSINOPHIL # BLD AUTO: 0.23 K/UL
EOSINOPHIL NFR BLD AUTO: 3.4 %
GLUCOSE SERPL-MCNC: 74 MG/DL
HCT VFR BLD CALC: 30.2 %
HGB BLD-MCNC: 8.9 G/DL
IMM GRANULOCYTES NFR BLD AUTO: 0.3 %
LYMPHOCYTES # BLD AUTO: 0.92 K/UL
LYMPHOCYTES NFR BLD AUTO: 13.6 %
MAGNESIUM SERPL-MCNC: 2.4 MG/DL
MAN DIFF?: NORMAL
MCHC RBC-ENTMCNC: 27.1 PG
MCHC RBC-ENTMCNC: 29.5 GM/DL
MCV RBC AUTO: 91.8 FL
MONOCYTES # BLD AUTO: 0.71 K/UL
MONOCYTES NFR BLD AUTO: 10.5 %
NEUTROPHILS # BLD AUTO: 4.85 K/UL
NEUTROPHILS NFR BLD AUTO: 71.5 %
NT-PROBNP SERPL-MCNC: 963 PG/ML
PLATELET # BLD AUTO: 236 K/UL
POTASSIUM SERPL-SCNC: 3.9 MMOL/L
PROT SERPL-MCNC: 5.9 G/DL
RBC # BLD: 3.29 M/UL
RBC # FLD: 15.7 %
SODIUM SERPL-SCNC: 141 MMOL/L
WBC # FLD AUTO: 6.78 K/UL

## 2023-01-07 ENCOUNTER — TRANSCRIPTION ENCOUNTER (OUTPATIENT)
Age: 63
End: 2023-01-07

## 2023-01-10 ENCOUNTER — TRANSCRIPTION ENCOUNTER (OUTPATIENT)
Age: 63
End: 2023-01-10

## 2023-01-11 ENCOUNTER — OUTPATIENT (OUTPATIENT)
Dept: OUTPATIENT SERVICES | Facility: HOSPITAL | Age: 63
LOS: 1 days | End: 2023-01-11
Payer: COMMERCIAL

## 2023-01-11 ENCOUNTER — APPOINTMENT (OUTPATIENT)
Dept: CARDIOTHORACIC SURGERY | Facility: CLINIC | Age: 63
End: 2023-01-11
Payer: COMMERCIAL

## 2023-01-11 ENCOUNTER — APPOINTMENT (OUTPATIENT)
Dept: CARDIOTHORACIC SURGERY | Facility: CLINIC | Age: 63
End: 2023-01-11

## 2023-01-11 ENCOUNTER — NON-APPOINTMENT (OUTPATIENT)
Age: 63
End: 2023-01-11

## 2023-01-11 VITALS
TEMPERATURE: 97.8 F | HEART RATE: 93 BPM | SYSTOLIC BLOOD PRESSURE: 98 MMHG | HEIGHT: 61 IN | OXYGEN SATURATION: 92 % | WEIGHT: 122 LBS | BODY MASS INDEX: 23.03 KG/M2 | RESPIRATION RATE: 16 BRPM | DIASTOLIC BLOOD PRESSURE: 65 MMHG

## 2023-01-11 DIAGNOSIS — Z09 ENCOUNTER FOR FOLLOW-UP EXAMINATION AFTER COMPLETED TREATMENT FOR CONDITIONS OTHER THAN MALIGNANT NEOPLASM: ICD-10-CM

## 2023-01-11 DIAGNOSIS — Z98.890 OTHER SPECIFIED POSTPROCEDURAL STATES: Chronic | ICD-10-CM

## 2023-01-11 DIAGNOSIS — Z98.890 OTHER SPECIFIED POSTPROCEDURAL STATES: ICD-10-CM

## 2023-01-11 DIAGNOSIS — Z95.2 PRESENCE OF PROSTHETIC HEART VALVE: ICD-10-CM

## 2023-01-11 DIAGNOSIS — O00.90 UNSPECIFIED ECTOPIC PREGNANCY WITHOUT INTRAUTERINE PREGNANCY: Chronic | ICD-10-CM

## 2023-01-11 DIAGNOSIS — Z98.49 CATARACT EXTRACTION STATUS, UNSPECIFIED EYE: Chronic | ICD-10-CM

## 2023-01-11 PROCEDURE — 71046 X-RAY EXAM CHEST 2 VIEWS: CPT

## 2023-01-11 PROCEDURE — 99024 POSTOP FOLLOW-UP VISIT: CPT

## 2023-01-11 PROCEDURE — 71046 X-RAY EXAM CHEST 2 VIEWS: CPT | Mod: 26

## 2023-01-12 ENCOUNTER — TRANSCRIPTION ENCOUNTER (OUTPATIENT)
Age: 63
End: 2023-01-12

## 2023-01-12 NOTE — PHYSICAL EXAM
[] : no respiratory distress [Respiration, Rhythm And Depth] : normal respiratory rhythm and effort [Auscultation Breath Sounds / Voice Sounds] : lungs were clear to auscultation bilaterally [Apical Impulse] : the apical impulse was normal [Heart Rate And Rhythm] : heart rate was normal and rhythm regular [Heart Sounds] : normal S1 and S2 [Murmurs] : no murmurs [Clean] : clean [Dry] : dry [Healing Well] : healing well [No Edema] : no edema

## 2023-01-13 ENCOUNTER — APPOINTMENT (OUTPATIENT)
Dept: ELECTROPHYSIOLOGY | Facility: CLINIC | Age: 63
End: 2023-01-13
Payer: COMMERCIAL

## 2023-01-13 ENCOUNTER — NON-APPOINTMENT (OUTPATIENT)
Age: 63
End: 2023-01-13

## 2023-01-13 VITALS — SYSTOLIC BLOOD PRESSURE: 106 MMHG | HEART RATE: 99 BPM | DIASTOLIC BLOOD PRESSURE: 73 MMHG | OXYGEN SATURATION: 98 %

## 2023-01-13 DIAGNOSIS — I47.1 SUPRAVENTRICULAR TACHYCARDIA: ICD-10-CM

## 2023-01-13 DIAGNOSIS — R00.2 PALPITATIONS: ICD-10-CM

## 2023-01-13 PROCEDURE — 93000 ELECTROCARDIOGRAM COMPLETE: CPT

## 2023-01-13 PROCEDURE — 99205 OFFICE O/P NEW HI 60 MIN: CPT

## 2023-01-13 RX ORDER — FUROSEMIDE 20 MG/1
20 TABLET ORAL DAILY
Qty: 90 | Refills: 3 | Status: DISCONTINUED | COMMUNITY
Start: 2023-01-04 | End: 2023-01-13

## 2023-01-14 ENCOUNTER — TRANSCRIPTION ENCOUNTER (OUTPATIENT)
Age: 63
End: 2023-01-14

## 2023-01-16 ENCOUNTER — NON-APPOINTMENT (OUTPATIENT)
Age: 63
End: 2023-01-16

## 2023-01-16 NOTE — CARDIOLOGY SUMMARY
[de-identified] : 12/26/2022: Conclusions: \par 1. Bioprosthetic aortic valve. Peak transaortic valve\par gradient equals 27 mm Hg, mean transaortic valve gradient\par equals 11 mm Hg, which is probably normal in the presence\par of a bioprosthetic aortic valve. No aortic valve\par regurgitation seen.\par peak velocity: 2.5 m/s\par peak gradient 27 mmHg\par LVOT VTI: 18 cm\par AV VTI: 32 cm\par DVI: 0.4\par 2. Mildly dilated left atrium.  LA volume index = 39 cc/m2.\par Increased relative wall thickness with normal left\par ventricular mass index, consistent with concentric left\par ventricular remodeling.Endocardial visualization enhanced\par with intravenous injection of Ultrasonic Enhancing Agent\par (Definity). Mild segmental left ventricular systolic\par dysfunction. No left ventricular thrombus. LVEF calculated\par using biplane Pace's method is 58%. Septal motion\par consistent with prior cardiac surgery. There is hypokinesis\par of the inferior wall and dyssynchrony of the septum. \par Increased E/e'  is consistent with elevated left\par ventricular filling pressure. Unable to determine diastolic\par function due to merged E and A wave.\par 3. Severe right atrial enlargement. Right ventricular\par enlargement with mild decreased right ventricular systolic\par function.\par 4. Normal tricuspid valve. Moderate tricuspid\par regurgitation.Estimated pulmonary artery systolic pressure\par equals 38  mm Hg, assuming right atrial pressure equals 15\par mm Hg, consistent with borderline pulmonary pressures.\par 5. No pericardial effusion seen.\par *** Compared with echocardiogram of 9/27/2022, there is now\par a bioprosthetic valve in the aortic position.\par ADDENDUM 12/26/2022:  Also compared to the previous, there\par is inferior hypokinesis and septal dyssynchrony. LV\par function is preserved. [de-identified] : 11/28/2022: Diagnostic Findings: \par \par Coronary Angiography \par The coronary circulation is left dominant.  \par \par LM \par Left main artery: Angiography shows no disease.  \par \par LAD \par Left anterior descending artery: Angiography shows mild\par atherosclerosis.\par \par CX \par Circumflex: Angiography shows no disease.  \par \par RCA \par Right coronary artery: Angiography shows no disease.  \par

## 2023-01-16 NOTE — DISCUSSION/SUMMARY
[EKG obtained to assist in diagnosis and management of assessed problem(s)] : EKG obtained to assist in diagnosis and management of assessed problem(s) [FreeTextEntry1] : Impression:\par \par 1. Paroxysmal afib/aflutter: EKG performed today to assess for presence of recurrent afib and reveals atrial flutter. 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 Will change metoprolol to succinate, 50mg BID. Rx provided. Review of ECHO with normal LVEF. Given recurrent symptomatic afib and SVT despite rate control management, discussed improved treatment options such as ablation. Risks, benefits, and alternatives to procedure discussed at length. Risks including that of bleeding, infection, stroke, and cardiac tamponade discussed and he verbalizes understanding of all. Will hold all medications the morning of the ablation. May take all regularly scheduled medications the night before. Given newly initiated Eliquis, will do COLEEN on day of procedure to ensure no evidence of LA thrombus. \par \par 2. s/p bioprosthetic AVR on 12/12/2022. On ASA 81mg daily. \par \par 3. JO: resume compliance with JO management to prevent future sinus node dysfunction and atrial fibrillation. Encouraged weight loss.\par \par Plan for COLEEN/afib ablation.

## 2023-01-16 NOTE — HISTORY OF PRESENT ILLNESS
[FreeTextEntry1] : Trisha Villalobos is a 61y/o woman with Hx of splenic vein thrombosis, rheumatic mitral disease, GERD, esophageal varices, ITP (platelets around 100k), hyperkalemic periodic paralysis diagnosed at age 36, JO on CPAP, basal cell carcinoma, T11 fracture (current), cataracts, recent bio-pros AVR and ascending aorta replacement on 12/12/20222 and now paroxysmal afib with RVR who presents today for initial evaluation. Admits a few days post operatively began feeling palpitations into her throat. Put her Apple Iwatch on which revealed afib. Was seen in the ER on [place] day and started on a heparin gtt and given metoprolol. Eventually self converted to NSR. Since that time, has been going in and out of afib daily. Notes her HR over the last two days to be steady in the 130s. Denies chest pain, SOB, syncope or near syncope. On Eliquis 5mg BID without s/s of bleeding and metoprolol tart 25mg, taking 4x a day at present. \par

## 2023-01-17 ENCOUNTER — TRANSCRIPTION ENCOUNTER (OUTPATIENT)
Age: 63
End: 2023-01-17

## 2023-01-18 ENCOUNTER — NON-APPOINTMENT (OUTPATIENT)
Age: 63
End: 2023-01-18

## 2023-01-18 ENCOUNTER — OUTPATIENT (OUTPATIENT)
Dept: OUTPATIENT SERVICES | Facility: HOSPITAL | Age: 63
LOS: 1 days | Discharge: HOME | End: 2023-01-18
Payer: COMMERCIAL

## 2023-01-18 ENCOUNTER — OUTPATIENT (OUTPATIENT)
Dept: OUTPATIENT SERVICES | Facility: HOSPITAL | Age: 63
LOS: 1 days | End: 2023-01-18

## 2023-01-18 VITALS
RESPIRATION RATE: 17 BRPM | TEMPERATURE: 98 F | HEART RATE: 102 BPM | SYSTOLIC BLOOD PRESSURE: 92 MMHG | OXYGEN SATURATION: 98 % | DIASTOLIC BLOOD PRESSURE: 61 MMHG

## 2023-01-18 DIAGNOSIS — Z95.2 PRESENCE OF PROSTHETIC HEART VALVE: Chronic | ICD-10-CM

## 2023-01-18 DIAGNOSIS — Z98.890 OTHER SPECIFIED POSTPROCEDURAL STATES: Chronic | ICD-10-CM

## 2023-01-18 DIAGNOSIS — I48.0 PAROXYSMAL ATRIAL FIBRILLATION: ICD-10-CM

## 2023-01-18 DIAGNOSIS — Z98.49 CATARACT EXTRACTION STATUS, UNSPECIFIED EYE: Chronic | ICD-10-CM

## 2023-01-18 DIAGNOSIS — O00.90 UNSPECIFIED ECTOPIC PREGNANCY WITHOUT INTRAUTERINE PREGNANCY: Chronic | ICD-10-CM

## 2023-01-18 LAB
BLD GP AB SCN SERPL QL: NEGATIVE — SIGNIFICANT CHANGE UP
RH IG SCN BLD-IMP: NEGATIVE — SIGNIFICANT CHANGE UP

## 2023-01-18 PROCEDURE — 93306 TTE W/DOPPLER COMPLETE: CPT | Mod: 26

## 2023-01-18 PROCEDURE — 93010 ELECTROCARDIOGRAM REPORT: CPT

## 2023-01-18 PROCEDURE — 93655 ICAR CATH ABLTJ DSCRT ARRHYT: CPT

## 2023-01-18 PROCEDURE — 99232 SBSQ HOSP IP/OBS MODERATE 35: CPT

## 2023-01-18 PROCEDURE — 93656 COMPRE EP EVAL ABLTJ ATR FIB: CPT

## 2023-01-18 RX ORDER — SODIUM CHLORIDE 9 MG/ML
500 INJECTION INTRAMUSCULAR; INTRAVENOUS; SUBCUTANEOUS ONCE
Refills: 0 | Status: COMPLETED | OUTPATIENT
Start: 2023-01-18 | End: 2023-01-18

## 2023-01-18 RX ORDER — ACETAZOLAMIDE 250 MG/1
250 TABLET ORAL DAILY
Refills: 0 | Status: DISCONTINUED | OUTPATIENT
Start: 2023-01-18 | End: 2023-02-01

## 2023-01-18 RX ORDER — SODIUM CHLORIDE 9 MG/ML
3 INJECTION INTRAMUSCULAR; INTRAVENOUS; SUBCUTANEOUS EVERY 8 HOURS
Refills: 0 | Status: DISCONTINUED | OUTPATIENT
Start: 2023-01-18 | End: 2023-02-01

## 2023-01-18 RX ORDER — ASPIRIN/CALCIUM CARB/MAGNESIUM 324 MG
81 TABLET ORAL DAILY
Refills: 0 | Status: DISCONTINUED | OUTPATIENT
Start: 2023-01-18 | End: 2023-02-01

## 2023-01-18 RX ORDER — PANTOPRAZOLE SODIUM 20 MG/1
40 TABLET, DELAYED RELEASE ORAL
Refills: 0 | Status: DISCONTINUED | OUTPATIENT
Start: 2023-01-18 | End: 2023-02-01

## 2023-01-18 RX ORDER — MULTIVIT-MIN/FERROUS GLUCONATE 9 MG/15 ML
1 LIQUID (ML) ORAL
Qty: 0 | Refills: 0 | DISCHARGE

## 2023-01-18 RX ORDER — APIXABAN 2.5 MG/1
5 TABLET, FILM COATED ORAL
Refills: 0 | Status: DISCONTINUED | OUTPATIENT
Start: 2023-01-18 | End: 2023-02-01

## 2023-01-18 RX ORDER — GABAPENTIN 400 MG/1
100 CAPSULE ORAL THREE TIMES A DAY
Refills: 0 | Status: DISCONTINUED | OUTPATIENT
Start: 2023-01-18 | End: 2023-02-01

## 2023-01-18 RX ORDER — METOPROLOL TARTRATE 50 MG
50 TABLET ORAL DAILY
Refills: 0 | Status: DISCONTINUED | OUTPATIENT
Start: 2023-01-18 | End: 2023-02-01

## 2023-01-18 RX ORDER — CITALOPRAM 10 MG/1
20 TABLET, FILM COATED ORAL DAILY
Refills: 0 | Status: DISCONTINUED | OUTPATIENT
Start: 2023-01-18 | End: 2023-02-01

## 2023-01-18 RX ADMIN — SODIUM CHLORIDE 3 MILLILITER(S): 9 INJECTION INTRAMUSCULAR; INTRAVENOUS; SUBCUTANEOUS at 22:40

## 2023-01-18 RX ADMIN — GABAPENTIN 100 MILLIGRAM(S): 400 CAPSULE ORAL at 22:23

## 2023-01-18 RX ADMIN — APIXABAN 5 MILLIGRAM(S): 2.5 TABLET, FILM COATED ORAL at 23:39

## 2023-01-18 RX ADMIN — SODIUM CHLORIDE 1500 MILLILITER(S): 9 INJECTION INTRAMUSCULAR; INTRAVENOUS; SUBCUTANEOUS at 18:00

## 2023-01-18 NOTE — H&P CARDIOLOGY - HISTORY OF PRESENT ILLNESS
61 y/o F w/ PMH of splenic vein thrombosis, rheumatic mitral disease, GERD, esophageal varices, ITP (platelets around 100k), hyperkalemic periodic paralysis diagnosed at age 36, JO on CPAP, basal cell carcinoma, T11 fracture (current), cataracts, recent bio-pros AVR and ascending aorta replacement on 12/12/20222 and paroxysmal Afib on Eliquis presents for elective COLEEN/Afib Ablation. Was seen in the ER on [place] day and started on a heparin gtt and given metoprolol. Eventually self converted to NSR. Since that time, has been going in and out of Afib daily. Pt experiences palpations which are exacerbated by exertion.

## 2023-01-18 NOTE — PATIENT PROFILE ADULT - FALL HARM RISK - HARM RISK INTERVENTIONS

## 2023-01-18 NOTE — H&P CARDIOLOGY - NSICDXPASTSURGICALHX_GEN_ALL_CORE_FT
PAST SURGICAL HISTORY:   delivery NOS     Ectopic pregnancy     H/O aortic valve replacement     H/O ascending aorta repair     H/O bilateral inguinal hernia repair age 5    H/O carpal tunnel repair     S/P cataract surgery

## 2023-01-18 NOTE — H&P CARDIOLOGY - NSICDXPASTMEDICALHX_GEN_ALL_CORE_FT
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

## 2023-01-18 NOTE — CHART NOTE - NSCHARTNOTEFT_GEN_A_CORE
ELLI LOVELL 62yrs s/p ablation via right femoral access.  Site is stable with no hematoma, active bleed or swelling.  Dressing is clean/dry/intact.  DP pulse is palpable.  Patient denies pain, numbness, tingling, CP, SOB. VSS. Will continue to monitor.     T(C): --  HR: --  BP: --  RR: --  SpO2: -- ELLI LOVELL 62yrs s/p ablation via right femoral access.  Site is stable with no hematoma, active bleed or swelling.  Dressing is clean/dry/intact.  DP pulse is palpable.  Patient denies pain, numbness, tingling, CP, SOB. VSS. Will continue to monitor. Eliquis restarted as the site is stable.

## 2023-01-19 ENCOUNTER — TRANSCRIPTION ENCOUNTER (OUTPATIENT)
Age: 63
End: 2023-01-19

## 2023-01-19 VITALS
DIASTOLIC BLOOD PRESSURE: 69 MMHG | RESPIRATION RATE: 18 BRPM | HEIGHT: 62 IN | OXYGEN SATURATION: 97 % | HEART RATE: 87 BPM | SYSTOLIC BLOOD PRESSURE: 93 MMHG | TEMPERATURE: 98 F

## 2023-01-19 LAB
ANION GAP SERPL CALC-SCNC: 12 MMOL/L — SIGNIFICANT CHANGE UP (ref 7–14)
BUN SERPL-MCNC: 14 MG/DL — SIGNIFICANT CHANGE UP (ref 7–23)
CALCIUM SERPL-MCNC: 8.9 MG/DL — SIGNIFICANT CHANGE UP (ref 8.4–10.5)
CHLORIDE SERPL-SCNC: 101 MMOL/L — SIGNIFICANT CHANGE UP (ref 98–107)
CO2 SERPL-SCNC: 23 MMOL/L — SIGNIFICANT CHANGE UP (ref 22–31)
CREAT SERPL-MCNC: 0.54 MG/DL — SIGNIFICANT CHANGE UP (ref 0.5–1.3)
EGFR: 103 ML/MIN/1.73M2 — SIGNIFICANT CHANGE UP
GLUCOSE SERPL-MCNC: 129 MG/DL — HIGH (ref 70–99)
HCT VFR BLD CALC: 30.2 % — LOW (ref 34.5–45)
HGB BLD-MCNC: 8.7 G/DL — LOW (ref 11.5–15.5)
MAGNESIUM SERPL-MCNC: 1.9 MG/DL — SIGNIFICANT CHANGE UP (ref 1.6–2.6)
MCHC RBC-ENTMCNC: 24.8 PG — LOW (ref 27–34)
MCHC RBC-ENTMCNC: 28.8 GM/DL — LOW (ref 32–36)
MCV RBC AUTO: 86 FL — SIGNIFICANT CHANGE UP (ref 80–100)
NRBC # BLD: 0 /100 WBCS — SIGNIFICANT CHANGE UP (ref 0–0)
NRBC # FLD: 0 K/UL — SIGNIFICANT CHANGE UP (ref 0–0)
PHOSPHATE SERPL-MCNC: 3.5 MG/DL — SIGNIFICANT CHANGE UP (ref 2.5–4.5)
PLATELET # BLD AUTO: 204 K/UL — SIGNIFICANT CHANGE UP (ref 150–400)
POTASSIUM SERPL-MCNC: 3.8 MMOL/L — SIGNIFICANT CHANGE UP (ref 3.5–5.3)
POTASSIUM SERPL-SCNC: 3.8 MMOL/L — SIGNIFICANT CHANGE UP (ref 3.5–5.3)
RBC # BLD: 3.51 M/UL — LOW (ref 3.8–5.2)
RBC # FLD: 15.2 % — HIGH (ref 10.3–14.5)
SODIUM SERPL-SCNC: 136 MMOL/L — SIGNIFICANT CHANGE UP (ref 135–145)
WBC # BLD: 7.55 K/UL — SIGNIFICANT CHANGE UP (ref 3.8–10.5)
WBC # FLD AUTO: 7.55 K/UL — SIGNIFICANT CHANGE UP (ref 3.8–10.5)

## 2023-01-19 RX ORDER — PANTOPRAZOLE SODIUM 20 MG/1
1 TABLET, DELAYED RELEASE ORAL
Qty: 30 | Refills: 0
Start: 2023-01-19 | End: 2023-02-17

## 2023-01-19 RX ORDER — APIXABAN 2.5 MG/1
1 TABLET, FILM COATED ORAL
Qty: 60 | Refills: 0
Start: 2023-01-19 | End: 2023-02-17

## 2023-01-19 RX ADMIN — Medication 50 MILLIGRAM(S): at 06:00

## 2023-01-19 RX ADMIN — APIXABAN 5 MILLIGRAM(S): 2.5 TABLET, FILM COATED ORAL at 05:59

## 2023-01-19 RX ADMIN — ACETAZOLAMIDE 250 MILLIGRAM(S): 250 TABLET ORAL at 09:34

## 2023-01-19 RX ADMIN — GABAPENTIN 100 MILLIGRAM(S): 400 CAPSULE ORAL at 05:59

## 2023-01-19 RX ADMIN — PANTOPRAZOLE SODIUM 40 MILLIGRAM(S): 20 TABLET, DELAYED RELEASE ORAL at 05:59

## 2023-01-19 NOTE — REASON FOR VISIT
[Family Member] : family member [de-identified] : Full sternotomy, replacement of the ascending aorta, Replacement of the aortic valve with a size 23-mm Zavala Inspiris biological valve  [de-identified] : 12/12/22

## 2023-01-19 NOTE — END OF VISIT
[FreeTextEntry3] : \par I personally scribed for ADAN WARE on Jan 11 2023  7:45AM . \par I personally performed the services described in the documentation, reviewed the documentation recorded by the scribe in my presence and it accurately and completely records my words and actions.\par \par \par \par \par \par Physician Attestation:\par Documented by ART BARDALES acting as a scribe for ADAN WARE 01/11/2023 . \par                 All medical record entries made by the Scribe were at my, ADAN WARE , direction and personally dictated by me on 01/11/2023 . I have reviewed the chart and agree that the record accurately reflects my personal performance of the history, physical exam, assessment and plan\par \par

## 2023-01-19 NOTE — PROGRESS NOTE ADULT - ASSESSMENT
63 y/o F w/ PMH of splenic vein thrombosis, rheumatic mitral disease, GERD, esophageal varices, ITP (platelets around 100k), hyperkalemic periodic paralysis diagnosed at age 36, JO on CPAP, basal cell carcinoma, T11 fracture (current), cataracts, recent bio-pros AVR and ascending aorta replacement on 12/12/20222 and paroxysmal Afib on Eliquis presented for elective COLEEN/Afib Ablation. Patient is s/p afib ablation on 1/18/23.    Teaching provided to patient regarding right groin site care.  Right groin without hematoma, ecchymosis, drainage, pain, or bleeding.  Telemetry review demonstrates sinus rhythm. VSS    - may shower after 24 hrs, otherwise keep groin incision sites dry and clean.    - Avoid activities such as jogging/excessive stair climbing/weight lifting for the next 7 days    - Take Acetaminophen (Tylenol) 500mg, one to two tablets every 8 hours as needed for pain relief.    - Pt was instructed to call [phone] if the following occurs:      - fever with temperature > 101      - swelling or bleeding at the groin incision site(s)  - Continue current meds including AC, BB and Protonix  - Outpatient F/U is scheduled with Dr. Koenig on 2/23/23 @ 12 noon    All questions answered to patient's satisfaction.  Follow up letter and instructions left in the care of the patient.    - May d/c home from EP standpoint

## 2023-01-19 NOTE — DISCHARGE NOTE NURSING/CASE MANAGEMENT/SOCIAL WORK - PATIENT PORTAL LINK FT
You can access the FollowMyHealth Patient Portal offered by [facility] by registering at the following website: [email or web address]. By joining [employer]’s FollowMyHealth portal, you will also be able to view your health information using other applications (apps) compatible with our system.

## 2023-01-19 NOTE — DISCHARGE NOTE PROVIDER - NSDCMRMEDTOKEN_GEN_ALL_CORE_FT
apixaban 5 mg oral tablet: 1 tab(s) orally 2 times a day  aspirin 81 mg oral tablet, chewable: 1 tab(s) orally once a day  citalopram 20 mg oral tablet: 1 tab(s) orally once a day  Diamox 250 mg oral tablet: 1 tab(s) orally once a day  gabapentin 100 mg oral capsule: 1 cap(s) orally 3 times a day  pantoprazole 40 mg oral delayed release tablet: 1 tab(s) orally once a day (before a meal)  Toprol-XL 50 mg oral tablet, extended release: 1 tab(s) orally 2 times a day

## 2023-01-19 NOTE — PROCEDURE
[FreeTextEntry1] :  Dr. Echevarria reviewed the indications for surgery, and used our webpage [email or web address] <[email or web address]> to illustrate the aorta and anatomy of the heart. Those indications are the following: size greater than 5.0 cm, symptomatic aneurysms, family history of aortic dissection or aneurysm death with a size greater than 4.5 cm, other necessary cardiac procedures such as coronary artery bypass grafting or valvular disorders with an aneurysm greater than 4.5 cm, or connective tissue disorders with an aneurysm size greater than 4.5 cm. The patient does not meet size criteria for surgical intervention at the time. Patient was advised to view the educational video prior to this visit regarding aortic pathology, risk factors, surgical procedures, and lifestyle modifications. Video can be retrieved at <[email or web address]>.\par \par Dr. Echevarria discussed activity restrictions with the patient, and would advise exercise at a moderate amount with no heavy lifting over one third of body weight, and avoiding heart rates that exceed 140 beats per minute. In addition, every patient should abstain from tobacco abuse and to avoid all illicit drug use, especially stimulants such as cocaine or methamphetamine. Dr. Echevarria also counseled regarding maintaining a healthy heart diet, and losing any excessive weight as this also put undue stress on both the aorta and entire cardiovascular system. First degree family members should be screened for bicuspid valve disease, and ascending aortic aneurysms. \par \par \par

## 2023-01-19 NOTE — PROGRESS NOTE ADULT - NS ATTEND AMEND GEN_ALL_CORE FT
63 y/o F w/ PMH of splenic vein thrombosis, rheumatic mitral disease, GERD, esophageal varices, ITP (platelets around 100k), hyperkalemic periodic paralysis diagnosed at age 36, JO on CPAP, basal cell carcinoma, T11 fracture (current), cataracts, recent bio-pros AVR and ascending aorta replacement on 12/12/20222 and paroxysmal Afib on Eliquis presented for elective COLEEN/Afib Ablation. Patient is s/p afib ablation on 1/18/23.    Teaching provided to patient regarding right groin site care.  Right groin without hematoma, ecchymosis, drainage, pain, or bleeding.  Telemetry review demonstrates sinus rhythm. VSS. Will fu in office.

## 2023-01-19 NOTE — DISCHARGE NOTE NURSING/CASE MANAGEMENT/SOCIAL WORK - NSDCPEFALRISK_GEN_ALL_CORE
For information on Fall & Injury Prevention, visit: [email or web address] OR  [email or web address] OR  https://www.cdc.gov/steadi/patient.html

## 2023-01-19 NOTE — ASSESSMENT
[FreeTextEntry1] : Ms. LOVELL is a 62 year old female with  past medical history of hyperkalemic periodic paralysis (diagnosed at age 36, controlled on diamox, last flare 2 years ago), bicuspid aortic valve with severe AS, aortic aneurysm, splenic vein thrombosis, portal vein thrombosis, macular degeneration, ITP, esophageal varices GERD, basal cell carcinoma, T11 fracture (current), cataracts.\par \par  On 22 pt underwent Full sternotomy, replacement of the ascending aorta, Replacement of the aortic valve with a size 23-mm Zavala Inspiris biological valve. EF normal. Intraoperative course uneventful. Arrived to CTICU on le[place]. CI noted to be low, started on Dobutamine and primacor with improvement in RV ejection and indices. POD1 given 1 unit pRBC, transfused with 1 unit for pRBC for low H/H. POD2 primacor decreased. POD3  decreased. Primacor weaned. CTs removed. Ambulated. POD4 transferred to floor. POD5 complaining of increased SOB. Started on Intermittent BiPaP. US of the chest revealed b/l small pleural effusions, not large enough for drainage. POD#7 echo performed to rule out pericardial effusion, no effusion, normal Bi-V function. Pigtail was removed in afternoon with no incident. Patient desaturated on walk in the afternoon with PT while on room air. It was recommended that she get discharged home with oxygen. \par \par patient reports SOB at rest w/ heart palpitations and was asked to present to the ED for concern of pericardial effusion on 22 . \par \par  TTE revealed Bioprosthetic aortic valve. Peak transaortic valve gradient equals 27 mm Hg, mean transaortic valve gradient equals 11 mm Hg, which is probably normal in the presence of a bioprosthetic aortic valve. No aortic valve regurgitation seen. Mildly dilated left atrium.  LVEF calculated using biplane Pace's method is 58%. Septal motion consistent with prior cardiac surgery. There is hypokinesis of the inferior wall and dyssynchrony of the septum. Increased E/e'  is consistent with elevated left ventricular filling pressure. Unable to determine diastolic\par function due to merged E and A wave. Moderate tricuspid regurgitation.Estimated pulmonary artery systolic pressure equals 38  mm Hg, assuming right atrial pressure equals 15 mm Hg, consistent with borderline pulmonary pressures,  No pericardial effusion seen. Pt to follow up with Dr Calix as outpatient. She is here for post op visit. \par \par Today she presents and reports that she has shortness of breath with activities , pain to MSI and occasional dizziness. Denies any chest pain, palpitations, syncope or pedal edema. \par \par Today on exam patient's lungs clear bilaterally, normal sinus rhythm, sternum stable, incision clean, dry and intact.   No peripheral edema noted. Sutures removed. Instructed patient on importance of optimal glycemic control, daily showering, daily weights, any signs of fever (temperature greater than 101F, chills,  incentive spirometer use, and increase ambulation as tolerated. Instructed to call office with any signs or symptoms of infection or weight gain of 2 or more pounds in 1 day or 3 or more pounds in 1 week.  \par \par \par Plan:\par 1) Continue current medication regimen\par 2) Follow up with cardiologist ( Dr. Celso Calix)  and PCP \par 3) May return on as needed basis \par 4) Follow up  in 1 year with TTE \par 5) SBE antibiotic prophylaxis discussed at length \par 6) Continue to increase activity and walk daily as tolerated. Continue to use incentive spirometer. \par 7) Keep legs elevated above heart when resting/sitting/sleeping. \par 8) Call MD if you experience fever, fatigue, dizziness, confusion, syncope, shortness of breath, chest pain not relieved with analgesics, increased redness/drainage from the surgical  incision site\par 9) Follow up with EP ( Dr.Stuart Arcos , phone number provided)\par 10) Discontinue Oxygen\par 11) Cardiac Rehab \par 12) Discontinue Lasix \par \par \par \par \par

## 2023-01-19 NOTE — PROGRESS NOTE ADULT - SUBJECTIVE AND OBJECTIVE BOX
Patient is seen and examined. Patient denies any chest pain, SOB, palpitations or dizziness. 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    Splenic vein thrombosis    Bilateral carpal tunnel syndrome    MVP (mitral valve prolapse)    Bicuspid aortic valve    Severe aortic stenosis    H/O aortic aneurysm    History of macular degeneration    History of ITP    Hyperkalemic periodic paralysis    Paroxysmal atrial fibrillation     delivery NOS    H/O bilateral inguinal hernia repair  age 5    Ectopic pregnancy      H/O carpal tunnel repair    S/P cataract surgery    H/O aortic valve replacement    H/O ascending aorta repair        MEDICATIONS  (STANDING):  acetaZOLAMIDE    Tablet 250 milliGRAM(s) Oral daily  apixaban 5 milliGRAM(s) Oral two times a day  aspirin enteric coated 81 milliGRAM(s) Oral daily  citalopram 20 milliGRAM(s) Oral daily  gabapentin 100 milliGRAM(s) Oral three times a day  metoprolol succinate ER 50 milliGRAM(s) Oral daily  pantoprazole    Tablet 40 milliGRAM(s) Oral before breakfast  sodium chloride 0.9% lock flush 3 milliLiter(s) IV Push every 8 hours    MEDICATIONS  (PRN):      Vital Signs Last 24 Hrs  T(C): 36.8 (2023 04:55), Max: 36.8 (2023 04:55)  T(F): 98.2 (2023 04:55), Max: 98.2 (2023 04:55)  HR: 102 (2023 04:55) (102 - 102)  BP: 110/73 (2023 04:55) (92/61 - 110/73)  BP(mean): --  RR: 17 (2023 04:55) (17 - 17)  SpO2: 96% (2023 04:55) (96% - 98%)    Parameters below as of 2023 04:55  Patient On (Oxygen Delivery Method): room air    INTERPRETATION OF TELEMETRY: Sinus rhythm with HR 90s-100s  LABS:                        8.7    7.55  )-----------(       ( 2023 07:51 )             30.2         136  |  101  |  14  ----------------------------<  129<H>  3.8   |  23  |  0.54    Ca    8.9      2023 07:51  Phos  3.5       Mg     1.90           I&O's Summary    2023 07:  -  2023 10:41  --------------------------------------------------------  IN: 0 mL / OUT: 400 mL / NET: -400 mL      PHYSICAL EXAM:    GENERAL: In no apparent distress, well nourished, and hydrated.  HEART: Regular rate and rhythm; No murmurs, rubs, or gallops.  PULMONARY: Clear to auscultation and percussion.  No rales, wheezing, or rhonchi bilaterally.  ABDOMEN: Soft, Nontender, Nondistended; Bowel sounds present  EXTREMITIES:  2+ Peripheral Pulses, No clubbing, cyanosis, or edema

## 2023-01-19 NOTE — DISCHARGE NOTE PROVIDER - HOSPITAL COURSE
Patient is a 61 y/o F w/ PMH of splenic vein thrombosis, rheumatic mitral disease, GERD, esophageal varices, ITP (platelets around 100k), hyperkalemic periodic paralysis diagnosed at age 36, JO on CPAP, basal cell carcinoma, T11 fracture (current), cataracts, recent bio-pros AVR and ascending aorta replacement on 12/12/20222 and paroxysmal Afib on Eliquis presents for elective COLEEN/Afib Ablation. Was seen in the ER on [place] day and started on a heparin gtt and given metoprolol. Eventually self converted to NSR. Since that time, has been going in and out of Afib daily. Pt experiences palpations which are exacerbated by exertion.  Patient is now s/p ablation on 1/18. Eliquis restarted.  Patient cleared from cardiology standpoint for discharge.     Patient is a 63 y/o F w/ PMH of splenic vein thrombosis, rheumatic mitral disease, GERD, esophageal varices, ITP (platelets around 100k), hyperkalemic periodic paralysis diagnosed at age 36, JO on CPAP, basal cell carcinoma, T11 fracture (current), cataracts, recent bio-pros AVR and ascending aorta replacement on 12/12/20222 and paroxysmal Afib on Eliquis presents for elective COLEEN/Afib Ablation. Was seen in the ER on [place] day and started on a heparin gtt and given metoprolol. Eventually self converted to NSR. Since that time, has been going in and out of Afib daily. Pt experiences palpations which are exacerbated by exertion.  Patient is now s/p ablation on 1/18. Eliquis restarted.  Patient cleared from EP standpoint for discharge.

## 2023-01-19 NOTE — DISCHARGE NOTE PROVIDER - NSDCCPCAREPLAN_GEN_ALL_CORE_FT
PRINCIPAL DISCHARGE DIAGNOSIS  Diagnosis: PAF (paroxysmal atrial fibrillation)  Assessment and Plan of Treatment: You underwent an ablation which is the process of using small burns or freezes to break up electrical signals that cause irregular heartbeats. You tolerated the procedure well,  Please resume Eliquis  Follow up outpatient with your cardiologist/PCP

## 2023-01-19 NOTE — DISCHARGE NOTE PROVIDER - NSDCFUSCHEDAPPT_GEN_ALL_CORE_FT
Zaire Arcos  [facility]  [address]  Scheduled Appointment: 02/14/2023    Rony Koenig  [facility]  [phone]  Scheduled Appointment: 02/23/2023

## 2023-01-19 NOTE — CONSULT LETTER
[Dear  ___] : Dear  [unfilled], [FreeTextEntry2] : Dr.Joe Calix  [FreeTextEntry1] : \par I had the pleasure of seeing your patient, ELLI LOVELL, in my office today. \par \par We take a multidisciplinary team approach to patient care and consider you, the referring physician, an extension of our team. We will maintain an open line of communication with you throughout your patient's treatment course.  \par \par As you recall, she is a 62 year old female status post  Full sternotomy, replacement of the ascending aorta, Replacement of the aortic valve with a size 23-mm Zavala Inspiris biological valve on 12/12/22.The patient presents to the office today for a routine follow up visit with repeat diagnostic imaging. I have enclosed a copy for your records.\par \par The surgical repair is intact and stable. Therefore, I have recommended that the patient will follow up in the [facility] in one year with a TTE to monitor her surgical repair. My office will assist the patient with her upcoming appointment and I will update you on her  progress at that time.\par \par I have discussed with the patient that we will continue to monitor her aortic pathology closely at the [facility], that encompasses the entire health care system and is one of the largest in the [place] at this point.\par \par I appreciate the opportunity to care for your patient at the [facility] based at [facility]. If there are any questions or concerns, please call me directly at [phone]. \par \par \par Sincerely, \par \par \par \par \par \par \par Vimal Echevarria M.D.\par Professor of Cardiovascular and Thoracic Surgery\par Minimally Invasive Valve Surgeon\par Director of Aortic Surgery, [facility]\par Cell: [phone]\par Email: [email or web address] \par \par [facility]:\par [address], [address] Floor, [place] [address]\par Office: [phone]\par Fax: [phone]\par \par [facility]:\par Department of Cardiovascular and Thoracic Surgery\par [address] [place] [address], [address]\par Office: [phone]\par Fax: [phone]\par \par Practice Manager: Ms. Theresa Cerda\par Email: [email or web address] Phone: [phone]\par \par

## 2023-01-23 DIAGNOSIS — I31.9 DISEASE OF PERICARDIUM, UNSPECIFIED: ICD-10-CM

## 2023-02-14 ENCOUNTER — APPOINTMENT (OUTPATIENT)
Dept: ELECTROPHYSIOLOGY | Facility: CLINIC | Age: 63
End: 2023-02-14

## 2023-02-27 ENCOUNTER — NON-APPOINTMENT (OUTPATIENT)
Age: 63
End: 2023-02-27

## 2023-02-27 ENCOUNTER — APPOINTMENT (OUTPATIENT)
Dept: ELECTROPHYSIOLOGY | Facility: CLINIC | Age: 63
End: 2023-02-27
Payer: COMMERCIAL

## 2023-02-27 VITALS
OXYGEN SATURATION: 95 % | HEIGHT: 61 IN | SYSTOLIC BLOOD PRESSURE: 109 MMHG | DIASTOLIC BLOOD PRESSURE: 69 MMHG | HEART RATE: 70 BPM

## 2023-02-27 PROBLEM — I48.0 PAROXYSMAL ATRIAL FIBRILLATION: Chronic | Status: ACTIVE | Noted: 2023-01-18

## 2023-02-27 PROCEDURE — 93000 ELECTROCARDIOGRAM COMPLETE: CPT

## 2023-02-27 PROCEDURE — 99215 OFFICE O/P EST HI 40 MIN: CPT

## 2023-02-27 RX ORDER — CYCLOBENZAPRINE HYDROCHLORIDE 5 MG/1
5 TABLET, FILM COATED ORAL EVERY 8 HOURS
Qty: 15 | Refills: 0 | Status: DISCONTINUED | COMMUNITY
Start: 2022-12-30 | End: 2023-02-27

## 2023-02-27 RX ORDER — MAGNESIUM OXIDE 241.3 MG/1000MG
400 TABLET ORAL DAILY
Qty: 14 | Refills: 0 | Status: DISCONTINUED | COMMUNITY
End: 2023-02-27

## 2023-02-27 RX ORDER — GABAPENTIN 100 MG/1
100 CAPSULE ORAL 3 TIMES DAILY
Qty: 270 | Refills: 3 | Status: DISCONTINUED | COMMUNITY
Start: 2023-01-04 | End: 2023-02-27

## 2023-02-27 RX ORDER — METOPROLOL TARTRATE 25 MG/1
25 TABLET, FILM COATED ORAL 4 TIMES DAILY
Qty: 56 | Refills: 3 | Status: DISCONTINUED | COMMUNITY
End: 2023-02-27

## 2023-03-01 NOTE — HISTORY OF PRESENT ILLNESS
[FreeTextEntry1] : Trisha Villalobos is a 63y/o woman with Hx of splenic vein thrombosis, rheumatic mitral disease, GERD, esophageal varices, ITP (platelets around 100k), hyperkalemic periodic paralysis diagnosed at age 36, JO on CPAP, basal cell carcinoma, T11 fracture (current), cataracts, recent bio-pros AVR and ascending aorta replacement on 12/12/20222 and now paroxysmal afib with RVR s/p PVI and CTI ablation on 1/18/2023 who presents today for routine f/u post ablation. Post procedure, experienced pericarditis, given colchicine with relief. Since that time, doing well. Denies chest pain, palpitations, SOB, syncope or near syncope. Right groin without pain, swelling, or bruising. Remains on Eliquis without s/s of bleeding. \par

## 2023-03-01 NOTE — CARDIOLOGY SUMMARY
[de-identified] : 12/26/2022: Conclusions: \par 1. Bioprosthetic aortic valve. Peak transaortic valve\par gradient equals 27 mm Hg, mean transaortic valve gradient\par equals 11 mm Hg, which is probably normal in the presence\par of a bioprosthetic aortic valve. No aortic valve\par regurgitation seen.\par peak velocity: 2.5 m/s\par peak gradient 27 mmHg\par LVOT VTI: 18 cm\par AV VTI: 32 cm\par DVI: 0.4\par 2. Mildly dilated left atrium.  LA volume index = 39 cc/m2.\par Increased relative wall thickness with normal left\par ventricular mass index, consistent with concentric left\par ventricular remodeling.Endocardial visualization enhanced\par with intravenous injection of Ultrasonic Enhancing Agent\par (Definity). Mild segmental left ventricular systolic\par dysfunction. No left ventricular thrombus. LVEF calculated\par using biplane Pace's method is 58%. Septal motion\par consistent with prior cardiac surgery. There is hypokinesis\par of the inferior wall and dyssynchrony of the septum. \par Increased E/e'  is consistent with elevated left\par ventricular filling pressure. Unable to determine diastolic\par function due to merged E and A wave.\par 3. Severe right atrial enlargement. Right ventricular\par enlargement with mild decreased right ventricular systolic\par function.\par 4. Normal tricuspid valve. Moderate tricuspid\par regurgitation.Estimated pulmonary artery systolic pressure\par equals 38  mm Hg, assuming right atrial pressure equals 15\par mm Hg, consistent with borderline pulmonary pressures.\par 5. No pericardial effusion seen.\par *** Compared with echocardiogram of 9/27/2022, there is now\par a bioprosthetic valve in the aortic position.\par ADDENDUM 12/26/2022:  Also compared to the previous, there\par is inferior hypokinesis and septal dyssynchrony. LV\par function is preserved. [de-identified] : 11/28/2022: Diagnostic Findings: \par \par Coronary Angiography \par The coronary circulation is left dominant.  \par \par LM \par Left main artery: Angiography shows no disease.  \par \par LAD \par Left anterior descending artery: Angiography shows mild\par atherosclerosis.\par \par CX \par Circumflex: Angiography shows no disease.  \par \par RCA \par Right coronary artery: Angiography shows no disease.  \par

## 2023-03-01 NOTE — DISCUSSION/SUMMARY
[EKG obtained to assist in diagnosis and management of assessed problem(s)] : EKG obtained to assist in diagnosis and management of assessed problem(s) [FreeTextEntry1] : Impression:\par \par 1. Paroxysmal afib/aflutter: s/p PVI and CTI ablation on 1/18/2023. EKG performed today to assess for presence of recurrent afib and reveals NSR. Review of ECHO with normal LVEF. Resume Eliquis for thromboembolic prophylaxis. \par \par 2. s/p bioprosthetic AVR on 12/12/2022. On ASA 81mg daily. \par \par 3. JO: resume compliance with JO management to prevent future sinus node dysfunction and atrial fibrillation. Encouraged weight loss.\par \par Continue f/u with Cardiologist and RTO for f/u in 3 months.

## 2023-04-03 ENCOUNTER — NON-APPOINTMENT (OUTPATIENT)
Age: 63
End: 2023-04-03

## 2023-04-03 ENCOUNTER — APPOINTMENT (OUTPATIENT)
Dept: ORTHOPEDIC SURGERY | Facility: CLINIC | Age: 63
End: 2023-04-03
Payer: COMMERCIAL

## 2023-04-03 VITALS
DIASTOLIC BLOOD PRESSURE: 64 MMHG | HEIGHT: 61 IN | WEIGHT: 122 LBS | BODY MASS INDEX: 23.03 KG/M2 | HEART RATE: 82 BPM | SYSTOLIC BLOOD PRESSURE: 110 MMHG

## 2023-04-03 DIAGNOSIS — M51.9 UNSPECIFIED THORACIC, THORACOLUMBAR AND LUMBOSACRAL INTERVERTEBRAL DISC DISORDER: ICD-10-CM

## 2023-04-03 DIAGNOSIS — M50.90 CERVICAL DISC DISORDER, UNSPECIFIED, UNSPECIFIED CERVICAL REGION: ICD-10-CM

## 2023-04-03 PROCEDURE — 72070 X-RAY EXAM THORAC SPINE 2VWS: CPT

## 2023-04-03 PROCEDURE — 99203 OFFICE O/P NEW LOW 30 MIN: CPT

## 2023-04-03 PROCEDURE — 72040 X-RAY EXAM NECK SPINE 2-3 VW: CPT

## 2023-04-15 ENCOUNTER — APPOINTMENT (OUTPATIENT)
Dept: MRI IMAGING | Facility: CLINIC | Age: 63
End: 2023-04-15
Payer: COMMERCIAL

## 2023-04-15 ENCOUNTER — OUTPATIENT (OUTPATIENT)
Dept: OUTPATIENT SERVICES | Facility: HOSPITAL | Age: 63
LOS: 1 days | End: 2023-04-15
Payer: COMMERCIAL

## 2023-04-15 DIAGNOSIS — Z98.890 OTHER SPECIFIED POSTPROCEDURAL STATES: Chronic | ICD-10-CM

## 2023-04-15 DIAGNOSIS — Z98.49 CATARACT EXTRACTION STATUS, UNSPECIFIED EYE: Chronic | ICD-10-CM

## 2023-04-15 DIAGNOSIS — O00.90 UNSPECIFIED ECTOPIC PREGNANCY WITHOUT INTRAUTERINE PREGNANCY: Chronic | ICD-10-CM

## 2023-04-15 DIAGNOSIS — Z95.2 PRESENCE OF PROSTHETIC HEART VALVE: Chronic | ICD-10-CM

## 2023-04-15 DIAGNOSIS — Z00.00 ENCOUNTER FOR GENERAL ADULT MEDICAL EXAMINATION WITHOUT ABNORMAL FINDINGS: ICD-10-CM

## 2023-04-15 PROCEDURE — 72146 MRI CHEST SPINE W/O DYE: CPT

## 2023-04-15 PROCEDURE — 72146 MRI CHEST SPINE W/O DYE: CPT | Mod: 26

## 2023-04-24 ENCOUNTER — APPOINTMENT (OUTPATIENT)
Dept: ELECTROPHYSIOLOGY | Facility: CLINIC | Age: 63
End: 2023-04-24
Payer: COMMERCIAL

## 2023-04-24 ENCOUNTER — NON-APPOINTMENT (OUTPATIENT)
Age: 63
End: 2023-04-24

## 2023-04-24 VITALS
TEMPERATURE: 97.8 F | DIASTOLIC BLOOD PRESSURE: 58 MMHG | SYSTOLIC BLOOD PRESSURE: 95 MMHG | HEART RATE: 63 BPM | BODY MASS INDEX: 23.03 KG/M2 | HEIGHT: 61 IN | OXYGEN SATURATION: 98 % | WEIGHT: 122 LBS

## 2023-04-24 PROCEDURE — 99214 OFFICE O/P EST MOD 30 MIN: CPT

## 2023-04-24 RX ORDER — COLCHICINE 0.6 MG/1
0.6 TABLET ORAL
Qty: 14 | Refills: 0 | Status: DISCONTINUED | COMMUNITY
Start: 2023-01-23 | End: 2023-04-24

## 2023-04-24 RX ORDER — FLUCONAZOLE 150 MG/1
150 TABLET ORAL
Qty: 1 | Refills: 0 | Status: DISCONTINUED | COMMUNITY
Start: 2023-01-23

## 2023-04-24 RX ORDER — APIXABAN 5 MG/1
5 TABLET, FILM COATED ORAL
Qty: 60 | Refills: 1 | Status: DISCONTINUED | COMMUNITY
Start: 2023-01-13 | End: 2023-04-24

## 2023-04-24 NOTE — DISCUSSION/SUMMARY
[EKG obtained to assist in diagnosis and management of assessed problem(s)] : EKG obtained to assist in diagnosis and management of assessed problem(s) [FreeTextEntry1] : Impression:\par \par 1. Paroxysmal afib/aflutter: s/p PVI and CTI ablation on 1/18/2023. EKG performed today to assess for presence of recurrent afib and reveals NSR. Review of ECHO with normal LVEF. Resume Eliquis for thromboembolic prophylaxis. Has Apple Iwatch without noted recurrent afib, checks EKGs. Questioning long term Eliquis use. Possible Eliquis contributing to insomnia. Did undergo CardioNet in March without noted recurrent afib. [id number] score 2 (gender and age). Can discontinue Eliquis at this time. Resume wearign Apple Iwatch for recurrent afib monitoring. Verbalizes understanding of thromboembolic risk without use of AC. Will try Xarelto for 1 mo to see if Eliquis cause of insomnia. \par \par 2. JO: resume compliance with JO management to prevent future sinus node dysfunction and atrial fibrillation. Encouraged weight loss. Will f/u with pulmonary given dyspnea and trouble sleeping. \par \par Continue f/u with Cardiologist and RTO for f/u in 3 months.

## 2023-04-24 NOTE — CARDIOLOGY SUMMARY
[de-identified] : 12/26/2022: Conclusions: \par 1. Bioprosthetic aortic valve. Peak transaortic valve\par gradient equals 27 mm Hg, mean transaortic valve gradient\par equals 11 mm Hg, which is probably normal in the presence\par of a bioprosthetic aortic valve. No aortic valve\par regurgitation seen.\par peak velocity: 2.5 m/s\par peak gradient 27 mmHg\par LVOT VTI: 18 cm\par AV VTI: 32 cm\par DVI: 0.4\par 2. Mildly dilated left atrium.  LA volume index = 39 cc/m2.\par Increased relative wall thickness with normal left\par ventricular mass index, consistent with concentric left\par ventricular remodeling.Endocardial visualization enhanced\par with intravenous injection of Ultrasonic Enhancing Agent\par (Definity). Mild segmental left ventricular systolic\par dysfunction. No left ventricular thrombus. LVEF calculated\par using biplane Pace's method is 58%. Septal motion\par consistent with prior cardiac surgery. There is hypokinesis\par of the inferior wall and dyssynchrony of the septum. \par Increased E/e'  is consistent with elevated left\par ventricular filling pressure. Unable to determine diastolic\par function due to merged E and A wave.\par 3. Severe right atrial enlargement. Right ventricular\par enlargement with mild decreased right ventricular systolic\par function.\par 4. Normal tricuspid valve. Moderate tricuspid\par regurgitation.Estimated pulmonary artery systolic pressure\par equals 38  mm Hg, assuming right atrial pressure equals 15\par mm Hg, consistent with borderline pulmonary pressures.\par 5. No pericardial effusion seen.\par *** Compared with echocardiogram of 9/27/2022, there is now\par a bioprosthetic valve in the aortic position.\par ADDENDUM 12/26/2022:  Also compared to the previous, there\par is inferior hypokinesis and septal dyssynchrony. LV\par function is preserved. [de-identified] : 11/28/2022: Diagnostic Findings: \par \par Coronary Angiography \par The coronary circulation is left dominant.  \par \par LM \par Left main artery: Angiography shows no disease.  \par \par LAD \par Left anterior descending artery: Angiography shows mild\par atherosclerosis.\par \par CX \par Circumflex: Angiography shows no disease.  \par \par RCA \par Right coronary artery: Angiography shows no disease.  \par

## 2023-04-24 NOTE — HISTORY OF PRESENT ILLNESS
[FreeTextEntry1] : Trisha Villalobos is a 64y/o woman with Hx of splenic vein thrombosis, rheumatic mitral disease, GERD, esophageal varices, ITP (platelets around 100k), hyperkalemic periodic paralysis diagnosed at age 36, JO on CPAP, basal cell carcinoma, T11 fracture (current), cataracts, recent bio-pros AVR and ascending aorta replacement on 12/12/20222 and now paroxysmal afib with RVR s/p PVI and CTI ablation on 1/18/2023 who presents today for routine f/u. Post procedure, experienced pericarditis, given colchicine with relief. Since that time, doing well. Does have some dyspnea, believes she had prior to procedure without change. Following with Pulmonologist. Also struggling with sleep, falling asleep. Denies chest pain, palpitations, SOB at rest, syncope or near syncope. Right groin without pain, swelling, or bruising. Remains on Eliquis without s/s of bleeding. \par

## 2023-05-03 ENCOUNTER — APPOINTMENT (OUTPATIENT)
Dept: PULMONOLOGY | Facility: CLINIC | Age: 63
End: 2023-05-03
Payer: COMMERCIAL

## 2023-05-03 ENCOUNTER — NON-APPOINTMENT (OUTPATIENT)
Age: 63
End: 2023-05-03

## 2023-05-03 ENCOUNTER — LABORATORY RESULT (OUTPATIENT)
Age: 63
End: 2023-05-03

## 2023-05-03 VITALS
RESPIRATION RATE: 17 BRPM | WEIGHT: 122 LBS | SYSTOLIC BLOOD PRESSURE: 100 MMHG | HEIGHT: 61 IN | DIASTOLIC BLOOD PRESSURE: 58 MMHG | OXYGEN SATURATION: 99 % | TEMPERATURE: 96.8 F | BODY MASS INDEX: 23.03 KG/M2 | HEART RATE: 71 BPM

## 2023-05-03 PROCEDURE — 94618 PULMONARY STRESS TESTING: CPT

## 2023-05-03 PROCEDURE — 94010 BREATHING CAPACITY TEST: CPT

## 2023-05-03 PROCEDURE — 99214 OFFICE O/P EST MOD 30 MIN: CPT | Mod: 25

## 2023-05-04 ENCOUNTER — NON-APPOINTMENT (OUTPATIENT)
Age: 63
End: 2023-05-04

## 2023-05-04 ENCOUNTER — INPATIENT (INPATIENT)
Facility: HOSPITAL | Age: 63
LOS: 1 days | Discharge: HOME | End: 2023-05-06
Attending: STUDENT IN AN ORGANIZED HEALTH CARE EDUCATION/TRAINING PROGRAM | Admitting: STUDENT IN AN ORGANIZED HEALTH CARE EDUCATION/TRAINING PROGRAM
Payer: COMMERCIAL

## 2023-05-04 VITALS
DIASTOLIC BLOOD PRESSURE: 46 MMHG | SYSTOLIC BLOOD PRESSURE: 116 MMHG | OXYGEN SATURATION: 100 % | HEART RATE: 67 BPM | TEMPERATURE: 98 F | RESPIRATION RATE: 18 BRPM

## 2023-05-04 DIAGNOSIS — Z98.890 OTHER SPECIFIED POSTPROCEDURAL STATES: Chronic | ICD-10-CM

## 2023-05-04 DIAGNOSIS — I48.20 CHRONIC ATRIAL FIBRILLATION, UNSPECIFIED: ICD-10-CM

## 2023-05-04 DIAGNOSIS — D69.6 THROMBOCYTOPENIA, UNSPECIFIED: ICD-10-CM

## 2023-05-04 DIAGNOSIS — Z01.818 ENCOUNTER FOR OTHER PREPROCEDURAL EXAMINATION: ICD-10-CM

## 2023-05-04 DIAGNOSIS — Z98.49 CATARACT EXTRACTION STATUS, UNSPECIFIED EYE: Chronic | ICD-10-CM

## 2023-05-04 DIAGNOSIS — D64.9 ANEMIA, UNSPECIFIED: ICD-10-CM

## 2023-05-04 DIAGNOSIS — O00.90 UNSPECIFIED ECTOPIC PREGNANCY WITHOUT INTRAUTERINE PREGNANCY: Chronic | ICD-10-CM

## 2023-05-04 DIAGNOSIS — Z29.9 ENCOUNTER FOR PROPHYLACTIC MEASURES, UNSPECIFIED: ICD-10-CM

## 2023-05-04 DIAGNOSIS — G72.3 PERIODIC PARALYSIS: ICD-10-CM

## 2023-05-04 DIAGNOSIS — G47.33 OBSTRUCTIVE SLEEP APNEA (ADULT) (PEDIATRIC): ICD-10-CM

## 2023-05-04 DIAGNOSIS — Z95.2 PRESENCE OF PROSTHETIC HEART VALVE: Chronic | ICD-10-CM

## 2023-05-04 DIAGNOSIS — D62 ACUTE POSTHEMORRHAGIC ANEMIA: ICD-10-CM

## 2023-05-04 LAB
ALBUMIN SERPL ELPH-MCNC: 4.1 G/DL — SIGNIFICANT CHANGE UP (ref 3.3–5)
ALP SERPL-CCNC: 100 U/L — SIGNIFICANT CHANGE UP (ref 40–120)
ALT FLD-CCNC: 36 U/L — HIGH (ref 4–33)
ANION GAP SERPL CALC-SCNC: 12 MMOL/L — SIGNIFICANT CHANGE UP (ref 7–14)
APTT BLD: 34.7 SEC — SIGNIFICANT CHANGE UP (ref 27–36.3)
AST SERPL-CCNC: 34 U/L — HIGH (ref 4–32)
BASOPHILS # BLD AUTO: 0 K/UL — SIGNIFICANT CHANGE UP (ref 0–0.2)
BASOPHILS # BLD AUTO: 0.04 K/UL
BASOPHILS NFR BLD AUTO: 0 % — SIGNIFICANT CHANGE UP (ref 0–2)
BASOPHILS NFR BLD AUTO: 0.7 %
BILIRUB SERPL-MCNC: 0.5 MG/DL — SIGNIFICANT CHANGE UP (ref 0.2–1.2)
BLD GP AB SCN SERPL QL: NEGATIVE — SIGNIFICANT CHANGE UP
BUN SERPL-MCNC: 15 MG/DL — SIGNIFICANT CHANGE UP (ref 7–23)
CALCIUM SERPL-MCNC: 9.4 MG/DL — SIGNIFICANT CHANGE UP (ref 8.4–10.5)
CHLORIDE SERPL-SCNC: 108 MMOL/L — HIGH (ref 98–107)
CO2 SERPL-SCNC: 20 MMOL/L — LOW (ref 22–31)
CREAT SERPL-MCNC: 0.61 MG/DL — SIGNIFICANT CHANGE UP (ref 0.5–1.3)
CRP SERPL HS-MCNC: 0.5 MG/L
EGFR: 100 ML/MIN/1.73M2 — SIGNIFICANT CHANGE UP
EOSINOPHIL # BLD AUTO: 0 K/UL — SIGNIFICANT CHANGE UP (ref 0–0.5)
EOSINOPHIL # BLD AUTO: 0.16 K/UL
EOSINOPHIL NFR BLD AUTO: 0 % — SIGNIFICANT CHANGE UP (ref 0–6)
EOSINOPHIL NFR BLD AUTO: 2.8 %
FERRITIN SERPL-MCNC: 7 NG/ML
GLUCOSE SERPL-MCNC: 101 MG/DL — HIGH (ref 70–99)
HCT VFR BLD CALC: 24.2 % — LOW (ref 34.5–45)
HCT VFR BLD CALC: 25.8 %
HCT VFR BLD CALC: 25.9 % — LOW (ref 34.5–45)
HCT VFR BLD CALC: 26.3 % — LOW (ref 34.5–45)
HGB BLD-MCNC: 6.7 G/DL — CRITICAL LOW (ref 11.5–15.5)
HGB BLD-MCNC: 7 G/DL
HGB BLD-MCNC: 7.5 G/DL — LOW (ref 11.5–15.5)
HGB BLD-MCNC: 7.5 G/DL — LOW (ref 11.5–15.5)
IANC: 2.82 K/UL — SIGNIFICANT CHANGE UP (ref 1.8–7.4)
IMM GRANULOCYTES NFR BLD AUTO: 0.4 %
INR BLD: 1.54 RATIO — HIGH (ref 0.88–1.16)
IRON SERPL-MCNC: 11 UG/DL
LYMPHOCYTES # BLD AUTO: 1.05 K/UL — SIGNIFICANT CHANGE UP (ref 1–3.3)
LYMPHOCYTES # BLD AUTO: 1.51 K/UL
LYMPHOCYTES # BLD AUTO: 22 % — SIGNIFICANT CHANGE UP (ref 13–44)
LYMPHOCYTES NFR BLD AUTO: 26.5 %
MAN DIFF?: NORMAL
MCHC RBC-ENTMCNC: 18.5 PG — LOW (ref 27–34)
MCHC RBC-ENTMCNC: 18.8 PG
MCHC RBC-ENTMCNC: 19.6 PG — LOW (ref 27–34)
MCHC RBC-ENTMCNC: 19.9 PG — LOW (ref 27–34)
MCHC RBC-ENTMCNC: 27.1 GM/DL
MCHC RBC-ENTMCNC: 27.7 GM/DL — LOW (ref 32–36)
MCHC RBC-ENTMCNC: 28.5 GM/DL — LOW (ref 32–36)
MCHC RBC-ENTMCNC: 29 GM/DL — LOW (ref 32–36)
MCV RBC AUTO: 66.9 FL — LOW (ref 80–100)
MCV RBC AUTO: 67.8 FL — LOW (ref 80–100)
MCV RBC AUTO: 69.4 FL
MCV RBC AUTO: 69.8 FL — LOW (ref 80–100)
MONOCYTES # BLD AUTO: 0.38 K/UL — SIGNIFICANT CHANGE UP (ref 0–0.9)
MONOCYTES # BLD AUTO: 0.55 K/UL
MONOCYTES NFR BLD AUTO: 8 % — SIGNIFICANT CHANGE UP (ref 2–14)
MONOCYTES NFR BLD AUTO: 9.6 %
NEUTROPHILS # BLD AUTO: 3.26 K/UL — SIGNIFICANT CHANGE UP (ref 1.8–7.4)
NEUTROPHILS # BLD AUTO: 3.42 K/UL
NEUTROPHILS NFR BLD AUTO: 60 %
NEUTROPHILS NFR BLD AUTO: 68 % — SIGNIFICANT CHANGE UP (ref 43–77)
NRBC # BLD: 0 /100 WBCS — SIGNIFICANT CHANGE UP (ref 0–0)
NRBC # BLD: 0 /100 WBCS — SIGNIFICANT CHANGE UP (ref 0–0)
NRBC # FLD: 0 K/UL — SIGNIFICANT CHANGE UP (ref 0–0)
NRBC # FLD: 0 K/UL — SIGNIFICANT CHANGE UP (ref 0–0)
NT-PROBNP SERPL-MCNC: 843 PG/ML
OB PNL STL: POSITIVE
PLATELET # BLD AUTO: 117 K/UL — LOW (ref 150–400)
PLATELET # BLD AUTO: 133 K/UL — LOW (ref 150–400)
PLATELET # BLD AUTO: 144 K/UL
PLATELET # BLD AUTO: 146 K/UL — LOW (ref 150–400)
POTASSIUM SERPL-MCNC: 3.8 MMOL/L — SIGNIFICANT CHANGE UP (ref 3.5–5.3)
POTASSIUM SERPL-SCNC: 3.8 MMOL/L — SIGNIFICANT CHANGE UP (ref 3.5–5.3)
PROT SERPL-MCNC: 6.6 G/DL — SIGNIFICANT CHANGE UP (ref 6–8.3)
PROTHROM AB SERPL-ACNC: 17.9 SEC — HIGH (ref 10.5–13.4)
RBC # BLD: 3.62 M/UL — LOW (ref 3.8–5.2)
RBC # BLD: 3.72 M/UL
RBC # BLD: 3.77 M/UL — LOW (ref 3.8–5.2)
RBC # BLD: 3.82 M/UL — SIGNIFICANT CHANGE UP (ref 3.8–5.2)
RBC # FLD: 16 %
RBC # FLD: 16.2 % — HIGH (ref 10.3–14.5)
RBC # FLD: 17.2 % — HIGH (ref 10.3–14.5)
RBC # FLD: 17.6 % — HIGH (ref 10.3–14.5)
RH IG SCN BLD-IMP: NEGATIVE — SIGNIFICANT CHANGE UP
SODIUM SERPL-SCNC: 140 MMOL/L — SIGNIFICANT CHANGE UP (ref 135–145)
WBC # BLD: 4.79 K/UL — SIGNIFICANT CHANGE UP (ref 3.8–10.5)
WBC # BLD: 5.01 K/UL — SIGNIFICANT CHANGE UP (ref 3.8–10.5)
WBC # BLD: 5.51 K/UL — SIGNIFICANT CHANGE UP (ref 3.8–10.5)
WBC # FLD AUTO: 4.79 K/UL — SIGNIFICANT CHANGE UP (ref 3.8–10.5)
WBC # FLD AUTO: 5.01 K/UL — SIGNIFICANT CHANGE UP (ref 3.8–10.5)
WBC # FLD AUTO: 5.51 K/UL — SIGNIFICANT CHANGE UP (ref 3.8–10.5)
WBC # FLD AUTO: 5.7 K/UL

## 2023-05-04 PROCEDURE — 99223 1ST HOSP IP/OBS HIGH 75: CPT

## 2023-05-04 PROCEDURE — 99285 EMERGENCY DEPT VISIT HI MDM: CPT

## 2023-05-04 PROCEDURE — 71046 X-RAY EXAM CHEST 2 VIEWS: CPT | Mod: 26

## 2023-05-04 PROCEDURE — 99222 1ST HOSP IP/OBS MODERATE 55: CPT | Mod: GC

## 2023-05-04 PROCEDURE — 71260 CT THORAX DX C+: CPT | Mod: 26

## 2023-05-04 PROCEDURE — 74177 CT ABD & PELVIS W/CONTRAST: CPT | Mod: 26

## 2023-05-04 PROCEDURE — 99222 1ST HOSP IP/OBS MODERATE 55: CPT

## 2023-05-04 RX ORDER — ACETAMINOPHEN 500 MG
650 TABLET ORAL EVERY 6 HOURS
Refills: 0 | Status: DISCONTINUED | OUTPATIENT
Start: 2023-05-04 | End: 2023-05-06

## 2023-05-04 RX ORDER — LANOLIN ALCOHOL/MO/W.PET/CERES
3 CREAM (GRAM) TOPICAL AT BEDTIME
Refills: 0 | Status: DISCONTINUED | OUTPATIENT
Start: 2023-05-04 | End: 2023-05-06

## 2023-05-04 RX ORDER — PANTOPRAZOLE SODIUM 20 MG/1
80 TABLET, DELAYED RELEASE ORAL ONCE
Refills: 0 | Status: COMPLETED | OUTPATIENT
Start: 2023-05-04 | End: 2023-05-04

## 2023-05-04 RX ORDER — GABAPENTIN 400 MG/1
1 CAPSULE ORAL
Qty: 0 | Refills: 0 | DISCHARGE

## 2023-05-04 RX ORDER — ONDANSETRON 8 MG/1
4 TABLET, FILM COATED ORAL EVERY 8 HOURS
Refills: 0 | Status: DISCONTINUED | OUTPATIENT
Start: 2023-05-04 | End: 2023-05-06

## 2023-05-04 RX ORDER — OCTREOTIDE ACETATE 200 UG/ML
50 INJECTION, SOLUTION INTRAVENOUS; SUBCUTANEOUS
Qty: 500 | Refills: 0 | Status: DISCONTINUED | OUTPATIENT
Start: 2023-05-04 | End: 2023-05-05

## 2023-05-04 RX ORDER — CITALOPRAM 10 MG/1
20 TABLET, FILM COATED ORAL DAILY
Refills: 0 | Status: DISCONTINUED | OUTPATIENT
Start: 2023-05-04 | End: 2023-05-06

## 2023-05-04 RX ORDER — CEFTRIAXONE 500 MG/1
1000 INJECTION, POWDER, FOR SOLUTION INTRAMUSCULAR; INTRAVENOUS EVERY 24 HOURS
Refills: 0 | Status: DISCONTINUED | OUTPATIENT
Start: 2023-05-04 | End: 2023-05-05

## 2023-05-04 RX ORDER — METOPROLOL TARTRATE 50 MG
50 TABLET ORAL DAILY
Refills: 0 | Status: DISCONTINUED | OUTPATIENT
Start: 2023-05-04 | End: 2023-05-05

## 2023-05-04 RX ORDER — ACETAZOLAMIDE 250 MG/1
250 TABLET ORAL DAILY
Refills: 0 | Status: DISCONTINUED | OUTPATIENT
Start: 2023-05-04 | End: 2023-05-06

## 2023-05-04 RX ORDER — PANTOPRAZOLE SODIUM 20 MG/1
8 TABLET, DELAYED RELEASE ORAL
Qty: 80 | Refills: 0 | Status: DISCONTINUED | OUTPATIENT
Start: 2023-05-04 | End: 2023-05-05

## 2023-05-04 RX ORDER — OCTREOTIDE ACETATE 200 UG/ML
50 INJECTION, SOLUTION INTRAVENOUS; SUBCUTANEOUS ONCE
Refills: 0 | Status: COMPLETED | OUTPATIENT
Start: 2023-05-04 | End: 2023-05-04

## 2023-05-04 RX ADMIN — OCTREOTIDE ACETATE 10 MICROGRAM(S)/HR: 200 INJECTION, SOLUTION INTRAVENOUS; SUBCUTANEOUS at 22:29

## 2023-05-04 RX ADMIN — Medication 3 MILLIGRAM(S): at 23:36

## 2023-05-04 RX ADMIN — CEFTRIAXONE 100 MILLIGRAM(S): 500 INJECTION, POWDER, FOR SOLUTION INTRAMUSCULAR; INTRAVENOUS at 18:02

## 2023-05-04 RX ADMIN — PANTOPRAZOLE SODIUM 10 MG/HR: 20 TABLET, DELAYED RELEASE ORAL at 18:34

## 2023-05-04 RX ADMIN — CITALOPRAM 20 MILLIGRAM(S): 10 TABLET, FILM COATED ORAL at 18:00

## 2023-05-04 RX ADMIN — OCTREOTIDE ACETATE 50 MICROGRAM(S): 200 INJECTION, SOLUTION INTRAVENOUS; SUBCUTANEOUS at 18:32

## 2023-05-04 RX ADMIN — PANTOPRAZOLE SODIUM 80 MILLIGRAM(S): 20 TABLET, DELAYED RELEASE ORAL at 14:56

## 2023-05-04 RX ADMIN — Medication 30 MILLILITER(S): at 22:43

## 2023-05-04 NOTE — CONSULT NOTE ADULT - NS ATTEND AMEND GEN_ALL_CORE FT
This is a 63 year old woman with pmhx of splenic vein thrombosis, rheumatic mitral disease, GERD, esophageal varices, ITP (platelets around 100k), hyperkalemic periodic paralysis diagnosed at age 36, JO on CPAP, basal cell carcinoma, T11 fracture (current), cataracts, recent bio-pros AVR and ascending aorta replacement on 12/12/20222 and paroxysmal afib with RVR s/p PVI and CTI ablation on 1/18/2023 who presented today for symptomatic anemia. Hold Xarelto given anemia. Hold metoprolol per her outpatient EP physician Dr. Koenig . Anemia workup.  f/u CBC to determine if patient need a transfusion. Consider FOBT and CT chest, AP to determine source of bleeding.

## 2023-05-04 NOTE — CONSULT NOTE ADULT - SUBJECTIVE AND OBJECTIVE BOX
CC: SOB    HPI: 63 year old woman with pmhx of splenic vein thrombosis, rheumatic mitral disease, GERD, esophageal varices, ITP (platelets around 100k), hyperkalemic periodic paralysis diagnosed at age 36, JO on CPAP, basal cell carcinoma, T11 fracture (current), cataracts, recent bio-pros AVR and ascending aorta replacement on  and paroxysmal afib with RVR s/p PVI and CTI ablation on 2023 who presented today for symptomatic anemia. Patient was found to have anemia on outpatient labs and sent in for evaluation. She was found to have Hgb of 6.7 and a positive FOBT. She denies overt signs of bleeding. She endorses general fatigue and dyspnea on exertion.    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      Splenic vein thrombosis      Bilateral carpal tunnel syndrome      Bicuspid aortic valve      Severe aortic stenosis      H/O aortic aneurysm      History of macular degeneration      History of ITP      Hyperkalemic periodic paralysis      Paroxysmal atrial fibrillation       delivery NOS      H/O bilateral inguinal hernia repair  age 5      Ectopic pregnancy        H/O carpal tunnel repair      S/P cataract surgery      H/O aortic valve replacement      H/O ascending aorta repair          FAMILY HISTORY:  Family history of acute myocardial infarction (Sibling)        SOCIAL HISTORY:  Smoking: Denied  EtOH Use: Social  Marital Status:   Occupation:  Exposures:  Recent Travel:    Allergies    [This allergen will not trigger allergy alert] nitrofurantoin, macrocrystals / nitrofurantoin, monohydrate  Reaction: Unknown (Unknown)  calcium channel blockers (Muscle Pain; Joint Pain)  Macrobid (Unknown)  succinylcholine (Other)  [This allergen will not trigger allergy alert] Succinamides (Unknown)    Intolerances        HOME MEDICATIONS:    REVIEW OF SYSTEMS:  Constitutional: No fevers or chills. +Fatigue  Eyes: No itching or discharge from the eyes  ENT: No ear pain. No ear discharge. No nasal congestion.   CV: No chest pain. No palpitations. No lightheadedness or dizziness.   Resp: +Dyspnea on exertion.  GI: No nausea. No vomiting. No diarrhea.  MSK: No joint pain or pain in any extremities  Integumentary: No skin lesions. No pedal edema.  Neurological: No gross motor weakness. No sensory changes.  [x] All other systems negative  [ ] Unable to assess ROS because ________    OBJECTIVE:  ICU Vital Signs Last 24 Hrs  T(C): 37.2 (04 May 2023 14:06), Max: 37.2 (04 May 2023 14:06)  T(F): 98.9 (04 May 2023 14:06), Max: 98.9 (04 May 2023 14:06)  HR: 65 (04 May 2023 14:06) (65 - 67)  BP: 103/54 (04 May 2023 14:06) (103/54 - 116/46)  BP(mean): --  ABP: --  ABP(mean): --  RR: 16 (04 May 2023 14:06) (16 - 18)  SpO2: 100% (04 May 2023 14:06) (100% - 100%)    O2 Parameters below as of 04 May 2023 14:06  Patient On (Oxygen Delivery Method): room air              CAPILLARY BLOOD GLUCOSE          PHYSICAL EXAM:  General: Awake, alert, oriented X 3.   HEENT: Atraumatic, normocephalic.   Lymph Nodes: No palpable lymphadenopathy  Neck: No JVD. No carotid bruit.   Respiratory: Normal chest expansion. Clear to auscultation bilaterally.  Cardiovascular: S1 S2 normal. No murmurs, rubs or gallops.   Abdomen: Soft, non-tender, non-distended. No organomegaly.  Extremities: Warm to touch. Peripheral pulse palpable. No pedal edema.   Skin: No rashes or skin lesions  Neurological: Motor and sensory examination equal and normal in all four extremities.  Psychiatry: Appropriate mood and affect.    HOSPITAL MEDICATIONS:  MEDICATIONS  (STANDING):    MEDICATIONS  (PRN):      LABS:                        6.7    4.79  )-----------( 146      ( 04 May 2023 13:05 )             24.2     05-04    140  |  108<H>  |  15  ----------------------------<  101<H>  3.8   |  20<L>  |  0.61    Ca    9.4      04 May 2023 13:05    TPro  6.6  /  Alb  4.1  /  TBili  0.5  /  DBili  x   /  AST  34<H>  /  ALT  36<H>  /  AlkPhos  100  05-04    PT/INR - ( 04 May 2023 13:05 )   PT: 17.9 sec;   INR: 1.54 ratio         PTT - ( 04 May 2023 13:05 )  PTT:34.7 sec          MICROBIOLOGY:     RADIOLOGY:  [ ] Reviewed and interpreted by me    Point of Care Ultrasound Findings;    PFT:    EKG: CC: SOB    HPI: 63 year old woman with pmhx of splenic vein thrombosis, rheumatic mitral disease, GERD, esophageal varices, ITP (platelets around 100k), hyperkalemic periodic paralysis diagnosed at age 36, JO on CPAP, basal cell carcinoma, T11 fracture (current), cataracts, recent bio-pros AVR and ascending aorta replacement on  and paroxysmal afib with RVR s/p PVI and CTI ablation on 2023 who presented today for symptomatic anemia. Patient was found to have anemia on outpatient labs and sent in for evaluation. She was found to have Hgb of 6.7 and a positive FOBT. She denies overt signs of bleeding. She endorses general fatigue and dyspnea on exertion.      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      Splenic vein thrombosis      Bilateral carpal tunnel syndrome      Bicuspid aortic valve      Severe aortic stenosis      H/O aortic aneurysm      History of macular degeneration      History of ITP      Hyperkalemic periodic paralysis      Paroxysmal atrial fibrillation       delivery NOS      H/O bilateral inguinal hernia repair  age 5      Ectopic pregnancy        H/O carpal tunnel repair      S/P cataract surgery      H/O aortic valve replacement      H/O ascending aorta repair          FAMILY HISTORY:  Family history of acute myocardial infarction (Sibling)        SOCIAL HISTORY:  Smoking: Denied  EtOH Use: Social  Marital Status:   Occupation: Nurse  Exposures: None, no pets  NO ilicit drug use    Allergies    [This allergen will not trigger allergy alert] nitrofurantoin, macrocrystals / nitrofurantoin, monohydrate  Reaction: Unknown (Unknown)  calcium channel blockers (Muscle Pain; Joint Pain)  Macrobid (Unknown)  succinylcholine (Other)  [This allergen will not trigger allergy alert] Succinamides (Unknown)    Intolerances        HOME MEDICATIONS:    REVIEW OF SYSTEMS:  Constitutional: No fevers or chills. +Fatigue, no night sweats or weight loss  Eyes: No itching or discharge from the eyes, no pain or redness  ENT: No ear pain. No ear discharge. No nasal congestion.   CV: No chest pain. No palpitations. No lightheadedness or dizziness.   Resp: +Dyspnea on exertion. no wheezing, sputum or cough  GI: No nausea. No vomiting. No diarrhea.  MSK: No joint pain or pain in any extremities  Integumentary: No skin lesions. No pedal edema.  Neurological: No gross motor weakness. No sensory changes.  [x] All other systems negative  [ ] Unable to assess ROS because ________    OBJECTIVE:  ICU Vital Signs Last 24 Hrs  T(C): 37.2 (04 May 2023 14:06), Max: 37.2 (04 May 2023 14:06)  T(F): 98.9 (04 May 2023 14:06), Max: 98.9 (04 May 2023 14:06)  HR: 65 (04 May 2023 14:06) (65 - 67)  BP: 103/54 (04 May 2023 14:06) (103/54 - 116/46)  BP(mean): --  ABP: --  ABP(mean): --  RR: 16 (04 May 2023 14:06) (16 - 18)  SpO2: 100% (04 May 2023 14:06) (100% - 100%)    O2 Parameters below as of 04 May 2023 14:06  Patient On (Oxygen Delivery Method): room air              CAPILLARY BLOOD GLUCOSE          PHYSICAL EXAM:  General: Awake, alert, oriented X 3, well appearing  HEENT: Atraumatic, normocephalic.   Lymph Nodes: No palpable lymphadenopathy  Neck: No JVD. No carotid bruit.   Respiratory: Normal chest expansion. Clear to auscultation bilaterally. No Wheezing, rhochi. Focal area of crackle in the LLL.   Cardiovascular: S1 S2 normal. No murmurs, rubs or gallops.   Abdomen: Soft, non-tender, non-distended. No organomegaly.  Extremities: Warm to touch. Peripheral pulse palpable. No pedal edema.   Skin: No rashes or skin lesions  Neurological: Motor and sensory examination equal and normal in all four extremities.  Psychiatry: Appropriate mood and affect.    HOSPITAL MEDICATIONS:  MEDICATIONS  (STANDING):    MEDICATIONS  (PRN):      LABS:                        6.7    4.79  )-----------( 146      ( 04 May 2023 13:05 )             24.2     05-04    140  |  108<H>  |  15  ----------------------------<  101<H>  3.8   |  20<L>  |  0.61    Ca    9.4      04 May 2023 13:05    TPro  6.6  /  Alb  4.1  /  TBili  0.5  /  DBili  x   /  AST  34<H>  /  ALT  36<H>  /  AlkPhos  100  05-04    PT/INR - ( 04 May 2023 13:05 )   PT: 17.9 sec;   INR: 1.54 ratio         PTT - ( 04 May 2023 13:05 )  PTT:34.7 sec          MICROBIOLOGY:     RADIOLOGY:  [ ] Reviewed and interpreted by me    Point of Care Ultrasound Findings;    PFT:    EKG:

## 2023-05-04 NOTE — HISTORY OF PRESENT ILLNESS
[TextBox_4] : Ms. LOVELL is a 63 year female with a history of arthritis of the hip, bicuspid aortic valve, hypercalcemic periodic predialysis, mild aortic regurgitation, moderate aortic stenosis, myositis rheumatic mitral valve disease, idiopathic thrombocytopenia, GERD, gastric esophageal varices who now presents for a sick visit.\par \par  Her chief complaint is shortness of breath on exertion and fatigue.\par \par Patient reports December 12, 2022 she had aortic aneurysm repaired and aortic valve replaced.  She reports post surgery she had A-fib and had ablation done.  Patient was initially on Eliquis and was changed to Xarelto due to side effects of insomnia.\par Patient is currently following up with Dr. Koenig and Dr. Calix\par Patient reports since the surgery she has shortness of breath on exertion and fatigue.  She reports shortness of breath is not improving.  Patient is not compliant wit inhalers that was prescribed at the last visit.  She reports she had a nosebleed last week.  Patient reports in 1/2020 she was told she is anemic.  She has not repeated labs yet.\par \par Patient reports she is currently compliant with her CPAP device.  She reports even with nightly use of CPAP she feels tired and exhausted in the morning. She reports pressure settings needs to be changed.\par \par Patient denies fever, chills, SOB @ rest, cough, wheezing, nasal congestion, runny nose, PND, or heartburn/reflux.\par

## 2023-05-04 NOTE — H&P ADULT - PROBLEM SELECTOR PLAN 3
Patient reports to have SOB which most likely due to new symptomatic anemia. No chest pain with exertion. Euvolemic on exam.   -RCRI is 0, suggesting low risk candidate undergoing low risk procedure. Patient is currently medically optimized. No further cardiac testing is necessay   -EP and pulm recs are appreciated.

## 2023-05-04 NOTE — CHART NOTE - NSCHARTNOTEFT_GEN_A_CORE
No overt GIB at this time and not medically optimized.    Keep NPO after midnight in cases warrants procedure tomorrow  Will need cardiac clearance  Hold AC if able  Trend CBC, CMP, coags  Transfuse if Hgb < 8, considering cardiac history  Abx, octreotide, PPI gtt  Rest of care per ED/primary      Penny Catalan MD  Gastroenterology/Hepatology Fellow, PGY-VI    NON-URGENT CONSULTS:  Please email [email or web address]  AT NIGHT AND ON WEEKENDS:  Contact on-call GI fellow via answering service ([phone]) from 5pm-8am and on weekends/holidays  MONDAY-FRIDAY 8AM-5PM:  Pager# [phone] ([facility])

## 2023-05-04 NOTE — CONSULT NOTE ADULT - ATTENDING COMMENTS
63 year old woman with pmhx of splenic vein thrombosis, rheumatic mitral disease, GERD, esophageal varices, ITP (platelets around 100k), hyperkalemic periodic paralysis diagnosed at age 36, JO on CPAP, basal cell carcinoma, T11 fracture (current), cataracts, recent bio-pros AVR and ascending aorta replacement on 12/12/20222 and paroxysmal afib with RVR s/p PVI and CTI ablation on 1/18/2023 who presented today for symptomatic anemia.     The patient saw her pulmonologist as outpatient had a poor 6MWT. Had lab drawn from CBC of 7. Patient has been having GUEVARA and has had lengthy work up in the past. Pulmonary work up including PFT without specific etiology, a FeNO which was indeterminant and was started on trial of Trelegy with a question hx of asthma. Patient however does have significant cardiac history as above. Patient is usually on CPAP QHS, recently changed to APAP but not yet started    Currently denies and cough, sputum, wheezing, LE edema. Outpatient labs notable for hg of 7 with low ferritin of 7.     # Anemia likely 2/2 to GIB  # GUEVARA/SOB  # Asthma  # JO  - Anemia, low ferritine, FOBT positive, likely in the setting of GIB. GUEVARA is mostly likely 2/2 to symptomatic anemia. Pending transfusion  - Start symbicort BID, spiriva daily. PRN albuterol. No role for steroids at this time  - Patient uses CPAP at 13 based on the most recent PSG. Was pending possible APAP at home but not yet started. Please start CPAP QHS  - No pulmonary objections to any planned GI procedures. Would inform anaesthesia of patient dz of JO severe (mixed obstructive and central). Recommend the use of NIPPV in the periprocedural period.     D/W patient,

## 2023-05-04 NOTE — ASSESSMENT
[FreeTextEntry1] : Ms. LOVELL is a 63 year female with a history of arthritis of the hip, bicuspid aortic valve, hypercalcemic periodic predialysis, mild aortic regurgitation, moderate aortic stenosis, myositis rheumatic mitral valve disease, idiopathic thrombocytopenia, GERD, gastric esophageal varices who now presents for a sick visit.\par Her chief complaint is shortness of breath on exertion and fatigue.\par \par 1. SOB on exertion and fatigue\par -with history of low hg, recent epistaxis, GUEVARA and fatigue could be related to anemia\par -will do labs (list of [facility] labs given to patient)\par -Advised patient to follow-up with PCP ASAP\par -Informed patient to go to the ER if shortness of breath, fatigue or dizziness worsens overnight.\par \par 2.  Cardiac\par -Given previously BNP was elevated will redo BNP\par -Add BNP\par -Recommended patient to follow-up with Dr. Calix and [place]\par \par 3.?  Asthma- active\par -Restart levalbuterol inhaler 2 puffs every 6 hours as needed\par -Restart Trelegy 100 1 puff: Rinse and gargle after use\par \par 4.  JO\par -Changed CPAP to APAP at 4 to 20 cm H2O via [employer]view\par -Informed patient it will take 24 hours for the settings to change on the device\par -We will recheck data in 1 month\par \par Patient to follow up with Dr. Cameron as scheduled.\par Patient to call with further questions and concerns.\par Patient verbalizes understanding of care plan and is agreeable.\par

## 2023-05-04 NOTE — ED PROVIDER NOTE - CLINICAL SUMMARY MEDICAL DECISION MAKING FREE TEXT BOX
Patient patient is a 63-year-old female past medical history significant for A-fib on Xarelto status post ablation in January, splenic vein thrombosis presenting for low hemoglobin. Currently with vs wnl and stable. Presenting for anemia with GUEVARA and generalized fatigue. DDx includes but not limited to symptomatic anemia from bleeding gi vs other etiology, less likely acs, vs ptx/pna. To eval labs, cxr, likely admit.

## 2023-05-04 NOTE — H&P ADULT - NSHPLABSRESULTS_GEN_ALL_CORE
6.7    4.79  )-----------( 146      ( 04 May 2023 13:05 )             24.2     Hgb Trend: 6.7<--  05-04    140  |  108<H>  |  15  ----------------------------<  101<H>  3.8   |  20<L>  |  0.61    Ca    9.4      04 May 2023 13:05    TPro  6.6  /  Alb  4.1  /  TBili  0.5  /  DBili  x   /  AST  34<H>  /  ALT  36<H>  /  AlkPhos  100  05-04    Creatinine Trend: 0.61<--  PT/INR - ( 04 May 2023 13:05 )   PT: 17.9 sec;   INR: 1.54 ratio         PTT - ( 04 May 2023 13:05 )  PTT:34.7 sec      CT A/P is ordered.       CT chest is ordered

## 2023-05-04 NOTE — H&P ADULT - PROBLEM SELECTOR PLAN 4
Patient has hx of hyperkalemic paralysis   -Please avoid succinylcholine during surgery as it can cause hyperkalemia   -Monitor K daily

## 2023-05-04 NOTE — ED PROVIDER NOTE - ATTENDING CONTRIBUTION TO CARE
Dr. Shirley, Attending Physician-  I performed a face to face bedside interview with patient regarding history of present illness, review of symptoms and past medical history. I completed an independent physical exam.  I have discussed patient's plan of care with the resident.    63F, afib on xarelto, who presents with dyspnea on exertion and low Hgb. Had outpatient Hgb of 7.0 yesterday and thus sent in today. Denies black/bloody stools. Symptomatically, complaining of generalized weakness and malaise. Dyspnea on exertion. Could not walk 6 minutes at outpatient pulmnologist yesterday. On ROS, denies headaches, fevers, chills, cough, sputum, cp, abdominal pain, nvd, dysuria, hematuria, recent travel, trauma, syncope, black/bloody stools. Physical: afebrile, well appearing, ctabl, abdomen soft, no le edema. Plan: labs, t/s, transfuse as need. Will need admission for symptomatic anemia.

## 2023-05-04 NOTE — H&P ADULT - NSHPREVIEWOFSYSTEMS_GEN_ALL_CORE
REVIEW OF SYSTEMS:    CONSTITUTIONAL: No weakness, fevers or chills  EYES/ENT: No visual changes;  No vertigo or throat pain   NECK: No pain or stiffness  RESPIRATORY: No cough, wheezing, hemoptysis; + shortness of breath  CARDIOVASCULAR: No chest pain or palpitations  GASTROINTESTINAL: No abdominal or epigastric pain. No nausea, vomiting, or hematemesis; No diarrhea or constipation. No melena or hematochezia.  GENITOURINARY: No dysuria, frequency or hematuria  NEUROLOGICAL: No numbness or weakness  MUSCULOSKELETAL: No joint pain, no muscle ache   SKIN: No itching, burning, rashes, or lesions   All other review of systems is negative unless indicated above.

## 2023-05-04 NOTE — CONSULT NOTE ADULT - SUBJECTIVE AND OBJECTIVE BOX
Source: patient and Chart    HPI:  This is a 63 year old woman with pmhx of splenic vein thrombosis, rheumatic mitral disease, GERD, esophageal varices, ITP (platelets around 100k), hyperkalemic periodic paralysis diagnosed at age 36, JO on CPAP, basal cell carcinoma, T11 fracture (current), cataracts, recent bio-pros AVR and ascending aorta replacement on  and paroxysmal afib with RVR s/p PVI and CTI ablation on 2023 who presented today for symptomatic anemia.    Patient started that for the past few weeks she was feeling very lightheaded and having worsening SOB. She stated this weeks she felt like she was going to past out while driving which prompted her to see her doctor who did routine blood work. Her doctor called her today and told her to go to the ED because her hgb drop 2 units and that she will need a transfusion. Patient admitted to sinus congestion last week which cause her to blow her nose frequently resulting a nosebleed. Patient stated that she held pressure, and her epistaxis was resolved. Patient denied fever, chills, diaphoresis, HA, changes in visions, CP, palpitation, abdominal pain, n/v/d, hematuria, melena, and hematochezia. She admitted to chronic hand numbness 2/2 carpal tunnel.    ED course was significant for T=98.3F, HR 67bpm, /46mmHg, RR 18/min with a spo2 % of  100 % on RA. Labs are pending. EP was consulted given ablation on 2023. Patient has remain SR since the ablation. Her Eliquis was changed to Xarelto due to insomnia which she still has.      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  Splenic vein thrombosis  Bilateral carpal tunnel syndrome  Bicuspid aortic valve  Severe aortic stenosis  H/O aortic aneurysm  History of macular degeneration  History of ITP  Hyperkalemic periodic paralysis  Paroxysmal atrial fibrillation   delivery NOS  H/O bilateral inguinal hernia repair  age 5  Ectopic pregnancy   H/O carpal tunnel repai  S/P cataract surgery  H/O aortic valve replacement  H/O ascending aorta repair      MEDICATIONS  (STANDING):    MEDICATIONS  (PRN):      FAMILY HISTORY:  Father Family history of malignant neoplasm of esophagus, basal cell carcinoma, and dementia  Mother and Sister Family history of bicuspid aortic valve   Mother malignant neoplasm      SOCIAL HISTORY:  CIGARETTES: Denied  ALCOHOL: Socially drinks  ILLICIT DRUG USES: denied    REVIEW OF SYSTEMS:  CONSTITUTIONAL: No fever, weight loss, chills, shakes, or fatigue  RESPIRATORY: per HPI  CARDIOVASCULAR: per HPI  GASTROINTESTINAL: No abdominal  or epigastric pain, nausea, vomiting, hematemesis, diarrhea, constipation, melena or bright red blood.  GENITOURINARY: No dysuria, nocturia, hematuria, or urinary incontinence  NEUROLOGICAL: No headaches, memory loss, slurred speech, limb weakness, loss of strength, numbness, or tremors  MUSCULOSKELETAL: No joint pain or swelling, muscle, back, or extremity pain      Vital Signs Last 24 Hrs  T(C): 36.8 (04 May 2023 11:36), Max: 36.8 (04 May 2023 11:36)  T(F): 98.3 (04 May 2023 11:36), Max: 98.3 (04 May 2023 11:36)  HR: 67 (04 May 2023 11:36) (67 - 67)  BP: 116/46 (04 May 2023 11:36) (116/46 - 116/46)  BP(mean): --  RR: 18 (04 May 2023 11:36) (18 - 18)  SpO2: 100% (04 May 2023 11:36) (100% - 100%)    Parameters below as of 04 May 2023 11:36  Patient On (Oxygen Delivery Method): room air        PHYSICAL EXAM:  GENERAL: Well appearing, speaking in full sentence, in NAD  HEART: S1S2 RRR  PULMONARY:CTABL, normal respiratory effort  ABDOMEN: Bowel sounds present, soft  EXTREMITIES:  Warm, well -perfused, no pedal edema, distal pulses present  NEUROLOGICAL:AOx3    INTERPRETATION OF TELEMETRY: SR     ECG: SR 87BPM    I&O's Detail      LABS:                  BNP  I&O's Detail    Daily     Daily

## 2023-05-04 NOTE — H&P ADULT - PROBLEM SELECTOR PLAN 1
Patient with acute on chronic anemia   -baseline Hgb is in the 8s, decreased to 6.7   -Most likely due to blood loss, DDx include esophageal varices, anastomoses, PUD, dieulafoy lesion, gastropathy, infection  -Spoke to GI team Dr. Argueta. Will start pt on octreotide gtt, PPI gtt, and ppx ceftriaxone 1 g  -CLD for now, NPO after midnight for possible EGD   -F/u with post transfusion CBC   -Trend CBC q12 hrs   -F/u with LDH and hapto Patient with acute on chronic anemia   -baseline Hgb is in the 8s, decreased to 6.7   -Most likely due to blood loss, DDx include esophageal varices, anastomoses, PUD, dieulafoy lesion, gastropathy, infection  -Spoke to GI team Dr. Argueta. Will start pt on octreotide gtt, PPI gtt, and ppx ceftriaxone 1 g  -CLD for now, NPO after midnight for possible EGD   -F/u with post transfusion CBC   -Trend CBC q12 hrs   -Transfuse if <8  -F/u with LDH and hapto Patient with acute on chronic anemia   -baseline Hgb is in the 8s, decreased to 6.7   -Most likely due to blood loss, DDx include esophageal varices, anastomoses, PUD, dieulafoy lesion, gastropathy, infection  -Spoke to GI team Dr. Argueta. Will start pt on octreotide gtt, PPI gtt, and ppx ceftriaxone 1 g  -CLD for now, NPO after midnight for possible EGD   -F/u with post transfusion CBC   -Trend CBC q12 hrs   -Transfuse if hgb <8  -F/u with LDH and hapto

## 2023-05-04 NOTE — REVIEW OF SYSTEMS
[Fatigue] : fatigue [Dyspnea] : dyspnea [SOB on Exertion] : sob on exertion [Negative] : Psychiatric [Fever] : no fever [Recent Wt Gain (___ Lbs)] : ~T no recent weight gain [EDS] : no EDS [Chills] : no chills [Poor Appetite] : no poor appetite [Recent Wt Loss (___ Lbs)] : ~T no recent weight loss [Cough] : no cough [Hemoptysis] : no hemoptysis [Chest Tightness] : no chest tightness [Frequent URIs] : no frequent URIs [Sputum] : no sputum [Pleuritic Pain] : no pleuritic pain [Wheezing] : no wheezing [A.M. Dry Mouth] : no a.m. dry mouth

## 2023-05-04 NOTE — ED ADULT NURSE NOTE - OBJECTIVE STATEMENT
Break coverage RN: Pt received in room 11. Pt is a&ox4, resp even and unlabored, no acute distress in appearance. Pt reports that she has been having increased shortness of breath for approximately 2 weeks, went to her doctor for blood work due to this and was found to have low hemoglobin in the 7s. Pt not sob att, denies presence of any pain, no cp, diff breathing, fevers, ambulation issues. Pt ambulation is steady to bathroom att, no dizziness reported. Pt skin is appropriate for race, pink. 20G IV placed in R ac, labs drawn and sent per orders. Will maintain safety and continue to monitor.

## 2023-05-04 NOTE — CONSULT NOTE ADULT - SUBJECTIVE AND OBJECTIVE BOX
Cardiology/Vascular Medicine Inpatient Consultation Note    Date of Admission:        CHIEF COMPLAINT:        HISTORY OF PRESENT ILLNESS:  HPI:  63 year old woman with pmhx of splenic vein thrombosis, rheumatic mitral disease, GERD, esophageal varices, ITP (platelets around 100k), hyperkalemic periodic paralysis diagnosed at age 36, JO on CPAP, basal cell carcinoma, T11 fracture (current), cataracts, recent bio-pros AVR and ascending aorta replacement on  and paroxysmal afib with RVR s/p PVI and CTI ablation on 2023 presents to the ED with worsening SOB and anemia. Patient reports that for the last couple of months she has been having worsening SOB. She can only walk one flight of stairs before she becomes SOB. She denies any associated chest pain, palpitations or LE edema. She went to see her pulmnologist about 3 days ago and underwent regular blood work. She was found to have Hgb of 6 and was advised to come to the ED today. Currently, she denies any melena, hematochezia, hematemesis, or hematuria. Of note, she had GI bleeding at the age of 15 because of varices and anastomoses 2/2 splenic vein thrombosis. She is also on Xarelto for Afib. She underwent AVR and aortic aneurysm repair in 2022. At that time, she had no significant CAD. She denies any hx of MI, CVA, DMII, or HF. She has hx of allergic reaction to succinylcholine causing hyperkalemia. She denies any hx of excessive bleeding during surgery.   In the ED, her vitals were notable for BP of 99/53 mmHg, HR of 60s, and Tmax of 98.9F. Labs were notable for anemia with Hgb of 6.7, thrombocytopenia with plts of 146,  (04 May 2023 16:17)          Allergies    [This allergen will not trigger allergy alert] nitrofurantoin, macrocrystals / nitrofurantoin, monohydrate  Reaction: Unknown (Unknown)  calcium channel blockers (Muscle Pain; Joint Pain)  Macrobid (Unknown)  succinylcholine (Other)  [This allergen will not trigger allergy alert] Succinamides (Unknown)    Intolerances    	    MEDICATIONS:  acetaZOLAMIDE    Tablet 250 milliGRAM(s) Oral daily  metoprolol succinate ER 50 milliGRAM(s) Oral daily    cefTRIAXone   IVPB 1000 milliGRAM(s) IV Intermittent every 24 hours      acetaminophen     Tablet .. 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      Splenic vein thrombosis      Bilateral carpal tunnel syndrome      Bicuspid aortic valve      Severe aortic stenosis      H/O aortic aneurysm      History of macular degeneration      History of ITP      Hyperkalemic periodic paralysis      Paroxysmal atrial fibrillation       delivery NOS      H/O bilateral inguinal hernia repair  age 5      Ectopic pregnancy        H/O carpal tunnel repair      S/P cataract surgery      H/O aortic valve replacement      H/O ascending aorta repair          FAMILY HISTORY:  Family history of acute myocardial infarction (Sibling)        SOCIAL HISTORY:    [ ] Non-smoker  [ ] Smoker  [ ] Alcohol    REVIEW OF SYSTEMS:  CONSTITUTIONAL: No fever, weight loss, or fatigue  EYES: No eye pain, visual disturbances, or discharge  ENMT:  No difficulty hearing, tinnitus, vertigo; No sinus or throat pain  NECK: No pain or stiffness  RESPIRATORY: No cough, wheezing, chills or hemoptysis; No Shortness of Breath  CARDIOVASCULAR: No chest pain, palpitations, passing out, dizziness, or leg swelling  GASTROINTESTINAL: No abdominal or epigastric pain. No nausea, vomiting, or hematemesis; No diarrhea or constipation. No melena or hematochezia.  GENITOURINARY: No dysuria, frequency, hematuria, or incontinence  NEUROLOGICAL: No headaches, memory loss, loss of strength, numbness, or tremors  SKIN: No itching, burning, rashes, or lesions   LYMPH Nodes: No enlarged glands  ENDOCRINE: No heat or cold intolerance; No hair loss  MUSCULOSKELETAL: No joint pain or swelling; No muscle, back, or extremity pain  PSYCHIATRIC: No depression, anxiety, mood swings, or difficulty sleeping  HEME/LYMPH: No easy bruising, or bleeding gums  ALLERY AND IMMUNOLOGIC: No hives or eczema	    [ ] All others negative	  [ ] Unable to obtain    PHYSICAL EXAM:  T(C): 36.7 (23 @ 15:55), Max: 37.2 (23 @ 14:06)  HR: 68 (23 @ 15:55) (65 - 68)  BP: 99/53 (23 @ 15:55) (99/53 - 116/46)  RR: 16 (23 @ 15:55) (16 - 18)  SpO2: 100% (23 @ 15:55) (100% - 100%)  Wt(kg): --  I&O's Summary      Appearance: Normal	  HEENT:   Normal oral mucosa, PERRL, EOMI	  Lymphatic: No lymphadenopathy  Cardiovascular: Normal S1 S2, No JVD, No murmurs, No edema  Respiratory: Lungs clear to auscultation	  Psychiatry: A & O x 3, Mood & affect appropriate  Gastrointestinal:  Soft, Non-tender, + BS	  Skin: No rashes, No ecchymoses, No cyanosis	  Neurologic: Non-focal  Extremities: Normal range of motion, No clubbing, cyanosis or edema  Vascular: Peripheral pulses palpable 2+ bilaterally      LABS:	 	    CBC Full  -  ( 04 May 2023 13:05 )  WBC Count : 4.79 K/uL  Hemoglobin : 6.7 g/dL  Hematocrit : 24.2 %  Platelet Count - Automated : 146 K/uL  Mean Cell Volume : 66.9 fL  Mean Cell Hemoglobin : 18.5 pg  Mean Cell Hemoglobin Concentration : 27.7 gm/dL  Auto Neutrophil # : 3.26 K/uL  Auto Lymphocyte # : 1.05 K/uL  Auto Monocyte # : 0.38 K/uL  Auto Eosinophil # : 0.00 K/uL  Auto Basophil # : 0.00 K/uL  Auto Neutrophil % : 68.0 %  Auto Lymphocyte % : 22.0 %  Auto Monocyte % : 8.0 %  Auto Eosinophil % : 0.0 %  Auto Basophil % : 0.0 %        140  |  108<H>  |  15  ----------------------------<  101<H>  3.8   |  20<L>  |  0.61    Ca    9.4      04 May 2023 13:05    TPro  6.6  /  Alb  4.1  /  TBili  0.5  /  DBili  x   /  AST  34<H>  /  ALT  36<H>  /  AlkPhos  100  05-04    < from: Transthoracic Echocardiogram (23 @ 11:26) >    Patient name: Elli Villalobos  YOB: 1960   Age: 62 (F)   MR#: [id number]  Study Date: 2023  Location: O/PSonographer: [facility]  Study quality: Technically good  Referring Physician: Celso Calix MD, PhD  Blood Pressure: 120/80 mmHg  Height: 155 cm  Weight: 59 kg  BSA: 1.6 m2  ------------------------------------------------------------------------  PROCEDURE: Transthoracic echocardiogram with 2-D, M-Mode  and complete spectral and color flow Doppler.  INDICATION: Unspecified atrial fibrillation (I48.91)  ------------------------------------------------------------------------  DIMENSIONS:  Dimensions:     Normal Values:  LA:     4.3 cm    2.0 - 4.0 cm  Ao:     3.2 cm    2.0 - 3.8 cm  SEPTUM: 1.0 cm    0.6 - 1.2 cm  PWT:    0.9 cm    0.6 - 1.1 cm  LVIDd:  3.6 cm    3.0 - 5.6 cm  LVIDs:    ---     1.8 - 4.0 cm  Derived Variables:  LVMI: 64 g/m2  RWT: 0.50  Ejection Fraction (Modified Pace Rule): 66 %  Peak Velocity (m/sec): AoV=2.4  ------------------------------------------------------------------------  OBSERVATIONS:  Mitral Valve: Mitral annular calcification, otherwise  normal mitral valve. Minimal mitral regurgitation.  Aortic Root: Normal aortic root.  Aortic Valve: Bioprosthetic aortic valve replacement. Peak  transaortic valve gradient equals 23 mm Hg, mean  transaortic valve gradient equals 12 mm Hg, which is  probably normal in the presence of a prosthetic valve.  Left Atrium: Mildly dilated left atrium.  LA volume index =  37 cc/m2.  Left Ventricle: Normal left ventricular systolic function.  No segmental wall motion abnormalities. Septal motion is  consistent with prior cardiac surgery.  (DT:140 ms).  Right Heart: Mild right atrial enlargement. Normal right  ventricular size and function. Normal tricuspid valve.  Moderate tricuspid regurgitation. Normal pulmonic valve.  Mild pulmonic regurgitation.  Pericardium/PleuraNormal pericardium with no pericardial  effusion.  Hemodynamic: Estimated right ventricular systolic pressure  equals 34 mm Hg, assuming right atrial pressure equals 10  mm Hg, consistent with normal pulmonary pressures.  ------------------------------------------------------------------------  CONCLUSIONS:  1. Mitral annular calcification, otherwise normal mitral  valve. Minimal mitral regurgitation.  2. Bioprosthetic aortic valve replacement. Peak transaortic  valve gradient equals 23 mm Hg, mean transaortic valve  gradient equals 12 mm Hg, which is probably normal in the  presence of a prosthetic valve.  3.Mildly dilated left atrium.  LA volume index = 37 cc/m2.  4. Normal left ventricular internal dimensions and wall  thicknesses.  5. Normal left ventricular systolic function. No segmental  wall motion abnormalities. Septal motion is consistent with  prior cardiac surgery.  6. Normal right ventricular size and function.  7. Normal tricuspid valve.   Moderate tricuspid  regurgitation.  ------------------------------------------------------------------------  Confirmed on  2023 - 16:10:59 by Zahra Jarrett M.D. RPVI  ------------------------------------------------------------------------    < end of copied text >  < from: Cardiac Catheterization (22 @ 11:32) >    Study Date:     2022   Name:           ELLI VILLALOBOS   :            1960   (62 years)   Gender:         female   MR#:            [id number]   [facility]#:           [id number]   Patient Class:  Outpatient     Cath Lab Report    Diagnostic Cardiologist:       Meir Watkins MD   Fellow:                        [place]   Referring Physician:           Vimal Echevarria MD     Procedures Performed   Procedures:               1.    Arterial Access - Right Femoral     2.    Diagnostic Coronary Angiography   3.    RHC   4.    Ultrasound Guided Access   5.    Venous Access - Right Femoral   6.    Aortogram     Indications:               Pre-op evaluation for cardiac surgery     Diagnostic Conclusions:     Mild nonobstructive CAD     Procedure Narrative:   The risks and alternatives of the procedures and conscious sedation  were explained to the patient and informed consent was  obtained. The patient was brought to the cath lab and placed on the  exam table.  Access   Right femoral artery:   The puncture site was infiltrated with 1% Lidocaine. Vascular access  was obtained using modified seldinger technique.  Right femoral vein:   The puncture site was infiltrated with 1% Lidocaine. Vascular access  was obtained using modified seldinger technique.    Diagnostic Findings:     Coronary Angiography   The coronary circulation is left dominant.      LM   Left main artery: Angiography shows no disease.      LAD   Left anterior descending artery: Angiography shows mild  atherosclerosis.    CX   Circumflex: Angiography shows no disease.      RCA   Right coronary artery: Angiography shows no disease.      Patient: ELLI VILLALOBOS            MRN: [id number]  Study Date: 2022   11:32 AM      Page 1 of 4      Aorta   The root exhibits moderate dilatation.    Valves   Aortic Valve: There is moderate (2+) aortic insufficiency.     Cardiology/Vascular Medicine Inpatient Consultation Note    HISTORY OF PRESENT ILLNESS:    Patient is a 63 year old woman with history of splenic vein thrombosis, esophageal varices, ITP (platelets around 100k), hyperkalemic periodic paralysis diagnosed at age 36, JO on CPAP, basal cell carcinoma, T11 fracture (current), cataracts,     She is status post bioprosthetic AVR and ascending aorta replacement on , paroxysmal atrial fibrillation with RVR s/p PVI and CTI ablation on 2023 presents to the ED with worsening SOB and anemia.     Patient reports gradual worsening exertional dyspnea and easy fatigability.  No other associated cardiac complaints.  She went to see her pulmonologist recently who performed CBC which noted profound anemia with Hgb 6.    She denies melena, hematochezia, hematemesis, or hematuria.  Of note, she has a history of GI bleeding at the age of 15 due to esophageal varices and anastomoses related to splenic vein thrombosis.     She is on rivaroxaban for atrial fibrillation.      In the ED, her vitals were notable for BP of 99/53 mmHg, HR of 60s, and Tmax of 98.9F. Labs were notable for anemia with Hgb of 6.7, thrombocytopenia with plts of 146,  (04 May 2023 16:17).  Patient was receiving pRBC transfusion in the ED during my evaluation.    Tentative anticipated plan for GI to perform EGD +/- colonoscopy.    She is currently asymptomatic from the cardiac perspective.  Currently in SR.  Recent echocardiogram performed in 2023 noted normal biventricular systolic function, with normal functioning bioprosthetic AV.  Cardiac catheterization performed on 22 noted no significant obstructive CAD.    From the cardiac perspective, she may proceed with GI procedures as outlined above, without the need for further cardiac testing or risk stratification.    Allergies    [This allergen will not trigger allergy alert] nitrofurantoin, macrocrystals / nitrofurantoin, monohydrate  Reaction: Unknown (Unknown)  calcium channel blockers (Muscle Pain; Joint Pain)  Macrobid (Unknown)  succinylcholine (Other)  [This allergen will not trigger allergy alert] Succinamides (Unknown)    Intolerances    	    MEDICATIONS:  acetaZOLAMIDE    Tablet 250 milliGRAM(s) Oral daily  metoprolol succinate ER 50 milliGRAM(s) Oral daily  cefTRIAXone   IVPB 1000 milliGRAM(s) IV Intermittent every 24 hours  acetaminophen     Tablet .. 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  Splenic vein thrombosis  Bilateral carpal tunnel syndrome  Bicuspid aortic valve  Severe aortic stenosis  H/O aortic aneurysm  History of macular degeneration  History of ITP  Hyperkalemic periodic paralysis  Paroxysmal atrial fibrillation   delivery NOS  H/O bilateral inguinal hernia repair age 5  Ectopic pregnancy   H/O carpal tunnel repair  S/P cataract surgery  H/O aortic valve replacement  H/O ascending aorta repair    FAMILY HISTORY:  Family history of acute myocardial infarction (Sibling)    SOCIAL HISTORY:    As above, as per chart notes    REVIEW OF SYSTEMS:  As above, as per chart notes    PHYSICAL EXAM:  T(C): 36.7 (23 @ 15:55), Max: 37.2 (23 @ 14:06)  HR: 68 (23 @ 15:55) (65 - 68)  BP: 99/53 (23 @ 15:55) (99/53 - 116/46)  RR: 16 (23 @ 15:55) (16 - 18)  SpO2: 100% (23 @ 15:55) (100% - 100%)    Appearance: NAD, laying flat in bed in ED, appears pale  HEENT:   No apparent JVD  Cardiovascular: Normal S1 S2  Respiratory: Decreased breath sounds bilaterally  Psychiatry: Awake, alert  Gastrointestinal:  Soft, Non-tender, + BS	  Neurologic: Non-focal  Extremities: No LE edema      LABS:	 	    CBC Full  -  ( 04 May 2023 13:05 )  WBC Count : 4.79 K/uL  Hemoglobin : 6.7 g/dL  Hematocrit : 24.2 %  Platelet Count - Automated : 146 K/uL  Mean Cell Volume : 66.9 fL  Mean Cell Hemoglobin : 18.5 pg  Mean Cell Hemoglobin Concentration : 27.7 gm/dL  Auto Neutrophil # : 3.26 K/uL  Auto Lymphocyte # : 1.05 K/uL  Auto Monocyte # : 0.38 K/uL  Auto Eosinophil # : 0.00 K/uL  Auto Basophil # : 0.00 K/uL  Auto Neutrophil % : 68.0 %  Auto Lymphocyte % : 22.0 %  Auto Monocyte % : 8.0 %  Auto Eosinophil % : 0.0 %  Auto Basophil % : 0.0 %    05-04    140  |  108<H>  |  15  ----------------------------<  101<H>  3.8   |  20<L>  |  0.61    Ca    9.4      04 May 2023 13:05    TPro  6.6  /  Alb  4.1  /  TBili  0.5  /  DBili  x   /  AST  34<H>  /  ALT  36<H>  /  AlkPhos  100  05-04    < from: Transthoracic Echocardiogram (23 @ 11:26) >    Patient name: Elli Villalobos  YOB: 1960   Age: 62 (F)   MR#: [id number]  Study Date: 2023  Location: O/PSonographer: [facility]  Study quality: Technically good  Referring Physician: Celso Calix MD, PhD  Blood Pressure: 120/80 mmHg  Height: 155 cm  Weight: 59 kg  BSA: 1.6 m2  ------------------------------------------------------------------------  PROCEDURE: Transthoracic echocardiogram with 2-D, M-Mode  and complete spectral and color flow Doppler.  INDICATION: Unspecified atrial fibrillation (I48.91)  ------------------------------------------------------------------------  DIMENSIONS:  Dimensions:     Normal Values:  LA:     4.3 cm    2.0 - 4.0 cm  Ao:     3.2 cm    2.0 - 3.8 cm  SEPTUM: 1.0 cm    0.6 - 1.2 cm  PWT:    0.9 cm    0.6 - 1.1 cm  LVIDd:  3.6 cm    3.0 - 5.6 cm  LVIDs:    ---     1.8 - 4.0 cm  Derived Variables:  LVMI: 64 g/m2  RWT: 0.50  Ejection Fraction (Modified Pace Rule): 66 %  Peak Velocity (m/sec): AoV=2.4  ------------------------------------------------------------------------  OBSERVATIONS:  Mitral Valve: Mitral annular calcification, otherwise  normal mitral valve. Minimal mitral regurgitation.  Aortic Root: Normal aortic root.  Aortic Valve: Bioprosthetic aortic valve replacement. Peak  transaortic valve gradient equals 23 mm Hg, mean  transaortic valve gradient equals 12 mm Hg, which is  probably normal in the presence of a prosthetic valve.  Left Atrium: Mildly dilated left atrium.  LA volume index =  37 cc/m2.  Left Ventricle: Normal left ventricular systolic function.  No segmental wall motion abnormalities. Septal motion is  consistent with prior cardiac surgery.  (DT:140 ms).  Right Heart: Mild right atrial enlargement. Normal right  ventricular size and function. Normal tricuspid valve.  Moderate tricuspid regurgitation. Normal pulmonic valve.  Mild pulmonic regurgitation.  Pericardium/PleuraNormal pericardium with no pericardial  effusion.  Hemodynamic: Estimated right ventricular systolic pressure  equals 34 mm Hg, assuming right atrial pressure equals 10  mm Hg, consistent with normal pulmonary pressures.  ------------------------------------------------------------------------  CONCLUSIONS:  1. Mitral annular calcification, otherwise normal mitral  valve. Minimal mitral regurgitation.  2. Bioprosthetic aortic valve replacement. Peak transaortic  valve gradient equals 23 mm Hg, mean transaortic valve  gradient equals 12 mm Hg, which is probably normal in the  presence of a prosthetic valve.  3.Mildly dilated left atrium.  LA volume index = 37 cc/m2.  4. Normal left ventricular internal dimensions and wall  thicknesses.  5. Normal left ventricular systolic function. No segmental  wall motion abnormalities. Septal motion is consistent with  prior cardiac surgery.  6. Normal right ventricular size and function.  7. Normal tricuspid valve.   Moderate tricuspid  regurgitation.  ------------------------------------------------------------------------  Confirmed on  2023 - 16:10:59 by Zahra Jarrett M.D. RPVI  ------------------------------------------------------------------------    < end of copied text >  < from: Cardiac Catheterization (22 @ 11:32) >    Study Date:     2022   Name:           ELLI VILLALOBOS   :            1960   (62 years)   Gender:         female   MR#:            [id number]   [facility]#:           [id number]   Patient Class:  Outpatient     Cath Lab Report    Diagnostic Cardiologist:       Meir Watkins MD   Fellow:                        Kristi Hinton, Fellow   Referring Physician:           Vimal Echevarria MD     Procedures Performed   Procedures:               1.    Arterial Access - Right Femoral     2.    Diagnostic Coronary Angiography   3.    RHC   4.    Ultrasound Guided Access   5.    Venous Access - Right Femoral   6.    Aortogram     Indications:               Pre-op evaluation for cardiac surgery     Diagnostic Conclusions:     Mild nonobstructive CAD     Procedure Narrative:   The risks and alternatives of the procedures and conscious sedation  were explained to the patient and informed consent was  obtained. The patient was brought to the cath lab and placed on the  exam table.  Access   Right femoral artery:   The puncture site was infiltrated with 1% Lidocaine. Vascular access  was obtained using modified seldinger technique.  Right femoral vein:   The puncture site was infiltrated with 1% Lidocaine. Vascular access  was obtained using modified seldinger technique.    Diagnostic Findings:     Coronary Angiography   The coronary circulation is left dominant.      LM   Left main artery: Angiography shows no disease.      LAD   Left anterior descending artery: Angiography shows mild  atherosclerosis.    CX   Circumflex: Angiography shows no disease.      RCA   Right coronary artery: Angiography shows no disease.      Patient: ELLI VILLALOBOS            MRN: [id number]  Study Date: 2022   11:32 AM      Page 1 of 4      Aorta   The root exhibits moderate dilatation.    Valves   Aortic Valve: There is moderate (2+) aortic insufficiency.

## 2023-05-04 NOTE — CONSULT NOTE ADULT - ASSESSMENT
63 year-old F with PMH splenic vein thrombosis, rheumatic mitral disease, GERD, esophageal varices, ITP (platelets around 100k), hyperkalemic periodic paralysis diagnosed at age 36, JO on CPAP, basal cell carcinoma, T11 fracture (current), cataracts, recent bio-pros AVR and ascending aorta replacement on 12/12/20222 and paroxysmal afib with RVR s/p PVI and CTI ablation on 1/18/2023 who presented today for symptomatic anemia. Patient was found to have anemia on outpatient labs and sent in for evaluation. She is being admitted for workup of symptomatic anemia.    Anemia  - Recommend transfusion trend CBC  - GI consult given positive FOBT  - Hold anticoagulation    Asthma  - Can continue home Trelegy if desire or can substitute with Symbicort and Spiriva  - Can order albuterol PRN, however patient is not wheezing on examination.    To be discussed with Dr. Argentina Seymour, PGY-7  Pulmonary and Critical Care Medicine  [id number]

## 2023-05-04 NOTE — ED ADULT NURSE REASSESSMENT NOTE - NS ED NURSE REASSESS COMMENT FT1
received report from caio RN. PT A&Ox4, ambulatory Pt coming to ED c/o of SOB, pt O2 100% on room air, respirations equal and unlabored. Pt hgb resulted 6.7; fecal occult positive. pt denies HA, chest pain, dizziness, blurred vision.blood transfusion consent completed. awaiting type and screen confirmation. VSS. safety maintained.

## 2023-05-04 NOTE — ED PROVIDER NOTE - OBJECTIVE STATEMENT
Patient patient is a 63-year-old female past medical history significant for A-fib on Xarelto status post ablation in January, splenic vein thrombosis presenting for low hemoglobin.  Patient states that for the past 1 to 2 weeks she has been having shortness of breath worse with exertion, without associated chest pain, also with generalized fatigue.  No known inciting event or trauma at onset.  Followed up with her pulmonologist a couple days ago for the symptoms, had labs sent, was informed last night that she had a low hemoglobin to 7.0, says that her last measurement was in the high eights but has typically been higher than that.  Denies chest pain, abdominal pain, weakness, numbness hematuria, dysuria, bloody stools, diarrhea, nausea, vomiting. Patient patient is a 63-year-old female past medical history significant for A-fib on Xarelto status post ablation in January, splenic vein thrombosis presenting for low hemoglobin.  Patient states that for the past 1 to 2 weeks she has been having shortness of breath worse with exertion, without associated chest pain, also with generalized fatigue.  No known inciting event or trauma at onset.  Followed up with her pulmonologist a couple days ago for the symptoms, had labs sent, was informed last night that she had a low hemoglobin to 7.0, says that her last measurement was in the high eights but has typically been higher than that.  Denies chest pain, abdominal pain, weakness, numbness hematuria, dysuria, bloody stools, diarrhea, nausea, vomiting, black or bloody stools.

## 2023-05-04 NOTE — H&P ADULT - HISTORY OF PRESENT ILLNESS
63 year old woman with pmhx of splenic vein thrombosis, rheumatic mitral disease, GERD, esophageal varices, ITP (platelets around 100k), hyperkalemic periodic paralysis diagnosed at age 36, JO on CPAP, basal cell carcinoma, T11 fracture (current), cataracts, recent bio-pros AVR and ascending aorta replacement on 12/12/20222 and paroxysmal afib with RVR s/p PVI and CTI ablation on 1/18/2023 presents to the ED with worsening SOB and anemia. Patient reports that for the last couple of months she has been having worsening SOB. She can only walk one flight of stairs before she becomes SOB. She denies any associated chest pain, palpitations or LE edema. She went to see her pulmnologist about 3 days ago and underwent regular blood work. She was found to have Hgb of 6 and was advised to come to the ED today. Currently, she denies any melena, hematochezia, hematemesis, or hematuria. Of note, she had GI bleeding at the age of 15 because of varices and anastomoses 2/2 splenic vein thrombosis. She is also on Xarelto for Afib. She underwent AVR and aortic aneurysm repair in 12/2022. At that time, she had no significant CAD. She denies any hx of MI, CVA, DMII, or HF. She has hx of allergic reaction to succinylcholine causing hyperkalemia. She denies any hx of excessive bleeding during surgery.   In the ED, her vitals were notable for BP of 99/53 mmHg, HR of 60s, and Tmax of 98.9F. Labs were notable for anemia with Hgb of 6.7, thrombocytopenia with plts of 146,

## 2023-05-04 NOTE — CONSULT NOTE ADULT - ASSESSMENT
This is a 63 year old woman with pmhx of splenic vein thrombosis, rheumatic mitral disease, GERD, esophageal varices, ITP (platelets around 100k), hyperkalemic periodic paralysis diagnosed at age 36, JO on CPAP, basal cell carcinoma, T11 fracture (current), cataracts, recent bio-pros AVR and ascending aorta replacement on 12/12/20222 and paroxysmal afib with RVR s/p PVI and CTI ablation on 1/18/2023 who presented today for symptomatic anemia.    Plan:  - Continuous telemetric monitoring  - Monitor electrolytes and replete K to 4 and Mg to 2  - Hold Xarelto  - Anemia workup   - Consider FOBT and CT chest, AP to determine source of bleeding   - Continue care per primary team    Juan Hurtado PA-C  Patient to be staffed with attending. Please await attending addendum This is a 63 year old woman with pmhx of splenic vein thrombosis, rheumatic mitral disease, GERD, esophageal varices, ITP (platelets around 100k), hyperkalemic periodic paralysis diagnosed at age 36, JO on CPAP, basal cell carcinoma, T11 fracture (current), cataracts, recent bio-pros AVR and ascending aorta replacement on 12/12/20222 and paroxysmal afib with RVR s/p PVI and CTI ablation on 1/18/2023 who presented today for symptomatic anemia.    Plan:  - Continuous telemetric monitoring  - Monitor electrolytes and replete K to 4 and Mg to 2  - Hold Xarelto and metoprolol   - Anemia workup   - Consider FOBT and CT chest, AP to determine source of bleeding   - Continue care per primary team    Juan Hurtado PA-C  Patient to be staffed with attending. Please await attending addendum This is a 63 year old woman with pmhx of splenic vein thrombosis, rheumatic mitral disease, GERD, esophageal varices, ITP (platelets around 100k), hyperkalemic periodic paralysis diagnosed at age 36, JO on CPAP, basal cell carcinoma, T11 fracture (current), cataracts, recent bio-pros AVR and ascending aorta replacement on 12/12/20222 and paroxysmal afib with RVR s/p PVI and CTI ablation on 1/18/2023 who presented today for symptomatic anemia.    Plan:  - Continuous telemetric monitoring  - Monitor electrolytes and replete K to 4 and Mg to 2  - Hold Xarelto given anemia  - Hold metoprolol per her outpatient EP physician Dr. Koenig    - Anemia workup   - f/u CBC to determine if patient need a transfusion   - Consider FOBT and CT chest, AP to determine source of bleeding   - Continue care per primary team    Juan Hurtado PA-C  Patient to be staffed with attending. Please await attending addendum

## 2023-05-04 NOTE — ED PROVIDER NOTE - PHYSICAL EXAMINATION
CONSTITUTIONAL: Well-developed; well-nourished; in no acute distress.   SKIN: warm, dry  HEAD: Normocephalic; atraumatic.  EYES: no conjunctival injection. no scleral icterus  ENT: No nasal discharge; airway clear.  NECK: Supple; non tender.  CARD: S1, S2 normal; no murmurs, gallops, or rubs. Regular rate and rhythm.   RESP: No wheezes, rales or rhonchi. Good air movement bilaterally.   ABD: soft ntnd, no guarding, no distention, no rigidity.   Rectal: chaperone Student Merissa, external exam wnl, internal with hemorrhoid, stool without visible blood or melena.  EXT: Ambulates independently.  No cyanosis or edema.   NEURO: Alert, oriented, grossly unremarkable  PSYCH: Cooperative, appropriate.

## 2023-05-04 NOTE — PROCEDURE
[FreeTextEntry1] : SPI performed in office show\par FEV: 88\par FEV1/FVC Ratio: 106\par FAS22-43%: 114\par \par 6MWT Performed in office shows:\par RA SPO2 @ REST: 99\par RA SPO2 @ EXERTION: 99\par \par Patient was not able to complete due to shortness of breath or dizziness.  Patient stopped after 1 minute.\par \par

## 2023-05-04 NOTE — DISCUSSION/SUMMARY
[FreeTextEntry1] : Compliance report via [employer]\par \par Usage Days >= 87%\par AHI 28.0/hr\par Leaks 54.8 L/min

## 2023-05-04 NOTE — CONSULT NOTE ADULT - ASSESSMENT
Patient is a 63 year old woman with history of splenic vein thrombosis, esophageal varices, ITP (platelets around 100k), hyperkalemic periodic paralysis diagnosed at age 36, JO on CPAP, basal cell carcinoma, T11 fracture (current), cataracts,     She is status post bioprosthetic AVR and ascending aorta replacement on 12/12/20222, paroxysmal atrial fibrillation with RVR s/p PVI and CTI ablation on 1/18/2023 presents to the ED with worsening SOB and anemia.     Patient reports gradual worsening exertional dyspnea and easy fatigability.  No other associated cardiac complaints.  She went to see her pulmonologist recently who performed CBC which noted profound anemia with Hgb 6.    She denies melena, hematochezia, hematemesis, or hematuria.  Of note, she has a history of GI bleeding at the age of 15 due to esophageal varices and anastomoses related to splenic vein thrombosis.     She is on rivaroxaban for atrial fibrillation.      In the ED, her vitals were notable for BP of 99/53 mmHg, HR of 60s, and Tmax of 98.9F. Labs were notable for anemia with Hgb of 6.7, thrombocytopenia with plts of 146,  (04 May 2023 16:17).  Patient was receiving pRBC transfusion in the ED during my evaluation.    Tentative anticipated plan for GI to perform EGD +/- colonoscopy.    She is currently asymptomatic from the cardiac perspective.  Currently in SR.  Recent echocardiogram performed in January 2023 noted normal biventricular systolic function, with normal functioning bioprosthetic AV.  Cardiac catheterization performed on 11/28/22 noted no significant obstructive CAD.    From the cardiac perspective, she may proceed with GI procedures as outlined above, without the need for further cardiac testing or risk stratification.

## 2023-05-04 NOTE — ED ADULT TRIAGE NOTE - CHIEF COMPLAINT QUOTE
pt sent by MD for hemoglobin of 7.0. pt states hemoglobin dropped from 8.9 since January. pt c/o sob and fatigue. pt denies chest pain, hematuria, blood in stool. hx of a-fib on xarelto, cardiac ablation on 01/18 and heart surgery 12/12/22.

## 2023-05-04 NOTE — H&P ADULT - ASSESSMENT
63 year old woman with pmhx of splenic vein thrombosis, rheumatic mitral disease, GERD, esophageal varices, ITP (platelets around 100k), hyperkalemic periodic paralysis diagnosed at age 36, JO on CPAP, basal cell carcinoma, T11 fracture (current), cataracts, recent bio-pros AVR and ascending aorta replacement on 12/12/20222 and paroxysmal afib with RVR s/p PVI and CTI ablation on 1/18/2023 presents to the ED with worsening SOB and anemia, most likely due to GI bleeding.

## 2023-05-04 NOTE — ED ADULT NURSE REASSESSMENT NOTE - NS ED NURSE REASSESS COMMENT FT1
Patient awake and alert, respirations are even and unlabored, sating at 99% on room air, normal sinus on cardiac monitor, BP stable. Patient denies chest pain, shortness of breath, headache, dizziness, abdominal pain at this time. Pt has no active bleeding noted. Medicine provider at bedside and states the plan is to repeat CBC at 1950 (1 hour after blood transfusion finished). Pt will also be NPO after midnight for possible endoscopy. Pt resting comfortably in stretcher, 22 G to L forearm and 20 G to R AC flushes without difficulty.

## 2023-05-05 LAB
A1C WITH ESTIMATED AVERAGE GLUCOSE RESULT: 5.3 % — SIGNIFICANT CHANGE UP (ref 4–5.6)
ALBUMIN SERPL ELPH-MCNC: 3.9 G/DL — SIGNIFICANT CHANGE UP (ref 3.3–5)
ALP SERPL-CCNC: 89 U/L — SIGNIFICANT CHANGE UP (ref 40–120)
ALT FLD-CCNC: 35 U/L — HIGH (ref 4–33)
ANION GAP SERPL CALC-SCNC: 10 MMOL/L — SIGNIFICANT CHANGE UP (ref 7–14)
AST SERPL-CCNC: 31 U/L — SIGNIFICANT CHANGE UP (ref 4–32)
BASOPHILS # BLD AUTO: 0.04 K/UL — SIGNIFICANT CHANGE UP (ref 0–0.2)
BASOPHILS NFR BLD AUTO: 0.9 % — SIGNIFICANT CHANGE UP (ref 0–2)
BILIRUB SERPL-MCNC: 1.5 MG/DL — HIGH (ref 0.2–1.2)
BUN SERPL-MCNC: 10 MG/DL — SIGNIFICANT CHANGE UP (ref 7–23)
CALCIUM SERPL-MCNC: 9 MG/DL — SIGNIFICANT CHANGE UP (ref 8.4–10.5)
CHLORIDE SERPL-SCNC: 109 MMOL/L — HIGH (ref 98–107)
CHOLEST SERPL-MCNC: 149 MG/DL — SIGNIFICANT CHANGE UP
CO2 SERPL-SCNC: 21 MMOL/L — LOW (ref 22–31)
CREAT SERPL-MCNC: 0.7 MG/DL — SIGNIFICANT CHANGE UP (ref 0.5–1.3)
EGFR: 97 ML/MIN/1.73M2 — SIGNIFICANT CHANGE UP
EOSINOPHIL # BLD AUTO: 0.18 K/UL — SIGNIFICANT CHANGE UP (ref 0–0.5)
EOSINOPHIL NFR BLD AUTO: 4.2 % — SIGNIFICANT CHANGE UP (ref 0–6)
ESTIMATED AVERAGE GLUCOSE: 105 — SIGNIFICANT CHANGE UP
GLUCOSE SERPL-MCNC: 118 MG/DL — HIGH (ref 70–99)
HAPTOGLOB SERPL-MCNC: 54 MG/DL — SIGNIFICANT CHANGE UP (ref 34–200)
HCT VFR BLD CALC: 27.3 % — LOW (ref 34.5–45)
HDLC SERPL-MCNC: 44 MG/DL — LOW
HGB BLD-MCNC: 8.3 G/DL — LOW (ref 11.5–15.5)
IANC: 2.26 K/UL — SIGNIFICANT CHANGE UP (ref 1.8–7.4)
IMM GRANULOCYTES NFR BLD AUTO: 0.2 % — SIGNIFICANT CHANGE UP (ref 0–0.9)
LDH SERPL L TO P-CCNC: 200 U/L — SIGNIFICANT CHANGE UP (ref 135–225)
LIPID PNL WITH DIRECT LDL SERPL: 92 MG/DL — SIGNIFICANT CHANGE UP
LYMPHOCYTES # BLD AUTO: 1.29 K/UL — SIGNIFICANT CHANGE UP (ref 1–3.3)
LYMPHOCYTES # BLD AUTO: 30 % — SIGNIFICANT CHANGE UP (ref 13–44)
MCHC RBC-ENTMCNC: 20.9 PG — LOW (ref 27–34)
MCHC RBC-ENTMCNC: 30.4 GM/DL — LOW (ref 32–36)
MCV RBC AUTO: 68.8 FL — LOW (ref 80–100)
MONOCYTES # BLD AUTO: 0.52 K/UL — SIGNIFICANT CHANGE UP (ref 0–0.9)
MONOCYTES NFR BLD AUTO: 12.1 % — SIGNIFICANT CHANGE UP (ref 2–14)
NEUTROPHILS # BLD AUTO: 2.26 K/UL — SIGNIFICANT CHANGE UP (ref 1.8–7.4)
NEUTROPHILS NFR BLD AUTO: 52.6 % — SIGNIFICANT CHANGE UP (ref 43–77)
NON HDL CHOLESTEROL: 105 MG/DL — SIGNIFICANT CHANGE UP
NRBC # BLD: 0 /100 WBCS — SIGNIFICANT CHANGE UP (ref 0–0)
NRBC # FLD: 0 K/UL — SIGNIFICANT CHANGE UP (ref 0–0)
PLATELET # BLD AUTO: 121 K/UL — LOW (ref 150–400)
POTASSIUM SERPL-MCNC: 3.7 MMOL/L — SIGNIFICANT CHANGE UP (ref 3.5–5.3)
POTASSIUM SERPL-SCNC: 3.7 MMOL/L — SIGNIFICANT CHANGE UP (ref 3.5–5.3)
PROT SERPL-MCNC: 5.8 G/DL — LOW (ref 6–8.3)
RBC # BLD: 3.97 M/UL — SIGNIFICANT CHANGE UP (ref 3.8–5.2)
RBC # FLD: 17.8 % — HIGH (ref 10.3–14.5)
SODIUM SERPL-SCNC: 140 MMOL/L — SIGNIFICANT CHANGE UP (ref 135–145)
TRIGL SERPL-MCNC: 67 MG/DL — SIGNIFICANT CHANGE UP
WBC # BLD: 4.3 K/UL — SIGNIFICANT CHANGE UP (ref 3.8–10.5)
WBC # FLD AUTO: 4.3 K/UL — SIGNIFICANT CHANGE UP (ref 3.8–10.5)

## 2023-05-05 PROCEDURE — 99233 SBSQ HOSP IP/OBS HIGH 50: CPT

## 2023-05-05 PROCEDURE — 43251 EGD REMOVE LESION SNARE: CPT | Mod: GC

## 2023-05-05 PROCEDURE — 99221 1ST HOSP IP/OBS SF/LOW 40: CPT | Mod: 25

## 2023-05-05 PROCEDURE — 88305 TISSUE EXAM BY PATHOLOGIST: CPT | Mod: 26

## 2023-05-05 DEVICE — IMPLANTABLE DEVICE: Type: IMPLANTABLE DEVICE | Status: FUNCTIONAL

## 2023-05-05 DEVICE — CLIP RESOLUTION 360 ULTRA 235CM 20/BX: Type: IMPLANTABLE DEVICE | Status: FUNCTIONAL

## 2023-05-05 RX ORDER — BUDESONIDE AND FORMOTEROL FUMARATE DIHYDRATE 160; 4.5 UG/1; UG/1
2 AEROSOL RESPIRATORY (INHALATION)
Refills: 0 | Status: DISCONTINUED | OUTPATIENT
Start: 2023-05-05 | End: 2023-05-06

## 2023-05-05 RX ORDER — ASCORBIC ACID 60 MG
500 TABLET,CHEWABLE ORAL DAILY
Refills: 0 | Status: DISCONTINUED | OUTPATIENT
Start: 2023-05-05 | End: 2023-05-06

## 2023-05-05 RX ORDER — METOPROLOL TARTRATE 50 MG
25 TABLET ORAL DAILY
Refills: 0 | Status: DISCONTINUED | OUTPATIENT
Start: 2023-05-06 | End: 2023-05-06

## 2023-05-05 RX ORDER — FERROUS SULFATE 325(65) MG
325 TABLET ORAL DAILY
Refills: 0 | Status: DISCONTINUED | OUTPATIENT
Start: 2023-05-05 | End: 2023-05-06

## 2023-05-05 RX ORDER — TIOTROPIUM BROMIDE 18 UG/1
2 CAPSULE ORAL; RESPIRATORY (INHALATION) DAILY
Refills: 0 | Status: DISCONTINUED | OUTPATIENT
Start: 2023-05-05 | End: 2023-05-06

## 2023-05-05 RX ADMIN — TIOTROPIUM BROMIDE 2 PUFF(S): 18 CAPSULE ORAL; RESPIRATORY (INHALATION) at 11:42

## 2023-05-05 RX ADMIN — OCTREOTIDE ACETATE 10 MICROGRAM(S)/HR: 200 INJECTION, SOLUTION INTRAVENOUS; SUBCUTANEOUS at 11:41

## 2023-05-05 RX ADMIN — ACETAZOLAMIDE 250 MILLIGRAM(S): 250 TABLET ORAL at 11:42

## 2023-05-05 RX ADMIN — BUDESONIDE AND FORMOTEROL FUMARATE DIHYDRATE 2 PUFF(S): 160; 4.5 AEROSOL RESPIRATORY (INHALATION) at 11:42

## 2023-05-05 RX ADMIN — CITALOPRAM 20 MILLIGRAM(S): 10 TABLET, FILM COATED ORAL at 11:43

## 2023-05-05 RX ADMIN — Medication 3 MILLIGRAM(S): at 21:38

## 2023-05-05 NOTE — PROGRESS NOTE ADULT - SUBJECTIVE AND OBJECTIVE BOX
Patient is a 63y old  Female who presents with a chief complaint of SOB (04 May 2023 18:33)     s/p PRBC yesterday "feel better".  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    Splenic vein thrombosis    Bilateral carpal tunnel syndrome    MVP (mitral valve prolapse)    Bicuspid aortic valve    Severe aortic stenosis    H/O aortic aneurysm    History of macular degeneration    History of ITP    Hyperkalemic periodic paralysis    Paroxysmal atrial fibrillation     delivery NOS    H/O bilateral inguinal hernia repair  age 5    Ectopic pregnancy      H/O carpal tunnel repair    S/P cataract surgery    H/O aortic valve replacement    H/O ascending aorta repair        MEDICATIONS  (STANDING):  acetaZOLAMIDE    Tablet 250 milliGRAM(s) Oral daily  budesonide  80 MICROgram(s)/formoterol 4.5 MICROgram(s) Inhaler 2 Puff(s) Inhalation two times a day  cefTRIAXone   IVPB 1000 milliGRAM(s) IV Intermittent every 24 hours  citalopram 20 milliGRAM(s) Oral daily  metoprolol succinate ER 50 milliGRAM(s) Oral daily  octreotide  Infusion 50 MICROgram(s)/Hr (10 mL/Hr) IV Continuous <Continuous>  pantoprazole Infusion 8 mG/Hr (10 mL/Hr) IV Continuous <Continuous>  tiotropium 2.5 MICROgram(s) Inhaler 2 Puff(s) Inhalation daily    MEDICATIONS  (PRN):  acetaminophen     Tablet .. 650 milliGRAM(s) Oral every 6 hours PRN Temp greater or equal to 38C (100.4F), Mild Pain (1 - 3)  aluminum hydroxide/magnesium hydroxide/simethicone Suspension 30 milliLiter(s) Oral every 4 hours PRN Dyspepsia  melatonin 3 milliGRAM(s) Oral at bedtime PRN Insomnia  ondansetron Injectable 4 milliGRAM(s) IV Push every 8 hours PRN Nausea and/or Vomiting            Vital Signs Last 24 Hrs  T(C): 36.3 (05 May 2023 09:54), Max: 37.3 (04 May 2023 18:55)  T(F): 97.4 (05 May 2023 09:54), Max: 99.1 (04 May 2023 18:55)  HR: 67 (05 May 2023 09:54) (65 - 70)  BP: 111/62 (05 May 2023 09:54) (98/58 - 116/46)  BP(mean): --  RR: 18 (05 May 2023 09:54) (16 - 18)  SpO2: 97% (05 May 2023 09:54) (97% - 100%)    Parameters below as of 05 May 2023 09:54  Patient On (Oxygen Delivery Method): room air                INTERPRETATION OF TELEMETRY: not on telemetry monitoring    ECG:        LABS:                        8.3    4.30  )-----------( 121      ( 05 May 2023 05:43 )             27.3     05-05    140  |  109<H>  |  10  ----------------------------<  118<H>  3.7   |  21<L>  |  0.70    Ca    9.0      05 May 2023 05:43    TPro  5.8<L>  /  Alb  3.9  /  TBili  1.5<H>  /  DBili  x   /  AST  31  /  ALT  35<H>  /  AlkPhos  89  05-05        PT/INR - ( 04 May 2023 13:05 )   PT: 17.9 sec;   INR: 1.54 ratio         PTT - ( 04 May 2023 13:05 )  PTT:34.7 sec      BNP  RADIOLOGY & ADDITIONAL STUDIES:  LVIDd:  3.6 cm    3.0 - 5.6 cm  LVIDs:    ---     1.8 - 4.0 cm  Derived Variables:  LVMI: 64 g/m2  RWT: 0.50  Ejection Fraction (Modified Pace Rule): 66 %  Peak Velocity (m/sec): AoV=2.4  ------------------------------------------------------------------------  OBSERVATIONS:  Mitral Valve: Mitral annular calcification, otherwise  normal mitral valve. Minimal mitral regurgitation.  Aortic Root: Normal aortic root.  Aortic Valve: Bioprosthetic aortic valve replacement. Peak  transaortic valve gradient equals 23 mm Hg, mean  transaortic valve gradient equals 12 mm Hg, which is  probably normal in the presence of a prosthetic valve.  Left Atrium: Mildly dilated left atrium.  LA volume index =  37 cc/m2.  Left Ventricle: Normal left ventricular systolic function.  No segmental wall motion abnormalities. Septal motion is  consistent with prior cardiac surgery.  (DT:140 ms).  Right Heart: Mild right atrial enlargement. Normal right  ventricular size and function. Normal tricuspid valve.  Moderate tricuspid regurgitation. Normal pulmonic valve.  Mild pulmonic regurgitation.  Pericardium/PleuraNormal pericardium with no pericardial  effusion.  Hemodynamic: Estimated right ventricular systolic pressure  equals 34 mm Hg, assuming right atrial pressure equals 10  mm Hg, consistent with normal pulmonary pressures.  ------------------------------------------------------------------------  CONCLUSIONS:  1. Mitral annular calcification, otherwise normal mitral  valve. Minimal mitral regurgitation.  2. Bioprosthetic aortic valve replacement. Peak transaortic  valve gradient equals 23 mm Hg, mean transaortic valve  gradient equals 12 mm Hg, which is probably normal in the  presence of a prosthetic valve.  3.Mildly dilated left atrium.  LA volume index = 37 cc/m2.  4. Normal left ventricular internal dimensions and wall  thicknesses.  5. Normal left ventricular systolic function. No segmental  wall motion abnormalities. Septal motion is consistent with  prior cardiac surgery.  6. Normal right ventricular size and function.  7. Normal tricuspid valve.   Moderate tricuspid  regurgitation.  ------------------------------------------------------------------------  Confirmed on  2023 - 16:10:59 by Zahra Jarrett M.D. RPVI  ------------------------------------------------------------------------      PHYSICAL EXAM:    GENERAL: In no apparent distress, pale looking   NECK: Supple and normal thyroid.  No JVD or carotid bruit.  Carotid pulse is 2+ bilaterally.  HEART: Regular rate and rhythm; 2/6 systolic  murmurs, no rubs, or gallops.  PULMONARY: Clear to auscultation and perfusion.  No rales, wheezing, or rhonchi bilaterally.  ABDOMEN: Soft, Nontender, Nondistended; Bowel sounds present  EXTREMITIES:  2+ Peripheral Pulses, No clubbing, cyanosis, or edema  NEUROLOGICAL: Grossly nonfocal

## 2023-05-05 NOTE — PROGRESS NOTE ADULT - PROBLEM SELECTOR PLAN 4
Patient has hx of hyperkalemic paralysis   -Please avoid succinylcholine during surgery as it can cause hyperkalemia   -Monitor K daily Pulm recs appreciated   -C/w CPAP QHS

## 2023-05-05 NOTE — CONSULT NOTE ADULT - SUBJECTIVE AND OBJECTIVE BOX
Chief Complaint:  Patient is a 63y old  Female who presents with a chief complaint of SOB (04 May 2023 18:33)      HPI:ELLI LOVELL is a 63y Female EV hemorrhage (age 15 per patient) and ?anastomoses splenic vein thrombosis, EV, h/o PV thrombosis, rheumatic mitral disease, GERD, esophageal varices, ITP (platelets around 100k), hyperkalemic periodic paralysis diagnosed at age 36, JO on CPAP, basal cell carcinoma, T11 fracture (current), cataracts, recent bio-pros AVR and ascending aorta replacement on  and paroxysmal afib with RVR s/p PVI and CTI ablation on 2023 on xarelto (previously eliquis/ASA) s/p LHC w/o significant CAD, who presented to the ED after reportedly being found to have hgb 6 or 7 with her pulmonologist in setting of worsening GUEVARA and was instructed to go to the ED. Hepatology consulted for anemia.    Patient any recurrent bleeding since she was 15. No bloody red/black stools, no vomiting of red blood/black substances. Denies n/v/d, constipation, abdominal pain. No heavy nsaid use aside from prior daily ASA use, while on omperazole. No heartburn/difficulty painful swallowing. Denies fhx of liver disease; grandfather had colon cancer dx age 70s. Last EGD in , reports varices otherwise no new findings. Last colonoscopy in , removed benign polyp per patient.     She's been HDS and afebrile. Hgb on admission was 6.7 s/p 2 pRBCs to correct Hgb to 8.3. CTAP revealed cirrhosis with esophageal and splenic varices and unchanged cavernous transformation (since ), portal hypertension and trace pelvic ascites, final read pending. Patient denying any history of cirrhosis.     Note, last echo 2023 EF 66%, mildly dilated LA, mild AR, moderate TR, mild RA enlargement, normal pulm pressures, RAP 10, RVSP 34.     PMHX/PSHX:  Anxiety    Depression    GERD (gastroesophageal reflux disease)    Periodic paralysis    Nonepileptic episode    Portal vein thrombosis    Splenic vein thrombosis    Bilateral carpal tunnel syndrome    MVP (mitral valve prolapse)    Bicuspid aortic valve    Severe aortic stenosis    H/O aortic aneurysm    History of macular degeneration    History of ITP    Hyperkalemic periodic paralysis    Paroxysmal atrial fibrillation     delivery NOS    H/O bilateral inguinal hernia repair    Ectopic pregnancy    H/O carpal tunnel repair    S/P cataract surgery    H/O aortic valve replacement    H/O ascending aorta repair      Allergies:  [This allergen will not trigger allergy alert] nitrofurantoin, macrocrystals / nitrofurantoin, monohydrate  Reaction: Unknown (Unknown)  calcium channel blockers (Muscle Pain; Joint Pain)  Macrobid (Unknown)  succinylcholine (Other)  [This allergen will not trigger allergy alert] Succinamides (Unknown)      Home Medications: reviewed  Hospital Medications:  acetaminophen     Tablet .. 650 milliGRAM(s) Oral every 6 hours PRN  acetaZOLAMIDE    Tablet 250 milliGRAM(s) Oral daily  aluminum hydroxide/magnesium hydroxide/simethicone Suspension 30 milliLiter(s) Oral every 4 hours PRN  budesonide  80 MICROgram(s)/formoterol 4.5 MICROgram(s) Inhaler 2 Puff(s) Inhalation two times a day  cefTRIAXone   IVPB 1000 milliGRAM(s) IV Intermittent every 24 hours  citalopram 20 milliGRAM(s) Oral daily  melatonin 3 milliGRAM(s) Oral at bedtime PRN  metoprolol succinate ER 50 milliGRAM(s) Oral daily  octreotide  Infusion 50 MICROgram(s)/Hr IV Continuous <Continuous>  ondansetron Injectable 4 milliGRAM(s) IV Push every 8 hours PRN  pantoprazole Infusion 8 mG/Hr IV Continuous <Continuous>  tiotropium 2.5 MICROgram(s) Inhaler 2 Puff(s) Inhalation daily      Social History:   Tob: Denies  EtOH: occasional, 1 glass wine/week  Illicit Drugs: Denies    Family history:  No pertinent family history in first degree relatives    Family history of acute myocardial infarction (Sibling)    Colon cancer grandfather dx age 70s    ROS:   Complete and normal except as mentioned above.    PHYSICAL EXAM:   Vital Signs:  Vital Signs Last 24 Hrs  T(C): 36.8 (05 May 2023 06:00), Max: 37.3 (04 May 2023 18:55)  T(F): 98.2 (05 May 2023 06:00), Max: 99.1 (04 May 2023 18:55)  HR: 65 (05 May 2023 06:00) (65 - 70)  BP: 107/58 (05 May 2023 06:00) (98/58 - 116/46)  BP(mean): --  RR: 17 (05 May 2023 06:00) (16 - 18)  SpO2: 97% (05 May 2023 06:00) (97% - 100%)    Parameters below as of 05 May 2023 06:00  Patient On (Oxygen Delivery Method): room air      Daily     Daily     GENERAL: no acute distress  NEURO: aox3  HEENT: anicteric sclera, no conjunctival pallor appreciated  CHEST: no respiratory distress, no accessory muscle use  CARDIAC: regular rate   ABDOMEN: soft, non-tender, non-distended, no rebound or guarding  GABRIEL: tan colored stool  EXTREMITIES: warm, well perfused, no edema  SKIN: no lesions noted    LABS: reviewed                        8.3    4.30  )-----------( 121      ( 05 May 2023 05:43 )             27.3     05-05    140  |  109<H>  |  10  ----------------------------<  118<H>  3.7   |  21<L>  |  0.70    Ca    9.0      05 May 2023 05:43    TPro  5.8<L>  /  Alb  3.9  /  TBili  1.5<H>  /  DBili  x   /  AST  31  /  ALT  35<H>  /  AlkPhos  89  05-05    LIVER FUNCTIONS - ( 05 May 2023 05:43 )  Alb: 3.9 g/dL / Pro: 5.8 g/dL / ALK PHOS: 89 U/L / ALT: 35 U/L / AST: 31 U/L / GGT: x               Diagnostic Studies: see sunrise for full report

## 2023-05-05 NOTE — PROGRESS NOTE ADULT - ASSESSMENT
63 year old woman with pmhx of splenic vein thrombosis, rheumatic mitral disease, GERD, esophageal varices, ITP (platelets around 100k), hyperkalemic periodic paralysis diagnosed at age 36, JO on CPAP, basal cell carcinoma, T11 fracture (current), cataracts, recent bio-pros AVR and ascending aorta replacement on 12/12/20222 and paroxysmal afib with RVR s/p PVI and CTI ablation on 1/18/2023 presents to the ED with worsening SOB and anemia, most likely due to GI bleeding.  63F h/o EV hemorrhage (age 15 per patient) and ?anastomoses splenic vein thrombosis, EV, h/o PV thrombosis, rheumatic mitral disease, GERD, esophageal varices, ITP (platelets around 100k), hyperkalemic periodic paralysis, JO on CPAP, basal cell carcinoma, T11 fracture, cataracts, bio-pros AVR and ascending aorta replacement on 12/12/20222 and subsequent paroxysmal afib with RVR s/p PVI and CTI ablation on 1/18/2023 on xarelto (previously eliquis/ASA) s/p LHC w/o significant CAD, who presented to the ED after reportedly being found to have hgb 6 or 7 with her pulmonologist in setting of worsening GUEVARA     Hb 6.7 --> s/p 2 units PRBCs --> Hb 8.3    EGD One column of trace esophageal varices. Flattens upon insufflation. No active bleeding or high risk stigmata of recent bleeding. No variceal banding performed. Innumerable gastric polyps. One was resected and retrieved. Clip was placed. Friable gastric mucosa. Gastritis. Normal examined duodenum

## 2023-05-05 NOTE — CONSULT NOTE ADULT - ASSESSMENT
63y Female EV hemorrhage (age 15 per patient) and ?anastomoses splenic vein thrombosis, EV, h/o PV thrombosis, rheumatic mitral disease, GERD, esophageal varices, ITP (platelets around 100k), hyperkalemic periodic paralysis diagnosed at age 36, JO on CPAP, basal cell carcinoma, T11 fracture (current), cataracts, recent bio-pros AVR and ascending aorta replacement on 12/12/20222 and paroxysmal afib with RVR s/p PVI and CTI ablation on 1/18/2023 on xarelto (previously eliquis/ASA) s/p LHC w/o significant CAD, who presented to the ED after reportedly being found to have hgb 6 or 7 with her pulmonologist in setting of worsening GUEVARA and was instructed to go to the ED. Hepatology consulted for anemia.    # Microcytic anemia w/o overt bleeding, possibly 2/2 PUD vs erosive gastropathy vs EV bleeding  # Cirrhosis on CT possibly 2/2 congestive hepatopathy vs splenic v thrombosis vs AI   MELDNa 13 on 5/5 using 5/4 INR  - Last EGD in 2014, reports varices otherwise no new findings. Last colonoscopy in 2020, removed benign polyp per patient.   - CTAP revealed cirrhosis with esophageal and splenic varices and unchanged cavernous transformation (since 2020), portal hypertension and trace pelvic ascites, final read pending. Patient denying any history of cirrhosis.   - Echo 1/2023 EF 66%, mildly dilated LA, mild MN, moderate TR, mild RA enlargement, normal pulm pressures, RAP 10, RVSP 34.     Recommendations:  - Tentatively plan for non-urgent EGD  - If EGD does not explain the anemia, may benefit from colonoscopy early next week  - Continue to hold AC if able  - Cleared by Pulm and Cards for EGD  - Per primary, avoid succinylcholine as it can cause hyperkalemia   - PPI gtt, octreotide, ceftriaxone  - Check GEMINI, ASMA, AMA, quantitative IgG, A1AT  - Check iron panel, ferritin, ceruloplasmin  - Daily CBC, CMP, coags; correct electrolyte derangements  - Transfuse if Hgb < 8, considering cardiac history  - f/u final CTAP read   - Rest of care per primary    Preliminary note until signed by Attending.    Thank you for involving us in this patient's care.    Penny Catalan MD  Gastroenterology/Hepatology Fellow, PGY-VI    NON-URGENT CONSULTS:  Please email [email or web address]  AT NIGHT AND ON WEEKENDS:  Contact on-call GI fellow via answering service ([phone]) from 5pm-8am and on weekends/holidays  MONDAY-FRIDAY 8AM-5PM:  Pager# [phone] ([facility])      63y Female EV hemorrhage (age 15 per patient) and ?anastomoses splenic vein thrombosis, EV, h/o PV thrombosis, rheumatic mitral disease, GERD, esophageal varices, ITP (platelets around 100k), hyperkalemic periodic paralysis diagnosed at age 36, JO on CPAP, basal cell carcinoma, T11 fracture (current), cataracts, recent bio-pros AVR and ascending aorta replacement on 12/12/20222 and paroxysmal afib with RVR s/p PVI and CTI ablation on 1/18/2023 on xarelto (previously eliquis/ASA) s/p LHC w/o significant CAD, who presented to the ED after reportedly being found to have hgb 6 or 7 with her pulmonologist in setting of worsening GUEVARA and was instructed to go to the ED. Hepatology consulted for anemia.    # Microcytic anemia w/o overt bleeding, possibly 2/2 PUD vs erosive gastropathy vs EV bleeding  # Cirrhosis on CT possibly 2/2 congestive hepatopathy vs splenic v thrombosis vs AI   MELDNa 13 on 5/5 using 5/4 INR  - Last EGD in 2014, reports varices otherwise no new findings. Last colonoscopy in 2020, removed benign polyp per patient.   - CTAP revealed cirrhosis with esophageal and splenic varices and unchanged cavernous transformation (since 2020), portal hypertension and trace pelvic ascites, final read pending. Patient denying any history of cirrhosis.   - Echo 1/2023 EF 66%, mildly dilated LA, mild UT, moderate TR, mild RA enlargement, normal pulm pressures, RAP 10, RVSP 34.     Recommendations:  - Tentatively plan for non-urgent EGD  - If EGD does not explain the anemia, may benefit from colonoscopy early next week  - Continue to hold AC if able  - Cleared by Pulm and Cards for EGD  - Per primary, avoid succinylcholine as it can cause hyperkalemia   - PPI gtt, octreotide, ceftriaxone  - Check HBsAb/sAg/cAb, HAV IgM, HCV Ab  - Check GEMINI, ASMA, AMA, quantitative IgG, A1AT  - Check iron panel, ferritin, ceruloplasmin  - US liver doppler  - Daily CBC, CMP, coags; correct electrolyte derangements  - Transfuse if Hgb < 8, considering cardiac history  - f/u final CTAP read   - Rest of care per primary    Preliminary note until signed by Attending.    Thank you for involving us in this patient's care.    Penny Catalan MD  Gastroenterology/Hepatology Fellow, PGY-VI    NON-URGENT CONSULTS:  Please email [email or web address]  AT NIGHT AND ON WEEKENDS:  Contact on-call GI fellow via answering service ([phone]) from 5pm-8am and on weekends/holidays  MONDAY-FRIDAY 8AM-5PM:  Pager# [phone] ([facility])      63y Female EV hemorrhage (age 15 per patient) and ?anastomoses splenic vein thrombosis, EV, h/o PV thrombosis, rheumatic mitral disease, GERD, esophageal varices, ITP (platelets around 100k), hyperkalemic periodic paralysis diagnosed at age 36, JO on CPAP, basal cell carcinoma, T11 fracture (current), cataracts, recent bio-pros AVR and ascending aorta replacement on 12/12/20222 and paroxysmal afib with RVR s/p PVI and CTI ablation on 1/18/2023 on xarelto (previously eliquis/ASA) s/p LHC w/o significant CAD, who presented to the ED after reportedly being found to have hgb 6 or 7 with her pulmonologist in setting of worsening GUEVARA and was instructed to go to the ED. Hepatology consulted for anemia.    # Microcytic anemia w/o overt bleeding, possibly 2/2 PUD vs erosive gastropathy vs EV bleeding  # Cirrhosis on CT possibly 2/2 congestive hepatopathy vs splenic v thrombosis vs AI   MELDNa 13 on 5/5 using 5/4 INR  - Last EGD in 2014, reports varices otherwise no new findings. Last colonoscopy in 2020, removed benign polyp per patient.   - CTAP revealed cirrhosis with esophageal and splenic varices and unchanged cavernous transformation (since 2020), portal hypertension and trace pelvic ascites, final read pending. Patient denying any history of cirrhosis.   - Echo 1/2023 EF 66%, mildly dilated LA, mild SD, moderate TR, mild RA enlargement, normal pulm pressures, RAP 10, RVSP 34.     Recommendations:  - Tentatively plan for non-urgent EGD  - If EGD does not explain the anemia, may benefit from colonoscopy early next week  - Keep NPO  - Continue to hold AC if able  - Cleared by Pulm and Cards for EGD  - Per primary, avoid succinylcholine as it can cause hyperkalemia   - PPI gtt, octreotide, ceftriaxone  - Check HBsAb/sAg/cAb, HAV IgM, HCV Ab  - Check GEMINI, ASMA, AMA, quantitative IgG, A1AT  - Check iron panel, ferritin, ceruloplasmin  - US liver doppler  - Daily CBC, CMP, coags; correct electrolyte derangements  - Transfuse if Hgb < 8, considering cardiac history  - f/u final CTAP read   - Rest of care per primary    Preliminary note until signed by Attending.    Thank you for involving us in this patient's care.    Penny Catalan MD  Gastroenterology/Hepatology Fellow, PGY-VI    NON-URGENT CONSULTS:  Please email [email or web address]  AT NIGHT AND ON WEEKENDS:  Contact on-call GI fellow via answering service ([phone]) from 5pm-8am and on weekends/holidays  MONDAY-FRIDAY 8AM-5PM:  Pager# [phone] ([facility])

## 2023-05-05 NOTE — PROGRESS NOTE ADULT - PROBLEM SELECTOR PLAN 2
EKG shows NSR   -[id number] is 2  -Will hold xarelto given suspected GI bleeding EKG shows NSR   -[id number] is 2  Xarelto held given above  d/w pt and  - pt has successful ablation in January, plan was to continue DOAC for about another month, but given acute situation, they have d/w Dr. Calix and agreed the risk of stroke is low. Pt monitors for irregular heart beat with her Apple Watch. They decided to stop DOAC and just take baby aspirin for now.   - pt notes bp running low like 90s - will decrease Toprol to 25 qd --> will f/u with cards as outpt  - AVR seems to be functioning properly

## 2023-05-05 NOTE — PATIENT PROFILE ADULT - FALL HARM RISK - HARM RISK INTERVENTIONS

## 2023-05-05 NOTE — PATIENT PROFILE ADULT - FUNCTIONAL SCREEN CURRENT LEVEL: SWALLOWING (IF SCORE 2 OR MORE FOR ANY ITEM, CONSULT REHAB SERVICES), MLM)
0 = swallows foods/liquids without difficulty Enbrel Counseling:  I discussed with the patient the risks of etanercept including but not limited to myelosuppression, immunosuppression, autoimmune hepatitis, demyelinating diseases, lymphoma, and infections.  The patient understands that monitoring is required including a PPD at baseline and must alert us or the primary physician if symptoms of infection or other concerning signs are noted.

## 2023-05-05 NOTE — PROGRESS NOTE ADULT - NSPROGADDITIONALINFOA_GEN_ALL_CORE
d/w pt and  [phone]) at bedside in detail re overall care.  PMD/GI Dr. Javi Hyatt but going to be retiring soon, so looking for new PMD.  goes to Dr. Tena, so they will call Monday to try to establish care with her.

## 2023-05-05 NOTE — PROGRESS NOTE ADULT - PROBLEM SELECTOR PLAN 5
Pulm recs appreciated   -C/w CPAP QHS pt reports plts always about 100, no specific treatment needed in past, no other clear cause so called ITP  plts stable

## 2023-05-05 NOTE — PROGRESS NOTE ADULT - PROBLEM SELECTOR PLAN 1
Patient with acute on chronic anemia   -baseline Hgb is in the 8s, decreased to 6.7   -Most likely due to blood loss, DDx include esophageal varices, anastomoses, PUD, dieulafoy lesion, gastropathy, infection  -Spoke to GI team Dr. Argueta. Will start pt on octreotide gtt, PPI gtt, and ppx ceftriaxone 1 g  -CLD for now, NPO after midnight for possible EGD   -F/u with post transfusion CBC   -Trend CBC q12 hrs   -Transfuse if hgb <8  -F/u with LDH and hapto Patient with acute on chronic anemia, baseline Hgb is in the 8s, decreased to 6.7   --> s/p 2u PRBCs Hb 8.3 --> no signs active bleeding on EGD. D/w pt/ will hold off on ENT eval as not actively bleeding. F/u CBC in am...  - f/u hepatology outpt, f/u path from polyp, do not recommend tx unless Hb <8 given underlying cirrhosis and varices  normal LDH and hapto, hemolysis less likely  outpt ferritin reportedly 7 c/w chronic iron deficiency anemia - will start FeSO4 qd with vitamin C; bowel regimen PRN constipation

## 2023-05-05 NOTE — PROGRESS NOTE ADULT - ASSESSMENT
This is a 63 year old woman with pmhx of splenic vein thrombosis, rheumatic mitral disease, GERD, esophageal varices, ITP (platelets around 100k), hyperkalemic periodic paralysis diagnosed at age 36, JO on CPAP, basal cell carcinoma, T11 fracture (current), cataracts, s/p recent bio-pros AVR and ascending aorta replacement on 12/12/20222 and paroxysmal afib with RVR s/p PVI and CTI ablation on 1/18/2023  (on Xarelto ) who presented today for symptomatic anemia. s/p PRBC 2 units transfusion.  She is undergoing GI workups.  AC Xarelto on hold since.  TTE from 01/2023 showed normal LVEF and no MVP or sig mitral valve diseases seen.     Plan:  - Continuous telemetric monitoring  - Monitor electrolytes and replete K to 4 and Mg to 2  - Avoid systemic AC ( Xarelto), will weigh the benefit /risks of AC given her CHADS2-VASC2 score is 1 (female) which correlated to low risk of thromboembolic event  - Hold metoprolol per her outpatient EP physician Dr. Koenig    - GI following for Anemia workup   - - Continue care per primary team  EP will sign off for now, reconsult if indicted

## 2023-05-05 NOTE — PROGRESS NOTE ADULT - SUBJECTIVE AND OBJECTIVE BOX
[facility] Division of Hospital Medicine  Arabella Giron MD  Pager (M-F, 8A-5P):  In-house pager [phone]; Long-range pager [phone]  Other Times:  Please page Hospitalist in Charge -  In-house pager [phone]    Patient is a 63y old  Female who presents with a chief complaint of SOB (05 May 2023 10:10)      SUBJECTIVE / OVERNIGHT EVENTS:  ADDITIONAL REVIEW OF SYSTEMS:    MEDICATIONS  (STANDING):  acetaZOLAMIDE    Tablet 250 milliGRAM(s) Oral daily  budesonide  80 MICROgram(s)/formoterol 4.5 MICROgram(s) Inhaler 2 Puff(s) Inhalation two times a day  citalopram 20 milliGRAM(s) Oral daily  metoprolol succinate ER 50 milliGRAM(s) Oral daily  tiotropium 2.5 MICROgram(s) Inhaler 2 Puff(s) Inhalation daily    MEDICATIONS  (PRN):  acetaminophen     Tablet .. 650 milliGRAM(s) Oral every 6 hours PRN Temp greater or equal to 38C (100.4F), Mild Pain (1 - 3)  aluminum hydroxide/magnesium hydroxide/simethicone Suspension 30 milliLiter(s) Oral every 4 hours PRN Dyspepsia  melatonin 3 milliGRAM(s) Oral at bedtime PRN Insomnia  ondansetron Injectable 4 milliGRAM(s) IV Push every 8 hours PRN Nausea and/or Vomiting      CAPILLARY BLOOD GLUCOSE        I&O's Summary      PHYSICAL EXAM:  Vital Signs Last 24 Hrs  T(C): 36.6 (05 May 2023 16:34), Max: 36.8 (04 May 2023 23:15)  T(F): 97.9 (05 May 2023 16:34), Max: 98.3 (04 May 2023 23:15)  HR: 62 (05 May 2023 17:17) (61 - 70)  BP: 93/57 (05 May 2023 17:17) (92/52 - 125/57)  BP(mean): --  RR: 14 (05 May 2023 16:34) (13 - 18)  SpO2: 97% (05 May 2023 09:54) (97% - 100%)    Parameters below as of 05 May 2023 09:54  Patient On (Oxygen Delivery Method): room air        CONSTITUTIONAL: NAD, well-developed, well-groomed  EYES: PERRLA; conjunctiva and sclera clear  ENMT: Moist oral mucosa, no pharyngeal injection or exudates; normal dentition  NECK: Supple, no palpable masses; no thyromegaly  RESPIRATORY: Normal respiratory effort; lungs are clear to auscultation bilaterally  CARDIOVASCULAR: Regular rate and rhythm, normal S1 and S2, no murmur/rub/gallop; No lower extremity edema; Peripheral pulses are 2+ bilaterally  ABDOMEN: Nontender to palpation, normoactive bowel sounds, no rebound/guarding; No hepatosplenomegaly  MUSCLOSKELETAL:  Normal gait; no clubbing or cyanosis of digits; no joint swelling or tenderness to palpation  PSYCH: A+O to person, place, and time; affect appropriate  NEUROLOGY: CN 2-12 are intact and symmetric; no gross sensory deficits;   SKIN: No rashes; no palpable lesions    LABS:                        8.3    4.30  )-----------( 121      ( 05 May 2023 05:43 )             27.3     05-05    140  |  109<H>  |  10  ----------------------------<  118<H>  3.7   |  21<L>  |  0.70    Ca    9.0      05 May 2023 05:43    TPro  5.8<L>  /  Alb  3.9  /  TBili  1.5<H>  /  DBili  x   /  AST  31  /  ALT  35<H>  /  AlkPhos  89  05-05    PT/INR - ( 04 May 2023 13:05 )   PT: 17.9 sec;   INR: 1.54 ratio         PTT - ( 04 May 2023 13:05 )  PTT:34.7 sec            RADIOLOGY & ADDITIONAL TESTS:  Results Reviewed:   Imaging Personally Reviewed:  Electrocardiogram Personally Reviewed:    COORDINATION OF CARE:  Care Discussed with Consultants/Other Providers [Y/N]:  Prior or Outpatient Records Reviewed [Y/N]:   [facility] Division of Hospital Medicine  Arabella Giron MD  Pager (M-F, 8A-5P):  In-house pager [phone]; Long-range pager [phone]  Other Times:  Please page Hospitalist in Charge -  In-house pager [phone]    Patient is a 63y old  Female who presents with a chief complaint of SOB (05 May 2023 10:10)    SUBJECTIVE / OVERNIGHT EVENTS:  Pt seen s/p endoscopy with  at bedside. Complex history reviewed, EGD and CT reviewed with patient.  Pt reports one self limited episode of nosebleeding, attributed to drying from CPAP. No postmenopausal bleeding. No other gross bleeding.   Denies any chest pain, SOB. Has GUEVARA for which had pulm eval as outpt and was found to be anemic. Feels bit better after transfusion. Inquiring about 3rd unit PRBCs but hepatology does not recommend given varices.  ADDITIONAL REVIEW OF SYSTEMS:    MEDICATIONS  (STANDING):  acetaZOLAMIDE    Tablet 250 milliGRAM(s) Oral daily  budesonide  80 MICROgram(s)/formoterol 4.5 MICROgram(s) Inhaler 2 Puff(s) Inhalation two times a day  citalopram 20 milliGRAM(s) Oral daily  metoprolol succinate ER 50 milliGRAM(s) Oral daily  tiotropium 2.5 MICROgram(s) Inhaler 2 Puff(s) Inhalation daily    MEDICATIONS  (PRN):  acetaminophen     Tablet .. 650 milliGRAM(s) Oral every 6 hours PRN Temp greater or equal to 38C (100.4F), Mild Pain (1 - 3)  aluminum hydroxide/magnesium hydroxide/simethicone Suspension 30 milliLiter(s) Oral every 4 hours PRN Dyspepsia  melatonin 3 milliGRAM(s) Oral at bedtime PRN Insomnia  ondansetron Injectable 4 milliGRAM(s) IV Push every 8 hours PRN Nausea and/or Vomiting    PHYSICAL EXAM:  Vital Signs Last 24 Hrs  T(C): 36.6 (05 May 2023 16:34), Max: 36.8 (04 May 2023 23:15)  T(F): 97.9 (05 May 2023 16:34), Max: 98.3 (04 May 2023 23:15)  HR: 62 (05 May 2023 17:17) (61 - 70)  BP: 93/57 (05 May 2023 17:17) (92/52 - 125/57)  BP(mean): --  RR: 14 (05 May 2023 16:34) (13 - 18)  SpO2: 97% (05 May 2023 09:54) (97% - 100%)    Parameters below as of 05 May 2023 09:54  Patient On (Oxygen Delivery Method): room air    CONSTITUTIONAL: NAD, well-developed, well-groomed  RESPIRATORY: Normal respiratory effort; lungs are clear to auscultation bilaterally  CARDIOVASCULAR: Regular rate and rhythm, normal S1 and S2, no murmur/rub/gallop; No lower extremity edema; Peripheral pulses are 2+ bilaterally  ABDOMEN: Nontender to palpation, normoactive bowel sounds, no rebound/guarding  MUSCLOSKELETAL:  no clubbing or cyanosis of digits; no joint swelling or tenderness to palpation  PSYCH: A+O to person, place, and time; affect appropriate  NEUROLOGY: nonfocal  SKIN: No rashes; no palpable lesions    LABS:                        8.3    4.30  )-----------( 121      ( 05 May 2023 05:43 )             27.3     05-05    140  |  109<H>  |  10  ----------------------------<  118<H>  3.7   |  21<L>  |  0.70    Ca    9.0      05 May 2023 05:43    TPro  5.8<L>  /  Alb  3.9  /  TBili  1.5<H>  /  DBili  x   /  AST  31  /  ALT  35<H>  /  AlkPhos  89  05-05    PT/INR - ( 04 May 2023 13:05 )   PT: 17.9 sec;   INR: 1.54 ratio    PTT - ( 04 May 2023 13:05 )  PTT:34.7 sec    RADIOLOGY & ADDITIONAL TESTS:  < from: Upper Endoscopy (05.05.23 @ 14:45) >       One column of trace varices were found in the distal esophagus. Flattens        upon insufflation. No active bleeding or high risk stigmata of recent        bleeding. No variceal banding performed.       The exam of the esophagus was otherwise normal.       Innumerable 4 to 13 mm semi-sessile polyps with no bleeding and no        stigmata of recent bleeding were found in the gastric fundus and in the        gastric body. One polyp was removed with a cold snare. Resection and        retrieval were complete. To prevent bleeding afterthe polypectomy, one        hemostatic clip was successfully placed. There was no bleeding at the        end of the procedure.       Diffuse mildly friable mucosa with contact bleeding was found in the        gastric body.       Localized mild inflammation characterized by erythema was found in the        gastric antrum.       The exam of the stomach was otherwise normal.       The examined duodenum was normal.    Results Reviewed:   Imaging Personally Reviewed:  Electrocardiogram Personally Reviewed:    COORDINATION OF CARE:  Care Discussed with Consultants/Other Providers [Y/N]: RN, ACP and GI re overall care   Prior or Outpatient Records Reviewed [Y/N]:

## 2023-05-05 NOTE — PROGRESS NOTE ADULT - PROBLEM SELECTOR PLAN 3
Patient reports to have SOB which most likely due to new symptomatic anemia. No chest pain with exertion. Euvolemic on exam.   -RCRI is 0, suggesting low risk candidate undergoing low risk procedure. Patient is currently medically optimized. No further cardiac testing is necessay   -EP and pulm recs are appreciated. 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

## 2023-05-06 ENCOUNTER — TRANSCRIPTION ENCOUNTER (OUTPATIENT)
Age: 63
End: 2023-05-06

## 2023-05-06 VITALS — OXYGEN SATURATION: 98 % | HEART RATE: 77 BPM

## 2023-05-06 LAB
A1AT SERPL-MCNC: 153 MG/DL — SIGNIFICANT CHANGE UP (ref 90–200)
ALBUMIN SERPL ELPH-MCNC: 4.6 G/DL — SIGNIFICANT CHANGE UP (ref 3.3–5)
ALP SERPL-CCNC: 105 U/L — SIGNIFICANT CHANGE UP (ref 40–120)
ALT FLD-CCNC: 42 U/L — HIGH (ref 4–33)
ANION GAP SERPL CALC-SCNC: 14 MMOL/L — SIGNIFICANT CHANGE UP (ref 7–14)
AST SERPL-CCNC: 32 U/L — SIGNIFICANT CHANGE UP (ref 4–32)
BASOPHILS # BLD AUTO: 0 K/UL — SIGNIFICANT CHANGE UP (ref 0–0.2)
BASOPHILS NFR BLD AUTO: 0 % — SIGNIFICANT CHANGE UP (ref 0–2)
BILIRUB SERPL-MCNC: 1 MG/DL — SIGNIFICANT CHANGE UP (ref 0.2–1.2)
BUN SERPL-MCNC: 16 MG/DL — SIGNIFICANT CHANGE UP (ref 7–23)
CALCIUM SERPL-MCNC: 9.9 MG/DL — SIGNIFICANT CHANGE UP (ref 8.4–10.5)
CERULOPLASMIN SERPL-MCNC: 34 MG/DL — SIGNIFICANT CHANGE UP (ref 16–45)
CHLORIDE SERPL-SCNC: 104 MMOL/L — SIGNIFICANT CHANGE UP (ref 98–107)
CO2 SERPL-SCNC: 22 MMOL/L — SIGNIFICANT CHANGE UP (ref 22–31)
CREAT SERPL-MCNC: 0.68 MG/DL — SIGNIFICANT CHANGE UP (ref 0.5–1.3)
EGFR: 97 ML/MIN/1.73M2 — SIGNIFICANT CHANGE UP
EOSINOPHIL # BLD AUTO: 0 K/UL — SIGNIFICANT CHANGE UP (ref 0–0.5)
EOSINOPHIL NFR BLD AUTO: 0 % — SIGNIFICANT CHANGE UP (ref 0–6)
FERRITIN SERPL-MCNC: 16 NG/ML — SIGNIFICANT CHANGE UP (ref 15–150)
GLUCOSE SERPL-MCNC: 151 MG/DL — HIGH (ref 70–99)
HAV IGM SER-ACNC: SIGNIFICANT CHANGE UP
HBV CORE AB SER-ACNC: SIGNIFICANT CHANGE UP
HBV SURFACE AB SER-ACNC: SIGNIFICANT CHANGE UP
HBV SURFACE AG SER-ACNC: SIGNIFICANT CHANGE UP
HCT VFR BLD CALC: 32 % — LOW (ref 34.5–45)
HCV AB S/CO SERPL IA: 0.13 S/CO — SIGNIFICANT CHANGE UP (ref 0–0.99)
HCV AB SERPL-IMP: SIGNIFICANT CHANGE UP
HGB BLD-MCNC: 9.3 G/DL — LOW (ref 11.5–15.5)
IANC: 3.69 K/UL — SIGNIFICANT CHANGE UP (ref 1.8–7.4)
IMM GRANULOCYTES NFR BLD AUTO: 0.2 % — SIGNIFICANT CHANGE UP (ref 0–0.9)
IRON SATN MFR SERPL: 18 UG/DL — LOW (ref 30–160)
IRON SATN MFR SERPL: 4 % — LOW (ref 14–50)
LYMPHOCYTES # BLD AUTO: 0.7 K/UL — LOW (ref 1–3.3)
LYMPHOCYTES # BLD AUTO: 15.4 % — SIGNIFICANT CHANGE UP (ref 13–44)
MCHC RBC-ENTMCNC: 20 PG — LOW (ref 27–34)
MCHC RBC-ENTMCNC: 29.1 GM/DL — LOW (ref 32–36)
MCV RBC AUTO: 69 FL — LOW (ref 80–100)
MONOCYTES # BLD AUTO: 0.15 K/UL — SIGNIFICANT CHANGE UP (ref 0–0.9)
MONOCYTES NFR BLD AUTO: 3.3 % — SIGNIFICANT CHANGE UP (ref 2–14)
NEUTROPHILS # BLD AUTO: 3.69 K/UL — SIGNIFICANT CHANGE UP (ref 1.8–7.4)
NEUTROPHILS NFR BLD AUTO: 81.1 % — HIGH (ref 43–77)
NRBC # BLD: 0 /100 WBCS — SIGNIFICANT CHANGE UP (ref 0–0)
NRBC # FLD: 0 K/UL — SIGNIFICANT CHANGE UP (ref 0–0)
PLATELET # BLD AUTO: 148 K/UL — LOW (ref 150–400)
POTASSIUM SERPL-MCNC: 3.9 MMOL/L — SIGNIFICANT CHANGE UP (ref 3.5–5.3)
POTASSIUM SERPL-SCNC: 3.9 MMOL/L — SIGNIFICANT CHANGE UP (ref 3.5–5.3)
PROT SERPL-MCNC: 7.1 G/DL — SIGNIFICANT CHANGE UP (ref 6–8.3)
RBC # BLD: 4.64 M/UL — SIGNIFICANT CHANGE UP (ref 3.8–5.2)
RBC # FLD: 18.5 % — HIGH (ref 10.3–14.5)
SODIUM SERPL-SCNC: 140 MMOL/L — SIGNIFICANT CHANGE UP (ref 135–145)
TIBC SERPL-MCNC: 460 UG/DL — HIGH (ref 220–430)
UIBC SERPL-MCNC: 442 UG/DL — HIGH (ref 110–370)
WBC # BLD: 4.55 K/UL — SIGNIFICANT CHANGE UP (ref 3.8–10.5)
WBC # FLD AUTO: 4.55 K/UL — SIGNIFICANT CHANGE UP (ref 3.8–10.5)

## 2023-05-06 PROCEDURE — 99239 HOSP IP/OBS DSCHRG MGMT >30: CPT

## 2023-05-06 RX ORDER — IRON SUCROSE 20 MG/ML
200 INJECTION, SOLUTION INTRAVENOUS EVERY 24 HOURS
Refills: 0 | Status: DISCONTINUED | OUTPATIENT
Start: 2023-05-06 | End: 2023-05-06

## 2023-05-06 RX ORDER — RIVAROXABAN 15 MG-20MG
1 KIT ORAL
Refills: 0 | DISCHARGE

## 2023-05-06 RX ORDER — ASCORBIC ACID 60 MG
1 TABLET,CHEWABLE ORAL
Qty: 30 | Refills: 0
Start: 2023-05-06 | End: 2023-06-04

## 2023-05-06 RX ORDER — PANTOPRAZOLE SODIUM 20 MG/1
40 TABLET, DELAYED RELEASE ORAL
Refills: 0 | Status: DISCONTINUED | OUTPATIENT
Start: 2023-05-06 | End: 2023-05-06

## 2023-05-06 RX ORDER — METOPROLOL TARTRATE 50 MG
1 TABLET ORAL
Qty: 30 | Refills: 0
Start: 2023-05-06 | End: 2023-06-04

## 2023-05-06 RX ORDER — METOPROLOL TARTRATE 50 MG
1 TABLET ORAL
Qty: 0 | Refills: 0 | DISCHARGE

## 2023-05-06 RX ORDER — ASPIRIN/CALCIUM CARB/MAGNESIUM 324 MG
1 TABLET ORAL
Qty: 30 | Refills: 0
Start: 2023-05-06 | End: 2023-06-04

## 2023-05-06 RX ORDER — ASPIRIN/CALCIUM CARB/MAGNESIUM 324 MG
1 TABLET ORAL
Qty: 0 | Refills: 0 | DISCHARGE
Start: 2023-05-06

## 2023-05-06 RX ORDER — IRON SUCROSE 20 MG/ML
200 INJECTION, SOLUTION INTRAVENOUS ONCE
Refills: 0 | Status: COMPLETED | OUTPATIENT
Start: 2023-05-06 | End: 2023-05-06

## 2023-05-06 RX ORDER — ASPIRIN/CALCIUM CARB/MAGNESIUM 324 MG
81 TABLET ORAL DAILY
Refills: 0 | Status: DISCONTINUED | OUTPATIENT
Start: 2023-05-06 | End: 2023-05-06

## 2023-05-06 RX ORDER — FERROUS SULFATE 325(65) MG
1 TABLET ORAL
Qty: 30 | Refills: 0
Start: 2023-05-06 | End: 2023-06-04

## 2023-05-06 RX ORDER — ASCORBIC ACID 60 MG
1 TABLET,CHEWABLE ORAL
Qty: 0 | Refills: 0 | DISCHARGE
Start: 2023-05-06

## 2023-05-06 RX ORDER — OMEPRAZOLE 10 MG/1
1 CAPSULE, DELAYED RELEASE ORAL
Refills: 0 | DISCHARGE

## 2023-05-06 RX ADMIN — TIOTROPIUM BROMIDE 2 PUFF(S): 18 CAPSULE ORAL; RESPIRATORY (INHALATION) at 10:57

## 2023-05-06 RX ADMIN — ACETAZOLAMIDE 250 MILLIGRAM(S): 250 TABLET ORAL at 12:53

## 2023-05-06 RX ADMIN — Medication 500 MILLIGRAM(S): at 12:53

## 2023-05-06 RX ADMIN — IRON SUCROSE 110 MILLIGRAM(S): 20 INJECTION, SOLUTION INTRAVENOUS at 10:55

## 2023-05-06 NOTE — PROGRESS NOTE ADULT - SUBJECTIVE AND OBJECTIVE BOX
[facility] Division of Hospital Medicine  Arabella Giron MD  Pager (M-F, 8A-5P):  In-house pager [phone]; Long-range pager [phone]  Other Times:  Please page Hospitalist in Charge -  In-house pager [phone]    Patient is a 63y old  Female who presents with a chief complaint of SOB (05 May 2023 19:06)      SUBJECTIVE / OVERNIGHT EVENTS:  No problems reported over night.   ADDITIONAL REVIEW OF SYSTEMS:    MEDICATIONS  (STANDING):  acetaZOLAMIDE    Tablet 250 milliGRAM(s) Oral daily  ascorbic acid 500 milliGRAM(s) Oral daily  budesonide  80 MICROgram(s)/formoterol 4.5 MICROgram(s) Inhaler 2 Puff(s) Inhalation two times a day  citalopram 20 milliGRAM(s) Oral daily  ferrous    sulfate 325 milliGRAM(s) Oral daily  metoprolol succinate ER 25 milliGRAM(s) Oral daily  tiotropium 2.5 MICROgram(s) Inhaler 2 Puff(s) Inhalation daily    MEDICATIONS  (PRN):  acetaminophen     Tablet .. 650 milliGRAM(s) Oral every 6 hours PRN Temp greater or equal to 38C (100.4F), Mild Pain (1 - 3)  aluminum hydroxide/magnesium hydroxide/simethicone Suspension 30 milliLiter(s) Oral every 4 hours PRN Dyspepsia  melatonin 3 milliGRAM(s) Oral at bedtime PRN Insomnia  ondansetron Injectable 4 milliGRAM(s) IV Push every 8 hours PRN Nausea and/or Vomiting    PHYSICAL EXAM:  Vital Signs Last 24 Hrs  T(C): 36.6 (06 May 2023 05:40), Max: 36.7 (05 May 2023 15:01)  T(F): 97.9 (06 May 2023 05:40), Max: 98.1 (05 May 2023 22:00)  HR: 78 (06 May 2023 07:58) (61 - 78)  BP: 99/59 (06 May 2023 05:40) (92/52 - 125/57)  BP(mean): --  RR: 17 (06 May 2023 05:40) (13 - 18)  SpO2: 97% (06 May 2023 07:58) (95% - 99%)    Parameters below as of 06 May 2023 05:40  Patient On (Oxygen Delivery Method): room air    CONSTITUTIONAL: NAD, well-developed, well-groomed  RESPIRATORY: Normal respiratory effort; lungs are clear to auscultation bilaterally  CARDIOVASCULAR: Regular rate and rhythm, normal S1 and S2, no murmur/rub/gallop; No lower extremity edema  ABDOMEN: Nontender to palpation, normoactive bowel sounds, no rebound/guarding  MUSCLOSKELETAL:  no clubbing or cyanosis of digits; no joint swelling or tenderness to palpation  PSYCH: A+O to person, place, and time; affect appropriate  NEUROLOGY: nonfocal  SKIN: No rashes; no palpable lesions    LABS:                        9.3    4.55  )-----------( 148      ( 06 May 2023 06:30 )             32.0     05-06    140  |  104  |  16  ----------------------------<  151<H>  3.9   |  22  |  0.68    Ca    9.9      06 May 2023 06:30    TPro  7.1  /  Alb  4.6  /  TBili  1.0  /  DBili  x   /  AST  32  /  ALT  42<H>  /  AlkPhos  105  05-06    PT/INR - ( 04 May 2023 13:05 )   PT: 17.9 sec;   INR: 1.54 ratio    PTT - ( 04 May 2023 13:05 )  PTT:34.7 sec    RADIOLOGY & ADDITIONAL TESTS:  Results Reviewed:   Imaging Personally Reviewed:  Electrocardiogram Personally Reviewed:    COORDINATION OF CARE:  Care Discussed with Consultants/Other Providers [Y/N]: RNCHEIKH re overall care   Prior or Outpatient Records Reviewed [Y/N]:   [facility] Division of Hospital Medicine  Arabella Giron MD  Pager (M-F, 8A-5P):  In-house pager [phone]; Long-range pager [phone]  Other Times:  Please page Hospitalist in Charge -  In-house pager [phone]    Patient is a 63y old  Female who presents with a chief complaint of SOB (05 May 2023 19:06)    SUBJECTIVE / OVERNIGHT EVENTS:  No problems reported over night. Feeling a little stiff but chronic and gets better with movement.  D/w pt option of dose IV iron, agrees to dose prior to discharge.  ADDITIONAL REVIEW OF SYSTEMS:    MEDICATIONS  (STANDING):  acetaZOLAMIDE    Tablet 250 milliGRAM(s) Oral daily  ascorbic acid 500 milliGRAM(s) Oral daily  budesonide  80 MICROgram(s)/formoterol 4.5 MICROgram(s) Inhaler 2 Puff(s) Inhalation two times a day  citalopram 20 milliGRAM(s) Oral daily  ferrous    sulfate 325 milliGRAM(s) Oral daily  metoprolol succinate ER 25 milliGRAM(s) Oral daily  tiotropium 2.5 MICROgram(s) Inhaler 2 Puff(s) Inhalation daily    MEDICATIONS  (PRN):  acetaminophen     Tablet .. 650 milliGRAM(s) Oral every 6 hours PRN Temp greater or equal to 38C (100.4F), Mild Pain (1 - 3)  aluminum hydroxide/magnesium hydroxide/simethicone Suspension 30 milliLiter(s) Oral every 4 hours PRN Dyspepsia  melatonin 3 milliGRAM(s) Oral at bedtime PRN Insomnia  ondansetron Injectable 4 milliGRAM(s) IV Push every 8 hours PRN Nausea and/or Vomiting    PHYSICAL EXAM:  Vital Signs Last 24 Hrs  T(C): 36.6 (06 May 2023 05:40), Max: 36.7 (05 May 2023 15:01)  T(F): 97.9 (06 May 2023 05:40), Max: 98.1 (05 May 2023 22:00)  HR: 78 (06 May 2023 07:58) (61 - 78)  BP: 99/59 (06 May 2023 05:40) (92/52 - 125/57)  BP(mean): --  RR: 17 (06 May 2023 05:40) (13 - 18)  SpO2: 97% (06 May 2023 07:58) (95% - 99%)    Parameters below as of 06 May 2023 05:40  Patient On (Oxygen Delivery Method): room air    CONSTITUTIONAL: NAD, well-developed, well-groomed  RESPIRATORY: Normal respiratory effort; lungs are clear to auscultation bilaterally  CARDIOVASCULAR: Regular rate and rhythm, normal S1 and S2, no murmur/rub/gallop; No lower extremity edema  ABDOMEN: Nontender to palpation, normoactive bowel sounds, no rebound/guarding  MUSCLOSKELETAL:  no clubbing or cyanosis of digits; no joint swelling or tenderness to palpation  PSYCH: A+O to person, place, and time; affect appropriate  NEUROLOGY: nonfocal  SKIN: No rashes; no palpable lesions    LABS:                        9.3    4.55  )-----------( 148      ( 06 May 2023 06:30 )             32.0     05-06    140  |  104  |  16  ----------------------------<  151<H>  3.9   |  22  |  0.68    Ca    9.9      06 May 2023 06:30    TPro  7.1  /  Alb  4.6  /  TBili  1.0  /  DBili  x   /  AST  32  /  ALT  42<H>  /  AlkPhos  105  05-06    PT/INR - ( 04 May 2023 13:05 )   PT: 17.9 sec;   INR: 1.54 ratio    PTT - ( 04 May 2023 13:05 )  PTT:34.7 sec    RADIOLOGY & ADDITIONAL TESTS:  Results Reviewed:   Imaging Personally Reviewed:  Electrocardiogram Personally Reviewed:    COORDINATION OF CARE:  Care Discussed with Consultants/Other Providers [Y/N]: RN, CHEIKH re overall care   Prior or Outpatient Records Reviewed [Y/N]:

## 2023-05-06 NOTE — PROGRESS NOTE ADULT - NSPROGADDITIONALINFOA_GEN_ALL_CORE
d/w pt and  [phone]) at bedside in detail re overall care.  PMD/GI Dr. Javi Hyatt but going to be retiring soon, so looking for new PMD.  goes to Dr. Tena, so they will call Monday to try to establish care with her. d/w pt and  [phone]) at bedside in detail re overall care.  PMD/GI Dr. Javi Hyatt but going to be retiring soon, so looking for new PMD.  goes to Dr. Tena, so they will call Monday to try to establish care with her.    Spent 35 minutes counseling and coordinating discharge care.

## 2023-05-06 NOTE — PROGRESS NOTE ADULT - PROBLEM SELECTOR PLAN 3
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

## 2023-05-06 NOTE — PROGRESS NOTE ADULT - ASSESSMENT
63F h/o EV hemorrhage (age 15 per patient) and ?anastomoses splenic vein thrombosis, EV, h/o PV thrombosis, rheumatic mitral disease, GERD, esophageal varices, ITP (platelets around 100k), hyperkalemic periodic paralysis, JO on CPAP, basal cell carcinoma, T11 fracture, cataracts, bio-pros AVR and ascending aorta replacement on 12/12/20222 and subsequent paroxysmal afib with RVR s/p PVI and CTI ablation on 1/18/2023 on xarelto (previously eliquis/ASA) s/p LHC w/o significant CAD, who presented to the ED after reportedly being found to have hgb 6 or 7 with her pulmonologist in setting of worsening GUEVARA     Hb 6.7 --> s/p 2 units PRBCs --> Hb 8.3    EGD One column of trace esophageal varices. Flattens upon insufflation. No active bleeding or high risk stigmata of recent bleeding. No variceal banding performed. Innumerable gastric polyps. One was resected and retrieved. Clip was placed. Friable gastric mucosa. Gastritis. Normal examined duodenum

## 2023-05-06 NOTE — PROGRESS NOTE ADULT - PROBLEM SELECTOR PLAN 1
Patient with acute on chronic anemia, baseline Hgb is in the 8s, decreased to 6.7   --> s/p 2u PRBCs Hb 8.3 --> no signs active bleeding on EGD. D/w pt/ will hold off on ENT eval as not actively bleeding. F/u CBC in am...  - f/u hepatology outpt, f/u path from polyp, do not recommend tx unless Hb <8 given underlying cirrhosis and varices  normal LDH and hapto, hemolysis less likely  outpt ferritin reportedly 7 c/w chronic iron deficiency anemia - will start FeSO4 qd with vitamin C; bowel regimen PRN constipation Patient with acute on chronic anemia, baseline Hgb is in the 8s, decreased to 6.7   --> s/p 2u PRBCs Hb 8.3 --> no signs active bleeding on EGD. D/w pt/ will hold off on ENT eval as not actively bleeding. F/u CBC in am...  - f/u hepatology outpt, f/u path from polyp, do not recommend tx unless Hb <8 given underlying cirrhosis and varices  normal LDH and hapto, hemolysis less likely  outpt ferritin reportedly 7 c/w chronic iron deficiency anemia - will start FeSO4 qd with vitamin C; bowel regimen PRN constipation; dose of IV iron today then may proceed with d/c home

## 2023-05-06 NOTE — DISCHARGE NOTE PROVIDER - NSDCCPCAREPLAN_GEN_ALL_CORE_FT
PRINCIPAL DISCHARGE DIAGNOSIS  Diagnosis: Anemia  Assessment and Plan of Treatment: You were admitted for low blood count. You were given 2 units of blood. Endoscopy did not show any active bleeding. The source of the anemia is not completely clear.   You do likley have iron deficiency anemia, as your ferritin level as as outpatient was found to be very low. You should start taking iron tablets (aka ferrous sulfate) daily, with vitamin C. It will likely make you constipated and can make your stool look dark in color. You can gradulally increase the iron tablets to two to three times per day.  However, please see your primary care doctor and the liver doctor so that all your care can be closely monitored.      SECONDARY DISCHARGE DIAGNOSES  Diagnosis: Chronic atrial fibrillation  Assessment and Plan of Treatment: You had an ablation of the afib back in January, so you hopefully do not have active atrial fibrillation. As you discussed wtih Dr. Calix, you will be stopping the Xarelto and starting on a baby aspirin per day.   Since your blood pressure runs low, you may decrease your metoprolol but make sure you to continue to watch closely for any recurrent irregular heart rhythm and be sure to see Dr. Calix in the office soon.     PRINCIPAL DISCHARGE DIAGNOSIS  Diagnosis: Anemia  Assessment and Plan of Treatment: You were admitted for low blood count. You were given 2 units of blood. Endoscopy did not show any active bleeding. The source of the anemia is not completely clear.   You do likley have iron deficiency anemia, as your ferritin level as as outpatient was found to be very low. You should start taking iron tablets (aka ferrous sulfate) daily, with vitamin C. It will likely make you constipated and can make your stool look dark in color. You can gradulally increase the iron tablets to two to three times per day.  However, please see your primary care doctor and the liver doctor so that all your care can be closely monitored.      SECONDARY DISCHARGE DIAGNOSES  Diagnosis: Chronic atrial fibrillation  Assessment and Plan of Treatment: You had an ablation of the afib back in January, so you hopefully do not have active atrial fibrillation. As you discussed wtih Dr. Calix, you will be stopping the Xarelto and starting on a baby aspirin per day.   Since your blood pressure runs low, you may decrease your metoprolol but make sure you to continue to watch closely for any recurrent irregular heart rhythm and be sure to see Dr. Calix in the office soon.    Diagnosis: Cirrhosis  Assessment and Plan of Treatment: Cirrhosis was seen on the CT on your abdomen.   Make sure to see the liver doctor as soon as possible for thorough evaluation. Some blood work was sent here that the er doctor recommended and should be followed up at your visit.     PRINCIPAL DISCHARGE DIAGNOSIS  Diagnosis: Anemia  Assessment and Plan of Treatment: You were admitted for low blood count. You were given 2 units of blood. Endoscopy did not show any active bleeding. The source of the anemia is not completely clear.   You do likley have iron deficiency anemia, as your ferritin level as as outpatient was found to be very low.   You were given one dose of IV iron.  You should start taking iron tablets (aka ferrous sulfate) daily, with vitamin C. It will likely make you constipated and can make your stool look dark in color. You can gradulally increase the iron tablets to two to three times per day.  However, please see your primary care doctor and the liver doctor so that all your care can be closely monitored.      SECONDARY DISCHARGE DIAGNOSES  Diagnosis: Chronic atrial fibrillation  Assessment and Plan of Treatment: You had an ablation of the afib back in January, so you hopefully do not have active atrial fibrillation. As you discussed wtih Dr. Calix, you will be stopping the Xarelto and starting on a baby aspirin per day.   Since your blood pressure runs low, you may decrease your metoprolol but make sure you to continue to watch closely for any recurrent irregular heart rhythm and be sure to see Dr. Calix in the office soon.    Diagnosis: Cirrhosis  Assessment and Plan of Treatment: Cirrhosis was seen on the CT on your abdomen.   Make sure to see the liver doctor as soon as possible for thorough evaluation. Some blood work was sent here that the lver doctor recommended and should be followed up at your visit.

## 2023-05-06 NOTE — DISCHARGE NOTE PROVIDER - NSFOLLOWUPCLINICS_GEN_ALL_ED_FT
[facility]  Gastroenterology  [address] [place] [address]  Phone: [phone]  Fax:     [facility]  Gastroenterology  [address] [place] [address]  Phone: [phone]  Fax:     [facility]  Gastroenterology  [address]  [place] [address]  Phone: [phone]  Fax:

## 2023-05-06 NOTE — DISCHARGE NOTE PROVIDER - PROVIDER TOKENS
PROVIDER:[TOKEN:[[id number]]],PROVIDER:[TOKEN:[[id number]]] PROVIDER:[TOKEN:[[id number]]],PROVIDER:[TOKEN:[[id number]]],PROVIDER:[TOKEN:[[id number]]]

## 2023-05-06 NOTE — DISCHARGE NOTE PROVIDER - NSDCMRMEDTOKEN_GEN_ALL_CORE_FT
citalopram 20 mg oral tablet: 1 tab(s) orally once a day  Diamox 250 mg oral tablet: 1 tab(s) orally once a day  omeprazole 40 mg oral delayed release capsule: 1 orally  Toprol-XL 50 mg oral tablet, extended release: 1 tab(s) orally 2 times a day  Xarelto 20 mg oral tablet: 1 tab(s) orally once a day   ascorbic acid 500 mg oral tablet: 1 tab(s) orally once a day  aspirin 81 mg oral delayed release tablet: 1 tab(s) orally once a day  citalopram 20 mg oral tablet: 1 tab(s) orally once a day  Diamox 250 mg oral tablet: 1 tab(s) orally once a day   ascorbic acid 500 mg oral tablet: 1 tab(s) orally once a day  ascorbic acid 500 mg oral tablet: 1 tab(s) orally once a day  aspirin 81 mg oral delayed release tablet: 1 tab(s) orally once a day  aspirin 81 mg oral delayed release tablet: 1 tab(s) orally once a day  citalopram 20 mg oral tablet: 1 tab(s) orally once a day  Diamox 250 mg oral tablet: 1 tab(s) orally once a day  ferrous sulfate 325 mg (65 mg elemental iron) oral tablet: 1 tab(s) orally once a day  metoprolol succinate 25 mg oral tablet, extended release: 1 tab(s) orally once a day

## 2023-05-06 NOTE — PROGRESS NOTE ADULT - PROBLEM SELECTOR PLAN 2
EKG shows NSR   -[id number] is 2  Xarelto held given above  d/w pt and  - pt has successful ablation in January, plan was to continue DOAC for about another month, but given acute situation, they have d/w Dr. Calix and agreed the risk of stroke is low. Pt monitors for irregular heart beat with her Apple Watch. They decided to stop DOAC and just take baby aspirin for now.   - pt notes bp running low like 90s - will decrease Toprol to 25 qd --> will f/u with cards as outpt  - AVR seems to be functioning properly

## 2023-05-06 NOTE — DISCHARGE NOTE PROVIDER - NSDCFUSCHEDAPPT_GEN_ALL_CORE_FT
Onofre Cameron  [facility]  PULED [address] [facility]  Scheduled Appointment: 06/26/2023    Rony Koenig  [facility] [phone]  Scheduled Appointment: 07/24/2023

## 2023-05-06 NOTE — DISCHARGE NOTE PROVIDER - HOSPITAL COURSE
63F h/o EV hemorrhage (age 15 per patient) and ?anastomoses splenic vein thrombosis, EV, h/o PV thrombosis, rheumatic mitral disease, GERD, esophageal varices, ITP (platelets around 100k), hyperkalemic periodic paralysis, JO on CPAP, basal cell carcinoma, T11 fracture, cataracts, bio-pros AVR and ascending aorta replacement on 12/12/20222 and subsequent paroxysmal afib with RVR s/p PVI and CTI ablation on 1/18/2023 on xarelto (previously eliquis/ASA) s/p LHC w/o significant CAD, who presented to the ED after reportedly being found to have hgb 6 or 7 with her pulmonologist in setting of worsening GUEVARA     Hb 6.7 --> s/p 2 units PRBCs --> Hb 8.3    EGD One column of trace esophageal varices. Flattens upon insufflation. No active bleeding or high risk stigmata of recent bleeding. No variceal banding performed. Innumerable gastric polyps. One was resected and retrieved. Clip was placed. Friable gastric mucosa. Gastritis. Normal examined duodenum    Anemia due to acute blood loss. Patient with acute on chronic anemia, baseline Hgb is in the 8s, decreased to 6.7   --> s/p 2u PRBCs Hb 8.3 --> no signs active bleeding on EGD. D/w pt/ will hold off on ENT eval as not actively bleeding. F/u CBC in am...  - f/u hepatology outpt, f/u path from polyp, do not recommend tx unless Hb <8 given underlying cirrhosis and varices  normal LDH and hapto, hemolysis less likely  outpt ferritin reportedly 7 c/w chronic iron deficiency anemia - will start FeSO4 qd with vitamin C; bowel regimen PRN constipation.    Chronic atrial fibrillation. EKG shows NSR, [id number] is 2, Xarelto held given above  d/w pt and  - pt has successful ablation in January, plan was to continue DOAC for about another month, but given acute situation, they have d/w Dr. Calix and agreed the risk of stroke is low. Pt monitors for irregular heart beat with her Apple Watch. They decided to stop DOAC and just take baby aspirin for now.   - pt notes bp running low like 90s - will decrease Toprol to 25 qd --> will f/u with cards as outpt  - AVR seems to be functioning properly.              Additional Information:  Additional Information: d/w pt and  [phone]) at bedside in detail re overall care.  PMD/GI Dr. Javi Hyatt but going to be retiring soon, so looking for new PMD.  goes to Dr. Tena, so they will call Monday to try to establish care with her.   63F h/o EV hemorrhage (age 15 per patient) and ?anastomoses splenic vein thrombosis, EV, h/o PV thrombosis, rheumatic mitral disease, GERD, esophageal varices, ITP (platelets around 100k), hyperkalemic periodic paralysis, JO on CPAP, basal cell carcinoma, T11 fracture, cataracts, bio-pros AVR and ascending aorta replacement on 12/12/20222 and subsequent paroxysmal afib with RVR s/p PVI and CTI ablation on 1/18/2023 on xarelto (previously eliquis/ASA) s/p LHC w/o significant CAD, who presented to the ED after reportedly being found to have hgb 6 or 7 with her pulmonologist in setting of worsening GUEVARA     Hb 6.7 --> s/p 2 units PRBCs --> Hb 8.3    EGD One column of trace esophageal varices. Flattens upon insufflation. No active bleeding or high risk stigmata of recent bleeding. No variceal banding performed. Innumerable gastric polyps. One was resected and retrieved. Clip was placed. Friable gastric mucosa. Gastritis. Normal examined duodenum    Anemia due to acute blood loss. Patient with acute on chronic anemia, baseline Hgb is in the 8s, decreased to 6.7   --> s/p 2u PRBCs Hb 8.3 --> no signs active bleeding on EGD. D/w pt/ will hold off on ENT eval as not actively bleeding. F/u CBC in am...  - f/u hepatology outpt, f/u path from polyp, do not recommend tx unless Hb <8 given underlying cirrhosis and varices  normal LDH and hapto, hemolysis less likely  outpt ferritin reportedly 7 c/w chronic iron deficiency anemia - will start FeSO4 qd with vitamin C; bowel regimen PRN constipation.  --> dose IV iron given x1    Chronic atrial fibrillation. EKG shows NSR, [id number] is 2, Xarelto held given above  d/w pt and  - pt has successful ablation in January, plan was to continue DOAC for about another month, but given acute situation, they have d/w Dr. Calix and agreed the risk of stroke is low. Pt monitors for irregular heart beat with her Apple Watch. They decided to stop DOAC and just take baby aspirin for now.   - pt notes bp running low like 90s - will decrease Toprol to 25 qd --> will f/u with cards as outpt  - AVR seems to be functioning properly.              Additional Information:  Additional Information: d/w pt and  [phone]) at bedside in detail re overall care.  PMD/GI Dr. Javi Hyatt but going to be retiring soon, so looking for new PMD.  goes to Dr. Tena, so they will call Monday to try to establish care with her.   63F h/o EV hemorrhage (age 15 per patient) and ?anastomoses splenic vein thrombosis, EV, h/o PV thrombosis, rheumatic mitral disease, GERD, esophageal varices, ITP (platelets around 100k), hyperkalemic periodic paralysis, JO on CPAP, basal cell carcinoma, T11 fracture, cataracts, bio-pros AVR and ascending aorta replacement on 12/12/20222 and subsequent paroxysmal afib with RVR s/p PVI and CTI ablation on 1/18/2023 on xarelto (previously eliquis/ASA) s/p LHC w/o significant CAD, who presented to the ED after reportedly being found to have hgb 6 or 7 with her pulmonologist in setting of worsening GUEVARA     Hb 6.7 --> s/p 2 units PRBCs --> Hb 8.3    EGD One column of trace esophageal varices. Flattens upon insufflation. No active bleeding or high risk stigmata of recent bleeding. No variceal banding performed. Innumerable gastric polyps. One was resected and retrieved. Clip was placed. Friable gastric mucosa. Gastritis. Normal examined duodenum    Anemia due to acute blood loss. Patient with acute on chronic anemia, baseline Hgb is in the 8s, decreased to 6.7   --> s/p 2u PRBCs Hb 8.3 --> no signs active bleeding on EGD. D/w pt/ will hold off on ENT eval as not actively bleeding. CBC stable  - f/u hepatology outpt, f/u path from polyp, do not recommend tx unless Hb <8 given underlying cirrhosis and varices  normal LDH and hapto, hemolysis less likely  outpt ferritin reportedly 7 c/w chronic iron deficiency anemia - will start FeSO4 qd with vitamin C; bowel regimen PRN constipation.  --> dose IV iron given x1    Chronic atrial fibrillation. EKG shows NSR, [id number] is 2, Xarelto held given above  d/w pt and  - pt has successful ablation in January, plan was to continue DOAC for about another month, but given acute situation, they have d/w Dr. Calix and agreed the risk of stroke is low. Pt monitors for irregular heart beat with her Apple Watch. They decided to stop DOAC and just take baby aspirin for now.   - pt notes bp running low like 90s - will decrease Toprol to 25 qd --> will f/u with cards as outpt  - AVR seems to be functioning properly.    Additional Information:  Additional Information: d/w pt and  [phone]) at bedside in detail re overall care.  PMD/GI Dr. Javi Hyatt but going to be retiring soon, so looking for new PMD.  goes to Dr. Tena, so they will call Monday to try to establish care with her.

## 2023-05-06 NOTE — PROGRESS NOTE ADULT - PROBLEM SELECTOR PLAN 5
pt reports plts always about 100, no specific treatment needed in past, no other clear cause so called ITP  plts stable

## 2023-05-06 NOTE — PROGRESS NOTE ADULT - SUBJECTIVE AND OBJECTIVE BOX
Hepatology Progress Note    Interval Events:   Pt overall feeling well today with no ongoing n/v/abd pain or signs of any overt bleeding.     Allergies:  [This allergen will not trigger allergy alert] nitrofurantoin, macrocrystals / nitrofurantoin, monohydrate  Reaction: Unknown (Unknown)  calcium channel blockers (Muscle Pain; Joint Pain)  Macrobid (Unknown)  succinylcholine (Other)  [This allergen will not trigger allergy alert] Succinamides (Unknown)      Hospital Medications:  acetaminophen     Tablet .. 650 milliGRAM(s) Oral every 6 hours PRN  acetaZOLAMIDE    Tablet 250 milliGRAM(s) Oral daily  aluminum hydroxide/magnesium hydroxide/simethicone Suspension 30 milliLiter(s) Oral every 4 hours PRN  ascorbic acid 500 milliGRAM(s) Oral daily  aspirin enteric coated 81 milliGRAM(s) Oral daily  budesonide  80 MICROgram(s)/formoterol 4.5 MICROgram(s) Inhaler 2 Puff(s) Inhalation two times a day  citalopram 20 milliGRAM(s) Oral daily  ferrous    sulfate 325 milliGRAM(s) Oral daily  melatonin 3 milliGRAM(s) Oral at bedtime PRN  metoprolol succinate ER 25 milliGRAM(s) Oral daily  ondansetron Injectable 4 milliGRAM(s) IV Push every 8 hours PRN  tiotropium 2.5 MICROgram(s) Inhaler 2 Puff(s) Inhalation daily      ROS: 14 point ROS negative unless otherwise state in subjective    PHYSICAL EXAM:   Vital Signs:  Vital Signs Last 24 Hrs  T(C): 36.6 (06 May 2023 05:40), Max: 36.7 (05 May 2023 15:01)  T(F): 97.9 (06 May 2023 05:40), Max: 98.1 (05 May 2023 22:00)  HR: 78 (06 May 2023 07:58) (61 - 78)  BP: 99/59 (06 May 2023 05:40) (92/52 - 125/57)  BP(mean): --  RR: 17 (06 May 2023 05:40) (13 - 18)  SpO2: 97% (06 May 2023 07:58) (95% - 99%)    Parameters below as of 06 May 2023 05:40  Patient On (Oxygen Delivery Method): room air      Daily Height in cm: 154.94 (05 May 2023 15:01)    Daily     GENERAL:  No acute distress  HEENT:  NCAT  CHEST: no resp distress  HEART:  RRR  ABDOMEN:  Soft, non-tender, non-distended, normoactive bowel sounds  EXTREMITIES:  No edema  NEURO:  Alert and oriented x 3    LABS:                        9.3    4.55  )-----------( 148      ( 06 May 2023 06:30 )             32.0     Mean Cell Volume: 69.0 fL (05-06-23 @ 06:30)    05-06    140  |  104  |  16  ----------------------------<  151<H>  3.9   |  22  |  0.68    Ca    9.9      06 May 2023 06:30    TPro  7.1  /  Alb  4.6  /  TBili  1.0  /  DBili  x   /  AST  32  /  ALT  42<H>  /  AlkPhos  105  05-06    LIVER FUNCTIONS - ( 06 May 2023 06:30 )  Alb: 4.6 g/dL / Pro: 7.1 g/dL / ALK PHOS: 105 U/L / ALT: 42 U/L / AST: 32 U/L / GGT: x           PT/INR - ( 04 May 2023 13:05 )   PT: 17.9 sec;   INR: 1.54 ratio    PTT - ( 04 May 2023 13:05 )  PTT:34.7 sec      Imaging:  < from: CT Abdomen and Pelvis w/ IV Cont (05.04.23 @ 19:20) >    IMPRESSION:  *  Cirrhosis and portal hypertension  *  Cavernous transformation of the portal vein with upper abdominal and   paraesophageal varices.  *  Small pelvic ascites.    < end of copied text >  < from: Upper Endoscopy (05.05.23 @ 14:45) >  Findings:       One column of trace varices were found in the distal esophagus. Flattens        upon insufflation. No active bleeding or high risk stigmata of recent        bleeding. No variceal banding performed.       The exam of the esophagus was otherwise normal.       Innumerable 4 to 13 mm semi-sessile polyps with no bleeding and no        stigmata of recent bleeding were found in the gastric fundus and in the        gastric body. One polyp was removed with a cold snare. Resection and        retrieval were complete. To prevent bleeding afterthe polypectomy, one        hemostatic clip was successfully placed. There was no bleeding at the        end of the procedure.       Diffuse mildly friable mucosa with contact bleeding was found in the        gastric body.       Localized mild inflammation characterized by erythema was found in the        gastric antrum.       The exam of the stomach was otherwise normal.       The examined duodenum was normal.                                                                                   Impression:          - One column of trace esophageal varices. Flattens upon                        insufflation. No active bleeding or high risk stigmata                        of recent bleeding. No variceal banding performed.                       - Innumerable gastric polyps. One was resected and                        retrieved. Clip was placed.                       - Friable gastric mucosa.                       - Gastritis.                       - Normal examined duodenum.  Recommendation: - Discharge patient to home.                       - Resume previous diet.                       - Follow up pathology.                       - Due to patient endorses epistaxis prior to admission,                        recommend ENT evaulation forfurther workup of anemia                        and stigmata of recent nasopharynageal bleeding given                        the absence of upper GI bleeding.                                                                                     < end of copied text >

## 2023-05-06 NOTE — PROGRESS NOTE ADULT - ASSESSMENT
thrombosis, rheumatic mitral disease, GERD, esophageal varices, ITP (platelets around 100k), hyperkalemic periodic paralysis diagnosed at age 36, JO on CPAP, basal cell carcinoma, T11 fracture (current), cataracts, recent bio-pros AVR and ascending aorta replacement on 12/12/20222 and paroxysmal afib with RVR s/p PVI and CTI ablation on 1/18/2023 on xarelto (previously eliquis/ASA) s/p LHC w/o significant CAD, who presented to the ED after reportedly being found to have hgb 6 or 7 with her pulmonologist in setting of worsening GUEVARA and was instructed to go to the ED. Hepatology consulted for anemia.    # Microcytic anemia w/o overt bleeding, possibly 2/2 PUD vs erosive gastropathy vs EV bleeding  # Cirrhosis on CT possibly 2/2 congestive hepatopathy vs splenic v thrombosis vs AI   MELDNa 13 on 5/5 using 5/4 INR  - Last EGD in 2014, reports varices otherwise no new findings. Last colonoscopy in 2020, removed benign polyp per patient.   - CTAP revealed cirrhosis with esophageal and splenic varices and unchanged cavernous transformation (since 2020), portal hypertension and trace pelvic ascites, final read pending. Patient denying any history of cirrhosis.   - Echo 1/2023 EF 66%, mildly dilated LA, mild SC, moderate TR, mild RA enlargement, normal pulm pressures, RAP 10, RVSP 34.   - EGD on 5/5/2023 with one column of trace esophageal varices that flattens upo   insufflation. No active bleeding or high risk stigmata of recent bleeding with no variceal banding performed. She did have innumerable gastric polyps with one polyp removed for biopsy with riable gastric mucosa although no signs of any active bleeding. Anemia thought to be 2/2 epistaxis prior to admission. Hgb remains stable at 8-9 with NO further signs of voert bleeding      Recommendations:  -Diet as tolerated   -Ok to resume home AC if clinically indicated from afib perspetive   -Follow up biopsy of gastric polyp   -Outpatient follow up to complete workup of anemia (consider VCE) as well as workup of CLD/cirrohisis (ok to follow up BsAb/sAg/cAb, HAV IgM, HCV Ab, GEMINI, ASMA, AMA, quantitative IgG, A1AT and ceruloplasmin as outpatient. If workup negative consdier liver biopsy)    Hepatology will plan to sign off at this time. Please call back with any follow up questions or concerns. Please provide patient with Hepatology Clinic number to confirm/arrange appointment; [phone] ([facility] [place]) or [phone] ([facility] at [address] [place] [address]) or [phone] ([facility] at [address] [facility]).    All recommendations are tentative until note is attested by attending.     Isac Pennington, PGY-4  Hepatology Fellow  Available on [employer] Teams   [phone] (Long Range Pager)  [phone] (Short Range Pager LI)    After 5pm, please contact the on-call GI fellow. [phone]

## 2023-05-06 NOTE — DISCHARGE NOTE NURSING/CASE MANAGEMENT/SOCIAL WORK - PATIENT PORTAL LINK FT
You can access the FollowMyHealth Patient Portal offered by [facility] by registering at the following website: [email or web address]. By joining [employer]’s FollowMyHealth portal, you will also be able to view your health information using other applications (apps) compatible with our system.

## 2023-05-06 NOTE — DISCHARGE NOTE PROVIDER - CARE PROVIDERS DIRECT ADDRESSES
,DirectAddress_Unknown,[email or web address] ,DirectAddress_Unknown,[email or web address],DirectAddress_Unknown

## 2023-05-07 ENCOUNTER — TRANSCRIPTION ENCOUNTER (OUTPATIENT)
Age: 63
End: 2023-05-07

## 2023-05-08 ENCOUNTER — TRANSCRIPTION ENCOUNTER (OUTPATIENT)
Age: 63
End: 2023-05-08

## 2023-05-08 LAB — SURGICAL PATHOLOGY STUDY: SIGNIFICANT CHANGE UP

## 2023-05-09 LAB — MITOCHONDRIA AB SER-ACNC: SIGNIFICANT CHANGE UP

## 2023-05-10 ENCOUNTER — APPOINTMENT (OUTPATIENT)
Dept: HEPATOLOGY | Facility: CLINIC | Age: 63
End: 2023-05-10
Payer: COMMERCIAL

## 2023-05-10 ENCOUNTER — LABORATORY RESULT (OUTPATIENT)
Age: 63
End: 2023-05-10

## 2023-05-10 VITALS
OXYGEN SATURATION: 97 % | HEART RATE: 61 BPM | HEIGHT: 61 IN | DIASTOLIC BLOOD PRESSURE: 68 MMHG | RESPIRATION RATE: 16 BRPM | TEMPERATURE: 97.8 F | BODY MASS INDEX: 23.03 KG/M2 | SYSTOLIC BLOOD PRESSURE: 108 MMHG | WEIGHT: 122 LBS

## 2023-05-10 LAB
ANA TITR SER: NEGATIVE — SIGNIFICANT CHANGE UP
IGG SERPL-MCNC: 1006 MG/DL — SIGNIFICANT CHANGE UP (ref 586–1602)
IGG1 SER-MCNC: 649 MG/DL — SIGNIFICANT CHANGE UP (ref 248–810)
IGG2 SER-MCNC: 141 MG/DL — SIGNIFICANT CHANGE UP (ref 130–555)
IGG3 SER-MCNC: 28 MG/DL — SIGNIFICANT CHANGE UP (ref 15–102)
IGG4 SER-MCNC: 10 MG/DL — SIGNIFICANT CHANGE UP (ref 2–96)
MITOCHONDRIA AB SER-ACNC: SIGNIFICANT CHANGE UP
SMOOTH MUSCLE AB SER-ACNC: SIGNIFICANT CHANGE UP

## 2023-05-10 PROCEDURE — 99214 OFFICE O/P EST MOD 30 MIN: CPT

## 2023-05-10 RX ORDER — POTASSIUM CHLORIDE 1500 MG/1
20 TABLET, FILM COATED, EXTENDED RELEASE ORAL
Qty: 7 | Refills: 1 | Status: DISCONTINUED | COMMUNITY
End: 2023-05-10

## 2023-05-10 RX ORDER — OXYCODONE 5 MG/1
5 TABLET ORAL EVERY 6 HOURS
Refills: 0 | Status: DISCONTINUED | COMMUNITY
Start: 2022-12-21 | End: 2023-05-10

## 2023-05-10 RX ORDER — RIVAROXABAN 20 MG/1
20 TABLET, FILM COATED ORAL
Qty: 30 | Refills: 5 | Status: DISCONTINUED | COMMUNITY
Start: 2023-04-24 | End: 2023-05-10

## 2023-05-10 RX ORDER — FUROSEMIDE 20 MG/1
20 TABLET ORAL DAILY
Qty: 5 | Refills: 0 | Status: DISCONTINUED | COMMUNITY
Start: 2022-12-21 | End: 2023-05-10

## 2023-05-11 ENCOUNTER — NON-APPOINTMENT (OUTPATIENT)
Age: 63
End: 2023-05-11

## 2023-05-11 LAB
AFP-TM SERPL-MCNC: <1.8 NG/ML
ALBUMIN SERPL ELPH-MCNC: 4.6 G/DL
ALP BLD-CCNC: 112 U/L
ALT SERPL-CCNC: 30 U/L
ANION GAP SERPL CALC-SCNC: 13 MMOL/L
AST SERPL-CCNC: 27 U/L
BASOPHILS # BLD AUTO: 0.07 K/UL
BASOPHILS NFR BLD AUTO: 1 %
BILIRUB DIRECT SERPL-MCNC: 0.2 MG/DL
BILIRUB SERPL-MCNC: 0.7 MG/DL
BUN SERPL-MCNC: 17 MG/DL
CALCIUM SERPL-MCNC: 9.8 MG/DL
CERULOPLASMIN SERPL-MCNC: 32 MG/DL
CHLORIDE SERPL-SCNC: 107 MMOL/L
CO2 SERPL-SCNC: 22 MMOL/L
CREAT SERPL-MCNC: 0.63 MG/DL
DEPRECATED KAPPA LC FREE/LAMBDA SER: 1.06 RATIO
EGFR: 99 ML/MIN/1.73M2
EOSINOPHIL # BLD AUTO: 0.34 K/UL
EOSINOPHIL NFR BLD AUTO: 4.8 %
FERRITIN SERPL-MCNC: 95 NG/ML
FOLATE SERPL-MCNC: 17.3 NG/ML
GLUCOSE SERPL-MCNC: 87 MG/DL
HAPTOGLOB SERPL-MCNC: 59 MG/DL
HCT VFR BLD CALC: 37.3 %
HGB BLD-MCNC: 10.5 G/DL
IGA SER QL IEP: 93 MG/DL
IGG SER QL IEP: 889 MG/DL
IGM SER QL IEP: 158 MG/DL
IMM GRANULOCYTES NFR BLD AUTO: 0.6 %
INR PPP: 1.07 RATIO
IRON SATN MFR SERPL: 53 %
IRON SERPL-MCNC: 247 UG/DL
KAPPA LC CSF-MCNC: 1.69 MG/DL
KAPPA LC SERPL-MCNC: 1.79 MG/DL
LDH SERPL-CCNC: 246 U/L
LYMPHOCYTES # BLD AUTO: 1.65 K/UL
LYMPHOCYTES NFR BLD AUTO: 23.1 %
MAN DIFF?: NORMAL
MCHC RBC-ENTMCNC: 21.7 PG
MCHC RBC-ENTMCNC: 28.2 GM/DL
MCV RBC AUTO: 77.2 FL
MONOCYTES # BLD AUTO: 0.61 K/UL
MONOCYTES NFR BLD AUTO: 8.5 %
NEUTROPHILS # BLD AUTO: 4.44 K/UL
NEUTROPHILS NFR BLD AUTO: 62 %
PLATELET # BLD AUTO: 131 K/UL
POTASSIUM SERPL-SCNC: 4.2 MMOL/L
PROT SERPL-MCNC: 7 G/DL
PT BLD: 12.5 SEC
RBC # BLD: 4.83 M/UL
RBC # BLD: 4.83 M/UL
RBC # FLD: 25.2 %
RETICS # AUTO: 2.7 %
RETICS AGGREG/RBC NFR: 127.4 K/UL
SMOOTH MUSCLE AB SER QL IF: NORMAL
SODIUM SERPL-SCNC: 142 MMOL/L
TIBC SERPL-MCNC: 471 UG/DL
UIBC SERPL-MCNC: 223 UG/DL
VIT B12 SERPL-MCNC: 519 PG/ML
WBC # FLD AUTO: 7.15 K/UL

## 2023-05-11 NOTE — HISTORY OF PRESENT ILLNESS
[TextBox_42] : Transplant Hepatologist: Dr. Layne Dent\par Transplant Hepatology NP: Ciera Monaco, [facility]\par \par Trisha Villalobos is a 62 y/o female with a PMH of Rheumatic mitral disease, GERD, ITP, hyperkalemic periodic paralysis dx at 37 y/o, JO on CPAP, BCC, T11 fracture (current), cataracts, Splenic Vein thrombosis, Portal vein malformation w/ hx of thrombus, portal HTN w/ EV and compensated liver cirrhosis, here for follow-up after hospital discharge. \par \par Patient seen inpatient at [facility] in 5/2023 after being advised to present to the ED for anemia (Hgb ~6) i/s/o progressive GUEVARA. Of note, patient recently underwent bioprosthetic AVE and ascending aorta replacement on 12/12/20222 with post-op course c/b paroxysmal afib with RVR s/p PVI and CTI ablation on 1/18/2023 and previously maintained on Eliquis + ASA, subsequently transitioned to Xarelto and now maintained on ASA only. 1/2023 TTE: LVEF 66%, mildly dilated LA, mild OH, moderate TR, mild RA enlargement, & normal pulmonary pressures. S/p LHC w/o significant CAD. Hepatology consulted for evaluation of anemia and cirrhosis. 5/5/23 CTAP comments on cirrhotic liver morphology, e/o pHTN with esophageal and splenic varices, small amount of pelvic ascites, and unchanged Portal Vein cavernous transformation (since 2020). 5/5/2023 EGD: one column of trace EV that flatten upon insufflation, no active bleeding or high risk stigmata of recent bleeding and no variceal banding performed. Colonoscopy notable for innumerable gastric polyps with one polyp removed for bx (pathology negative for malignancy). Cirrhosis thought to be 2/2 congestive hepatopathy v splenic vein thrombosis v portal vein malformation/thrombosis. 5/5/23 MELD 13. Anemia ultimately tx with IV iron, pt transitioned to PO iron supp and anemia thought to be r/t epistaxis prior to admission. \par \par Patient first made aware of c/f cirrhosis during 5/2023 admission. Denies episodes of liver decompensation. No prior liver bx. No known family hx of liver disease. Known EV varices 2/2 portal HTN since 2014, with reported hemorrhage at age 15. Hx of known splenic and portal vein thrombosis, intiially diagnosed ~ 30 yrs ago. No ETOH use since recent hospitalization -- prior mild/moderate ETOH with ~ 2 drinks a few times a month. No IVDU. +Blood transfusion in 2000's. Heaviest weight in mid 130's and has been living at current weight for past few yrs, current weight 122 lbs. Ethnicity from [place]. Currently works PT as RN in Ambulatory Surgery (on disability). Lives at home with . Independent in ADLs/iADLs. \par \par Patient's allergies, medications, past medical, surgical, family, and social histories were reviewed and updated as appropriate. Seen in clinic today, reports that she has been feeling well since hospital d/c and only complaint is of persistent fatigue. Denies any recent fevers, chills, cough, lightheadedness, AMS, abdominal pain, n/v, diarrhea, hematochezia, hematemesis, and melena. Denies alcohol, tobacco, or recreational drug use.

## 2023-05-11 NOTE — ASSESSMENT
[FreeTextEntry1] : Trisha Villalobos is a 64 y/o female with Splenic Vein thrombosis, Portal vein malformation w/ hx of thrombus, portal HTN w/ EV and compensated liver cirrhosis (based on cross sectional imaging), here for f/u after hospital discharge.

## 2023-05-11 NOTE — PLAN
[FreeTextEntry1] : \par #Compensated Liver Cirrhosis 2/2 Congestive Hepatopathy +/- Splenic Vein thrombosis +/- Portal vein malformation/thrombosis\par - Will supplement chronic liver dx w/u (GEMINI, AMA, HBV & HCV negative)\par - Will refer to Dr. Berman in GI\par - Will refer to Hematology for hypercoagulable w/u \par - Reviewed utility of liver bx in diagnosis and confirmation of advanced liver disease after above w/u (NRH v Cirrhosis)\par - Counseled on importance of maintaining adequate nutrition with high protein and low Na diet\par - Advised to avoid nephrotoxic drugs\par - C/w PO Iron supplementation\par - Reinforced s/s of liver decompensation and advised to report immediately\par \par Update labs today. \par RTC in 4-6 weeks. \par Patient seen and plan discussed with Dr. Dent. \par \par Ciera Monaco, MSN, AGACNP-BC\par Transplant Hepatology Nurse Practitioner\par [facility]\par [address] [place] [id number]\par T: [phone] | F: [phone]

## 2023-05-11 NOTE — PHYSICAL EXAM
[Alert] : alert [Supple] : supple [Clear to Auscultation] : lungs were clear to auscultation bilaterally [Breathing Comfortably on room air] : breathing comfortably on room air [Normal Rate] : normal rate [Regular Rhythm] : regular rhythm [Soft] : soft [Normal Bowel Sounds] : normal bowel sounds [No Edema] : no edema [Scleral Icterus] : no scleral icterus [Ascites Shifting Dullness] : no ascites shifting dullness [Spider Angioma] : no spider angioma [Jaundice] : no jaundice [Asterixis] : no asterixis [Hepatic Encephalopathy] : no hepatic encephalopathy

## 2023-05-11 NOTE — REVIEW OF SYSTEMS
[Fatigue] : fatigue [Fever] : no fever [Chills] : no chills [Recent Weight Gain (___ Lbs)] : no recent weight gain [Chest Pain] : no chest pain [Palpitations] : no palpitations [SOB] : no shortness of breath [Cough] : no cough [Abdominal Pain] : no abdominal pain [Nausea] : no nausea [Constipation] : no constipation [Diarrhea] : diarrhea [Vomiting] : no vomiting [Dysuria] : no dysuria [Hematuria] : no hematuria [Itching] : no itching [Headache] : no headache [Dizziness] : no dizziness [Confusion] : no confusion

## 2023-05-12 LAB
TTG IGA SER IA-ACNC: <1.2 U/ML
TTG IGA SER-ACNC: NEGATIVE

## 2023-05-18 ENCOUNTER — NON-APPOINTMENT (OUTPATIENT)
Age: 63
End: 2023-05-18

## 2023-05-18 LAB — ACARBOXYPROTHROMBIN SERPL-MCNC: 0.3 NG/ML

## 2023-05-23 ENCOUNTER — LABORATORY RESULT (OUTPATIENT)
Age: 63
End: 2023-05-23

## 2023-05-24 ENCOUNTER — NON-APPOINTMENT (OUTPATIENT)
Age: 63
End: 2023-05-24

## 2023-05-24 LAB
ALBUMIN SERPL ELPH-MCNC: 4.4 G/DL
ALP BLD-CCNC: 102 U/L
ALT SERPL-CCNC: 27 U/L
ANION GAP SERPL CALC-SCNC: 16 MMOL/L
AST SERPL-CCNC: 25 U/L
BILIRUB SERPL-MCNC: 0.6 MG/DL
BUN SERPL-MCNC: 14 MG/DL
CALCIUM SERPL-MCNC: 9.8 MG/DL
CHLORIDE SERPL-SCNC: 106 MMOL/L
CO2 SERPL-SCNC: 22 MMOL/L
CREAT SERPL-MCNC: 0.59 MG/DL
EGFR: 101 ML/MIN/1.73M2
POTASSIUM SERPL-SCNC: 4.1 MMOL/L
PROT SERPL-MCNC: 6.6 G/DL
SODIUM SERPL-SCNC: 144 MMOL/L

## 2023-05-25 ENCOUNTER — NON-APPOINTMENT (OUTPATIENT)
Age: 63
End: 2023-05-25

## 2023-06-03 ENCOUNTER — OUTPATIENT (OUTPATIENT)
Dept: OUTPATIENT SERVICES | Facility: HOSPITAL | Age: 63
LOS: 1 days | Discharge: HOME | End: 2023-06-03

## 2023-06-03 DIAGNOSIS — Z98.890 OTHER SPECIFIED POSTPROCEDURAL STATES: Chronic | ICD-10-CM

## 2023-06-03 DIAGNOSIS — D64.9 ANEMIA, UNSPECIFIED: ICD-10-CM

## 2023-06-03 DIAGNOSIS — Z95.2 PRESENCE OF PROSTHETIC HEART VALVE: Chronic | ICD-10-CM

## 2023-06-03 DIAGNOSIS — O00.90 UNSPECIFIED ECTOPIC PREGNANCY WITHOUT INTRAUTERINE PREGNANCY: Chronic | ICD-10-CM

## 2023-06-03 DIAGNOSIS — K76.6 PORTAL HYPERTENSION: ICD-10-CM

## 2023-06-03 DIAGNOSIS — I82.890 ACUTE EMBOLISM AND THROMBOSIS OF OTHER SPECIFIED VEINS: ICD-10-CM

## 2023-06-03 DIAGNOSIS — Z98.49 CATARACT EXTRACTION STATUS, UNSPECIFIED EYE: Chronic | ICD-10-CM

## 2023-06-12 ENCOUNTER — APPOINTMENT (OUTPATIENT)
Dept: HEMATOLOGY ONCOLOGY | Facility: CLINIC | Age: 63
End: 2023-06-12
Payer: COMMERCIAL

## 2023-06-12 ENCOUNTER — NON-APPOINTMENT (OUTPATIENT)
Age: 63
End: 2023-06-12

## 2023-06-12 ENCOUNTER — RESULT REVIEW (OUTPATIENT)
Age: 63
End: 2023-06-12

## 2023-06-12 VITALS
WEIGHT: 125.66 LBS | OXYGEN SATURATION: 97 % | BODY MASS INDEX: 23.73 KG/M2 | SYSTOLIC BLOOD PRESSURE: 111 MMHG | HEIGHT: 61.02 IN | HEART RATE: 64 BPM | DIASTOLIC BLOOD PRESSURE: 66 MMHG | TEMPERATURE: 97.4 F | RESPIRATION RATE: 16 BRPM

## 2023-06-12 LAB
BASOPHILS # BLD AUTO: 0.04 K/UL — SIGNIFICANT CHANGE UP (ref 0–0.2)
BASOPHILS NFR BLD AUTO: 0.7 % — SIGNIFICANT CHANGE UP (ref 0–2)
EOSINOPHIL # BLD AUTO: 0.21 K/UL — SIGNIFICANT CHANGE UP (ref 0–0.5)
EOSINOPHIL NFR BLD AUTO: 3.4 % — SIGNIFICANT CHANGE UP (ref 0–6)
HCT VFR BLD CALC: 40.6 % — SIGNIFICANT CHANGE UP (ref 34.5–45)
HGB BLD-MCNC: 12.4 G/DL — SIGNIFICANT CHANGE UP (ref 11.5–15.5)
IMM GRANULOCYTES NFR BLD AUTO: 0.2 % — SIGNIFICANT CHANGE UP (ref 0–0.9)
LYMPHOCYTES # BLD AUTO: 1.35 K/UL — SIGNIFICANT CHANGE UP (ref 1–3.3)
LYMPHOCYTES # BLD AUTO: 22.1 % — SIGNIFICANT CHANGE UP (ref 13–44)
MCHC RBC-ENTMCNC: 25.1 PG — LOW (ref 27–34)
MCHC RBC-ENTMCNC: 30 G/DL — LOW (ref 32–36)
MCV RBC AUTO: 83.5 FL — SIGNIFICANT CHANGE UP (ref 80–100)
MONOCYTES # BLD AUTO: 0.49 K/UL — SIGNIFICANT CHANGE UP (ref 0–0.9)
MONOCYTES NFR BLD AUTO: 8 % — SIGNIFICANT CHANGE UP (ref 2–14)
NEUTROPHILS # BLD AUTO: 4.01 K/UL — SIGNIFICANT CHANGE UP (ref 1.8–7.4)
NEUTROPHILS NFR BLD AUTO: 65.6 % — SIGNIFICANT CHANGE UP (ref 43–77)
NRBC # BLD: 0 /100 WBCS — SIGNIFICANT CHANGE UP (ref 0–0)
PLATELET # BLD AUTO: 121 K/UL — LOW (ref 150–400)
RBC # BLD: 4.99 M/UL — SIGNIFICANT CHANGE UP (ref 3.8–5.2)
RBC # FLD: SIGNIFICANT CHANGE UP (ref 10.3–14.5)
WBC # BLD: 6.11 K/UL — SIGNIFICANT CHANGE UP (ref 3.8–10.5)
WBC # FLD AUTO: 6.11 K/UL — SIGNIFICANT CHANGE UP (ref 3.8–10.5)

## 2023-06-12 PROCEDURE — 99215 OFFICE O/P EST HI 40 MIN: CPT

## 2023-06-12 NOTE — END OF VISIT
Transseptal puncture performed.    [Time Spent: ___ minutes] : I have spent [unfilled] minutes of time on the encounter.

## 2023-06-13 LAB
FERRITIN SERPL-MCNC: 46 NG/ML
IRON SATN MFR SERPL: 23 %
IRON SERPL-MCNC: 81 UG/DL
LDH SERPL-CCNC: 232 U/L
TIBC SERPL-MCNC: 352 UG/DL
UIBC SERPL-MCNC: 270 UG/DL
VIT B12 SERPL-MCNC: 424 PG/ML

## 2023-06-13 NOTE — REVIEW OF SYSTEMS
[Fatigue] : fatigue [Vision Problems] : vision problems [Shortness Of Breath] : shortness of breath [SOB on Exertion] : shortness of breath during exertion [Joint Pain] : joint pain [Fever] : no fever [Chills] : no chills [Night Sweats] : no night sweats [Recent Change In Weight] : ~T no recent weight change [Eye Pain] : no eye pain [Red Eyes] : eyes not red [Dry Eyes] : no dryness of the eyes [Dysphagia] : no dysphagia [Loss of Hearing] : no loss of hearing [Nosebleeds] : no nosebleeds [Odynophagia] : no odynophagia [Mucosal Pain] : no mucosal pain [Chest Pain] : no chest pain [Palpitations] : no palpitations [Leg Claudication] : no intermittent leg claudication [Lower Ext Edema] : no lower extremity edema [Wheezing] : no wheezing [Cough] : no cough [Abdominal Pain] : no abdominal pain [Vomiting] : no vomiting [Constipation] : no constipation [Dysuria] : no dysuria [Incontinence] : no incontinence [Vaginal Discharge] : no vaginal discharge [Dysmenorrhea/Abn Vaginal Bleeding] : no dysmenorrhea/abnormal vaginal bleeding [Joint Stiffness] : no joint stiffness [Muscle Pain] : no muscle pain [Muscle Weakness] : no muscle weakness [FreeTextEntry3] : cataract surgery OD; early macula degeneration [FreeTextEntry9] : sacral to left leg

## 2023-06-13 NOTE — HISTORY OF PRESENT ILLNESS
[Date: ____________] : Patient's last distress assessment performed on [unfilled]. [0 - No Distress] : Distress Level: 0 [100: Normal, no complaints, no evidence of disease.] : 100: Normal, no complaints, no evidence of disease. [ECOG Performance Status: 0 - Fully active, able to carry on all pre-disease performance without restriction] : Performance Status: 0 - Fully active, able to carry on all pre-disease performance without restriction [Disease:__________________________] : Disease: [unfilled] [de-identified] : Trisha Villalobos is a 63 year old female with a history of recent shortness of breath and fatigue. She has a history of sleep apnea\par 05/04/2023 [facility] admission 3 day stay; she was referred to Dr Dent; he has recommended referral to hematology. She was noted to have iron deficiency anemia; she received two units of packed red cells during the admission and one bag of IV iron; no adverse infusion effects.\par \par 12/25/2023 first notation of atrial fibrillation; 01/12/2023 cardiac ablation  for atrial fibrillation at [facility]; she was anemia and had increasing weakness; eventually referred to ER in 05/04/2023; she was on rivaroxaban 20 mg PO daily for one month\par \par 1989 noted to have a low platelet count in clearance for work; blood sent to [facility] and clotting studies were normal. She was noted to have "tumors in the chest". Subsequent studies showed enlarged pulmonary vessels; Thrombosis was noted in liver and spleen [FreeTextEntry1] : first visit [de-identified] : first visit

## 2023-06-13 NOTE — ASSESSMENT
[Supportive] : Goals of care discussed with patient: Supportive [Palliative Care Plan] : not applicable at this time [FreeTextEntry1] : Vikki Villalobos is a 63 year old female with a history of  mild thrombocytopenia possibly related to chronic immune thrombocytopenia that is not requiring transfusion of platelets or use of steroids.She had a history of a vascular abnormality  (possible thrombosis) several decades ago but she has not been on anticoagulation. She has no history of bleeding or exposure to hepatic toxin. No history of hepatitis or Carlos Enrique Disease.Her use of alcohol is described by her accounting as little and in the chart as social. \par She had been seen by Dr Louis in past.\par Events of her recent finding of iron deficiency anemia in May 2023 preceded by fatigue; these events were  following cardiac atrial focus ablation in 01/2023.\par She currently has normal liver function studies including normal albumins SGOT SGPT bilirubin.\par CT scanning reveals liver scarring. there is venous transformation of the portal vein. No thrombosis is seen. she has small atheroma plaque in the aorta\par The iron deficiency anemia is being appropriately managed and she may change her regimen to 2 tablets of 65 mg PO every other day with vitamin C ( She declines use of orange juice);  and to take  stool softener she is using senna. Further follow up with fibroscan planned by hepatic  service. I would not use anticoagulation unless thrombosis can be identified (re reviewed recent images).She is avoiding all alcohol given the finding of her last images\par RTC in 3 months

## 2023-06-16 NOTE — H&P ADULT - NSHPPHYSICALEXAM_GEN_ALL_CORE
"Assessment/Plan:    Normal breast exam  Vaginal atrophy  Breast density on normal mammogram 2022  Colonoscopy with polyps 2020  DEXA scan showing osteoporosis 2023  On vaginal estrogen and would like to continue    Plan: Rx mammogram with bilateral screening ultrasound  Renew vaginal estrogen  Information on calcium through diet given  Recommend vitamin D calcium and weightbearing exercise  Subjective:       Patient ID: Jarod Vega is a 79 y o  female presents to the office for evaluation of vaginal atrophy  Presently on vaginal estrogen tablets and doing well  Denies any pelvic pain or bleeding  Presently not sexually active  Denies any breast bowel or bladder problems  Had a cyst in the breast last year  Scheduled for repeat mammogram 2022  Breast density was noted  Placed a request for bilateral breast ultrasound at the time of mammogram   Going to exercise class at a community center regularly  Doing balance exercises and weights  Review of Systems   Constitutional: Negative  Negative for fatigue, fever and unexpected weight change  HENT: Negative  Eyes: Negative  Respiratory: Negative  Negative for chest tightness, shortness of breath, wheezing and stridor  Cardiovascular: Negative  Negative for chest pain, palpitations and leg swelling  Gastrointestinal: Negative  Negative for abdominal pain, blood in stool, diarrhea, nausea, rectal pain and vomiting  Endocrine: Negative  Genitourinary: Negative for dysuria, frequency, vaginal bleeding, vaginal discharge and vaginal pain  Musculoskeletal: Negative  Skin: Negative  Allergic/Immunologic: Negative  Neurological: Negative  Hematological: Negative  Psychiatric/Behavioral: Negative  All other systems reviewed and are negative          Objective:      /60   Ht 5' 3\" (1 6 m)   Wt 58 1 kg (128 lb)   LMP  (LMP Unknown)   BMI 22 67 kg/m²          " Physical Exam  Constitutional:       Appearance: She is well-developed  HENT:      Head: Normocephalic and atraumatic  Neck:      Thyroid: No thyromegaly  Trachea: No tracheal deviation  Cardiovascular:      Rate and Rhythm: Normal rate and regular rhythm  Heart sounds: Normal heart sounds  Pulmonary:      Effort: Pulmonary effort is normal  No respiratory distress  Breath sounds: Normal breath sounds  No stridor  No wheezing or rales  Chest:      Chest wall: No tenderness  Breasts:     Breasts are symmetrical       Right: No inverted nipple, mass, nipple discharge, skin change or tenderness  Left: No inverted nipple, mass, nipple discharge, skin change or tenderness  Abdominal:      General: Bowel sounds are normal  There is no distension  Palpations: Abdomen is soft  There is no mass  Tenderness: There is no abdominal tenderness  There is no guarding or rebound  Hernia: No hernia is present  There is no hernia in the left inguinal area  Genitourinary:     Labia:         Right: No rash, tenderness, lesion or injury  Left: No rash, tenderness, lesion or injury  Vagina: Normal  No signs of injury and foreign body  No vaginal discharge, erythema, tenderness or bleeding  Cervix: No cervical motion tenderness, discharge or friability  Uterus: Not deviated, not enlarged, not fixed and not tender  Adnexa:         Right: No mass, tenderness or fullness  Left: No mass, tenderness or fullness  Rectum: No mass, anal fissure, external hemorrhoid or internal hemorrhoid  Comments: Vaginal atrophy  Normal urethra and [place]'s glands  No uterine prolapse  No cystocele or rectocele  Excellent muscle tone on Kegel attempt  Musculoskeletal:      Cervical back: Normal range of motion and neck supple  Lymphadenopathy:      Lower Body: No right inguinal adenopathy  No left inguinal adenopathy     Skin:     General: Skin is warm and dry    Neurological:      Mental Status: She is alert and oriented to person, place, and time  Psychiatric:         Behavior: Behavior normal          Thought Content:  Thought content normal          Judgment: Judgment normal  Vital Signs Last 24 Hrs  T(C): 36.7 (04 May 2023 15:55), Max: 37.2 (04 May 2023 14:06)  T(F): 98 (04 May 2023 15:55), Max: 98.9 (04 May 2023 14:06)  HR: 68 (04 May 2023 15:55) (65 - 68)  BP: 99/53 (04 May 2023 15:55) (99/53 - 116/46)  BP(mean): --  RR: 16 (04 May 2023 15:55) (16 - 18)  SpO2: 100% (04 May 2023 15:55) (100% - 100%)    Parameters below as of 04 May 2023 15:55  Patient On (Oxygen Delivery Method): room air    GENERAL: NAD, well-developed  HEENT:  Atraumatic, Normocephalic, EOMI, PERRLA, conjunctiva and sclera clear, oral mucosa moist, clear w/o any exudate   NECK: Supple, No JVD  CHEST/LUNG: Clear to auscultation bilaterally; No wheeze  HEART: Regular rate and rhythm; No murmurs, rubs, or gallops  ABDOMEN: Soft, Nontender, Nondistended; Bowel sounds present  EXTREMITIES:  2+ Peripheral Pulses, No clubbing, cyanosis, or edema  PSYCH: AAOx3, normal affect  NEUROLOGY: non-focal, moving all extremities   SKIN: No rashes or lesions

## 2023-06-16 NOTE — PROGRESS NOTE ADULT - NS ATTEND AMEND GEN_ALL_CORE FT
Patient requests normal tests results to be communicated via Letter.  Patient address is :  [address] [place] [id number].    Patient notified that if test results are abnormal, provider will call patient.     Health Maintenance Due   Topic Date Due   • Diabetes Foot Exam  10/15/2022   • Medicare Advantage- Medicare Wellness Visit  01/01/2023       Patient is due for the topics as listed above and wishes to proceed with them. Orders placed for MWV (Medicare Wellness Visit).     This is a 63 year old woman with pmhx of splenic vein thrombosis, rheumatic mitral disease, GERD, esophageal varices, ITP (platelets around 100k), hyperkalemic periodic paralysis diagnosed at age 36, JO on CPAP, basal cell carcinoma, T11 fracture (current), cataracts, s/p recent bio-pros AVR and ascending aorta replacement on 12/12/20222 and paroxysmal afib with RVR s/p PVI and CTI ablation on 1/18/2023  (on Xarelto ) who presented today for symptomatic anemia. s/p PRBC 2 units transfusion.  She is undergoing GI workups.  AC Xarelto on hold since.  TTE from 01/2023 showed normal LVEF and no MVP or sig mitral valve diseases seen. Avoid systemic AC ( Xarelto), will weigh the benefit /risks of AC given her CHADS2-VASC2 score is 1 (female) which correlated to low risk of thromboembolic event. Hold metoprolol per her outpatient EP physician Dr. Koenig. GI following for Anemia workup   - - Continue care per primary team

## 2023-06-26 ENCOUNTER — APPOINTMENT (OUTPATIENT)
Dept: PULMONOLOGY | Facility: CLINIC | Age: 63
End: 2023-06-26
Payer: COMMERCIAL

## 2023-06-26 ENCOUNTER — NON-APPOINTMENT (OUTPATIENT)
Age: 63
End: 2023-06-26

## 2023-06-26 VITALS
WEIGHT: 293 LBS | TEMPERATURE: 97.4 F | OXYGEN SATURATION: 98 % | BODY MASS INDEX: 57.52 KG/M2 | DIASTOLIC BLOOD PRESSURE: 60 MMHG | HEIGHT: 60 IN | RESPIRATION RATE: 16 BRPM | HEART RATE: 57 BPM | SYSTOLIC BLOOD PRESSURE: 110 MMHG

## 2023-06-26 DIAGNOSIS — Z72.820 SLEEP DEPRIVATION: ICD-10-CM

## 2023-06-26 PROCEDURE — 99214 OFFICE O/P EST MOD 30 MIN: CPT | Mod: 25

## 2023-06-26 PROCEDURE — 95012 NITRIC OXIDE EXP GAS DETER: CPT

## 2023-06-26 PROCEDURE — 94010 BREATHING CAPACITY TEST: CPT

## 2023-06-26 NOTE — ADDENDUM
[FreeTextEntry1] : Documented by Dewey Burden acting as a scribe for Dr. Onofre Cameron on (06/26/2023).\par \par All medical record entries made by the Scribe were at my, Dr. Onofre Cameron's, direction and personally dictated by me on (06/26/2023). I have reviewed the chart and agree that the record accurately reflects my personal performance of the history, physical exam, assessment and plan. I have personally directed, reviewed, and agree with the discharge instructions.

## 2023-06-26 NOTE — PROCEDURE
[FreeTextEntry1] : PFT's were performed for the evaluation of Asthma/SOB\par \par PFT reveals normal flows, with an FEV1 of  2.12L, which is 102% of predicted, with a normal flow volume loop \par \par FENO was 31; a normal value being less than 25\par Fractional exhaled nitric oxide (FENO) is regarded as a simple, noninvasive method for assessing eosinophilic airway inflammation. Produced by a variety of cells within the lung, nitric oxide (NO) concentrations are generally low in healthy individuals. However, high concentrations of NO appear to be involved in nonspecific host defense mechanisms and chronic inflammatory diseases such as asthma. The American Thoracic Society (ATS) therefore has recommended using FENO to aid in the diagnosis and monitoring of eosinophilic airway inflammation and asthma, and for identifying steroid responsive individuals whose chronic respiratory symptoms may be caused by airway inflammation. \par \par CPAP compliance report revealed AI 9.4 and central 7.5

## 2023-06-26 NOTE — PHYSICAL EXAM
[No Acute Distress] : no acute distress [Normal Oropharynx] : normal oropharynx [Normal Appearance] : normal appearance [No Neck Mass] : no neck mass [Normal Rate/Rhythm] : normal rate/rhythm [Normal S1, S2] : normal s1, s2 [No Resp Distress] : no resp distress [Clear to Auscultation Bilaterally] : clear to auscultation bilaterally [No Abnormalities] : no abnormalities [Benign] : benign [Normal Gait] : normal gait [No Clubbing] : no clubbing [No Cyanosis] : no cyanosis [No Edema] : no edema [FROM] : FROM [Normal Color/ Pigmentation] : normal color/ pigmentation [No Focal Deficits] : no focal deficits [Oriented x3] : oriented x3 [Normal Affect] : normal affect [III] : Mallampati Class: III [Murmur ___ / 6] : murmur [unfilled] / 6 [TextBox_68] : I:E ratio 1:3; clear

## 2023-06-26 NOTE — HISTORY OF PRESENT ILLNESS
[TextBox_4] : Ms. LOVELL is a 63 year female with a history of arthritis of the hip, bicuspid aortic valve, hypercalcemic periodic predialysis, mild aortic regurgitation, moderate aortic stenosis, myositis rheumatic mitral valve disease, idiopathic thrombocytopenia, GERD, gastric esophageal varices  who now comes in for a follow up pulmonary evaluation. Her chief complaint is\par - s/p open heart surgery \par - she notes recently she was very fatigued and was not able to walk at all, she was SOB \par - she notes she was found to be anemic and ended up getting transfusion \par - she notes feeling well in general\par - she denies swallowing issues \par - she denies any visual issues \par - she notes sleep pattern is better \par - she notes walking about an hours 1-2 a week \par - she denies wheezing \par - she denies coughing\par - she notes sinus are quiet\par - she notes bowels are regular \par - she notes weight is stable\par - she notes eating well \par - she notes appetite is good \par - she also notes having portal vein thrombosis\par \par \par - She  denies any headaches, nausea, vomiting, fever, chills, sweats, chest pains, chest pressure, diarrhea, dysphagia, myalgia, dizziness, leg swelling, leg pain, itchy eyes, itchy ears, heartburn, reflux, or sour taste in the mouth.

## 2023-06-26 NOTE — ASSESSMENT
[FreeTextEntry1] : Ms. LOVELL is a 63 year female with a history of arthritis of the hip, bicuspid aortic valve, hypercalcemic periodic predialysis, mild aortic regurgitation, moderate aortic stenosis, myositis rheumatic mitral valve disease, idiopathic thrombocytopenia, GERD, gastric esophageal varices  who now comes in for an initial pulmonary evaluation for second opinion for SOB following extensive workup CT,cardiac angiogram, cardiac Catherization right and left;- still symptomatic- s/p Open heart surgery ()  +JO- Afib/Anemia/Portal Vein Thrombosis \par \par The patient's SOB is felt to be multifactorial:\par -poor mechanics of breathing\par -out of shape/overweight\par -Pulmonary\par   -no pulmonary hypertension based on CT and cardiac ECHO \par   -no emphysema based of CT and PFT\par   -?asthma\par -+ Cardiac- s/p OHS\par    -valvular dysfunction brought on by exertion (resting studies right and left cardiac cath and echo done at rest)\par \par Problem 1: ?asthma \par - Xopenex 0.63 q6H \par -complete methacholine challenge\par - continue Stiolto 2 Puffs qDay or Trelegy 100 1 puff QD \par -add Trelegy 1 puff QD \par -s/p HRCT -showed mucous plugging \par - Inhaler technique reviewed as well as oral hygiene technique reviewed with patient. Avoidance of cold air, extremes of temperature, rescue inhaler should be used before exercise. Order of medication reviewed with patient. Recommended use of a cool mist humidifier in the bedroom. \par - Asthma is believed to be caused by inherited (genetic) and environmental factor, but its exact cause is unknown. asthma may be triggered by allergens, lung infections, or irritants in the air. Asthma triggers are different for each person \par \par Problem 2: + Iron deficiency anemia \par -s/p iron test (normal); PRBC x2, Iron 5/2023\par \par Problem 3:Cardiac (valvular dysfunction brought on by exertion (resting studies right and left cardiac cath and echo done at rest), Afib \par -s/p cardio pulmonary stress test, s/p OHS 12/2022\par -s/p ECHO stress jasmin\par -recommended consult with Dr. Tom Dowell for structural heart- Dr Esteban), Ablation (Dr. Koenig)\par -Recommend cardiac follow up evaluation with cardiologist if needed \par \par Problem 4 (+) JO (Unrestful sleep/ fatigue)\par -complete 2nd sleep study \par -s/p home sleep study - boost pressure 1mm H20\par -Sleep apnea is associated with adverse clinical consequences which can affect most organ systems. Cardiovascular disease risk includes arrhythmias, atrial fibrillation, hypertension, coronary artery disease, and stroke. Metabolic disorders include diabetes type 2, non-alcoholic fatty liver disease. Mood disorder especially depression; and cognitive decline especially in the elderly. Associations with chronic reflux/Levine’s esophagus some but not all inclusive. \par -Reasons include arousal consistent with hypopnea; respiratory events most prominent in REM sleep or supine position; therefore sleep staging and body position are important for accurate diagnosis and estimation of AHI. \par  \par Problem 5: out of shape\par -Recommended Muniq "OTC" Supplement for controlling blood sugar levels, weight loss, and energy \par - Weight loss, exercise and diet control were discussed and are highly encouraged. Treatment options were given such as aqua therapy, and contacting a nutritionist. Recommended to use the elliptical, stationary bike, less use of treadmill. Mindful eating was explained to the patient. Obesity is associated with worsening asthma, SOB, and potential for cardiac disease, diabetes, and other underlying medical conditions.\par \par Problem 6: Poor mechanics of breathing\par -Recommended Wim Hof and Buteyko breathing techniques \par -recommended internet exercise platforms: [employer] and Aerobic exercise for seniors \par - Proper breathing techniques were reviewed with an emphasis on exhalation. Patient instructed to breath in for 1 second and out for four seconds. Patient was encouraged not to talk while walking.\par \par Problem 7: Health Maintenance\par -Recommend attempting to get back into work\par -s/p flu shot 2022\par -recommended strep pneumonia vaccines: Prevnar-13 vaccine, follow by Pneumo vaccine 23 one year following\par -recommended early intervention for URIs\par -recommended regular osteoporosis evaluations\par -recommended early dermatological evaluations\par -recommended after the age of 50 to the age of 70, colonoscopy every 5 years\par \par f/u in 6-8 weeks\par pt is encouraged to call or fax the office with any questions or concerns.\par

## 2023-06-27 ENCOUNTER — NON-APPOINTMENT (OUTPATIENT)
Age: 63
End: 2023-06-27

## 2023-07-05 ENCOUNTER — APPOINTMENT (OUTPATIENT)
Dept: OBGYN | Facility: CLINIC | Age: 63
End: 2023-07-05
Payer: COMMERCIAL

## 2023-07-05 PROCEDURE — 81002 URINALYSIS NONAUTO W/O SCOPE: CPT

## 2023-07-05 PROCEDURE — 99396 PREV VISIT EST AGE 40-64: CPT

## 2023-07-05 PROCEDURE — 82270 OCCULT BLOOD FECES: CPT

## 2023-07-24 ENCOUNTER — APPOINTMENT (OUTPATIENT)
Dept: ELECTROPHYSIOLOGY | Facility: CLINIC | Age: 63
End: 2023-07-24
Payer: COMMERCIAL

## 2023-07-24 ENCOUNTER — NON-APPOINTMENT (OUTPATIENT)
Age: 63
End: 2023-07-24

## 2023-07-24 VITALS — OXYGEN SATURATION: 94 % | SYSTOLIC BLOOD PRESSURE: 106 MMHG | DIASTOLIC BLOOD PRESSURE: 65 MMHG | HEART RATE: 57 BPM

## 2023-07-24 VITALS — WEIGHT: 125 LBS | HEIGHT: 60 IN | BODY MASS INDEX: 24.54 KG/M2

## 2023-07-24 PROCEDURE — 93000 ELECTROCARDIOGRAM COMPLETE: CPT

## 2023-07-24 PROCEDURE — 99214 OFFICE O/P EST MOD 30 MIN: CPT

## 2023-07-24 NOTE — HISTORY OF PRESENT ILLNESS
[FreeTextEntry1] : Trisha Villalobos is a 62y/o woman with Hx of splenic vein thrombosis, rheumatic mitral disease, GERD, esophageal varices, ITP (platelets around 100k), hyperkalemic periodic paralysis diagnosed at age 36, JO on CPAP, basal cell carcinoma, T11 fracture (current), cataracts, recent bio-pros AVR and ascending aorta replacement on 12/12/20222 and now paroxysmal afib with RVR s/p PVI and CTI ablation on 1/18/2023 who presents today for routine f/u. Post procedure, experienced pericarditis, given colchicine with relief. Since that time, was hospitalized again for acute anemia and noted ti have iron deficiency anemia in the setting of known portal vein thrombosis. Now following with Liver specialist. Was given two units of PRBCs and one bag of IV iron. Has discontinued po Iron about 3 weeks ago and now notes feeling fatigue. Unsure of current H/H, will be going tomorrow for testing. Denies any evidence of bleeding. Denies chest pain, palpitations, SOB, syncope or near syncope.

## 2023-07-24 NOTE — CARDIOLOGY SUMMARY
[de-identified] : 12/26/2022: Conclusions: \par 1. Bioprosthetic aortic valve. Peak transaortic valve\par gradient equals 27 mm Hg, mean transaortic valve gradient\par equals 11 mm Hg, which is probably normal in the presence\par of a bioprosthetic aortic valve. No aortic valve\par regurgitation seen.\par peak velocity: 2.5 m/s\par peak gradient 27 mmHg\par LVOT VTI: 18 cm\par AV VTI: 32 cm\par DVI: 0.4\par 2. Mildly dilated left atrium.  LA volume index = 39 cc/m2.\par Increased relative wall thickness with normal left\par ventricular mass index, consistent with concentric left\par ventricular remodeling.Endocardial visualization enhanced\par with intravenous injection of Ultrasonic Enhancing Agent\par (Definity). Mild segmental left ventricular systolic\par dysfunction. No left ventricular thrombus. LVEF calculated\par using biplane Pace's method is 58%. Septal motion\par consistent with prior cardiac surgery. There is hypokinesis\par of the inferior wall and dyssynchrony of the septum. \par Increased E/e'  is consistent with elevated left\par ventricular filling pressure. Unable to determine diastolic\par function due to merged E and A wave.\par 3. Severe right atrial enlargement. Right ventricular\par enlargement with mild decreased right ventricular systolic\par function.\par 4. Normal tricuspid valve. Moderate tricuspid\par regurgitation.Estimated pulmonary artery systolic pressure\par equals 38  mm Hg, assuming right atrial pressure equals 15\par mm Hg, consistent with borderline pulmonary pressures.\par 5. No pericardial effusion seen.\par *** Compared with echocardiogram of 9/27/2022, there is now\par a bioprosthetic valve in the aortic position.\par ADDENDUM 12/26/2022:  Also compared to the previous, there\par is inferior hypokinesis and septal dyssynchrony. LV\par function is preserved. [de-identified] : 11/28/2022: Diagnostic Findings: \par \par Coronary Angiography \par The coronary circulation is left dominant.  \par \par LM \par Left main artery: Angiography shows no disease.  \par \par LAD \par Left anterior descending artery: Angiography shows mild\par atherosclerosis.\par \par CX \par Circumflex: Angiography shows no disease.  \par \par RCA \par Right coronary artery: Angiography shows no disease.  \par

## 2023-07-24 NOTE — DISCUSSION/SUMMARY
[EKG obtained to assist in diagnosis and management of assessed problem(s)] : EKG obtained to assist in diagnosis and management of assessed problem(s) [FreeTextEntry1] : Impression:\par \par 1. Paroxysmal afib/aflutter: s/p PVI and CTI ablation on 1/18/2023. EKG performed today to assess for presence of recurrent afib and reveals NSR. Review of ECHO with normal LVEF. Off AC given portal vein thrombosis/acute anemia. Has Apple Iwatch without noted recurrent afib, checks EKGs. Some fatigue, unsure of secondary to anemia. Will decrease metoprolol to 25mg daily. Seeing Liver Specialist tomorrow for blood work. \par \par 2. JO: resume compliance with JO management to prevent future sinus node dysfunction and atrial fibrillation. Encouraged weight loss. Will f/u with pulmonary given dyspnea and trouble sleeping. \par \par Continue f/u with Cardiologist and RTO for f/u in 3 months.

## 2023-07-25 ENCOUNTER — APPOINTMENT (OUTPATIENT)
Dept: HEPATOLOGY | Facility: CLINIC | Age: 63
End: 2023-07-25
Payer: COMMERCIAL

## 2023-07-25 VITALS
BODY MASS INDEX: 23.41 KG/M2 | RESPIRATION RATE: 17 BRPM | SYSTOLIC BLOOD PRESSURE: 97 MMHG | DIASTOLIC BLOOD PRESSURE: 63 MMHG | TEMPERATURE: 98 F | HEIGHT: 61 IN | HEART RATE: 64 BPM | WEIGHT: 124 LBS | OXYGEN SATURATION: 96 %

## 2023-07-25 PROCEDURE — 99215 OFFICE O/P EST HI 40 MIN: CPT

## 2023-07-25 RX ORDER — FERROUS SULFATE 325(65) MG
325 (65 FE) TABLET ORAL
Qty: 60 | Refills: 3 | Status: DISCONTINUED | COMMUNITY
Start: 2023-06-12 | End: 2023-07-25

## 2023-07-25 RX ORDER — FLUTICASONE FUROATE, UMECLIDINIUM BROMIDE AND VILANTEROL TRIFENATATE 100; 62.5; 25 UG/1; UG/1; UG/1
100-62.5-25 POWDER RESPIRATORY (INHALATION)
Qty: 3 | Refills: 1 | Status: DISCONTINUED | COMMUNITY
Start: 2021-12-16 | End: 2023-07-25

## 2023-07-25 RX ORDER — SENNA 8.6 MG/1
8.6 TABLET, FILM COATED ORAL
Refills: 0 | Status: DISCONTINUED | COMMUNITY
Start: 2022-12-21 | End: 2023-07-25

## 2023-07-25 RX ORDER — BIOTIN 10 MG
TABLET ORAL
Refills: 0 | Status: ACTIVE | COMMUNITY

## 2023-07-26 NOTE — HISTORY OF PRESENT ILLNESS
[TextBox_42] : Transplant Hepatologist: Dr. Layne Dent\par Transplant Hepatology NP: Ciera Monaco, AGACNP-BC\par \par Trisha Villalobos is a 64 y/o female with a PMH of Rheumatic mitral disease, GERD, ITP, hyperkalemic periodic paralysis dx at 37 y/o, JO on CPAP, BCC, T11 fx, cataracts, Splenic Vein thrombosis, Portal vein malformation w/ hx of thrombus, portal HTN w/ EV and compensated liver cirrhosis, here for follow-up. \par \par Prior HPI: Patient seen inpatient at [facility] in 5/2023 after being advised to present to the ED for anemia (Hgb ~6) i/s/o progressive GUEVARA. Of note, patient recently underwent bioprosthetic AVR and ascending aorta replacement on 12/12/20222 with post-op course c/b paroxysmal afib with RVR s/p PVI and CTI ablation on 1/18/2023 and previously maintained on Eliquis + ASA, subsequently transitioned to Xarelto and now maintained on ASA only. 1/2023 TTE: LVEF 66%, mildly dilated LA, mild DC, moderate TR, mild RA enlargement, & normal pulmonary pressures. S/p LHC w/o significant CAD. Hepatology consulted for evaluation of anemia and cirrhosis. 5/5/23 CTAP comments on cirrhotic liver morphology, e/o pHTN with esophageal and splenic varices, small amount of pelvic ascites, and unchanged Portal Vein cavernous transformation (since 2020). 5/5/2023 EGD: one column of trace EV that flatten upon insufflation, no active bleeding or high risk stigmata of recent bleeding and no variceal banding performed. Colonoscopy notable for innumerable gastric polyps with one polyp removed for bx (pathology negative for malignancy). Cirrhosis thought to be 2/2 congestive hepatopathy v splenic vein thrombosis v portal vein malformation/thrombosis. 5/5/23 MELD 13. Anemia ultimately tx with IV iron, pt transitioned to PO iron supp and anemia thought to be r/t epistaxis prior to admission. \par \par Patient first made aware of c/f cirrhosis during 5/2023 admission. Denies episodes of liver decompensation. No prior liver bx. No known family hx of liver disease. Known EV varices 2/2 portal HTN since 2014, with reported hemorrhage at age 15. Hx of known splenic and portal vein thrombosis, initially diagnosed ~ 30 yrs ago. No ETOH use since recent hospitalization -- prior mild/moderate ETOH with ~ 2 drinks a few times a month. No IVDU. +Blood transfusion in 2000's. Heaviest weight in mid 130's and has been living at current weight for past few yrs, current weight 122 lbs. Ethnicity from [place]. Currently works PT as RN in Ambulatory Surgery (on disability). Lives at home with . Independent in ADLs/iADLs. \par \par In the interim since last visit, there have been no additional illnesses of hospitalizations. No episodes of liver decompensation. Returned to work as PT RN and has tolerated activity well, however, with report of recent increasing fatigue. Evaluated by Cardiologist yesterday and Metoprolol dosing reduced from 50 mg to 25 mg given c/o fatigue and lightheadedness. F/u with Dr. Sosa in Hematology (no hypercoagulable w/u) and deemed stable from an anemia perspective -- pt self d/c iron supp a few weeks ago. Evaluated by Dr. Reyes and completed Capsule endoscopy that was negative for GIB. Patient's allergies, medications, past medical, surgical, family, and social histories were reviewed and updated as appropriate. Seen in clinic today, accompanied by her . Reports that she is in baseline state of health with chronic fatigue. Denies any recent fevers, chills, cough, lightheadedness, AMS, abdominal pain, n/v, diarrhea, hematochezia, hematemesis, and melena. Denies alcohol, tobacco, or recreational drug use. OTC use of Collagen powder supp.

## 2023-07-26 NOTE — PHYSICAL EXAM
[Alert] : alert [Healthy Appearing] : healthy appearing [Supple] : supple [Clear to Auscultation] : lungs were clear to auscultation bilaterally [Breathing Comfortably on room air] : breathing comfortably on room air [Normal Rate] : normal rate [Regular Rhythm] : regular rhythm [Soft] : soft [Normal Bowel Sounds] : normal bowel sounds [No Edema] : no edema [Scleral Icterus] : no scleral icterus [Abdominal Ascites] : no abdominal ascites [Jaundice] : no jaundice [Asterixis] : no asterixis [Hepatic Encephalopathy] : no hepatic encephalopathy

## 2023-07-26 NOTE — ASSESSMENT
[FreeTextEntry1] : Trisha Villalobos is a 62 y/o female with Splenic Vein thrombosis, Portal vein malformation w/ hx of thrombus, portal HTN w/ EV and compensated liver cirrhosis (based on cross sectional imaging), here for f/u.

## 2023-07-26 NOTE — REVIEW OF SYSTEMS
[Fatigue] : fatigue [Fever] : no fever [Chills] : no chills [Recent Weight Gain (___ Lbs)] : no recent weight gain [Recent Weight Loss (___ Lbs)] : no recent weight loss [Chest Pain] : no chest pain [Palpitations] : no palpitations [SOB] : no shortness of breath [Cough] : no cough [Abdominal Pain] : no abdominal pain [Nausea] : no nausea [Constipation] : no constipation [Diarrhea] : diarrhea [Vomiting] : no vomiting [Dysuria] : no dysuria [Hematuria] : no hematuria [Itching] : no itching [Headache] : no headache [Dizziness] : no dizziness [Confusion] : no confusion

## 2023-07-26 NOTE — PLAN
[FreeTextEntry1] : \par #Compensated Liver Cirrhosis 2/2 Congestive Hepatopathy +/- Splenic Vein thrombosis +/- Portal vein malformation/thrombosis\par - Chronic liver dx w/u and viral hepatitis negative and MELD has continued to improve (13 > 8)  \par - Reviewed utility of liver bx in diagnosis and confirmation of advanced liver disease (NRH v Cirrhosis) > will decide on bx after updated imaging\par - Obtain MRI/MRE to evaluate vasculature and accurate fibrosis assessment\par - Euvolemic on exam, no indication for diuretics\par - Advised to c/w daily weights and report > 2-3 lb/day weight gain\par - HE: no sx, reinforced s/s and advised to report immediately\par - pHTN: 5/5/2023 EGD w/ one column of trace EV, no indication for BB at this time > need capsule EGD report\par - Counseled on importance of maintaining adequate nutrition with high protein and low Na diet\par - Advised to avoid nephrotoxic drugs\par - Iron deficient anemia resolved w/ PO supp, however, pt recently self d/c > update iron studies\par - Check thyroid fxn given persistent fatigue recently and anticipate improvement with recent medication adjustment\par - Reinforced s/s of liver decompensation and advised to report immediately\par \par Update labs today. \par RTC in 2 months. \par Plan discussed with Dr. Dent. \par \par Ciera Monaco, MSN, AGACNP-BC\par Transplant Hepatology Nurse Practitioner\par [facility]\par [address] [place] [id number]\par T: [phone] | F: [phone].

## 2023-07-27 ENCOUNTER — NON-APPOINTMENT (OUTPATIENT)
Age: 63
End: 2023-07-27

## 2023-07-27 LAB
ALBUMIN SERPL ELPH-MCNC: 4.4 G/DL
ALP BLD-CCNC: 92 U/L
ALT SERPL-CCNC: 21 U/L
ANION GAP SERPL CALC-SCNC: 8 MMOL/L
AST SERPL-CCNC: 22 U/L
BILIRUB SERPL-MCNC: 0.5 MG/DL
BUN SERPL-MCNC: 17 MG/DL
CALCIUM SERPL-MCNC: 9.8 MG/DL
CHLORIDE SERPL-SCNC: 107 MMOL/L
CO2 SERPL-SCNC: 26 MMOL/L
CREAT SERPL-MCNC: 0.62 MG/DL
EGFR: 99 ML/MIN/1.73M2
FERRITIN SERPL-MCNC: 19 NG/ML
FOLATE SERPL-MCNC: 19.7 NG/ML
GLUCOSE SERPL-MCNC: 66 MG/DL
INR PPP: 1 RATIO
IRON SATN MFR SERPL: 19 %
IRON SERPL-MCNC: 78 UG/DL
POTASSIUM SERPL-SCNC: 4 MMOL/L
PROT SERPL-MCNC: 6.8 G/DL
PT BLD: 11.4 SEC
SODIUM SERPL-SCNC: 141 MMOL/L
TIBC SERPL-MCNC: 410 UG/DL
TSH SERPL-ACNC: 2.5 UIU/ML
UIBC SERPL-MCNC: 332 UG/DL
VIT B12 SERPL-MCNC: 339 PG/ML

## 2023-08-17 ENCOUNTER — RESULT REVIEW (OUTPATIENT)
Age: 63
End: 2023-08-17

## 2023-08-17 ENCOUNTER — APPOINTMENT (OUTPATIENT)
Dept: MRI IMAGING | Facility: CLINIC | Age: 63
End: 2023-08-17
Payer: COMMERCIAL

## 2023-08-17 ENCOUNTER — OUTPATIENT (OUTPATIENT)
Dept: OUTPATIENT SERVICES | Facility: HOSPITAL | Age: 63
LOS: 1 days | End: 2023-08-17
Payer: COMMERCIAL

## 2023-08-17 DIAGNOSIS — Z98.890 OTHER SPECIFIED POSTPROCEDURAL STATES: Chronic | ICD-10-CM

## 2023-08-17 DIAGNOSIS — K74.69 OTHER CIRRHOSIS OF LIVER: ICD-10-CM

## 2023-08-17 DIAGNOSIS — O00.90 UNSPECIFIED ECTOPIC PREGNANCY WITHOUT INTRAUTERINE PREGNANCY: Chronic | ICD-10-CM

## 2023-08-17 DIAGNOSIS — Z98.49 CATARACT EXTRACTION STATUS, UNSPECIFIED EYE: Chronic | ICD-10-CM

## 2023-08-17 DIAGNOSIS — Z95.2 PRESENCE OF PROSTHETIC HEART VALVE: Chronic | ICD-10-CM

## 2023-08-17 PROCEDURE — 76391 MR ELASTOGRAPHY: CPT

## 2023-08-17 PROCEDURE — 74183 MRI ABD W/O CNTR FLWD CNTR: CPT

## 2023-08-17 PROCEDURE — 76391 MR ELASTOGRAPHY: CPT | Mod: 26

## 2023-08-17 PROCEDURE — A9585: CPT

## 2023-08-17 PROCEDURE — 74183 MRI ABD W/O CNTR FLWD CNTR: CPT | Mod: 26

## 2023-08-21 ENCOUNTER — NON-APPOINTMENT (OUTPATIENT)
Age: 63
End: 2023-08-21

## 2023-08-29 ENCOUNTER — APPOINTMENT (OUTPATIENT)
Dept: INTERNAL MEDICINE | Facility: CLINIC | Age: 63
End: 2023-08-29
Payer: COMMERCIAL

## 2023-08-29 VITALS
HEIGHT: 61 IN | HEART RATE: 67 BPM | BODY MASS INDEX: 23.41 KG/M2 | WEIGHT: 124 LBS | OXYGEN SATURATION: 95 % | TEMPERATURE: 98.6 F

## 2023-08-29 DIAGNOSIS — M85.852 OTHER SPECIFIED DISORDERS OF BONE DENSITY AND STRUCTURE, RIGHT THIGH: ICD-10-CM

## 2023-08-29 DIAGNOSIS — Z23 ENCOUNTER FOR IMMUNIZATION: ICD-10-CM

## 2023-08-29 DIAGNOSIS — F41.9 ANXIETY DISORDER, UNSPECIFIED: ICD-10-CM

## 2023-08-29 DIAGNOSIS — M85.851 OTHER SPECIFIED DISORDERS OF BONE DENSITY AND STRUCTURE, RIGHT THIGH: ICD-10-CM

## 2023-08-29 DIAGNOSIS — Z00.00 ENCOUNTER FOR GENERAL ADULT MEDICAL EXAMINATION W/OUT ABNORMAL FINDINGS: ICD-10-CM

## 2023-08-29 DIAGNOSIS — F32.A ANXIETY DISORDER, UNSPECIFIED: ICD-10-CM

## 2023-08-29 DIAGNOSIS — I35.9 NONRHEUMATIC AORTIC VALVE DISORDER, UNSPECIFIED: ICD-10-CM

## 2023-08-29 PROCEDURE — 90471 IMMUNIZATION ADMIN: CPT

## 2023-08-29 PROCEDURE — 90677 PCV20 VACCINE IM: CPT

## 2023-08-29 PROCEDURE — 99386 PREV VISIT NEW AGE 40-64: CPT | Mod: 25

## 2023-08-30 VITALS — SYSTOLIC BLOOD PRESSURE: 120 MMHG | DIASTOLIC BLOOD PRESSURE: 65 MMHG

## 2023-08-30 PROBLEM — Z23 NEED FOR PNEUMOCOCCAL VACCINATION: Status: ACTIVE | Noted: 2023-08-30

## 2023-08-30 PROBLEM — F41.9 ANXIETY AND DEPRESSION: Status: ACTIVE | Noted: 2023-08-30

## 2023-08-30 PROBLEM — M85.851 OSTEOPENIA OF BOTH HIPS: Status: ACTIVE | Noted: 2023-08-30

## 2023-08-30 PROBLEM — Z23 ENCOUNTER FOR IMMUNIZATION: Status: ACTIVE | Noted: 2023-08-30 | Resolved: 2023-09-13

## 2023-08-30 NOTE — HEALTH RISK ASSESSMENT
[No] : No [No falls in past year] : Patient reported no falls in the past year [0] : 2) Feeling down, depressed, or hopeless: Not at all (0) [[id number]] : 0 [Fully functional (bathing, dressing, toileting, transferring, walking, feeding)] : Fully functional (bathing, dressing, toileting, transferring, walking, feeding) [Fully functional (using the telephone, shopping, preparing meals, housekeeping, doing laundry, using] : Fully functional and needs no help or supervision to perform IADLs (using the telephone, shopping, preparing meals, housekeeping, doing laundry, using transportation, managing medications and managing finances) [Reports changes in hearing] : Reports no changes in hearing [Reports changes in vision] : Reports no changes in vision [Reports changes in dental health] : Reports no changes in dental health

## 2023-08-30 NOTE — PHYSICAL EXAM
[No Acute Distress] : no acute distress [Well Nourished] : well nourished [Well Developed] : well developed [Well-Appearing] : well-appearing [Normal Sclera/Conjunctiva] : normal sclera/conjunctiva [PERRL] : pupils equal round and reactive to light [EOMI] : extraocular movements intact [Normal Outer Ear/Nose] : the outer ears and nose were normal in appearance [Normal Oropharynx] : the oropharynx was normal [No JVD] : no jugular venous distention [No Lymphadenopathy] : no lymphadenopathy [Supple] : supple [Thyroid Normal, No Nodules] : the thyroid was normal and there were no nodules present [No Respiratory Distress] : no respiratory distress  [No Accessory Muscle Use] : no accessory muscle use [Clear to Auscultation] : lungs were clear to auscultation bilaterally [Normal Rate] : normal rate  [Regular Rhythm] : with a regular rhythm [Normal S1, S2] : normal S1 and S2 [No Carotid Bruits] : no carotid bruits [No Abdominal Bruit] : a ~M bruit was not heard ~T in the abdomen [No Varicosities] : no varicosities [Pedal Pulses Present] : the pedal pulses are present [No Edema] : there was no peripheral edema [No Palpable Aorta] : no palpable aorta [No Extremity Clubbing/Cyanosis] : no extremity clubbing/cyanosis [Soft] : abdomen soft [Non Tender] : non-tender [Non-distended] : non-distended [No Masses] : no abdominal mass palpated [No HSM] : no HSM [Normal Bowel Sounds] : normal bowel sounds [Normal Posterior Cervical Nodes] : no posterior cervical lymphadenopathy [Normal Anterior Cervical Nodes] : no anterior cervical lymphadenopathy [No CVA Tenderness] : no CVA  tenderness [No Spinal Tenderness] : no spinal tenderness [No Joint Swelling] : no joint swelling [Grossly Normal Strength/Tone] : grossly normal strength/tone [No Rash] : no rash [Coordination Grossly Intact] : coordination grossly intact [No Focal Deficits] : no focal deficits [Normal Gait] : normal gait [Deep Tendon Reflexes (DTR)] : deep tendon reflexes were 2+ and symmetric [Normal Affect] : the affect was normal [Normal Insight/Judgement] : insight and judgment were intact [de-identified] : 2/6 systolic murmur

## 2023-08-30 NOTE — HISTORY OF PRESENT ILLNESS
[FreeTextEntry1] : The patient needs a PCP.  She comes in to establish care. [de-identified] : She has an extensive PMH.  In 12/22 she had a bicuspid aortic valve and thoracic aortic aneurysm surgery.  She had atrial fibrillation postoperatively s/p ablation.  She sees her pulmonologist, Dr. Cameron.  She has JO.  Earlier this year she had fatigue and was found to be severely anemic requiring several until PRBC.  She had a GI bleed- the patient says the exact source was not found.  She has a long history of liver disease and she is seeing a hepatologist.  She had a recent MRI/MRE showing unchanged liver pathology going back to 2013.  She has a chronic portal vein and splenic thrombosis.  Her diet is fairly good.  She hasn't been exercising.    She is a nurse at [employer].  She is  with two children.  She doesn't smoke or use drugs and she has stopped drinking ETOH.

## 2023-08-30 NOTE — ASSESSMENT
[FreeTextEntry1] : Her medical history was reviewed and chart reviewed.    She is s/p cardiac surgery as above and she has seen her cardiologist.  The BP is fine at 120/65.  Return in two months to check lipids.  She sees her pulmonologist.  She sees a hepatologist for liver disease as above.  She is asymptomatic and had recent imaging and unremarkable LFTs.  Discussed diet and exercise.    Mammogram planned next week.  She sees a gyn.  DEXA done last year.  She sees a dermatologist and ophthalmologist.  She says she had a colonoscopy in the last two years.  S/p COVID-19 vaccine and two boosters and the new booster was advised when available.    She had a recent GI bleed with last CBC showing a normal H/H.  Check blood tests in two months.    Prevnar 20 today.  Shingrix advised.

## 2023-09-12 ENCOUNTER — APPOINTMENT (OUTPATIENT)
Dept: ULTRASOUND IMAGING | Facility: CLINIC | Age: 63
End: 2023-09-12
Payer: COMMERCIAL

## 2023-09-12 ENCOUNTER — APPOINTMENT (OUTPATIENT)
Dept: MAMMOGRAPHY | Facility: CLINIC | Age: 63
End: 2023-09-12
Payer: COMMERCIAL

## 2023-09-12 ENCOUNTER — APPOINTMENT (OUTPATIENT)
Dept: HEMATOLOGY ONCOLOGY | Facility: CLINIC | Age: 63
End: 2023-09-12

## 2023-09-12 ENCOUNTER — OUTPATIENT (OUTPATIENT)
Dept: OUTPATIENT SERVICES | Facility: HOSPITAL | Age: 63
LOS: 1 days | End: 2023-09-12
Payer: COMMERCIAL

## 2023-09-12 DIAGNOSIS — Z98.890 OTHER SPECIFIED POSTPROCEDURAL STATES: Chronic | ICD-10-CM

## 2023-09-12 DIAGNOSIS — Z95.2 PRESENCE OF PROSTHETIC HEART VALVE: Chronic | ICD-10-CM

## 2023-09-12 DIAGNOSIS — O00.90 UNSPECIFIED ECTOPIC PREGNANCY WITHOUT INTRAUTERINE PREGNANCY: Chronic | ICD-10-CM

## 2023-09-12 DIAGNOSIS — Z98.49 CATARACT EXTRACTION STATUS, UNSPECIFIED EYE: Chronic | ICD-10-CM

## 2023-09-12 DIAGNOSIS — Z00.8 ENCOUNTER FOR OTHER GENERAL EXAMINATION: ICD-10-CM

## 2023-09-12 PROCEDURE — 76641 ULTRASOUND BREAST COMPLETE: CPT | Mod: 26,50

## 2023-09-12 PROCEDURE — 77063 BREAST TOMOSYNTHESIS BI: CPT | Mod: 26

## 2023-09-12 PROCEDURE — 77067 SCR MAMMO BI INCL CAD: CPT | Mod: 26

## 2023-09-12 PROCEDURE — 76641 ULTRASOUND BREAST COMPLETE: CPT

## 2023-09-12 PROCEDURE — 77063 BREAST TOMOSYNTHESIS BI: CPT

## 2023-09-12 PROCEDURE — 77067 SCR MAMMO BI INCL CAD: CPT

## 2023-09-18 ENCOUNTER — NON-APPOINTMENT (OUTPATIENT)
Age: 63
End: 2023-09-18

## 2023-09-26 ENCOUNTER — APPOINTMENT (OUTPATIENT)
Dept: HEPATOLOGY | Facility: CLINIC | Age: 63
End: 2023-09-26
Payer: COMMERCIAL

## 2023-09-26 VITALS
TEMPERATURE: 96.7 F | WEIGHT: 125 LBS | HEART RATE: 78 BPM | RESPIRATION RATE: 17 BRPM | BODY MASS INDEX: 23.6 KG/M2 | SYSTOLIC BLOOD PRESSURE: 125 MMHG | HEIGHT: 61 IN | OXYGEN SATURATION: 97 % | DIASTOLIC BLOOD PRESSURE: 81 MMHG

## 2023-09-26 PROCEDURE — 99215 OFFICE O/P EST HI 40 MIN: CPT

## 2023-09-26 RX ORDER — METOPROLOL SUCCINATE 25 MG/1
25 TABLET, EXTENDED RELEASE ORAL
Refills: 2 | Status: DISCONTINUED | COMMUNITY
Start: 2023-01-13 | End: 2023-09-26

## 2023-10-31 ENCOUNTER — APPOINTMENT (OUTPATIENT)
Dept: INTERNAL MEDICINE | Facility: CLINIC | Age: 63
End: 2023-10-31
Payer: COMMERCIAL

## 2023-10-31 VITALS
BODY MASS INDEX: 23.79 KG/M2 | WEIGHT: 126 LBS | TEMPERATURE: 97.2 F | OXYGEN SATURATION: 98 % | HEIGHT: 61 IN | HEART RATE: 63 BPM

## 2023-10-31 DIAGNOSIS — R53.83 OTHER FATIGUE: ICD-10-CM

## 2023-10-31 PROCEDURE — 99214 OFFICE O/P EST MOD 30 MIN: CPT | Mod: 25

## 2023-10-31 PROCEDURE — 36415 COLL VENOUS BLD VENIPUNCTURE: CPT

## 2023-11-01 VITALS — DIASTOLIC BLOOD PRESSURE: 70 MMHG | SYSTOLIC BLOOD PRESSURE: 108 MMHG

## 2023-11-08 NOTE — ED PROVIDER NOTE - BIRTH SEX
PULMONARY/CRITICAL CARE        Patient is a 45y old  Female who presents with a chief complaint of s/p laparoscopic sleeve gastrectomy (2023 14:48)  Mild abdominal discomfort  BRIEF HOSPITAL COURSE: ***    Events last 24 hours: ***    PAST MEDICAL & SURGICAL HISTORY:  Back pain  intermittent pain; not on standing pain medication      History of COVID-19      FABRICIO on CPAP Dr Rizo      Mild obstructive sleep apnea      History of hypothyroidism      History of vitamin D deficiency      Prediabetes      Morbid obesity with BMI of 40.0-44.9, adult      History of 2  sections      History of tubal ligation        Allergies    No Known Allergies    Intolerances      FAMILY HISTORY: NA [facility].; no cigs, etoh; born [place], here for 30 yrs  Family history of hypertension (Father)        Review of Systems:  CONSTITUTIONAL: No fever, chills, or fatigue  EYES: No eye pain, visual disturbances, or discharge  ENMT:  No difficulty hearing, tinnitus, vertigo; No sinus or throat pain  NECK: No pain or stiffness  RESPIRATORY: No cough, wheezing, chills or hemoptysis; No shortness of breath  CARDIOVASCULAR: No chest pain, palpitations, dizziness, or leg swelling  GASTROINTESTINAL: mild abdominal   pain. No nausea, vomiting, or hematemesis; No diarrhea or constipation. No melena or hematochezia.  GENITOURINARY: No dysuria, frequency, hematuria, or incontinence  NEUROLOGICAL: No headaches, memory loss, loss of strength, numbness, or tremors  SKIN: No itching, burning, rashes, or lesions   MUSCULOSKELETAL: No joint pain or swelling; No muscle, back, or extremity pain  PSYCHIATRIC: No depression, anxiety, mood swings, or difficulty sleeping      Medications:        acetaminophen   IVPB .. 1000 milliGRAM(s) IV Intermittent once  acetaminophen   IVPB .. 1000 milliGRAM(s) IV Intermittent once  HYDROmorphone  Injectable 0.5 milliGRAM(s) IV Push every 4 hours PRN  ibuprofen IVPB .. 800 milliGRAM(s) IV Intermittent every 6 hours PRN  ondansetron Injectable 4 milliGRAM(s) IV Push every 6 hours  oxyCODONE    IR 5 milliGRAM(s) Oral every 4 hours PRN        hyoscyamine SL 0.125 milliGRAM(s) SubLingual every 6 hours PRN  pantoprazole  Injectable 40 milliGRAM(s) IV Push daily  simethicone 80 milliGRAM(s) Chew every 8 hours PRN        lactated ringers. 1000 milliLiter(s) IV Continuous <Continuous>    influenza   Vaccine 0.5 milliLiter(s) IntraMuscular once              ICU Vital Signs Last 24 Hrs  T(C): 36.7 (2023 16:32), Max: 36.7 (2023 06:54)  T(F): 98 (2023 16:32), Max: 98.1 (2023 06:54)  HR: 93 (:) (56 - 93)  BP: 136/85 (2023 16:32) (115/66 - 163/88)  BP(mean): --  ABP: --  ABP(mean): --  RR: 18 (:32) (11 - 18)  SpO2: 99% (:) (90% - 99%)    O2 Parameters below as of 2023 16:32  Patient On (Oxygen Delivery Method): room air          Vital Signs Last 24 Hrs  T(C): 36.7 (2023 16:32), Max: 36.7 (2023 06:54)  T(F): 98 (:32), Max: 98.1 (2023 06:54)  HR: 93 (:) (56 - 93)  BP: 136/85 (:32) (115/66 - 163/88)  BP(mean): --  RR: 18 (:32) (11 - 18)  SpO2: 99% (:) (90% - 99%)    Parameters below as of :32  Patient On (Oxygen Delivery Method): room air            I&O's Detail    2023 07:01  -  2023 17:53  --------------------------------------------------------  IN:  Total IN: 0 mL    OUT:    Voided (mL): 1075 mL  Total OUT: 1075 mL    Total NET: -1075 mL            LABS:                CAPILLARY BLOOD GLUCOSE            CULTURES:      Physical Examination:    General: No acute distress.      HEENT: Pupils equal, reactive to light.  Symmetric.    PULM: Clear to auscultation bilaterally, no wheeze rhonchi rales.    CVS: Regular rate and rhythm, no murmurs, rubs, or gallops    ABD: Soft, nondistended, mildly tender, decreased bowel sounds, no masses    EXT: No edema, nontender calves    SKIN: Warm and well perfused, no rashes noted.    NEURO: Alert, oriented, interactive, nonfocal    RADIOLOGY: ***    CRITICAL CARE TIME SPENT: ***  
Female

## 2023-11-16 ENCOUNTER — APPOINTMENT (OUTPATIENT)
Dept: PULMONOLOGY | Facility: CLINIC | Age: 63
End: 2023-11-16
Payer: COMMERCIAL

## 2023-11-16 VITALS
BODY MASS INDEX: 23.79 KG/M2 | HEIGHT: 61 IN | HEART RATE: 60 BPM | WEIGHT: 126 LBS | RESPIRATION RATE: 17 BRPM | DIASTOLIC BLOOD PRESSURE: 70 MMHG | OXYGEN SATURATION: 97 % | SYSTOLIC BLOOD PRESSURE: 120 MMHG

## 2023-11-16 PROCEDURE — ZZZZZ: CPT

## 2023-11-16 PROCEDURE — 99214 OFFICE O/P EST MOD 30 MIN: CPT | Mod: 25

## 2023-11-16 PROCEDURE — 94010 BREATHING CAPACITY TEST: CPT

## 2023-12-21 ENCOUNTER — APPOINTMENT (OUTPATIENT)
Dept: ORTHOPEDIC SURGERY | Facility: CLINIC | Age: 63
End: 2023-12-21
Payer: COMMERCIAL

## 2023-12-21 VITALS
WEIGHT: 128 LBS | DIASTOLIC BLOOD PRESSURE: 63 MMHG | HEART RATE: 65 BPM | HEIGHT: 61 IN | BODY MASS INDEX: 24.17 KG/M2 | SYSTOLIC BLOOD PRESSURE: 105 MMHG

## 2023-12-21 DIAGNOSIS — G56.02 CARPAL TUNNEL SYNDROME, LEFT UPPER LIMB: ICD-10-CM

## 2023-12-21 DIAGNOSIS — M18.12 UNILATERAL PRIMARY OSTEOARTHRITIS OF FIRST CARPOMETACARPAL JOINT, LEFT HAND: ICD-10-CM

## 2023-12-21 DIAGNOSIS — G56.01 CARPAL TUNNEL SYNDROME, RIGHT UPPER LIMB: ICD-10-CM

## 2023-12-21 DIAGNOSIS — Z78.9 OTHER SPECIFIED HEALTH STATUS: ICD-10-CM

## 2023-12-21 DIAGNOSIS — M18.11 UNILATERAL PRIMARY OSTEOARTHRITIS OF FIRST CARPOMETACARPAL JOINT, RIGHT HAND: ICD-10-CM

## 2023-12-21 PROCEDURE — 99203 OFFICE O/P NEW LOW 30 MIN: CPT | Mod: 25

## 2023-12-21 PROCEDURE — 20526 THER INJECTION CARP TUNNEL: CPT | Mod: LT

## 2023-12-21 PROCEDURE — 73110 X-RAY EXAM OF WRIST: CPT | Mod: 50

## 2023-12-21 NOTE — CONSULT LETTER
[Dear  ___] : Dear  [unfilled], [Consult Letter:] : I had the pleasure of evaluating your patient, [unfilled]. [FreeTextEntry1] : The patient was seen in hand consultation today. A copy of my office note is enclosed for your review with the patient's knowledge and consent.  Sincerely,  Sudarshan Briseno MD Chief, Hand Surgery Residency  (3023-1901) Department of Orthopaedic Surgery  [facility] Professor of Orthopaedic Surgery Jignesh BYRNES at [facility]

## 2023-12-21 NOTE — ASSESSMENT
[FreeTextEntry1] : The patient's primary problem is left hand numbness and tingling at night interfering with sleep.  Patient also has pain.  Symptoms are present upon awakening in the morning but do not occur during the day.  Given history and physical findings patient most likely has left carpal tunnel syndrome. Patient had right carpal tunnel release approximately 6 years ago performed by Holland Cha MD, and reports a good result. Patient had electrodiagnostic studies prior to right carpal tunnel release.  Patient does not recall if the study showed left carpal tunnel syndrome.  The old studies cannot be obtained.  Patient states that she found the electrodiagnostic study extremely painful and does not want to have it repeated. I have explained to patient that it is appropriate to confirm carpal tunnel syndrome by 1 means or another.  I suggested to the patient utilizing a carpal tunnel cortisone injection on the left as a diagnostic test and therapeutic trial.  If symptoms subside, then it will confirm carpal tunnel syndrome.  Furthermore, if symptoms subside it will serve to treat the patient for her complaints.  Patient would far prefer injection to electrodiagnostic study and therefore patient was treated today with left carpal tunnel cortisone injection.  Patient provided left wrist support splint, which she should wear at night.  Not needed during the day.  As noted, if symptoms are controlled, it will confirm diagnosis and serve as treatment. If symptoms are controlled and then recur, patient should return to discuss additional diagnostic and/or treatment options.  Patient states that she anticipates retiring in the next 6 months and explains that she was considering having surgery before or shortly after she retires.  If the patient is relieved of symptoms following today's injection, surgery would not be indicated unless/until symptoms recur. Conversely, if the injection provides no benefit, electrodiagnostic studies would be indicated and recommended to confirm or rule out carpal tunnel syndrome before proceeding with other treatments.  Patient has right basal joint arthritis radiographically stage II.  Similar left basal joint arthritis but minimally symptomatic.  Patient states that she does not take oral NSAIDs because of history of GI bleed and GI irritation.  Consequently, topical diclofenac gel prescribed to be applied 1-2-3 times per day to the basal joint area.  The exact location, methodology, dosing, and frequency were described.  If symptoms of basal joint pain are controlled, no further treatment required.  If not controlled, then patient should return for reevaluation.  HMTS discussed but deferred because patient works as a nurse and it would be impractical to wear HMTS at work. Patient has no other active hand problem requiring treatment. Prognosis uncertain. Patient should return for reevaluation if symptoms continue, or if symptoms recur.  If patient is comfortable she may return as needed.  A lengthy and detailed discussion was held with the patient regarding analysis, treatment, and recommendations. All questions have been answered. At the conclusion the patient expressed acceptance, understanding and agreement with the plan.

## 2023-12-21 NOTE — PHYSICAL EXAM
[de-identified] : NEUROLOGIC: Left hand: Normal sensation all fingers at rest. Phalen's test with median nerve compression: Causes pain just proximal to wrist where pressure is applied No paresthesias in median distribution Radial nerve motor and sensory and ulnar nerve motor and sensory are intact. Questionable decreased thenar tone and bulk  Right hand: Carpal tunnel surgical scar healed, blended with surrounding skin, nontender Phalen's test with median nerve compression: Negative Radial nerve motor and sensory and ulnar nerve motor and sensory are intact. Thenar tone and bulk normal, larger thenar muscles and better tone than left.  Right wrist E/F50/50 without notable positive findings Right basal joint Joint line tenderness 1+ Manipulation 1+ crepitus, 1+ pain No other notable wrist findings.  Right hand No A1 pulley tenderness and no triggering in any finger. MP 2: No tenderness good stability. At maximum MP flexion radial aspect of metacarpal head is prominent but without other notable findings. No pertinent MP, PIP, or DIP joint contributory findings, except some Heberden's nodes; none are clinically painful.  Left wrist Good motion no notable localizing wrist joint findings. Left basal joint Slight joint line tenderness. Manipulation: Trace crepitus mild pain, much less painful than right basal joint manipulation No other notable wrist findings.  Left hand No A1 pulley tenderness and no triggering in any finger. No pertinent MP, PIP, or DIP joint contributory findings, except some Heberden's nodes; none are clinically painful.  Skin: No cyanosis, clubbing, edema or rashes. Vascular: Radial pulses intact. Lymphatic: No streaking or epitrochlear adenopathy. The patient is awake, alert, and oriented. Affect appropriate. Cooperative.  [de-identified] : Radiographs ordered and interpreted by me today, reviewed and discussed with the patient today. 3 views right wrist and hand. Right basal joint narrowing. Slight sclerosis.  Small volar beak exostosis. Combined changes consistent with stage II basal joint arthritis. Ulnar positive variance approximately 5 mm. Atypical relationship between distal radius, ulna, carpal bones. Proximal aspect of head obliquely oriented articulating with radius/sigmoid notch. Distal radial aspect of ulnar head articulates with ulnar facet of proximal lunate. Radio carpal and midcarpal joint spaces maintained.  Intercarpal relationships within normal acceptable range. MP PIP and DIP joints without notable degenerative changes. No fractures or dislocations. Tiny osteophyte DIP 2.  3 views left hand and wrist including basal joint. Basal joint narrowing.  Sclerosis of trapezio first metacarpal surfaces. Small osteophyte formation.  Changes consistent with stage II basal joint arthritis. Suggestion of cystic area within trapezium visible on basal joint lateral radiograph not evident on other images. Cystic area may be artifactual and not real. Ulnar +3 mm. Relationship between radius ulna and lunate more conventional than right wrist. Radiocarpal midcarpal joint spaces maintained. MP PIP and DIP joints without notable degenerative changes.  Tiny osteophyte dorsal radial corner thumb distal phalanx and IP joint. No fractures or dislocations.

## 2023-12-21 NOTE — HISTORY OF PRESENT ILLNESS
[FreeTextEntry1] : The patient is 62 yo HD female [facility] OR admission area, occ at [facility], who presents as a NEW HAND PATIENT for evaluation. Past history of hyperkalemic periodic paralysis.  The patient reports numbness in left hand every night and upon awakening in morning. The patient has pain and radiation into the forearm. Patient had right carpal tunnel release by Holland Cha MD approximately 5 to 6 years ago at [facility].  Patient perceives pain at the right basal joint.  Pain can be severe at times. Pain is associated with power pinch activities. Patient notes prominence of the right index metacarpal head but has no pain and question significance of the bony prominence. Patient was not aware of left basal joint pain until basal joint was manipulated when there was slight crepitus and pain. Patient then acknowledged left basal joint pain. Patient not aware of triggering. Patient has no other active hand problems for which she seeks treatment.  Patient states that she is working full-time but is considering care home.

## 2024-01-15 LAB
ALBUMIN SERPL ELPH-MCNC: 4.1 G/DL
ALP BLD-CCNC: 105 U/L
ALT SERPL-CCNC: 22 U/L
ANION GAP SERPL CALC-SCNC: 13 MMOL/L
AST SERPL-CCNC: 19 U/L
BILIRUB SERPL-MCNC: 0.3 MG/DL
BUN SERPL-MCNC: 17 MG/DL
CALCIUM SERPL-MCNC: 9.1 MG/DL
CHLORIDE SERPL-SCNC: 107 MMOL/L
CHOLEST SERPL-MCNC: 233 MG/DL
CO2 SERPL-SCNC: 19 MMOL/L
CREAT SERPL-MCNC: 0.64 MG/DL
EGFR: 99 ML/MIN/1.73M2
ESTIMATED AVERAGE GLUCOSE: 111 MG/DL
FERRITIN SERPL-MCNC: 18 NG/ML
GLUCOSE SERPL-MCNC: 97 MG/DL
HBA1C MFR BLD HPLC: 5.5 %
HCT VFR BLD CALC: 38.8 %
HDLC SERPL-MCNC: 62 MG/DL
HGB BLD-MCNC: 12.2 G/DL
IRON SATN MFR SERPL: 11 %
IRON SERPL-MCNC: 43 UG/DL
LDLC SERPL CALC-MCNC: 154 MG/DL
MCHC RBC-ENTMCNC: 27.2 PG
MCHC RBC-ENTMCNC: 31.4 GM/DL
MCV RBC AUTO: 86.6 FL
NONHDLC SERPL-MCNC: 171 MG/DL
PLATELET # BLD AUTO: 117 K/UL
POTASSIUM SERPL-SCNC: 3.9 MMOL/L
PROT SERPL-MCNC: 6.5 G/DL
RBC # BLD: 4.48 M/UL
RBC # FLD: 14.9 %
SODIUM SERPL-SCNC: 139 MMOL/L
TIBC SERPL-MCNC: 393 UG/DL
TRIGL SERPL-MCNC: 94 MG/DL
UIBC SERPL-MCNC: 351 UG/DL
WBC # FLD AUTO: 5.15 K/UL

## 2024-01-16 ENCOUNTER — APPOINTMENT (OUTPATIENT)
Dept: HEPATOLOGY | Facility: CLINIC | Age: 64
End: 2024-01-16
Payer: COMMERCIAL

## 2024-01-16 VITALS
TEMPERATURE: 97.3 F | HEIGHT: 61 IN | SYSTOLIC BLOOD PRESSURE: 120 MMHG | HEART RATE: 58 BPM | WEIGHT: 130 LBS | BODY MASS INDEX: 24.55 KG/M2 | DIASTOLIC BLOOD PRESSURE: 60 MMHG | OXYGEN SATURATION: 98 %

## 2024-01-16 DIAGNOSIS — I85.00 ESOPHAGEAL VARICES W/OUT BLEEDING: ICD-10-CM

## 2024-01-16 DIAGNOSIS — I85.10 SECONDARY ESOPHAGEAL VARICES W/OUT BLEEDING: ICD-10-CM

## 2024-01-16 PROCEDURE — 99215 OFFICE O/P EST HI 40 MIN: CPT

## 2024-01-16 NOTE — HISTORY OF PRESENT ILLNESS
[de-identified] : 64 y/o female with a PMH of Rheumatic mitral disease, GERD, ITP, hyperkalemic periodic paralysis dx at 37 y/o, JO on CPAP, BCC, T11 fx, cataracts, Splenic Vein thrombosis, abnormal liver morphology 2/2 portal vein malformation w/ hx of thrombus, portal HTN w/ EV, here for follow-up. 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 No liver fibrosis and morphologic changes attributed to PVT rather than advanced fibrosis.  Previous note: Prior HPI: Patient seen inpatient at [facility] in 5/2023 after being advised to present to the ED for anemia (Hgb ~6) i/s/o progressive GUEVARA. Of note, patient recently underwent bioprosthetic AVR and ascending aorta replacement on 12/12/20222 with post-op course c/b paroxysmal afib with RVR s/p PVI and CTI ablation on 1/18/2023 and previously maintained on Eliquis + ASA, subsequently transitioned to Xarelto and now maintained on ASA only. 1/2023 TTE: LVEF 66%, mildly dilated LA, mild WY, moderate TR, mild RA enlargement, & normal pulmonary pressures. S/p LHC w/o significant CAD. Hepatology consulted for evaluation of anemia and cirrhosis. 5/5/23 CTAP comments on cirrhotic liver morphology, e/o pHTN with esophageal and splenic varices, small amount of pelvic ascites, and unchanged Portal Vein cavernous transformation (since 2020). 5/5/2023 EGD: one column of trace EV that flatten upon insufflation, no active bleeding or high risk stigmata of recent bleeding and no variceal banding performed. Colonoscopy notable for innumerable gastric polyps with one polyp removed for bx (pathology negative for malignancy). Cirrhosis thought to be 2/2 congestive hepatopathy v splenic vein thrombosis v portal vein malformation/thrombosis. 5/5/23 MELD 13. Anemia ultimately tx with IV iron, pt transitioned to PO iron supp and anemia thought to be r/t epistaxis prior to admission.

## 2024-01-16 NOTE — ASSESSMENT
[FreeTextEntry1] : 62 y/o female with Splenic Vein thrombosis and abnormal liver morphology (no cirrhosis) 2/2 portal vein malformation w/ hx of thrombus, portal HTN w/ EV, here for f/u.   Abnormal liver morphology 2/2 cavernous transformation of portal vein - Chronic liver dx w/u and viral hepatitis negative - 8/17/23 MRE w/ normal elastography and c/f cirrhosis 2/2 abnormal liver morphology likely r/t cavernous transformation of portal vein > reviewed with Radiology - No indication for liver bx at this time as it will not  - Need to obtain Hematology second opinion regarding potential underlying hypercoagulable disorder - Will review imaging with IR to see if intervention can be made (after heme opinion) US of liver - pHTN: 5/5/2023 EGD w/ one column of trace EV > repeat in 5/2024- will see Dr. Elliott - Encouraged to f/u with Cardiology given report of new chest heaviness  -fatigue - will do labs r/o anemia f/u in 3 months I spent total of 45 minutes on this patient encounter.

## 2024-01-16 NOTE — PHYSICAL EXAM
[General Appearance - Alert] : alert [Sclera] : the sclera and conjunctiva were normal [Neck Appearance] : the appearance of the neck was normal [] : no respiratory distress [Respiration, Rhythm And Depth] : normal respiratory rhythm and effort [Auscultation Breath Sounds / Voice Sounds] : lungs were clear to auscultation bilaterally [Heart Rate And Rhythm] : heart rate was normal and rhythm regular [Heart Sounds] : normal S1 and S2 [Bowel Sounds] : normal bowel sounds [Abdomen Soft] : soft [Abdomen Tenderness] : non-tender [Oriented To Time, Place, And Person] : oriented to person, place, and time

## 2024-01-17 ENCOUNTER — NON-APPOINTMENT (OUTPATIENT)
Age: 64
End: 2024-01-17

## 2024-01-17 ENCOUNTER — OUTPATIENT (OUTPATIENT)
Dept: OUTPATIENT SERVICES | Facility: HOSPITAL | Age: 64
LOS: 1 days | End: 2024-01-17
Payer: COMMERCIAL

## 2024-01-17 ENCOUNTER — APPOINTMENT (OUTPATIENT)
Dept: CV DIAGNOSITCS | Facility: HOSPITAL | Age: 64
End: 2024-01-17

## 2024-01-17 DIAGNOSIS — Z98.890 OTHER SPECIFIED POSTPROCEDURAL STATES: ICD-10-CM

## 2024-01-17 DIAGNOSIS — Z98.890 OTHER SPECIFIED POSTPROCEDURAL STATES: Chronic | ICD-10-CM

## 2024-01-17 DIAGNOSIS — Z98.49 CATARACT EXTRACTION STATUS, UNSPECIFIED EYE: Chronic | ICD-10-CM

## 2024-01-17 DIAGNOSIS — Z95.2 PRESENCE OF PROSTHETIC HEART VALVE: ICD-10-CM

## 2024-01-17 DIAGNOSIS — O00.90 UNSPECIFIED ECTOPIC PREGNANCY WITHOUT INTRAUTERINE PREGNANCY: Chronic | ICD-10-CM

## 2024-01-17 DIAGNOSIS — Z95.2 PRESENCE OF PROSTHETIC HEART VALVE: Chronic | ICD-10-CM

## 2024-01-17 PROCEDURE — 93306 TTE W/DOPPLER COMPLETE: CPT | Mod: 26

## 2024-01-18 ENCOUNTER — NON-APPOINTMENT (OUTPATIENT)
Age: 64
End: 2024-01-18

## 2024-01-18 LAB
AFP-TM SERPL-MCNC: <1.8 NG/ML
ALBUMIN SERPL ELPH-MCNC: 4.4 G/DL
ALP BLD-CCNC: 110 U/L
ALT SERPL-CCNC: 23 U/L
ANION GAP SERPL CALC-SCNC: 11 MMOL/L
AST SERPL-CCNC: 23 U/L
BILIRUB SERPL-MCNC: 0.6 MG/DL
BUN SERPL-MCNC: 16 MG/DL
CALCIUM SERPL-MCNC: 9.4 MG/DL
CANCER AG19-9 SERPL-ACNC: 8 U/ML
CHLORIDE SERPL-SCNC: 108 MMOL/L
CO2 SERPL-SCNC: 23 MMOL/L
CREAT SERPL-MCNC: 0.7 MG/DL
EGFR: 97 ML/MIN/1.73M2
FERRITIN SERPL-MCNC: 15 NG/ML
GLUCOSE SERPL-MCNC: 92 MG/DL
HCT VFR BLD CALC: 41.6 %
HGB BLD-MCNC: 12.5 G/DL
INR PPP: 1.07 RATIO
IRON SATN MFR SERPL: 16 %
IRON SERPL-MCNC: 73 UG/DL
MCHC RBC-ENTMCNC: 25.7 PG
MCHC RBC-ENTMCNC: 30 GM/DL
MCV RBC AUTO: 85.6 FL
PLATELET # BLD AUTO: 127 K/UL
POTASSIUM SERPL-SCNC: 4 MMOL/L
PROT SERPL-MCNC: 6.8 G/DL
PT BLD: 12.2 SEC
RBC # BLD: 4.86 M/UL
RBC # FLD: 15.1 %
SODIUM SERPL-SCNC: 142 MMOL/L
TIBC SERPL-MCNC: 443 UG/DL
UIBC SERPL-MCNC: 370 UG/DL
WBC # FLD AUTO: 5.42 K/UL

## 2024-01-23 ENCOUNTER — EMERGENCY (EMERGENCY)
Facility: HOSPITAL | Age: 64
LOS: 1 days | Discharge: HOME | End: 2024-01-23
Attending: EMERGENCY MEDICINE | Admitting: EMERGENCY MEDICINE
Payer: COMMERCIAL

## 2024-01-23 ENCOUNTER — APPOINTMENT (OUTPATIENT)
Dept: INTERNAL MEDICINE | Facility: CLINIC | Age: 64
End: 2024-01-23
Payer: COMMERCIAL

## 2024-01-23 VITALS
HEART RATE: 59 BPM | OXYGEN SATURATION: 100 % | DIASTOLIC BLOOD PRESSURE: 77 MMHG | SYSTOLIC BLOOD PRESSURE: 127 MMHG | RESPIRATION RATE: 18 BRPM | TEMPERATURE: 98 F

## 2024-01-23 VITALS — DIASTOLIC BLOOD PRESSURE: 65 MMHG | TEMPERATURE: 98.2 F | SYSTOLIC BLOOD PRESSURE: 105 MMHG

## 2024-01-23 VITALS
HEIGHT: 61 IN | WEIGHT: 131 LBS | TEMPERATURE: 97.8 F | OXYGEN SATURATION: 96 % | HEART RATE: 72 BPM | BODY MASS INDEX: 24.73 KG/M2

## 2024-01-23 DIAGNOSIS — Z98.890 OTHER SPECIFIED POSTPROCEDURAL STATES: Chronic | ICD-10-CM

## 2024-01-23 DIAGNOSIS — Z98.49 CATARACT EXTRACTION STATUS, UNSPECIFIED EYE: Chronic | ICD-10-CM

## 2024-01-23 DIAGNOSIS — O00.90 UNSPECIFIED ECTOPIC PREGNANCY WITHOUT INTRAUTERINE PREGNANCY: Chronic | ICD-10-CM

## 2024-01-23 DIAGNOSIS — Z95.2 PRESENCE OF PROSTHETIC HEART VALVE: Chronic | ICD-10-CM

## 2024-01-23 DIAGNOSIS — R10.9 UNSPECIFIED ABDOMINAL PAIN: ICD-10-CM

## 2024-01-23 LAB
ALBUMIN SERPL ELPH-MCNC: 3.8 G/DL — SIGNIFICANT CHANGE UP (ref 3.3–5)
ALP SERPL-CCNC: 103 U/L — SIGNIFICANT CHANGE UP (ref 40–120)
ALT FLD-CCNC: 18 U/L — SIGNIFICANT CHANGE UP (ref 4–33)
ANION GAP SERPL CALC-SCNC: 11 MMOL/L — SIGNIFICANT CHANGE UP (ref 7–14)
APPEARANCE UR: CLEAR — SIGNIFICANT CHANGE UP
APTT BLD: 34.6 SEC — SIGNIFICANT CHANGE UP (ref 24.5–35.6)
AST SERPL-CCNC: 22 U/L — SIGNIFICANT CHANGE UP (ref 4–32)
BACTERIA # UR AUTO: NEGATIVE /HPF — SIGNIFICANT CHANGE UP
BASOPHILS # BLD AUTO: 0.03 K/UL — SIGNIFICANT CHANGE UP (ref 0–0.2)
BASOPHILS NFR BLD AUTO: 0.5 % — SIGNIFICANT CHANGE UP (ref 0–2)
BILIRUB SERPL-MCNC: 0.4 MG/DL — SIGNIFICANT CHANGE UP (ref 0.2–1.2)
BILIRUB UR-MCNC: NEGATIVE — SIGNIFICANT CHANGE UP
BLD GP AB SCN SERPL QL: NEGATIVE — SIGNIFICANT CHANGE UP
BLOOD GAS VENOUS COMPREHENSIVE RESULT: SIGNIFICANT CHANGE UP
BUN SERPL-MCNC: 14 MG/DL — SIGNIFICANT CHANGE UP (ref 7–23)
CALCIUM SERPL-MCNC: 9.5 MG/DL — SIGNIFICANT CHANGE UP (ref 8.4–10.5)
CAST: 2 /LPF — SIGNIFICANT CHANGE UP (ref 0–4)
CHLORIDE SERPL-SCNC: 106 MMOL/L — SIGNIFICANT CHANGE UP (ref 98–107)
CO2 SERPL-SCNC: 23 MMOL/L — SIGNIFICANT CHANGE UP (ref 22–31)
COLOR SPEC: YELLOW — SIGNIFICANT CHANGE UP
CREAT SERPL-MCNC: 0.64 MG/DL — SIGNIFICANT CHANGE UP (ref 0.5–1.3)
DIFF PNL FLD: NEGATIVE — SIGNIFICANT CHANGE UP
EGFR: 99 ML/MIN/1.73M2 — SIGNIFICANT CHANGE UP
EOSINOPHIL # BLD AUTO: 0.2 K/UL — SIGNIFICANT CHANGE UP (ref 0–0.5)
EOSINOPHIL NFR BLD AUTO: 3.5 % — SIGNIFICANT CHANGE UP (ref 0–6)
GLUCOSE SERPL-MCNC: 108 MG/DL — HIGH (ref 70–99)
GLUCOSE UR QL: NEGATIVE MG/DL — SIGNIFICANT CHANGE UP
HCT VFR BLD CALC: 38.6 % — SIGNIFICANT CHANGE UP (ref 34.5–45)
HGB BLD-MCNC: 12.1 G/DL — SIGNIFICANT CHANGE UP (ref 11.5–15.5)
IANC: 3.14 K/UL — SIGNIFICANT CHANGE UP (ref 1.8–7.4)
IMM GRANULOCYTES NFR BLD AUTO: 0.4 % — SIGNIFICANT CHANGE UP (ref 0–0.9)
INR BLD: 1.01 RATIO — SIGNIFICANT CHANGE UP (ref 0.85–1.18)
KETONES UR-MCNC: NEGATIVE MG/DL — SIGNIFICANT CHANGE UP
LEUKOCYTE ESTERASE UR-ACNC: NEGATIVE — SIGNIFICANT CHANGE UP
LIDOCAIN IGE QN: 39 U/L — SIGNIFICANT CHANGE UP (ref 7–60)
LYMPHOCYTES # BLD AUTO: 1.73 K/UL — SIGNIFICANT CHANGE UP (ref 1–3.3)
LYMPHOCYTES # BLD AUTO: 30.7 % — SIGNIFICANT CHANGE UP (ref 13–44)
MCHC RBC-ENTMCNC: 25.6 PG — LOW (ref 27–34)
MCHC RBC-ENTMCNC: 31.3 GM/DL — LOW (ref 32–36)
MCV RBC AUTO: 81.6 FL — SIGNIFICANT CHANGE UP (ref 80–100)
MONOCYTES # BLD AUTO: 0.52 K/UL — SIGNIFICANT CHANGE UP (ref 0–0.9)
MONOCYTES NFR BLD AUTO: 9.2 % — SIGNIFICANT CHANGE UP (ref 2–14)
NEUTROPHILS # BLD AUTO: 3.14 K/UL — SIGNIFICANT CHANGE UP (ref 1.8–7.4)
NEUTROPHILS NFR BLD AUTO: 55.7 % — SIGNIFICANT CHANGE UP (ref 43–77)
NITRITE UR-MCNC: NEGATIVE — SIGNIFICANT CHANGE UP
NRBC # BLD: 0 /100 WBCS — SIGNIFICANT CHANGE UP (ref 0–0)
NRBC # FLD: 0 K/UL — SIGNIFICANT CHANGE UP (ref 0–0)
PH UR: 7 — SIGNIFICANT CHANGE UP (ref 5–8)
PLATELET # BLD AUTO: 108 K/UL — LOW (ref 150–400)
POTASSIUM SERPL-MCNC: 3.8 MMOL/L — SIGNIFICANT CHANGE UP (ref 3.5–5.3)
POTASSIUM SERPL-SCNC: 3.8 MMOL/L — SIGNIFICANT CHANGE UP (ref 3.5–5.3)
PROT SERPL-MCNC: 6.7 G/DL — SIGNIFICANT CHANGE UP (ref 6–8.3)
PROT UR-MCNC: NEGATIVE MG/DL — SIGNIFICANT CHANGE UP
PROTHROM AB SERPL-ACNC: 11.4 SEC — SIGNIFICANT CHANGE UP (ref 9.5–13)
RBC # BLD: 4.73 M/UL — SIGNIFICANT CHANGE UP (ref 3.8–5.2)
RBC # FLD: 14.9 % — HIGH (ref 10.3–14.5)
RBC CASTS # UR COMP ASSIST: 0 /HPF — SIGNIFICANT CHANGE UP (ref 0–4)
RH IG SCN BLD-IMP: NEGATIVE — SIGNIFICANT CHANGE UP
SODIUM SERPL-SCNC: 140 MMOL/L — SIGNIFICANT CHANGE UP (ref 135–145)
SP GR SPEC: 1.01 — SIGNIFICANT CHANGE UP (ref 1–1.03)
SQUAMOUS # UR AUTO: 1 /HPF — SIGNIFICANT CHANGE UP (ref 0–5)
UROBILINOGEN FLD QL: 1 MG/DL — SIGNIFICANT CHANGE UP (ref 0.2–1)
WBC # BLD: 5.64 K/UL — SIGNIFICANT CHANGE UP (ref 3.8–10.5)
WBC # FLD AUTO: 5.64 K/UL — SIGNIFICANT CHANGE UP (ref 3.8–10.5)
WBC UR QL: 2 /HPF — SIGNIFICANT CHANGE UP (ref 0–5)

## 2024-01-23 PROCEDURE — 74177 CT ABD & PELVIS W/CONTRAST: CPT | Mod: 26,MA

## 2024-01-23 PROCEDURE — 99285 EMERGENCY DEPT VISIT HI MDM: CPT

## 2024-01-23 PROCEDURE — 99214 OFFICE O/P EST MOD 30 MIN: CPT | Mod: 25

## 2024-01-23 RX ORDER — ACETAMINOPHEN 500 MG
1000 TABLET ORAL ONCE
Refills: 0 | Status: COMPLETED | OUTPATIENT
Start: 2024-01-23 | End: 2024-01-23

## 2024-01-23 RX ORDER — SODIUM CHLORIDE 9 MG/ML
1000 INJECTION, SOLUTION INTRAVENOUS ONCE
Refills: 0 | Status: COMPLETED | OUTPATIENT
Start: 2024-01-23 | End: 2024-01-23

## 2024-01-23 RX ADMIN — Medication 400 MILLIGRAM(S): at 20:42

## 2024-01-23 RX ADMIN — SODIUM CHLORIDE 1000 MILLILITER(S): 9 INJECTION, SOLUTION INTRAVENOUS at 20:27

## 2024-01-23 NOTE — ED PROVIDER NOTE - OBJECTIVE STATEMENT
63F p/w RLQ pain x 2 days.  No fever or vomiting.  Worse with movement, worse with car ride, worse with laughing.  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  No dysuria or hematuria.  Pt saw PMD Dr Henderson who sent pt to ED to eval for appx and I agree.  Pt has RLQ ttp, (+)obturator and (+)psoas sign.  Afebrile.  Exam otherwise benign.  Of note pt previously was on eliquis but it was d/c due to bleeding of uncertain etiology - pt got 2 U of blood but colonoscopy was noncontributory. PMHX CAD, anemia, GERD.  PSHX CABG, Ao Valve repeair.  meds diamox, lopressor, asa, celexa, omeprazole.  No T.  All - succinycholine - paralysis.  VS wnl.  Mild discomfort abd pain.  lungs clear (+)RLQ ttp.  (+)Psoas (+)Obturator sign.  No CVAT.  Plan preops, check CT eval for appx.  Rx pain meds, fluids, NPO, reass   VS:  unremarkable     GEN - mild discomfort abd pain;   A+O x3   HEAD - NC/AT     ENT - PEERL, EOMI, mucous membranes   dry , no discharge      NECK: Neck supple, non-tender without lymphadenopathy, no masses, no JVD  PULM - CTA b/l,  symmetric breath sounds  COR -  normal heart sounds    ABD - , ND, RLQ ttp (+)obturator (+)psoas sign, soft,  BACK - no CVA tenderness, nontender spine     EXTREMS - no edema, no deformity, warm and well perfused    SKIN - no rash    or bruising      NEUROLOGIC - alert, face symmetric, speech fluent, sensation nl, motor no focal deficit.

## 2024-01-23 NOTE — ED ADULT NURSE NOTE - OBJECTIVE STATEMENT
Patient received wellness, a&ox4, ambulatory. pt c/o non-worsening, RLQ pain x 2 days. pt was sent to ED by PMD for r/o appy.  Hx:  CABG, aortic valve replacement. pt denies chest pain, sob, n+v, headache, dizziness, fever, chills. Breathing even, unlabored. Last BM yesterday. 20g IV placed to left ac, labs collected and sent. Pt medicated as per MAR. Safety maintained.

## 2024-01-23 NOTE — ED PROVIDER NOTE - CLINICAL SUMMARY MEDICAL DECISION MAKING FREE TEXT BOX
63F p/w RLQ pain x 2 days.  No fever or vomiting.  Worse with movement, worse with car ride, worse with laughing.  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  No dysuria or hematuria.  Pt saw PMD Dr Henderson who sent pt to ED to eval for appx and I agree.  Pt has RLQ ttp, (+)obturator and (+)psoas sign.  Afebrile.  Exam otherwise benign.  Of note pt previously was on eliquis but it was d/c due to bleeding of uncertain etiology - pt got 2 U of blood but colonoscopy was noncontributory. PMHX CAD, anemia, GERD.  PSHX CABG, Ao Valve repeair.  meds diamox, lopressor, asa, celexa, omeprazole.  No T.  All - succinycholine - paralysis.  VS wnl.  Mild discomfort abd pain.  lungs clear (+)RLQ ttp.  (+)Psoas (+)Obturator sign.  No CVAT.  Plan preops, check CT eval for appx.  Rx pain meds, fluids, NPO, reass

## 2024-01-23 NOTE — HISTORY OF PRESENT ILLNESS
[FreeTextEntry8] : The patient comes in with her  for an acute visit.  She has right lower quadrant abdominal pain for two days. It doesn't radiate.  It was a 5/10 earlier and now 3-4/10.  No N/V, fever, rash, diarrhea, BRBPR. The stool is dark brown. Last BP was yesterday.  No dysuria or other  symptoms.  No vaginal bleeding or discharge.  She hasn't had this pain before.  Her appetite is still good and she is able to eat.

## 2024-01-23 NOTE — PHYSICAL EXAM
[Normal] : no carotid or abdominal bruits heard, no varicosities, pedal pulses are present, no peripheral edema, no extremity clubbing or cyanosis and no palpable aorta [Non-distended] : non-distended [No HSM] : no HSM [No Masses] : no abdominal mass palpated [de-identified] : She appears mildly uncomfortable,   [de-identified] : She is moderately tender in the RLQ. No rebound or guarding.

## 2024-01-23 NOTE — ED PROVIDER NOTE - PHYSICAL EXAMINATION
VS:  unremarkable     GEN - mild discomfort abd pain;   A+O x3   HEAD - NC/AT     ENT - PEERL, EOMI, mucous membranes   dry , no discharge      NECK: Neck supple, non-tender without lymphadenopathy, no masses, no JVD  PULM - CTA b/l,  symmetric breath sounds  COR -  normal heart sounds    ABD - , ND, RLQ ttp (+)obturator (+)psoas sign, soft,  BACK - no CVA tenderness, nontender spine     EXTREMS - no edema, no deformity, warm and well perfused    SKIN - no rash    or bruising      NEUROLOGIC - alert, face symmetric, speech fluent, sensation nl, motor no focal deficit.

## 2024-01-23 NOTE — ED ADULT TRIAGE NOTE - CHIEF COMPLAINT QUOTE
pt c/o RLQ pain x 2 days, denies n/v/d.  pt was sent to ED by PMD for r/o appy.  Hx:  CABG, aortic valve replacement

## 2024-01-23 NOTE — ASSESSMENT
[FreeTextEntry1] : The patient has new, acute RLQ pain for two days.  On exam, the abdomen is tender in the RLQ and she appears uncomfortable.  Consider appendicitis and other pathology.  She was advised to go to the ER now for further evaluation including likely CT A/P.  She understands and agrees and her  also agrees.  Will follow-up.

## 2024-01-23 NOTE — ED PROVIDER NOTE - NSFOLLOWUPINSTRUCTIONS_ED_ALL_ED_FT
You were seen in the Emergency Room for right lower abdominal pain.    After our evaluation, we have determined you do not have a life-threatening condition today and you can be discharged home.    Please return to the Emergency Room if your symptoms change or worsen, such as worsening pain, fevers, vomiting, or other concerning symptoms to you     Please follow up with a general medicine doctor (PMD) within 3-5 days for a re-exam    If you need help making an appointment with a doctor or do not have your own PMD, you can call our referral line at [phone] to make an appointment with a general medicine doctor (PMD).

## 2024-01-23 NOTE — ED PROVIDER NOTE - PROGRESS NOTE DETAILS
Dr. Roger Ramirez DO (ED ATTENDING):  patient re-examined and reports to be feeling improved. CT showing no acute findings. No appendicitis.     All test results have been reviewed with the patient with verbalized understanding.  Opportunities to ask questions for further understanding have been offered to the patient.  The patient feels comfortable going home after our evaluation and understands to return to the Emergency Department for any new or worsening symptoms.

## 2024-01-23 NOTE — ED PROVIDER NOTE - PATIENT PORTAL LINK FT
You can access the FollowMyHealth Patient Portal offered by [facility] by registering at the following website: [email or web address]. By joining [employer]’s FollowMyHealth portal, you will also be able to view your health information using other applications (apps) compatible with our system.

## 2024-01-25 LAB
CULTURE RESULTS: SIGNIFICANT CHANGE UP
SPECIMEN SOURCE: SIGNIFICANT CHANGE UP

## 2024-01-27 ENCOUNTER — NON-APPOINTMENT (OUTPATIENT)
Age: 64
End: 2024-01-27

## 2024-01-31 ENCOUNTER — APPOINTMENT (OUTPATIENT)
Dept: PULMONOLOGY | Facility: CLINIC | Age: 64
End: 2024-01-31
Payer: COMMERCIAL

## 2024-01-31 VITALS
RESPIRATION RATE: 16 BRPM | OXYGEN SATURATION: 96 % | TEMPERATURE: 97.4 F | BODY MASS INDEX: 24.55 KG/M2 | SYSTOLIC BLOOD PRESSURE: 118 MMHG | WEIGHT: 130 LBS | HEIGHT: 61 IN | HEART RATE: 60 BPM | DIASTOLIC BLOOD PRESSURE: 68 MMHG

## 2024-01-31 DIAGNOSIS — J45.909 UNSPECIFIED ASTHMA, UNCOMPLICATED: ICD-10-CM

## 2024-01-31 DIAGNOSIS — R06.09 OTHER FORMS OF DYSPNEA: ICD-10-CM

## 2024-01-31 PROCEDURE — 99214 OFFICE O/P EST MOD 30 MIN: CPT

## 2024-01-31 NOTE — ASSESSMENT
[FreeTextEntry1] : Problem 1: JO/ New Central sleep apnea -Based on Compliance report, AHI 14.1 with Central episodes 6.7 and obstructive episode 0.1.  -Will make pressure and mask adjustments. If unsuccessful may consider in lab ASV titration study.  -Sleep apnea is associated with adverse clinical consequences which can affect most organ systems. Cardiovascular disease risk includes arrhythmias, atrial fibrillation, hypertension, coronary artery disease, and stroke. Metabolic disorders include diabetes type 2, non-alcoholic fatty liver disease. Mood disorder especially depression; and cognitive decline especially in the elderly. Associations with chronic reflux/Levine's esophagus some but not all inclusive.   Problem 1a: APAP pressure issues  - Reduce max setting from 20cm to 16 cm H2O    Problem 1b: Ill-fitting mask -Sample of Resmed F30i mask with three sizes given to patient to try.  F/u in 2 weeks with NP to reassess AHI pt is encouraged to call or fax the office with any questions or concerns.

## 2024-01-31 NOTE — PHYSICAL EXAM
[No Acute Distress] : no acute distress [Normal Oropharynx] : normal oropharynx [Normal Appearance] : normal appearance [No Neck Mass] : no neck mass [Normal Rate/Rhythm] : normal rate/rhythm [Normal S1, S2] : normal s1, s2 [No Resp Distress] : no resp distress [Clear to Auscultation Bilaterally] : clear to auscultation bilaterally [No Abnormalities] : no abnormalities [Benign] : benign [Normal Gait] : normal gait [No Clubbing] : no clubbing [No Cyanosis] : no cyanosis [No Edema] : no edema [FROM] : FROM [Normal Color/ Pigmentation] : normal color/ pigmentation [No Focal Deficits] : no focal deficits [Oriented x3] : oriented x3 [Normal Affect] : normal affect

## 2024-01-31 NOTE — REASON FOR VISIT
[Follow-Up] : a follow-up visit [Sleep Apnea] : sleep apnea [TextBox_44] : (2nd opinion)-for SOB s/p work incident-work up Non-Dx to this point

## 2024-01-31 NOTE — HISTORY OF PRESENT ILLNESS
[TextBox_4] : Ms. LOVELL is a 63 year female with a history of arthritis of the hip, bicuspid aortic valve, hypercalcemic periodic predialysis, mild aortic regurgitation, moderate aortic stenosis, myositis rheumatic mitral valve disease, idiopathic thrombocytopenia, GERD, gastric esophageal varices , s/p open heart surgery, periodic paralysis (rare condition   Presents to office for a follow up for sleep apnea. Reports that APAP pressure may be too high and also states mask is ill-fitting.  Patient in her usual state of health. Denies any dyspnea, cough, chest pain, headaches, nausea, vomiting, fever, chills, sweats, chest pains, chest pressure, diarrhea, dysphagia, myalgia, dizziness, leg swelling, leg pain, itchy eyes, itchy ears, heartburn, reflux, or sour taste in the mouth.

## 2024-02-07 ENCOUNTER — NON-APPOINTMENT (OUTPATIENT)
Age: 64
End: 2024-02-07

## 2024-02-07 ENCOUNTER — APPOINTMENT (OUTPATIENT)
Dept: CARDIOLOGY | Facility: CLINIC | Age: 64
End: 2024-02-07
Payer: COMMERCIAL

## 2024-02-07 VITALS
OXYGEN SATURATION: 94 % | SYSTOLIC BLOOD PRESSURE: 114 MMHG | BODY MASS INDEX: 24.73 KG/M2 | HEART RATE: 55 BPM | WEIGHT: 131 LBS | DIASTOLIC BLOOD PRESSURE: 69 MMHG | HEIGHT: 61 IN

## 2024-02-07 DIAGNOSIS — Z95.3 PRESENCE OF XENOGENIC HEART VALVE: ICD-10-CM

## 2024-02-07 DIAGNOSIS — R06.09 OTHER FORMS OF DYSPNEA: ICD-10-CM

## 2024-02-07 DIAGNOSIS — Q23.1 CONGENITAL INSUFFICIENCY OF AORTIC VALVE: ICD-10-CM

## 2024-02-07 DIAGNOSIS — R07.89 OTHER CHEST PAIN: ICD-10-CM

## 2024-02-07 DIAGNOSIS — Z98.890 OTHER SPECIFIED POSTPROCEDURAL STATES: ICD-10-CM

## 2024-02-07 DIAGNOSIS — Z86.79 OTHER SPECIFIED POSTPROCEDURAL STATES: ICD-10-CM

## 2024-02-07 DIAGNOSIS — I35.0 NONRHEUMATIC AORTIC (VALVE) STENOSIS: ICD-10-CM

## 2024-02-07 DIAGNOSIS — Z95.2 PRESENCE OF PROSTHETIC HEART VALVE: ICD-10-CM

## 2024-02-07 DIAGNOSIS — I48.3 TYPICAL ATRIAL FLUTTER: ICD-10-CM

## 2024-02-07 DIAGNOSIS — R06.02 SHORTNESS OF BREATH: ICD-10-CM

## 2024-02-07 DIAGNOSIS — I35.1 NONRHEUMATIC AORTIC (VALVE) INSUFFICIENCY: ICD-10-CM

## 2024-02-07 DIAGNOSIS — Z13.6 ENCOUNTER FOR SCREENING FOR CARDIOVASCULAR DISORDERS: ICD-10-CM

## 2024-02-07 PROCEDURE — 99214 OFFICE O/P EST MOD 30 MIN: CPT

## 2024-02-07 PROCEDURE — 93000 ELECTROCARDIOGRAM COMPLETE: CPT

## 2024-02-08 NOTE — ASU PATIENT PROFILE, ADULT - ALCOHOL USE HISTORY SINGLE SELECT
Detail Level: Detailed yes... Depth Of Biopsy: dermis Was A Bandage Applied: Yes Size Of Lesion In Cm: 0 Biopsy Type: H and E Biopsy Method: scissors Anesthesia Type: 1% lidocaine with epinephrine Anesthesia Volume In Cc: 1.5 Hemostasis: Aluminum Chloride and Electrocautery Wound Care: Petrolatum Dressing: bandage Destruction After The Procedure: No Type Of Destruction Used: Curettage Curettage Text: The wound bed was treated with curettage after the biopsy was performed. Cryotherapy Text: The wound bed was treated with cryotherapy after the biopsy was performed. Electrodesiccation Text: The wound bed was treated with electrodesiccation after the biopsy was performed. Electrodesiccation And Curettage Text: The wound bed was treated with electrodesiccation and curettage after the biopsy was performed. Silver Nitrate Text: The wound bed was treated with silver nitrate after the biopsy was performed. Lab: -[id number] Consent: Verbal consent was obtained and risks were reviewed including but not limited to scarring, infection, bleeding, scabbing, incomplete removal, nerve damage and allergy to anesthesia. Post-Care Instructions: I reviewed with the patient in detail post-care instructions. Patient is to keep the biopsy site dry for 24 hours, and then apply petrolatum daily until healed. Notification Instructions: Patient will be notified of biopsy results. However, patient instructed to call the office if not contacted within 2 weeks. Billing Type: Third-Party Bill Information: Selecting Yes will display possible errors in your note based on the variables you have selected. This validation is only offered as a suggestion for you. PLEASE NOTE THAT THE VALIDATION TEXT WILL BE REMOVED WHEN YOU FINALIZE YOUR NOTE. IF YOU WANT TO FAX A PRELIMINARY NOTE YOU WILL NEED TO TOGGLE THIS TO 'NO' IF YOU DO NOT WANT IT IN YOUR FAXED NOTE.

## 2024-02-14 ENCOUNTER — APPOINTMENT (OUTPATIENT)
Dept: CARDIOTHORACIC SURGERY | Facility: CLINIC | Age: 64
End: 2024-02-14
Payer: COMMERCIAL

## 2024-02-14 VITALS
OXYGEN SATURATION: 95 % | DIASTOLIC BLOOD PRESSURE: 73 MMHG | TEMPERATURE: 97.4 F | BODY MASS INDEX: 24.73 KG/M2 | RESPIRATION RATE: 16 BRPM | HEART RATE: 63 BPM | WEIGHT: 131 LBS | HEIGHT: 61 IN | SYSTOLIC BLOOD PRESSURE: 117 MMHG

## 2024-02-14 DIAGNOSIS — I35.0 NONRHEUMATIC AORTIC (VALVE) STENOSIS: ICD-10-CM

## 2024-02-14 DIAGNOSIS — I48.0 PAROXYSMAL ATRIAL FIBRILLATION: ICD-10-CM

## 2024-02-14 PROCEDURE — 99214 OFFICE O/P EST MOD 30 MIN: CPT

## 2024-02-15 ENCOUNTER — APPOINTMENT (OUTPATIENT)
Dept: PULMONOLOGY | Facility: CLINIC | Age: 64
End: 2024-02-15
Payer: COMMERCIAL

## 2024-02-15 PROBLEM — I48.0 PAF (PAROXYSMAL ATRIAL FIBRILLATION): Status: ACTIVE | Noted: 2022-12-21

## 2024-02-15 PROBLEM — I35.0 AORTIC STENOSIS: Status: ACTIVE | Noted: 2022-09-27

## 2024-02-15 PROCEDURE — 99443: CPT | Mod: 93

## 2024-02-15 RX ORDER — LEVALBUTEROL TARTRATE 45 UG/1
45 AEROSOL, METERED ORAL EVERY 6 HOURS
Qty: 1 | Refills: 2 | Status: COMPLETED | COMMUNITY
Start: 2023-05-03 | End: 2024-02-15

## 2024-02-15 RX ORDER — METOPROLOL SUCCINATE 25 MG/1
25 TABLET, EXTENDED RELEASE ORAL
Qty: 90 | Refills: 3 | Status: COMPLETED | COMMUNITY
Start: 2023-09-26 | End: 2024-02-15

## 2024-02-15 NOTE — ASSESSMENT
[FreeTextEntry1] : Problem 1:  Central sleep apnea- APAP pressure settings - Previous Compliance report 1/1-1/31/2024 with pressure of 5-20 cmH20 showed AHI 14.1 (Central episodes 6.7 and obstructive episode 0.1). - Recent compliance report 1/17-2/15/24 with Decreased pressure of 5-16 cmH2O increased the AHI 15.8 (central episodes 8.0 obstructive episodes 0.1).  -Discussed Compliance report; decrease of PAP pressure was ineffective in reducing AHI; recommended increasing pressure from 16 cmH2O to 18 cmH2O with backup rate of 8 cmH20, but patient declined; she is opting for mask fitting first and if ineffective may consider pressure change or in lab bilevel titration study.  -Sleep apnea is associated with adverse clinical consequences which can affect most organ systems. Cardiovascular disease risk includes arrhythmias, atrial fibrillation, hypertension, coronary artery disease, and stroke. Metabolic disorders include diabetes type 2, non-alcoholic fatty liver disease. Mood disorder especially depression; and cognitive decline especially in the elderly. Associations with chronic reflux/Levine's esophagus some but not all inclusive.   Problem 1a: Mask leak  -Has been using the Resmed F30i size SW sample  -Mask fitting ordered with [facility] -Advised to purchase CPAP Chap or Gel Sealer to help with mask seal  F/u in 2 weeks with NP to reassess AHI after mask fitting pt is encouraged to call or fax the office with any questions or concerns.

## 2024-02-15 NOTE — ASSESSMENT
[FreeTextEntry1] :  Ms. LOVELL is a 63 year old female with past medical history of hyperkalemic periodic paralysis (diagnosed at age 36, controlled on diamox, last flare 2 years ago), bicuspid aortic valve with severe AS, aortic aneurysm, splenic vein thrombosis, portal vein thrombosis, macular degeneration, ITP, esophageal varices GERD, basal cell carcinoma, T11 fracture (current), cataracts.   On 12/12/22 pt underwent Full sternotomy, replacement of the ascending aorta, Replacement of the aortic valve with a size 23-mm Zavala Inspiris biological valve. She is here for 1 year follow up visit with TTE.  Today she presents and reports that she has chest pain on/off even at rest , stays for 1 -2 minutes and resolves by itself. She also reports occasional dizziness.  1/17/24 TTE revealed Left ventricular wall thickness is normal. Septal motion is abnormal consistent with previous cardiac surgery. Left ventricular systolic function is normal with an ejection fraction of 69 % by Pace's method of disks.  2. Normal right ventricular cavity size, wall thickness, and normal systolic function.  3. The left atrium is mildly dilated.  4. A bioprosthetic valve replacement is present in the aortic position. is well seated with normal function.  5. Mild mitral regurgitation.  6. Moderate tricuspid regurgitation.  7. Estimated pulmonary artery systolic pressure is 26 mmHg.  8. No echocardiographic evidence of pulmonary hypertension.  9. No pericardial effusion seen.   I have reviewed the patient's medical records, diagnostic images during the time of this office consultation and have made the following recommendation. The surgical repair is intact and stable.    Plan  1. [facility] with CTA C/A/P . 2. Continue medication regimen. 3. Follow up with cardiologist and PCP. 4. BP control- I have recommended the patient to monitor his blood pressure closely. I have also advised the patient to take daily blood pressures at home and adhere to medication regimen. 5. Discussed signs and symptoms that warrant emergency medical attention

## 2024-02-15 NOTE — PROCEDURE
[FreeTextEntry1] :  Dr. Echevarria reviewed the indications for surgery, and used our webpage [email or web address] <[email or web address].heartprocedures.org> to illustrate the aorta and anatomy of the heart. Those indications are the following: size greater than 5.0 cm, symptomatic aneurysms, family history of aortic dissection or aneurysm death with a size greater than 4.5 cm, other necessary cardiac procedures such as coronary artery bypass grafting or valvular disorders with an aneurysm greater than 4.5 cm, or connective tissue disorders with an aneurysm size greater than 4.5 cm. The patient does not meet size criteria for surgical intervention at the time. Patient was advised to view the educational video prior to this visit regarding aortic pathology, risk factors, surgical procedures, and lifestyle modifications. Video can be retrieved at <[email or web address]=[email or web address]>.  Dr. Echevarria discussed activity restrictions with the patient, and would advise exercise at a moderate amount with no heavy lifting over one third of body weight, and avoiding heart rates that exceed 140 beats per minute. In addition, every patient should abstain from tobacco abuse and to avoid all illicit drug use, especially stimulants such as cocaine or methamphetamine. Dr. Echevarria also counseled regarding maintaining a healthy heart diet, and losing any excessive weight as this also put undue stress on both the aorta and entire cardiovascular system. First degree family members should be screened for bicuspid valve disease, and ascending aortic aneurysms.

## 2024-02-15 NOTE — CONSULT LETTER
[Dear  ___] : Dear  [unfilled], [Courtesy Letter:] : I had the pleasure of seeing your patient, [unfilled], in my office today. [Please see my note below.] : Please see my note below. [Consult Closing:] : Thank you very much for allowing me to participate in the care of this patient.  If you have any questions, please do not hesitate to contact me. [Sincerely,] : Sincerely, [FreeTextEntry2] : Dr.Joe Calix,  [FreeTextEntry1] :  Please find attached our consultation on your patient, Ms. LOVELL  We take a multidisciplinary team approach to patient care and consider you, the referring physician, an extension of our team. We will maintain an open line of communication with you throughout your patient's treatment course.  It is our commitment to provide your patient with the highest quality of advanced therapeutic options. We thank you for allowing us to participate in the care of your patient. Please do not hesitate to contact our team with any questions or concerns at [phone].    Thank you for allowing us to participate in this patients care. [FreeTextEntry3] : Vimal Echevarria M.D. Professor of Cardiovascular and Thoracic Surgery Minimally Invasive Valve Surgeon Director of Aortic Surgery, [facility] Cell: [phone] Email: [email or web address]

## 2024-02-15 NOTE — END OF VISIT
[FreeTextEntry3] :   I, ADAN Bear , personally performed the evaluation and management (E/M) services for this established  patient. That E/M includes conducting the initial examination, assessing all conditions, and establishing the plan of care. Today, ART SALAMANCA  was here to observe my evaluation and management services for this patient.

## 2024-02-15 NOTE — HISTORY OF PRESENT ILLNESS
[FreeTextEntry1] : Ms. LOVELL is a 63 year old female with past medical history of hyperkalemic periodic paralysis (diagnosed at age 36, controlled on diamox, last flare 2 years ago), bicuspid aortic valve with severe AS, aortic aneurysm, splenic vein thrombosis, portal vein thrombosis, macular degeneration, ITP, esophageal varices GERD, basal cell carcinoma, T11 fracture (current), cataracts.   On 12/12/22 pt underwent Full sternotomy, replacement of the ascending aorta, Replacement of the aortic valve with a size 23-mm Zavala Inspiris biological valve. She is here for 1 year follow up visit with TTE.  Today she presents and reports that she has chest pain on/off even at rest , stays for 1 -2 minutes and resolves by itself. She also reports occasional dizziness.  Patient is doing well and denies recent hospitalization, ER visits, or surgeries. He denies fever, chills, fatigue, headache, blurred vision, syncope, palpitations, shortness of breath, orthopnea, paroxysmal nocturnal dyspnea, nausea, vomiting, abdominal pain, back pain, headache, visual disturbances, CVA, PE, DVT, D/C, hematochezia, melena, dysuria, hematuria,  BRBPR or swelling to legs.

## 2024-02-15 NOTE — HISTORY OF PRESENT ILLNESS
[TextBox_4] : Ms. LOVELL is a 63 year female with a history of arthritis of the hip, bicuspid aortic valve, hypercalcemic periodic predialysis, mild aortic regurgitation, moderate aortic stenosis, myositis rheumatic mitral valve disease, idiopathic thrombocytopenia, GERD, gastric esophageal varices , s/p open heart surgery, periodic paralysis (rare condition).  Presents to office for a follow up for sleep apnea to determine effectiveness of change in APAP pressure from previous visit. Previously on APAP 5-20 cmH20- verbalized too much pressure and pressure was reduced to 5-16 cmH2O  Patient reports her eyes sometimes burn due to leak from mask and pressured air going upwards towards eyes.  Reports she is feeling well and APAP is effective with sleep symptoms; has restful sleep.  Patient in her usual state of health. Denies any dyspnea, cough, chest pain, headaches, nausea, vomiting, fever, chills, sweats, chest pains, chest pressure, diarrhea, dysphagia, myalgia, dizziness, leg swelling, leg pain, itchy eyes, itchy ears, heartburn, reflux, or sour taste in the mouth.

## 2024-02-15 NOTE — REASON FOR VISIT
[Home] : at home, [unfilled] , at the time of the visit. [Medical Office: ([facility])___] : at the medical office located in  [Patient] : the patient [This encounter was initiated by telehealth (audio with video) and converted to telephone (audio only) due to technical difficulties.] : This encounter was initiated by telehealth (audio with video) and converted to telephone (audio only) due to technical difficulties. [Follow-Up] : a follow-up visit [Sleep Apnea] : sleep apnea

## 2024-02-15 NOTE — DATA REVIEWED
[FreeTextEntry1] : 1/17/24 TTE revealed Left ventricular wall thickness is normal. Septal motion is abnormal consistent with previous cardiac surgery. Left ventricular systolic function is normal with an ejection fraction of 69 % by Pace's method of disks.  2. Normal right ventricular cavity size, wall thickness, and normal systolic function.  3. The left atrium is mildly dilated.  4. A bioprosthetic valve replacement is present in the aortic position. is well seated with normal function.  5. Mild mitral regurgitation.  6. Moderate tricuspid regurgitation.  7. Estimated pulmonary artery systolic pressure is 26 mmHg.  8. No echocardiographic evidence of pulmonary hypertension.  9. No pericardial effusion seen.

## 2024-02-15 NOTE — PHYSICAL EXAM
[Sclera] : the sclera and conjunctiva were normal [PERRL With Normal Accommodation] : pupils were equal in size, round, and reactive to light [Neck Appearance] : the appearance of the neck was normal [] : no respiratory distress [Respiration, Rhythm And Depth] : normal respiratory rhythm and effort [Auscultation Breath Sounds / Voice Sounds] : lungs were clear to auscultation bilaterally [Apical Impulse] : the apical impulse was normal [Heart Rate And Rhythm] : heart rate was normal and rhythm regular [Heart Sounds] : normal S1 and S2 [Examination Of The Chest] : the chest was normal in appearance [2+] : left 2+ [Breast Appearance] : normal in appearance [Bowel Sounds] : normal bowel sounds [Abdomen Soft] : soft [No CVA Tenderness] : no ~M costovertebral angle tenderness [Abnormal Walk] : normal gait [Involuntary Movements] : no involuntary movements were seen [Skin Color & Pigmentation] : normal skin color and pigmentation [Skin Turgor] : normal skin turgor [No Focal Deficits] : no focal deficits [Oriented To Time, Place, And Person] : oriented to person, place, and time [Impaired Insight] : insight and judgment were intact [Affect] : the affect was normal [Mood] : the mood was normal [Memory Recent] : recent memory was not impaired [Memory Remote] : remote memory was not impaired [Systolic grade ___/6] : A grade [unfilled]/6 systolic murmur was heard. [FreeTextEntry1] : Deferred

## 2024-02-20 PROBLEM — Z95.3: Status: ACTIVE | Noted: 2023-01-04

## 2024-02-20 PROBLEM — I35.1 MODERATE AORTIC REGURGITATION: Status: ACTIVE | Noted: 2022-08-27

## 2024-02-20 PROBLEM — R06.09 DYSPNEA ON EXERTION: Status: ACTIVE | Noted: 2021-09-01

## 2024-02-20 PROBLEM — Q23.1 BICUSPID AORTIC VALVE: Status: ACTIVE | Noted: 2021-07-29

## 2024-02-20 PROBLEM — Z98.890 S/P ABLATION OF ATRIAL FIBRILLATION: Status: ACTIVE | Noted: 2023-02-27

## 2024-02-20 PROBLEM — Z95.2 S/P AVR (AORTIC VALVE REPLACEMENT): Status: ACTIVE | Noted: 2023-01-05

## 2024-02-20 PROBLEM — Z13.6 ENCOUNTER FOR SCREENING FOR VASCULAR DISEASE: Status: ACTIVE | Noted: 2021-06-24

## 2024-02-20 PROBLEM — I35.0 MODERATE AORTIC STENOSIS: Status: ACTIVE | Noted: 2021-06-24

## 2024-02-20 PROBLEM — R06.02 SHORTNESS OF BREATH: Status: ACTIVE | Noted: 2020-07-30

## 2024-02-20 PROBLEM — Z98.890 S/P ASCENDING AORTIC ANEURYSM REPAIR: Status: ACTIVE | Noted: 2023-01-05

## 2024-02-20 PROBLEM — Z98.890 S/P THORACIC AORTIC ANEURYSM REPAIR: Status: ACTIVE | Noted: 2023-01-04

## 2024-02-20 PROBLEM — I48.3 TYPICAL ATRIAL FLUTTER: Status: ACTIVE | Noted: 2023-02-27

## 2024-03-20 ENCOUNTER — OUTPATIENT (OUTPATIENT)
Dept: OUTPATIENT SERVICES | Facility: HOSPITAL | Age: 64
LOS: 1 days | Discharge: HOME | End: 2024-03-20

## 2024-03-20 DIAGNOSIS — K76.6 PORTAL HYPERTENSION: ICD-10-CM

## 2024-03-20 DIAGNOSIS — Z98.890 OTHER SPECIFIED POSTPROCEDURAL STATES: Chronic | ICD-10-CM

## 2024-03-20 DIAGNOSIS — O00.90 UNSPECIFIED ECTOPIC PREGNANCY WITHOUT INTRAUTERINE PREGNANCY: Chronic | ICD-10-CM

## 2024-03-20 DIAGNOSIS — Z98.49 CATARACT EXTRACTION STATUS, UNSPECIFIED EYE: Chronic | ICD-10-CM

## 2024-03-20 DIAGNOSIS — D64.9 ANEMIA, UNSPECIFIED: ICD-10-CM

## 2024-03-20 DIAGNOSIS — Z95.2 PRESENCE OF PROSTHETIC HEART VALVE: Chronic | ICD-10-CM

## 2024-03-27 ENCOUNTER — APPOINTMENT (OUTPATIENT)
Dept: PULMONOLOGY | Facility: CLINIC | Age: 64
End: 2024-03-27
Payer: COMMERCIAL

## 2024-03-27 VITALS
BODY MASS INDEX: 24.55 KG/M2 | OXYGEN SATURATION: 98 % | RESPIRATION RATE: 16 BRPM | SYSTOLIC BLOOD PRESSURE: 122 MMHG | HEIGHT: 61 IN | HEART RATE: 69 BPM | DIASTOLIC BLOOD PRESSURE: 80 MMHG | TEMPERATURE: 97.1 F | WEIGHT: 130 LBS

## 2024-03-27 DIAGNOSIS — R93.89 ABNORMAL FINDINGS ON DIAGNOSTIC IMAGING OF OTHER SPECIFIED BODY STRUCTURES: ICD-10-CM

## 2024-03-27 DIAGNOSIS — G47.33 OBSTRUCTIVE SLEEP APNEA (ADULT) (PEDIATRIC): ICD-10-CM

## 2024-03-27 DIAGNOSIS — G47.31 PRIMARY CENTRAL SLEEP APNEA: ICD-10-CM

## 2024-03-27 DIAGNOSIS — R06.02 SHORTNESS OF BREATH: ICD-10-CM

## 2024-03-27 PROCEDURE — 94010 BREATHING CAPACITY TEST: CPT

## 2024-03-27 PROCEDURE — 94727 GAS DIL/WSHOT DETER LNG VOL: CPT

## 2024-03-27 PROCEDURE — 94729 DIFFUSING CAPACITY: CPT

## 2024-03-27 PROCEDURE — ZZZZZ: CPT

## 2024-03-27 PROCEDURE — 95012 NITRIC OXIDE EXP GAS DETER: CPT

## 2024-03-27 PROCEDURE — 99214 OFFICE O/P EST MOD 30 MIN: CPT | Mod: 25

## 2024-03-27 NOTE — ADDENDUM
[FreeTextEntry1] : Documented by Love Streeetr acting as a scribe for Dr. Onofre Cameron on 03/27/2024. All medical record entries made by the Scribe were at my, Dr. Onofre Cameron's, direction and personally dictated by me on 03/27/2024. I have reviewed the chart and agree that the record accurately reflects my personal performance of the history, physical exam, assessment and plan. I have also personally directed, reviewed, and agree with the discharge instructions.

## 2024-03-27 NOTE — PHYSICAL EXAM
[No Acute Distress] : no acute distress [Normal Oropharynx] : normal oropharynx [III] : Mallampati Class: III [Normal Appearance] : normal appearance [Normal Rate/Rhythm] : normal rate/rhythm [No Neck Mass] : no neck mass [Murmur ___ / 6] : murmur [unfilled] / 6 [Normal S1, S2] : normal s1, s2 [No Resp Distress] : no resp distress [Clear to Auscultation Bilaterally] : clear to auscultation bilaterally [No Abnormalities] : no abnormalities [Benign] : benign [Normal Gait] : normal gait [No Clubbing] : no clubbing [No Cyanosis] : no cyanosis [FROM] : FROM [No Edema] : no edema [Normal Color/ Pigmentation] : normal color/ pigmentation [Oriented x3] : oriented x3 [No Focal Deficits] : no focal deficits [Normal Affect] : normal affect [TextBox_68] : I:E ratio 1:3; clear

## 2024-03-27 NOTE — ASSESSMENT
[FreeTextEntry1] : Ms. LOVELL is a 63 year old female with a history of arthritis of the hip, bicuspid aortic valve, hypercalcemic periodic pre-dialysis, mild aortic regurgitation, moderate aortic stenosis, myositis rheumatic mitral valve disease, idiopathic thrombocytopenia, GERD, gastric esophageal varices who now comes in for a follow-up pulmonary evaluation for second opinion for SOB following extensive workup CT, cardiac angiogram, cardiac Catheterization right and left;- still symptomatic- s/p Open heart surgery (Dr. Echevarria) +mixed JO- Afib/Anemia/Portal Vein Thrombosis - Single "Near Syncope Event" 11/2023; mixed OSAS- attempting best Rx  The patient's SOB is felt to be multifactorial: -poor mechanics of breathing -out of shape/overweight -Pulmonary  -no pulmonary hypertension based on CT and cardiac ECHO  -no emphysema based on CT and PFT  -?asthma -+ Cardiac- s/p OHS  -valvular dysfunction brought on by exertion (resting studies right and left cardiac cath and echo done at rest)  Problem 1: ?asthma -Xopenex 0.63 q6H (off) -complete methacholine challenge -off Stiolto 2 Puffs qDay or Trelegy 100 1 puff QD -s/p HRCT -showed mucous plugging - Inhaler technique reviewed as well as oral hygiene technique reviewed with patient. Avoidance of cold air, extremes of temperature, rescue inhaler should be used before exercise. Order of medication reviewed with patient. Recommended use of a cool mist humidifier in the bedroom. - Asthma is believed to be caused by inherited (genetic) and environmental factor, but its exact cause is unknown. asthma may be triggered by allergens, lung infections, or irritants in the air. Asthma triggers are different for each person  Problem 2: + Iron deficiency anemia -s/p iron test (normal); PRBC x2, Iron 5/2023  Problem 3: Cardiac (valvular dysfunction brought on by exertion (resting studies right and left cardiac cath and echo done at rest), Afib -s/p cardio pulmonary stress test, s/p OHS 12/2022 -s/p ECHO stress test -recommended consult with Dr. Tom Dowell for structural heart- Dr Zamarripa), Ablation (Dr. Koenig) -Recommend cardiac follow up evaluation with cardiologist if needed  Problem 4: (+) mixed central and obstructive sleep apnea (Unrestful sleep/ fatigue) -s/p home sleep study - APAP in place 16cm H2O max, ResMed AirFit F30 FFM, size small -repeat in-lab sleep study (ASV) -Sleep apnea is associated with adverse clinical consequences which can affect most organ systems. Cardiovascular disease risk includes arrhythmias, atrial fibrillation, hypertension, coronary artery disease, and stroke. Metabolic disorders include diabetes type 2, non-alcoholic fatty liver disease. Mood disorder especially depression; and cognitive decline especially in the elderly. Associations with chronic reflux/Levine's esophagus some but not all inclusive. -Reasons include arousal consistent with hypopnea; respiratory events most prominent in REM sleep or supine position; therefore sleep staging and body position are important for accurate diagnosis and estimation of AHI.  Problem 5: out of shape -Recommended Muniq "OTC" Supplement for controlling blood sugar levels, weight loss, and energy - Weight loss, exercise and diet control were discussed and are highly encouraged. Treatment options were given such as aqua therapy, and contacting a nutritionist. Recommended to use the elliptical, stationary bike, less use of treadmill. Mindful eating was explained to the patient. Obesity is associated with worsening asthma, SOB, and potential for cardiac disease, diabetes, and other underlying medical conditions.  Problem 6: Poor mechanics of breathing -Recommended Jackelin Freedman and Edith breathing techniques -recommended internet exercise platforms: [employer] and Aerobic exercise for seniors - Proper breathing techniques were reviewed with an emphasis on exhalation. Patient instructed to breath in for 1 second and out for four seconds. Patient was encouraged not to talk while walking.  Problem 7: Health Maintenance -Recommend attempting to get back into work -s/p flu shot 2023 -recommended strep pneumonia vaccines: Prevnar-13 vaccine, follow by Pneumo vaccine 23 one year following (pending) -recommended early intervention for URIs -recommended regular osteoporosis evaluations -recommended early dermatological evaluations -recommended after the age of 50 to the age of 70, colonoscopy every 5 years  F/P in 3-4 months  pt is encouraged to call or fax the office with any questions or concerns.

## 2024-03-27 NOTE — REASON FOR VISIT
[Follow-Up] : a follow-up visit [TextBox_44] : (2nd opinion)-for SOB s/p work incident-work up Non-Dx to this point, OSAS (mixed)

## 2024-03-27 NOTE — HISTORY OF PRESENT ILLNESS
[TextBox_4] : Ms. LOVELL is a 63 year old female with a history of arthritis of the hip, bicuspid aortic valve, hypercalcemic periodic predialysis, mild aortic regurgitation, moderate aortic stenosis, myositis rheumatic mitral valve disease, idiopathic thrombocytopenia, GERD, gastric esophageal varices  who now comes in for a follow-up pulmonary evaluation. Her chief complaint is  -she notes feeling generally well  -she notes she's using the CPAP, and it's not working for her -she notes bowels are regular  -she notes she traveled to [place] 1 week ago, where she had SOB. She didn't bring her CPAP with her -she notes she was climbing a lot of stairs in [place] -she notes her SOB has improved now that she's in [place]. She may not have been hydrating well when she was in [place] -she notes weight is stable  -she denies exercising at this time  -she denies any headaches, nausea, emesis, fever, chills, sweats, chest pain, chest pressure, coughing, wheezing, palpitations, diarrhea, constipation, dysphagia, vertigo, arthralgias, myalgias, leg swelling, itchy eyes, itchy ears, heartburn, reflux, or sour taste in the mouth.

## 2024-03-27 NOTE — PROCEDURE
[FreeTextEntry1] : CPAP Compliance Report (3/7/2024) revealed 100% use (30/30 days), average usage 8 hours 0 minutes, max pressure 16 cm H2O, AHI 16.1, 8.4 central apnea index, 0.1 obstructive apnea index, 2.6 unknown apnea index  Full PFT reveals normal flows; FEV1 was 2.19L which is 104% of predicted; normal lung volumes; normal diffusion at 14.2, which is 82% of predicted; normal flow volume loop. PFTs were performed to evaluate for SOB  FENO was 37; a normal value being less than 25 Fractional exhaled nitric oxide (FENO) is regarded as a simple, noninvasive method for assessing eosinophilic airway inflammation. Produced by a variety of cells within the lung, nitric oxide (NO) concentrations are generally low in healthy individuals. However, high concentrations of NO appear to be involved in nonspecific host defense mechanisms and chronic inflammatory diseases such as asthma. The American Thoracic Society (ATS) therefore has recommended using FENO to aid in the diagnosis and monitoring of eosinophilic airway inflammation and asthma, and for identifying steroid responsive individuals whose chronic respiratory symptoms may be caused by airway inflammation.

## 2024-05-13 ENCOUNTER — NON-APPOINTMENT (OUTPATIENT)
Age: 64
End: 2024-05-13

## 2024-05-22 ENCOUNTER — OUTPATIENT (OUTPATIENT)
Dept: OUTPATIENT SERVICES | Facility: HOSPITAL | Age: 64
LOS: 1 days | Discharge: HOME | End: 2024-05-22

## 2024-05-22 DIAGNOSIS — Z95.2 PRESENCE OF PROSTHETIC HEART VALVE: Chronic | ICD-10-CM

## 2024-05-22 DIAGNOSIS — Z98.49 CATARACT EXTRACTION STATUS, UNSPECIFIED EYE: Chronic | ICD-10-CM

## 2024-05-22 DIAGNOSIS — K76.6 PORTAL HYPERTENSION: ICD-10-CM

## 2024-05-22 DIAGNOSIS — Z98.890 OTHER SPECIFIED POSTPROCEDURAL STATES: Chronic | ICD-10-CM

## 2024-05-22 DIAGNOSIS — I82.0 BUDD-CHIARI SYNDROME: ICD-10-CM

## 2024-05-22 DIAGNOSIS — D64.9 ANEMIA, UNSPECIFIED: ICD-10-CM

## 2024-05-22 DIAGNOSIS — O00.90 UNSPECIFIED ECTOPIC PREGNANCY WITHOUT INTRAUTERINE PREGNANCY: Chronic | ICD-10-CM

## 2024-05-29 ENCOUNTER — APPOINTMENT (OUTPATIENT)
Dept: HEMATOLOGY ONCOLOGY | Facility: CLINIC | Age: 64
End: 2024-05-29
Payer: COMMERCIAL

## 2024-05-29 ENCOUNTER — RESULT REVIEW (OUTPATIENT)
Age: 64
End: 2024-05-29

## 2024-05-29 VITALS
OXYGEN SATURATION: 97 % | HEIGHT: 61 IN | RESPIRATION RATE: 16 BRPM | SYSTOLIC BLOOD PRESSURE: 120 MMHG | WEIGHT: 129.98 LBS | TEMPERATURE: 97.5 F | DIASTOLIC BLOOD PRESSURE: 78 MMHG | HEART RATE: 63 BPM | BODY MASS INDEX: 24.54 KG/M2

## 2024-05-29 DIAGNOSIS — K31.7 POLYP OF STOMACH AND DUODENUM: ICD-10-CM

## 2024-05-29 DIAGNOSIS — D50.9 IRON DEFICIENCY ANEMIA, UNSPECIFIED: ICD-10-CM

## 2024-05-29 DIAGNOSIS — D69.3 IMMUNE THROMBOCYTOPENIC PURPURA: ICD-10-CM

## 2024-05-29 LAB
BASOPHILS # BLD AUTO: 0 K/UL — SIGNIFICANT CHANGE UP (ref 0–0.2)
BASOPHILS NFR BLD AUTO: 0 % — SIGNIFICANT CHANGE UP (ref 0–2)
EOSINOPHIL # BLD AUTO: 0.18 K/UL — SIGNIFICANT CHANGE UP (ref 0–0.5)
EOSINOPHIL NFR BLD AUTO: 3 % — SIGNIFICANT CHANGE UP (ref 0–6)
HCT VFR BLD CALC: 38.8 % — SIGNIFICANT CHANGE UP (ref 34.5–45)
HGB BLD-MCNC: 12.4 G/DL — SIGNIFICANT CHANGE UP (ref 11.5–15.5)
LYMPHOCYTES # BLD AUTO: 1.89 K/UL — SIGNIFICANT CHANGE UP (ref 1–3.3)
LYMPHOCYTES # BLD AUTO: 31 % — SIGNIFICANT CHANGE UP (ref 13–44)
MCHC RBC-ENTMCNC: 25.7 PG — LOW (ref 27–34)
MCHC RBC-ENTMCNC: 32 G/DL — SIGNIFICANT CHANGE UP (ref 32–36)
MCV RBC AUTO: 80.3 FL — SIGNIFICANT CHANGE UP (ref 80–100)
MONOCYTES # BLD AUTO: 0.43 K/UL — SIGNIFICANT CHANGE UP (ref 0–0.9)
MONOCYTES NFR BLD AUTO: 7 % — SIGNIFICANT CHANGE UP (ref 2–14)
NEUTROPHILS # BLD AUTO: 3.6 K/UL — SIGNIFICANT CHANGE UP (ref 1.8–7.4)
NEUTROPHILS NFR BLD AUTO: 59 % — SIGNIFICANT CHANGE UP (ref 43–77)
NRBC # BLD: 0 /100 WBCS — SIGNIFICANT CHANGE UP (ref 0–0)
NRBC # BLD: SIGNIFICANT CHANGE UP /100 WBCS (ref 0–0)
PLAT MORPH BLD: NORMAL — SIGNIFICANT CHANGE UP
PLATELET # BLD AUTO: 128 K/UL — LOW (ref 150–400)
RBC # BLD: 4.83 M/UL — SIGNIFICANT CHANGE UP (ref 3.8–5.2)
RBC # FLD: 15.1 % — HIGH (ref 10.3–14.5)
RBC BLD AUTO: SIGNIFICANT CHANGE UP
WBC # BLD: 6.1 K/UL — SIGNIFICANT CHANGE UP (ref 3.8–10.5)
WBC # FLD AUTO: 6.1 K/UL — SIGNIFICANT CHANGE UP (ref 3.8–10.5)

## 2024-05-29 PROCEDURE — G2211 COMPLEX E/M VISIT ADD ON: CPT

## 2024-05-29 PROCEDURE — 99215 OFFICE O/P EST HI 40 MIN: CPT

## 2024-05-29 RX ORDER — ESTRADIOL 0.1 MG/G
0.1 CREAM VAGINAL
Qty: 42 | Refills: 0 | Status: DISCONTINUED | COMMUNITY
Start: 2023-07-05 | End: 2024-05-29

## 2024-05-30 PROBLEM — D69.3 THROMBOCYTOPENIA, IDIOPATHIC: Status: ACTIVE | Noted: 2021-08-11

## 2024-05-30 PROBLEM — D50.9 IRON DEFICIENCY ANEMIA: Status: ACTIVE | Noted: 2023-06-12

## 2024-05-30 PROBLEM — K31.7 MULTIPLE GASTRIC POLYPS: Status: ACTIVE | Noted: 2024-05-30

## 2024-05-30 LAB
APTT 2H P 1:4 NP PPP: 34.7 SEC
APTT 2H P INC PPP: 34.3 SEC
APTT BLD: 35.9 SEC
APTT IMM NP/PRE NP PPP: 33.6 SEC
APTT INV RATIO PPP: 35.9 SEC
AT III PPP CHRO-ACNC: 92 %
B2 GLYCOPROT1 AB SER QL: NEGATIVE
CONFIRM: 32.4 SEC
DEPRECATED D DIMER PPP IA-ACNC: 320 NG/ML DDU
DRVVT IMM 1:2 NP PPP: NORMAL
DRVVT SCREEN TO CONFIRM RATIO: 0.87 RATIO
FERRITIN SERPL-MCNC: 15 NG/ML
HCYS SERPL-MCNC: 10.4 UMOL/L
INR PPP: 1.02 RATIO
IRON SATN MFR SERPL: 16 %
IRON SERPL-MCNC: 67 UG/DL
NPP NORMAL POOLED PLASMA: 33.9 SECS
PROT C PPP CHRO-ACNC: 98 %
PROT S AG ACT/NOR PPP IA: 96 %
PT BLD: 11.5 SEC
SCREEN DRVVT: 33.3 SEC
SILICA CLOTTING TIME INTERPRETATION: NORMAL
SILICA CLOTTING TIME S/C: 1.07 RATIO
TIBC SERPL-MCNC: 428 UG/DL
TT CONT PPP: 24.2 SEC
UIBC SERPL-MCNC: 361 UG/DL

## 2024-05-31 LAB — CARDIOLIPIN AB SER IA-ACNC: NEGATIVE

## 2024-05-31 RX ORDER — OMEPRAZOLE 40 MG/1
40 CAPSULE, DELAYED RELEASE ORAL
Qty: 90 | Refills: 0 | Status: ACTIVE | COMMUNITY
Start: 1900-01-01 | End: 1900-01-01

## 2024-06-03 LAB
ABR COMMENT: NORMAL
DNA PLOIDY SPEC FC-IMP: NORMAL
PNH GRANULOCYTES: 0 %
PNH MONOCYTES: 0 %
PNH RBC - COMPLETE: 0 %
PNH RBC - PARTIAL: 0.01 %
PTR INTERP: NORMAL

## 2024-06-04 ENCOUNTER — APPOINTMENT (OUTPATIENT)
Dept: HEPATOLOGY | Facility: CLINIC | Age: 64
End: 2024-06-04
Payer: COMMERCIAL

## 2024-06-04 VITALS
DIASTOLIC BLOOD PRESSURE: 70 MMHG | BODY MASS INDEX: 24.17 KG/M2 | HEIGHT: 61 IN | TEMPERATURE: 97.3 F | WEIGHT: 128 LBS | HEART RATE: 61 BPM | OXYGEN SATURATION: 97 % | SYSTOLIC BLOOD PRESSURE: 120 MMHG

## 2024-06-04 DIAGNOSIS — I82.890 ACUTE EMBOLISM AND THROMBOSIS OF OTHER SPECIFIED VEINS: ICD-10-CM

## 2024-06-04 DIAGNOSIS — K76.6 PORTAL HYPERTENSION: ICD-10-CM

## 2024-06-04 DIAGNOSIS — I81 PORTAL VEIN THROMBOSIS: ICD-10-CM

## 2024-06-04 PROCEDURE — 99215 OFFICE O/P EST HI 40 MIN: CPT

## 2024-06-05 ENCOUNTER — OUTPATIENT (OUTPATIENT)
Dept: OUTPATIENT SERVICES | Facility: HOSPITAL | Age: 64
LOS: 1 days | End: 2024-06-05
Payer: COMMERCIAL

## 2024-06-05 ENCOUNTER — APPOINTMENT (OUTPATIENT)
Dept: SLEEP CENTER | Facility: CLINIC | Age: 64
End: 2024-06-05
Payer: COMMERCIAL

## 2024-06-05 DIAGNOSIS — Z98.890 OTHER SPECIFIED POSTPROCEDURAL STATES: Chronic | ICD-10-CM

## 2024-06-05 DIAGNOSIS — O00.90 UNSPECIFIED ECTOPIC PREGNANCY WITHOUT INTRAUTERINE PREGNANCY: Chronic | ICD-10-CM

## 2024-06-05 DIAGNOSIS — Z98.49 CATARACT EXTRACTION STATUS, UNSPECIFIED EYE: Chronic | ICD-10-CM

## 2024-06-05 DIAGNOSIS — Z95.2 PRESENCE OF PROSTHETIC HEART VALVE: Chronic | ICD-10-CM

## 2024-06-05 PROCEDURE — 95811 POLYSOM 6/>YRS CPAP 4/> PARM: CPT | Mod: 26

## 2024-06-05 PROCEDURE — 95811 POLYSOM 6/>YRS CPAP 4/> PARM: CPT

## 2024-06-05 NOTE — ADDENDUM
[FreeTextEntry1] : I, Jossdinah Ty, acted solely as a scribe for Dr. Paul Zuniga on 5/29/24. All medical entries made by the Scribe were at my, Dr. Paul Zuniga's, direction and personally dictated by me on 5/29/24. I have reviewed the chart and agree that the record accurately reflects my personal performance of the history, physical exam, assessment and plan. I have also personally directed, reviewed, and agreed with the chart.

## 2024-06-05 NOTE — ASSESSMENT
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 She does not have hepatic cirrhosis and the varices are felt to be due to portal hypertension relating from the thromboses. She has no personal nor family history of other thrombotic events. A congenital hypercoagulable condition is unlikely given this. The hypercoagulable states often associated with portal and splenic vein thrombosis are acquired - lupus anticoagulant/anticardiolipin antibody syndrome and paroxysmal nocturnal hemoglobinuria. Given this event occurred at a young age, we will nevertheless do a full hypercoagulable screen.   Plan: Review peripheral smear - Adequate platelets. NC/NC. WBC normal. Iron/TIBC, ferritin- her MCV has been declining. PI-linked antigen DIC screen, lupus anticoagulant screen, protein C, protein S, antithrombin III, factor V Leiden, prothrombin G mutation, homocysteine Call me in 2 weeks.

## 2024-06-05 NOTE — HISTORY OF PRESENT ILLNESS
[de-identified] : 1990: Portal and splenic vein thrombosis 1980 Idiopathic thrombocytopenia 5/2023 Fe deficiency anemia - gastric polyps, varices [de-identified] : 64 year old female transferring her care to me. In 1980, she was found to have a low platelet count. She saw Dr. Jensen Louis at that time and reportedly was felt to have idiopathic thrombocytopenia. In 1989, she was found to have had portal and splenic vein thrombosis at some point in time. Multiple varices were noted. In May,2023 she had iron deficiency anemia and was found to have multiple benign gastric polyps. She is referred now for hypercoagulable evaluation.   She feels well. Reports arthralgias in her knees. No other complaints.  She notes no headaches, visual problems, chest pain, SOB, abdominal pain, BRBPR, melena, hematemesis, coffee ground emesis, hematuria, vaginal bleeding, jaundice, dark urine, bleeding, bruising, leg pain/swelling, arm pain/swelling, rash, arthritis.  She drinks a glass of wine occasionally. Does not smoke. There is no personal nor family history of extremity DVT nor pulmonary embolism.

## 2024-06-05 NOTE — PHYSICAL EXAM
[Fully active, able to carry on all pre-disease performance without restriction] : Status 0 - Fully active, able to carry on all pre-disease performance without restriction [Normal] : affect appropriate [de-identified] : RRR. S1S2 normal. Gr 2/6 MISTY LUSB to precordium. No gallops.

## 2024-06-06 DIAGNOSIS — G47.33 OBSTRUCTIVE SLEEP APNEA (ADULT) (PEDIATRIC): ICD-10-CM

## 2024-06-06 NOTE — PHYSICAL EXAM
[General Appearance - Alert] : alert [General Appearance - Well Nourished] : well nourished [Sclera] : the sclera and conjunctiva were normal [] : the neck was supple [Respiration, Rhythm And Depth] : normal respiratory rhythm and effort [Auscultation Breath Sounds / Voice Sounds] : lungs were clear to auscultation bilaterally [Heart Rate And Rhythm] : heart rate was normal and rhythm regular [Heart Sounds] : normal S1 and S2 [Bowel Sounds] : normal bowel sounds [Abdomen Soft] : soft [Abdomen Tenderness] : non-tender [Skin Color & Pigmentation] : normal skin color and pigmentation [Oriented To Time, Place, And Person] : oriented to person, place, and time [Scleral Icterus] : No Scleral Icterus [Spider Angioma] : No spider angioma(s) were observed [Ascites Shifting Dullness] : no shifting dullness of ascites [Jaundice] : No jaundice

## 2024-06-06 NOTE — ASSESSMENT
[FreeTextEntry1] :  Trisha Villalobos is a 65 y/o female with Splenic Vein thrombosis and abnormal liver morphology (no cirrhosis) 2/2 portal vein malformation w/ hx of thrombus, portal HTN w/ EV, here for f/u.   #Abnormal liver morphology 2/2 cavernous transformation of portal vein - Chronic liver dx w/u and viral hepatitis negative - 8/17/23 MRE w/ normal elastography and c/f cirrhosis 2/2 abnormal liver morphology likely r/t cavernous transformation of portal vein > update imaging to f/u on portal HTN - No indication for liver bx at this time as it will not  - F/u Hematology to complete w/u and r/o underlying hypercoagulable disorder - pHTN: 5/5/2023 EGD w/ one column of trace EV > review imaging prior to considering repeat scope  - Maintain f/u with Cardiology for optimization of comorbidities  - Keep ETOH d/c  Update labs today. RTC in 3-4 months. Patient seen and plan discussed with Dr. Dent.  Ciera Monaco, MSN, AGAP-BC Transplant Hepatology Nurse Practitioner [facility] [address] [place] [address] T: [phone] | F: [phone].

## 2024-06-06 NOTE — HISTORY OF PRESENT ILLNESS
[FreeTextEntry1] : Transplant Hepatologist: Dr. Layne Dent Transplant Hepatology NP: Ciera Monaco, [facility]-BC  Trisha Villalobos is a 65 y/o female with a PMH of Rheumatic mitral disease, GERD, ITP, hyperkalemic periodic paralysis dx at 35 y/o, JO on CPAP, BCC, T11 fx, cataracts, Splenic Vein thrombosis, abnormal liver morphology 2/2 portal vein malformation w/ hx of thrombus, portal HTN w/ EV, here for follow-up.  Prior HPI: Patient seen inpatient at [facility] in 5/2023 after being advised to present to the ED for anemia (Hgb ~6) i/s/o progressive GUEVARA. Of note, patient recently underwent bioprosthetic AVR and ascending aorta replacement on 12/12/20222 with post-op course c/b paroxysmal afib with RVR s/p PVI and CTI ablation on 1/18/2023 and previously maintained on Eliquis + ASA, subsequently transitioned to Xarelto and now maintained on ASA only. 1/2023 TTE: LVEF 66%, mildly dilated LA, mild WY, moderate TR, mild RA enlargement, & normal pulmonary pressures. S/p LHC w/o significant CAD. Hepatology consulted for evaluation of anemia and cirrhosis. 5/5/23 CTAP comments on cirrhotic liver morphology, e/o pHTN with esophageal and splenic varices, small amount of pelvic ascites, and unchanged Portal Vein cavernous transformation (since 2020). 5/5/2023 EGD: one column of trace EV that flatten upon insufflation, no active bleeding or high risk stigmata of recent bleeding and no variceal banding performed. Colonoscopy notable for innumerable gastric polyps with one polyp removed for bx (pathology negative for malignancy). Cirrhosis thought to be 2/2 congestive hepatopathy v splenic vein thrombosis v portal vein malformation/thrombosis. 5/5/23 MELD 13. Anemia ultimately tx with IV iron, pt transitioned to PO iron supp and anemia thought to be r/t epistaxis prior to admission.  Patient first made aware of c/f cirrhosis during 5/2023 admission. Denies episodes of liver decompensation. No prior liver bx. No known family hx of liver disease. Known EV varices 2/2 portal HTN since 2014, with reported hemorrhage at age 15. Hx of known splenic and portal vein thrombosis, initially diagnosed ~ 30 yrs ago. No ETOH use since recent hospitalization -- prior mild/moderate ETOH with ~ 2 drinks a few times a month. No IVDU. +Blood transfusion in 2000's. Heaviest weight in mid 130's and has been living at current weight for past few yrs, current weight 122 lbs. Ethnicity from [place]. Currently works PT as RN in Ambulatory Surgery (on disability). Lives at home with . Independent in ADLs/iADLs.  - 8/17/23 MRE: Cavernous transformation of portal vein (SMV/splenic vein not visualized) w/ paraesophageal, perigastric, and perisplenic varices. No liver fibrosis and morphologic changes attributed to PVT rather than advanced fibrosis.  In the interim since last visit, there have been no additional illnesses of hospitalizations. No episodes of liver decompensation. Self-limited mild Covid infection. Still working as RN and has potential plans for senior care soon. Established care with Dr. Zuniga in Hematology for repeat w/u of hypercoagulable disorders, pending w/u. In process of going for second sleep apnea for potential dx of central sleep apnea. Maintained routine f/u with Cardiology and only maintained on ASA for AC. Patient's allergies, medications, past medical, surgical, family, and social histories were reviewed and updated as appropriate. Seen in clinic today, reports that she has been feeling well and in baseline state of health. Denies any recent fevers, chills, cough, lightheadedness, AMS, abdominal pain, n/v, diarrhea, hematochezia, hematemesis, and melena. Denies tobacco, or recreational drug use. No ETOH use in 4 months and now having a glass of wine a few times per month.

## 2024-06-06 NOTE — ADDENDUM
[FreeTextEntry1] : HEPATOLOGY ATTENDING ATTESTATION  I saw and evaluated the patient with LEONID Monaco. I discussed the care of this patient with the NP providing the service and was directly responsible for the patient's management. My discussion with the NP included the patient's history, physical exam, laboratory findings, and medical decision-making. I agree with the documented findings and plan of care of the NP's note. Spent > 30 minutes collectively in reviewing records, performing patient history and physical examination, and formulating recommendations/plan of care.   63 y/o female with a PMH of Rheumatic mitral disease, GERD, ITP, hyperkalemic periodic paralysis dx at 37 y/o, JO on CPAP, BCC, T11 fx, cataracts, Splenic Vein thrombosis, abnormal liver morphology 2/2 portal vein malformation w/ hx of thrombus, portal HTN w/ EV, here for follow-up. Agree with heme evaluation (ongoing) and updating MRI to assess sinistral portal htn. Pt asymptomatic. Etoh abstinence and wt loss advised. All questions answered, demonstrated understanding -   Layne Dent DO Transplant Hepatologist  Associate Professor of Medicine

## 2024-06-06 NOTE — REVIEW OF SYSTEMS
[Fever] : no fever [Chills] : no chills [Recent Weight Gain (___ Lbs)] : no recent weight gain [Chest Pain] : no chest pain [Palpitations] : no palpitations [Shortness Of Breath] : no shortness of breath [Cough] : no cough [Abdominal Pain] : no abdominal pain [Vomiting] : no vomiting [Constipation] : no constipation [Diarrhea] : no diarrhea [Dysuria] : no dysuria [Itching] : no itching [Confused] : no confusion [Dizziness] : no dizziness

## 2024-06-12 ENCOUNTER — APPOINTMENT (OUTPATIENT)
Dept: PULMONOLOGY | Facility: CLINIC | Age: 64
End: 2024-06-12
Payer: COMMERCIAL

## 2024-06-12 PROCEDURE — 99443: CPT

## 2024-06-14 ENCOUNTER — APPOINTMENT (OUTPATIENT)
Dept: MRI IMAGING | Facility: HOSPITAL | Age: 64
End: 2024-06-14
Payer: COMMERCIAL

## 2024-06-14 ENCOUNTER — OUTPATIENT (OUTPATIENT)
Dept: OUTPATIENT SERVICES | Facility: HOSPITAL | Age: 64
LOS: 1 days | End: 2024-06-14
Payer: COMMERCIAL

## 2024-06-14 DIAGNOSIS — Z95.2 PRESENCE OF PROSTHETIC HEART VALVE: Chronic | ICD-10-CM

## 2024-06-14 DIAGNOSIS — I82.890 ACUTE EMBOLISM AND THROMBOSIS OF OTHER SPECIFIED VEINS: ICD-10-CM

## 2024-06-14 DIAGNOSIS — Z98.890 OTHER SPECIFIED POSTPROCEDURAL STATES: Chronic | ICD-10-CM

## 2024-06-14 DIAGNOSIS — Z98.49 CATARACT EXTRACTION STATUS, UNSPECIFIED EYE: Chronic | ICD-10-CM

## 2024-06-14 DIAGNOSIS — O00.90 UNSPECIFIED ECTOPIC PREGNANCY WITHOUT INTRAUTERINE PREGNANCY: Chronic | ICD-10-CM

## 2024-06-14 PROCEDURE — 74183 MRI ABD W/O CNTR FLWD CNTR: CPT | Mod: 26

## 2024-06-14 PROCEDURE — A9579: CPT

## 2024-06-14 PROCEDURE — 74183 MRI ABD W/O CNTR FLWD CNTR: CPT

## 2024-07-17 ENCOUNTER — APPOINTMENT (OUTPATIENT)
Dept: PULMONOLOGY | Facility: CLINIC | Age: 64
End: 2024-07-17
Payer: COMMERCIAL

## 2024-07-17 PROCEDURE — 99441: CPT

## 2024-07-29 ENCOUNTER — APPOINTMENT (OUTPATIENT)
Dept: OBGYN | Facility: CLINIC | Age: 64
End: 2024-07-29
Payer: COMMERCIAL

## 2024-07-29 PROCEDURE — 82270 OCCULT BLOOD FECES: CPT

## 2024-07-29 PROCEDURE — 99397 PER PM REEVAL EST PAT 65+ YR: CPT

## 2024-07-29 PROCEDURE — 81002 URINALYSIS NONAUTO W/O SCOPE: CPT

## 2024-07-29 PROCEDURE — G0444 DEPRESSION SCREEN ANNUAL: CPT

## 2024-08-07 ENCOUNTER — APPOINTMENT (OUTPATIENT)
Dept: PULMONOLOGY | Facility: CLINIC | Age: 64
End: 2024-08-07

## 2024-08-12 ENCOUNTER — NON-APPOINTMENT (OUTPATIENT)
Age: 64
End: 2024-08-12

## 2024-08-12 ENCOUNTER — APPOINTMENT (OUTPATIENT)
Dept: INTERNAL MEDICINE | Facility: CLINIC | Age: 64
End: 2024-08-12
Payer: COMMERCIAL

## 2024-08-12 VITALS — SYSTOLIC BLOOD PRESSURE: 110 MMHG | DIASTOLIC BLOOD PRESSURE: 62 MMHG

## 2024-08-12 VITALS
BODY MASS INDEX: 24.55 KG/M2 | HEIGHT: 61 IN | HEART RATE: 65 BPM | TEMPERATURE: 98.2 F | OXYGEN SATURATION: 98 % | WEIGHT: 130 LBS

## 2024-08-12 DIAGNOSIS — Z09 ENCOUNTER FOR FOLLOW-UP EXAMINATION AFTER COMPLETED TREATMENT FOR CONDITIONS OTHER THAN MALIGNANT NEOPLASM: ICD-10-CM

## 2024-08-12 DIAGNOSIS — Z01.818 ENCOUNTER FOR OTHER PREPROCEDURAL EXAMINATION: ICD-10-CM

## 2024-08-12 DIAGNOSIS — Z20.822 CONTACT WITH AND (SUSPECTED) EXPOSURE TO COVID-19: ICD-10-CM

## 2024-08-12 DIAGNOSIS — Z71.84 ENC FOR HEALTH COUNSELING RELATED TO TRAVEL: ICD-10-CM

## 2024-08-12 DIAGNOSIS — Z92.29 PERSONAL HISTORY OF OTHER DRUG THERAPY: ICD-10-CM

## 2024-08-12 DIAGNOSIS — Z11.59 ENCOUNTER FOR SCREENING FOR OTHER VIRAL DISEASES: ICD-10-CM

## 2024-08-12 DIAGNOSIS — Z87.898 PERSONAL HISTORY OF OTHER SPECIFIED CONDITIONS: ICD-10-CM

## 2024-08-12 PROCEDURE — 99214 OFFICE O/P EST MOD 30 MIN: CPT | Mod: 25

## 2024-08-12 PROCEDURE — 93000 ELECTROCARDIOGRAM COMPLETE: CPT

## 2024-08-12 NOTE — HISTORY OF PRESENT ILLNESS
[de-identified] : The patient is here for a medical clearance prior to planned cataract surgery on 8/19/24 by Dr. Law Londono.  She feels generally well.  She can walk three miles at a moderate pace without difficulty.  There can be mild dyspnea but no chest pain or pressure.  No leg edema or orthopnea.  No gastrointestinal symptoms.    She mentions that she has CTS and she is being evaluated for that.

## 2024-08-12 NOTE — PHYSICAL EXAM
[Normal Rate] : normal rate  [Regular Rhythm] : with a regular rhythm [Normal] : normal gait, coordination grossly intact, no focal deficits and deep tendon reflexes were 2+ and symmetric [de-identified] : 2/6 systolic murmur

## 2024-08-12 NOTE — ASSESSMENT
[FreeTextEntry1] : The patient is at low medical risk for the planned procedure.  She has no symptoms to suggest cardiac ischemia.  The EKG is normal.  The BP is 110/62.  She has seen her cardiologist and cardiothoracic surgeon this year.   Echocardiogram reviewed.  She sees a pulmonologist.  Note that she has obstructive sleep apnea and she should be monitored perioperatively.  She has seen her hepatologist.  Platelets 128 iss table.    Medications reviewed.  She can continue the ASA 81 mg QD.    See the clearance form for more information.

## 2024-08-15 ENCOUNTER — NON-APPOINTMENT (OUTPATIENT)
Age: 64
End: 2024-08-15

## 2024-08-22 ENCOUNTER — NON-APPOINTMENT (OUTPATIENT)
Age: 64
End: 2024-08-22

## 2024-08-30 ENCOUNTER — OUTPATIENT (OUTPATIENT)
Dept: OUTPATIENT SERVICES | Facility: HOSPITAL | Age: 64
LOS: 1 days | End: 2024-08-30

## 2024-08-30 VITALS
WEIGHT: 128.09 LBS | DIASTOLIC BLOOD PRESSURE: 80 MMHG | RESPIRATION RATE: 17 BRPM | OXYGEN SATURATION: 97 % | SYSTOLIC BLOOD PRESSURE: 122 MMHG | HEART RATE: 60 BPM | HEIGHT: 61 IN

## 2024-08-30 DIAGNOSIS — Z91.89 OTHER SPECIFIED PERSONAL RISK FACTORS, NOT ELSEWHERE CLASSIFIED: ICD-10-CM

## 2024-08-30 DIAGNOSIS — F05 DELIRIUM DUE TO KNOWN PHYSIOLOGICAL CONDITION: ICD-10-CM

## 2024-08-30 DIAGNOSIS — G56.02 CARPAL TUNNEL SYNDROME, LEFT UPPER LIMB: ICD-10-CM

## 2024-08-30 DIAGNOSIS — G47.31 PRIMARY CENTRAL SLEEP APNEA: ICD-10-CM

## 2024-08-30 DIAGNOSIS — Z98.890 OTHER SPECIFIED POSTPROCEDURAL STATES: Chronic | ICD-10-CM

## 2024-08-30 DIAGNOSIS — Z98.49 CATARACT EXTRACTION STATUS, UNSPECIFIED EYE: Chronic | ICD-10-CM

## 2024-08-30 DIAGNOSIS — Z95.2 PRESENCE OF PROSTHETIC HEART VALVE: Chronic | ICD-10-CM

## 2024-08-30 DIAGNOSIS — O00.90 UNSPECIFIED ECTOPIC PREGNANCY WITHOUT INTRAUTERINE PREGNANCY: Chronic | ICD-10-CM

## 2024-08-30 RX ORDER — OMEPRAZOLE 40 MG/1
1 CAPSULE, DELAYED RELEASE ORAL
Refills: 0 | DISCHARGE

## 2024-08-30 RX ORDER — MOXIFLOXACIN HCL 0.5 %
1 DROPS OPHTHALMIC (EYE)
Refills: 0 | DISCHARGE

## 2024-08-30 RX ORDER — BROMFENAC SODIUM 0.9 MG/ML
1 SOLUTION/ DROPS OPHTHALMIC
Refills: 0 | DISCHARGE

## 2024-08-30 NOTE — H&P PST ADULT - HISTORY OF PRESENT ILLNESS
67 yr old female, history of hypokalemic periodic paralysis (on Acetazolamide, multiple family members that are affected, including her siblings son), cavernous transformation of the portal vein (chronic thrombocytopenia),  GERD and anxiety, presents for pre-op exam for carpal tunnel syndrome.  Pt reports bilateral wrist discomfort, numbness &  tingling, worse at night.       Now scheduled for endoscopic right crapal tunnel release on 12/21/16. 64 yr old female with h/o history of hyperkalemic periodic paralysis - on Diamox ( multiple family members that are affected, including her siblings son), cavernous transformation of the portal vein (chronic thrombocytopenia), AFIB. S/P Ablation, GERD and anxiety, presents for pre-op evaluation with pre op diagnosis: carpal tunnel syndrome left upper limb. Now schedule for left wrist endoscopic carpal tunnel release tentatively on 09/05/24 64 yr old female with h/o history of hyperkalemic periodic paralysis - on Diamox, last flare up 2 years ago ( multiple family members that are affected, including her siblings son), cavernous transformation of the portal vein (chronic thrombocytopenia), AFIB. S/P Ablation, GERD and anxiety, presents for pre-op evaluation with pre op diagnosis: carpal tunnel syndrome left upper limb. Now schedule for left wrist endoscopic carpal tunnel release tentatively on 09/05/24

## 2024-08-30 NOTE — H&P PST ADULT - NEGATIVE MALE-SPECIFIC SYMPTOMS
Karen Knapp Patient Age: 7 year old  MESSAGE: Interpreting service used: No    Insurance on file confirmed with caller: Yes    Pediatrics- Reason for call: Symptom- Yellow Symptom-   Limping     Mom states pt started complaining of leg pain a couple of days ago, pt has been limping on and off. Then this morning mom stated she was running a low grade fever.           Appointment: Caller declined making an appointment before speaking with the nurse.    Caller connected to triage- Yes- [facility]- Connect call to Triage queue and route message to Provider's Clinical Support Pool        Message read back to caller for accuracy: Yes       ALLERGIES:  Amoxicillin  Current Outpatient Medications   Medication Sig Dispense Refill   • dexAMETHasone (DECADRON) 4 MG tablet Take 3 tablets (crushed in applesauce) as single dose 3 tablet 0     No current facility-administered medications for this visit.     PHARMACY to use: confirmed          Pharmacy preference(s) on file:   [id number] - [place], [place] - [address] [facility] [address]  [facility] [address]  Phone: [phone] Fax: [phone]      CALL BACK INFO: Ok to leave response (including medical information) with family member or on answering machine      PCP: Gallito Kaufman MD         INS: Payor: BLUE CROSS BLUE [employer]IELD [place] / Plan: PPO [id number] / Product Type: PPO MISC   PATIENT ADDRESS:  [address] [place] [id number]     Products Recommended: SPF 30 or higher General Sunscreen Counseling: I recommended a broad spectrum sunscreen with a SPF of 30 or higher.  I explained that SPF 30 sunscreens block approximately 97 percent of the sun's harmful rays.  Sunscreens should be applied at least 15 minutes prior to expected sun exposure and then every 2 hours after that as long as sun exposure continues. If swimming or exercising sunscreen should be reapplied every 45 minutes to an hour after getting wet or sweating.  One ounce, or the equivalent of a shot glass full of sunscreen, is adequate to protect the skin not covered by a bathing suit. I also recommended a lip balm with a sunscreen as well. Sun protective clothing can be used in lieu of sunscreen but must be worn the entire time you are exposed to the sun's rays. Detail Level: Detailed not applicable

## 2024-08-30 NOTE — H&P PST ADULT - HEMATOLOGY/LYMPHATICS COMMENTS
chronic low platelets from cavernous transformation chronic low platelets from cavernous transformation - last hematologist consultation 05/2024.

## 2024-08-30 NOTE — H&P PST ADULT - NEGATIVE ENMT SYMPTOMS
no ear pain/no tinnitus/no vertigo/no sinus symptoms/no nasal congestion/no post-nasal discharge/no nose bleeds/no gum bleeding/no throat pain/no dysphagia no ear pain/no tinnitus/no vertigo/no sinus symptoms/no nasal congestion/no post-nasal discharge/no nose bleeds/no abnormal taste sensation/no gum bleeding/no throat pain/no dysphagia

## 2024-08-30 NOTE — H&P PST ADULT - PROBLEM SELECTOR PLAN 4
POST OP DELIRIUM SCREENING   1. Patient eligible for pierre risk screen age>75?  - No  2. Health care proxy paperwork given to patient? Yes - Paperwork given and explained   3. Impaired mobility (ie: uses cane, walker, wheelchair, or assist device)?   4. Known dementia diagnosis?   5. Impaired functional status (METS<4)?   6. Malnutrition BMI<20?  Email sent to surgeon if risk factor identified

## 2024-08-30 NOTE — H&P PST ADULT - RESPIRATORY
clear to auscultation bilaterally/no wheezes/no rales/no rhonchi/no respiratory distress/good air movement/respirations non-labored clear to auscultation bilaterally/no wheezes/no rales/no rhonchi/no respiratory distress/airway patent/breath sounds equal/good air movement/respirations non-labored

## 2024-08-30 NOTE — H&P PST ADULT - NEGATIVE NEUROLOGICAL SYMPTOMS
no transient paralysis/no weakness/no paresthesias/no vertigo/no loss of sensation/no difficulty walking/no loss of consciousness/no hemiparesis/no confusion/no facial palsy no transient paralysis/no weakness/no paresthesias/no generalized seizures/no syncope/no tremors/no vertigo/no loss of sensation/no difficulty walking/no loss of consciousness/no hemiparesis/no confusion/no facial palsy

## 2024-08-30 NOTE — H&P PST ADULT - PROBLEM SELECTOR PLAN 1
Schedule for left wrist endoscopic carpal tunnel release tentatively on 09/05/24. Pre op instructions chlorhexidine gluconate soap given and explained. Pt verbalized understanding. Schedule for left wrist endoscopic carpal tunnel release tentatively on 09/05/24. Pre op instructions chlorhexidine gluconate soap given and explained. Pt verbalized understanding.    *** Lab results 05/29/24 requested from hematologist Dr. Zuniga ([phone])*** Schedule for left wrist endoscopic carpal tunnel release tentatively on 09/05/24. Pre op instructions chlorhexidine gluconate soap given and explained. Pt verbalized understanding.    *** Lab results 05/29/24 in [facility]**

## 2024-08-30 NOTE — H&P PST ADULT - NEGATIVE PSYCHIATRIC SYMPTOMS
no suicidal ideation/no depression no suicidal ideation/no depression/no anxiety/no insomnia/no memory loss

## 2024-08-30 NOTE — H&P PST ADULT - NEGATIVE OPHTHALMOLOGIC SYMPTOMS
no diplopia/no photophobia/no lacrimation L/no lacrimation R/no blurred vision L/no blurred vision R/no discharge L/no pain L/no pain R/no irritation R/no loss of vision L/no loss of vision R/no scleral injection L/no scleral injection R

## 2024-08-30 NOTE — H&P PST ADULT - NEUROLOGICAL COMMENTS
Periodic paralysis from Hyperkalemia, seizures age 43-44, unknown etiology, no seizures since that time, occasional headaches

## 2024-08-30 NOTE — H&P PST ADULT - PROBLEM SELECTOR PLAN 2
H/O; Hyperkalemia paralysis  Case discussed with anesthesiologist Dr. RIVAS H/O; Hyperkalemia paralysis  Case discussed with anesthesiologist Dr. Little H/O; Hyperkalemia periodic paralysis control with Diamox  Case discussed with anesthesiologist Dr. Little, She recommends that the case be discussed with Dr. Bridges or Dr. Medina on Tuesday 09/03/24

## 2024-08-30 NOTE — H&P PST ADULT - MUSCULOSKELETAL COMMENTS
left middle toe pain, had cortisone injection. "Bilateral hand pain/ numbness tingling palm and first 2 fingers, worse when sleeping, difficulty grasping items, left worse than right at this time, but I am having carpal tunnel surgery on my right wrist first"

## 2024-08-30 NOTE — H&P PST ADULT - LAST ECHOCARDIOGRAM
01/2023 01/17/24 - Left ventricular systolic function is normal with an EF 69%. Moderate tricuspid regurg.

## 2024-08-30 NOTE — H&P PST ADULT - NEGATIVE LYMPHATIC SYMPTOMS
no enlarged lymph nodes/no swelling of extremity no enlarged lymph nodes/no tender lymph nodes/no swelling of extremity

## 2024-08-30 NOTE — H&P PST ADULT - NEGATIVE GENERAL SYMPTOMS
no fever/no anorexia/no weight loss/no weight gain/no polyphagia/no polyuria/no polydipsia/no malaise/no fatigue no fever/no chills/no anorexia/no weight loss/no weight gain/no polyphagia/no polyuria/no polydipsia/no malaise/no fatigue

## 2024-08-30 NOTE — H&P PST ADULT - NSICDXPASTSURGICALHX_GEN_ALL_CORE_FT
PAST SURGICAL HISTORY:   delivery NOS     Ectopic pregnancy     H/O aortic valve replacement     H/O ascending aorta repair     H/O bilateral inguinal hernia repair age 5    H/O carpal tunnel repair     S/P ablation of atrial fibrillation     S/P cataract surgery     S/P colonoscopy with polypectomy     Status post cataract extraction

## 2024-08-30 NOTE — H&P PST ADULT - MUSCULOSKELETAL
ROM intact/normal gait/strength 5/5 bilateral upper extremities details… ROM intact/no joint swelling/no joint erythema/no joint warmth/no calf tenderness/normal gait/strength 5/5 bilateral upper extremities

## 2024-08-30 NOTE — H&P PST ADULT - NEGATIVE GENERAL GENITOURINARY SYMPTOMS
no hematuria/no renal colic/no flank pain L/no flank pain R/no urine discoloration/no incontinence/no dysuria/no urinary hesitancy/normal urinary frequency/no nocturia no hematuria/no renal colic/no flank pain L/no flank pain R/no urine discoloration/no bladder infections/no incontinence/no dysuria/no urinary hesitancy/normal urinary frequency/no nocturia

## 2024-08-30 NOTE — H&P PST ADULT - OTHER CARE PROVIDERS
Dr. Zuniga ([phone]; Dr. Celso Calix (Cardio) [phone];  Dr. Dent (Hepatologist) [phone] Dr. Zuniga ([phone]; Dr. Celso Calix (Cardio) [phone];  Dr. Dent (Hepatologist) [phone]; Onofre Rice ( pulm)

## 2024-08-30 NOTE — H&P PST ADULT - NEGATIVE CARDIOVASCULAR SYMPTOMS
no chest pain/no palpitations/no dyspnea on exertion/no peripheral edema/no claudication no chest pain/no palpitations/no dyspnea on exertion/no orthopnea/no peripheral edema/no claudication

## 2024-09-04 VITALS
OXYGEN SATURATION: 97 % | RESPIRATION RATE: 16 BRPM | TEMPERATURE: 98 F | SYSTOLIC BLOOD PRESSURE: 103 MMHG | HEIGHT: 61 IN | DIASTOLIC BLOOD PRESSURE: 62 MMHG | WEIGHT: 128.09 LBS | HEART RATE: 58 BPM

## 2024-09-04 NOTE — ASU PREOPERATIVE ASSESSMENT, ADULT ([facility] ONLY) - FALL HARM RISK - UNIVERSAL INTERVENTIONS
Bed in lowest position, wheels locked, appropriate side rails in place/Call bell, personal items and telephone in reach/Instruct patient to call for assistance before getting out of bed or chair/Non-slip footwear when patient is out of bed/[place] to call system/Physically safe environment - no spills, clutter or unnecessary equipment/Purposeful Proactive Rounding/Room/bathroom lighting operational, light cord in reach

## 2024-09-05 ENCOUNTER — OUTPATIENT (OUTPATIENT)
Dept: OUTPATIENT SERVICES | Facility: HOSPITAL | Age: 64
LOS: 1 days | Discharge: HOME | End: 2024-09-05

## 2024-09-05 ENCOUNTER — TRANSCRIPTION ENCOUNTER (OUTPATIENT)
Age: 64
End: 2024-09-05

## 2024-09-05 VITALS
HEART RATE: 55 BPM | RESPIRATION RATE: 14 BRPM | SYSTOLIC BLOOD PRESSURE: 106 MMHG | OXYGEN SATURATION: 99 % | DIASTOLIC BLOOD PRESSURE: 65 MMHG

## 2024-09-05 DIAGNOSIS — Z98.49 CATARACT EXTRACTION STATUS, UNSPECIFIED EYE: Chronic | ICD-10-CM

## 2024-09-05 DIAGNOSIS — O00.90 UNSPECIFIED ECTOPIC PREGNANCY WITHOUT INTRAUTERINE PREGNANCY: Chronic | ICD-10-CM

## 2024-09-05 DIAGNOSIS — Z98.890 OTHER SPECIFIED POSTPROCEDURAL STATES: Chronic | ICD-10-CM

## 2024-09-05 DIAGNOSIS — G56.02 CARPAL TUNNEL SYNDROME, LEFT UPPER LIMB: ICD-10-CM

## 2024-09-05 DIAGNOSIS — Z95.2 PRESENCE OF PROSTHETIC HEART VALVE: Chronic | ICD-10-CM

## 2024-09-05 NOTE — ASU DISCHARGE PLAN (ADULT/PEDIATRIC) - NS MD DC FALL RISK RISK
For information on Fall & Injury Prevention, visit: [email or web address] OR  [email or web address] OR  https://www.cdc.gov/steadi/patient.html

## 2024-09-05 NOTE — ASU DISCHARGE PLAN (ADULT/PEDIATRIC) - PAIN MANAGEMENT
Prescriptions electronically submitted to pharmacy from [facility] Take over the counter pain medication/Prescriptions electronically submitted to pharmacy from [facility] Take over the counter pain medication

## 2024-09-17 ENCOUNTER — APPOINTMENT (OUTPATIENT)
Dept: CARDIOLOGY | Facility: CLINIC | Age: 64
End: 2024-09-17
Payer: COMMERCIAL

## 2024-09-17 PROCEDURE — 99213 OFFICE O/P EST LOW 20 MIN: CPT

## 2024-09-17 PROCEDURE — G2211 COMPLEX E/M VISIT ADD ON: CPT

## 2024-09-24 ENCOUNTER — APPOINTMENT (OUTPATIENT)
Dept: INTERNAL MEDICINE | Facility: CLINIC | Age: 64
End: 2024-09-24
Payer: COMMERCIAL

## 2024-09-24 VITALS
HEART RATE: 63 BPM | HEIGHT: 61 IN | WEIGHT: 127 LBS | TEMPERATURE: 97.7 F | BODY MASS INDEX: 23.98 KG/M2 | OXYGEN SATURATION: 99 %

## 2024-09-24 DIAGNOSIS — Z00.00 ENCOUNTER FOR GENERAL ADULT MEDICAL EXAMINATION W/OUT ABNORMAL FINDINGS: ICD-10-CM

## 2024-09-24 DIAGNOSIS — R22.1 LOCALIZED SWELLING, MASS AND LUMP, NECK: ICD-10-CM

## 2024-09-24 PROCEDURE — 36415 COLL VENOUS BLD VENIPUNCTURE: CPT

## 2024-09-24 PROCEDURE — 99397 PER PM REEVAL EST PAT 65+ YR: CPT

## 2024-09-25 ENCOUNTER — APPOINTMENT (OUTPATIENT)
Dept: ULTRASOUND IMAGING | Facility: CLINIC | Age: 64
End: 2024-09-25
Payer: COMMERCIAL

## 2024-09-25 ENCOUNTER — APPOINTMENT (OUTPATIENT)
Dept: ULTRASOUND IMAGING | Facility: CLINIC | Age: 64
End: 2024-09-25

## 2024-09-25 ENCOUNTER — APPOINTMENT (OUTPATIENT)
Dept: MAMMOGRAPHY | Facility: CLINIC | Age: 64
End: 2024-09-25
Payer: COMMERCIAL

## 2024-09-25 ENCOUNTER — APPOINTMENT (OUTPATIENT)
Dept: RADIOLOGY | Facility: CLINIC | Age: 64
End: 2024-09-25
Payer: COMMERCIAL

## 2024-09-25 ENCOUNTER — OUTPATIENT (OUTPATIENT)
Dept: OUTPATIENT SERVICES | Facility: HOSPITAL | Age: 64
LOS: 1 days | End: 2024-09-25
Payer: COMMERCIAL

## 2024-09-25 DIAGNOSIS — Z95.2 PRESENCE OF PROSTHETIC HEART VALVE: Chronic | ICD-10-CM

## 2024-09-25 DIAGNOSIS — O00.90 UNSPECIFIED ECTOPIC PREGNANCY WITHOUT INTRAUTERINE PREGNANCY: Chronic | ICD-10-CM

## 2024-09-25 DIAGNOSIS — Z98.49 CATARACT EXTRACTION STATUS, UNSPECIFIED EYE: Chronic | ICD-10-CM

## 2024-09-25 DIAGNOSIS — Z98.890 OTHER SPECIFIED POSTPROCEDURAL STATES: Chronic | ICD-10-CM

## 2024-09-25 DIAGNOSIS — Z12.31 ENCOUNTER FOR SCREENING MAMMOGRAM FOR MALIGNANT NEOPLASM OF BREAST: ICD-10-CM

## 2024-09-25 LAB
25(OH)D3 SERPL-MCNC: 34.8 NG/ML
ALBUMIN SERPL ELPH-MCNC: 4.3 G/DL
ALP BLD-CCNC: 95 U/L
ALT SERPL-CCNC: 16 U/L
ANION GAP SERPL CALC-SCNC: 16 MMOL/L
APPEARANCE: CLEAR
AST SERPL-CCNC: 26 U/L
BACTERIA: NEGATIVE /HPF
BILIRUB SERPL-MCNC: 0.4 MG/DL
BILIRUBIN URINE: NEGATIVE
BLOOD URINE: NEGATIVE
BUN SERPL-MCNC: 17 MG/DL
CALCIUM SERPL-MCNC: 9.5 MG/DL
CAST: 0 /LPF
CHLORIDE SERPL-SCNC: 108 MMOL/L
CHOLEST SERPL-MCNC: 245 MG/DL
CO2 SERPL-SCNC: 21 MMOL/L
COLOR: YELLOW
CREAT SERPL-MCNC: 0.68 MG/DL
EGFR: 97 ML/MIN/1.73M2
EPITHELIAL CELLS: 0 /HPF
ESTIMATED AVERAGE GLUCOSE: 114 MG/DL
FERRITIN SERPL-MCNC: 13 NG/ML
GLUCOSE QUALITATIVE U: NEGATIVE MG/DL
GLUCOSE SERPL-MCNC: 91 MG/DL
HBA1C MFR BLD HPLC: 5.6 %
HCT VFR BLD CALC: 40.7 %
HDLC SERPL-MCNC: 70 MG/DL
HGB BLD-MCNC: 12.3 G/DL
IRON SATN MFR SERPL: 7 %
IRON SERPL-MCNC: 28 UG/DL
KETONES URINE: NEGATIVE MG/DL
LDLC SERPL CALC-MCNC: 162 MG/DL
LEUKOCYTE ESTERASE URINE: ABNORMAL
MCHC RBC-ENTMCNC: 25.2 PG
MCHC RBC-ENTMCNC: 30.2 GM/DL
MCV RBC AUTO: 83.2 FL
MICROSCOPIC-UA: NORMAL
NITRITE URINE: NEGATIVE
NONHDLC SERPL-MCNC: 175 MG/DL
PH URINE: 6
PLATELET # BLD AUTO: 134 K/UL
POTASSIUM SERPL-SCNC: 3.5 MMOL/L
PROT SERPL-MCNC: 6.8 G/DL
PROTEIN URINE: NEGATIVE MG/DL
RBC # BLD: 4.89 M/UL
RBC # FLD: 16.3 %
RED BLOOD CELLS URINE: 0 /HPF
SODIUM SERPL-SCNC: 145 MMOL/L
SPECIFIC GRAVITY URINE: 1.01
T4 FREE SERPL-MCNC: 1 NG/DL
TIBC SERPL-MCNC: 393 UG/DL
TRIGL SERPL-MCNC: 77 MG/DL
TSH SERPL-ACNC: 3.31 UIU/ML
UIBC SERPL-MCNC: 365 UG/DL
UROBILINOGEN URINE: 0.2 MG/DL
WBC # FLD AUTO: 6.33 K/UL
WHITE BLOOD CELLS URINE: 1 /HPF

## 2024-09-25 PROCEDURE — 77067 SCR MAMMO BI INCL CAD: CPT | Mod: 26

## 2024-09-25 PROCEDURE — 76536 US EXAM OF HEAD AND NECK: CPT | Mod: 26

## 2024-09-25 PROCEDURE — 77080 DXA BONE DENSITY AXIAL: CPT | Mod: 26

## 2024-09-25 PROCEDURE — 77063 BREAST TOMOSYNTHESIS BI: CPT | Mod: 26

## 2024-09-25 PROCEDURE — 77067 SCR MAMMO BI INCL CAD: CPT

## 2024-09-25 PROCEDURE — 76536 US EXAM OF HEAD AND NECK: CPT

## 2024-09-25 PROCEDURE — 76641 ULTRASOUND BREAST COMPLETE: CPT | Mod: 26,50

## 2024-09-25 PROCEDURE — 77080 DXA BONE DENSITY AXIAL: CPT

## 2024-09-25 PROCEDURE — 77063 BREAST TOMOSYNTHESIS BI: CPT

## 2024-09-25 PROCEDURE — 76641 ULTRASOUND BREAST COMPLETE: CPT

## 2024-09-27 VITALS — DIASTOLIC BLOOD PRESSURE: 70 MMHG | SYSTOLIC BLOOD PRESSURE: 125 MMHG

## 2024-09-27 NOTE — PHYSICAL EXAM
[No Acute Distress] : no acute distress [Well Nourished] : well nourished [Well Developed] : well developed [Well-Appearing] : well-appearing [Normal Sclera/Conjunctiva] : normal sclera/conjunctiva [PERRL] : pupils equal round and reactive to light [EOMI] : extraocular movements intact [Normal Outer Ear/Nose] : the outer ears and nose were normal in appearance [Normal Oropharynx] : the oropharynx was normal [No JVD] : no jugular venous distention [No Lymphadenopathy] : no lymphadenopathy [Supple] : supple [Thyroid Normal, No Nodules] : the thyroid was normal and there were no nodules present [No Respiratory Distress] : no respiratory distress  [No Accessory Muscle Use] : no accessory muscle use [Clear to Auscultation] : lungs were clear to auscultation bilaterally [Normal Rate] : normal rate  [Regular Rhythm] : with a regular rhythm [Normal S1, S2] : normal S1 and S2 [No Murmur] : no murmur heard [No Carotid Bruits] : no carotid bruits [No Abdominal Bruit] : a ~M bruit was not heard ~T in the abdomen [No Varicosities] : no varicosities [Pedal Pulses Present] : the pedal pulses are present [No Edema] : there was no peripheral edema [No Palpable Aorta] : no palpable aorta [No Extremity Clubbing/Cyanosis] : no extremity clubbing/cyanosis [Soft] : abdomen soft [Non Tender] : non-tender [Non-distended] : non-distended [No Masses] : no abdominal mass palpated [No HSM] : no HSM [Normal Bowel Sounds] : normal bowel sounds [Normal Posterior Cervical Nodes] : no posterior cervical lymphadenopathy [Normal Anterior Cervical Nodes] : no anterior cervical lymphadenopathy [No CVA Tenderness] : no CVA  tenderness [No Spinal Tenderness] : no spinal tenderness [No Joint Swelling] : no joint swelling [Grossly Normal Strength/Tone] : grossly normal strength/tone [No Rash] : no rash [Coordination Grossly Intact] : coordination grossly intact [No Focal Deficits] : no focal deficits [Normal Gait] : normal gait [Deep Tendon Reflexes (DTR)] : deep tendon reflexes were 2+ and symmetric [Normal Affect] : the affect was normal [Normal Insight/Judgement] : insight and judgment were intact [de-identified] : left medial clavicle witha  firm, nontender, mobile lump

## 2024-09-27 NOTE — HISTORY OF PRESENT ILLNESS
[FreeTextEntry1] : The patient is here for a routine visit.  [de-identified] : She feels a lump over the clavicle for 1-2 weeks. No pain or trauma.  The CTS surgery went well.  She is s/p cataract surgery.

## 2024-09-27 NOTE — HISTORY OF PRESENT ILLNESS
[FreeTextEntry1] : The patient is here for a routine visit.  [de-identified] : She feels a lump over the clavicle for 1-2 weeks. No pain or trauma.  The CTS surgery went well.  She is s/p cataract surgery.

## 2024-09-27 NOTE — HEALTH RISK ASSESSMENT
[Fully functional (bathing, dressing, toileting, transferring, walking, feeding)] : Fully functional (bathing, dressing, toileting, transferring, walking, feeding) [Fully functional (using the telephone, shopping, preparing meals, housekeeping, doing laundry, using] : Fully functional and needs no help or supervision to perform IADLs (using the telephone, shopping, preparing meals, housekeeping, doing laundry, using transportation, managing medications and managing finances) [Reports changes in hearing] : Reports no changes in hearing [Reports changes in vision] : Reports no changes in vision [Reports changes in dental health] : Reports no changes in dental health

## 2024-09-27 NOTE — PHYSICAL EXAM
[No Acute Distress] : no acute distress [Well Nourished] : well nourished [Well Developed] : well developed [Well-Appearing] : well-appearing [Normal Sclera/Conjunctiva] : normal sclera/conjunctiva [PERRL] : pupils equal round and reactive to light [EOMI] : extraocular movements intact [Normal Outer Ear/Nose] : the outer ears and nose were normal in appearance [Normal Oropharynx] : the oropharynx was normal [No JVD] : no jugular venous distention [No Lymphadenopathy] : no lymphadenopathy [Supple] : supple [Thyroid Normal, No Nodules] : the thyroid was normal and there were no nodules present [No Respiratory Distress] : no respiratory distress  [No Accessory Muscle Use] : no accessory muscle use [Clear to Auscultation] : lungs were clear to auscultation bilaterally [Normal Rate] : normal rate  [Regular Rhythm] : with a regular rhythm [Normal S1, S2] : normal S1 and S2 [No Murmur] : no murmur heard [No Carotid Bruits] : no carotid bruits [No Abdominal Bruit] : a ~M bruit was not heard ~T in the abdomen [No Varicosities] : no varicosities [Pedal Pulses Present] : the pedal pulses are present [No Edema] : there was no peripheral edema [No Palpable Aorta] : no palpable aorta [No Extremity Clubbing/Cyanosis] : no extremity clubbing/cyanosis [Soft] : abdomen soft [Non Tender] : non-tender [Non-distended] : non-distended [No Masses] : no abdominal mass palpated [No HSM] : no HSM [Normal Bowel Sounds] : normal bowel sounds [Normal Posterior Cervical Nodes] : no posterior cervical lymphadenopathy [Normal Anterior Cervical Nodes] : no anterior cervical lymphadenopathy [No CVA Tenderness] : no CVA  tenderness [No Spinal Tenderness] : no spinal tenderness [No Joint Swelling] : no joint swelling [Grossly Normal Strength/Tone] : grossly normal strength/tone [No Rash] : no rash [Coordination Grossly Intact] : coordination grossly intact [No Focal Deficits] : no focal deficits [Normal Gait] : normal gait [Deep Tendon Reflexes (DTR)] : deep tendon reflexes were 2+ and symmetric [Normal Affect] : the affect was normal [Normal Insight/Judgement] : insight and judgment were intact [de-identified] : left medial clavicle witha  firm, nontender, mobile lump

## 2024-09-27 NOTE — ASSESSMENT
[FreeTextEntry1] : Discussed diet and exercise. Diet could be better. She walks.  She saw her cardiologist last week.   She will see her hepatologist next month.  There is a firm lump over the left clavicle- will get an U/S to evaluate.  Mammogram and breast U/S tomorrow.  She sees her gyn.  DEXA tomorrow.    She will have the flu vaccine at work.  She will have the new COVID-19 vaccine later.  S/p Prevnar 20 and Shingrix.    She had a colonoscopy about three years ago.  Done by Dr. Javi Hyatt.    She sees a dermatologist and ophthalmologist.    She sees a hematologist.

## 2024-10-01 ENCOUNTER — APPOINTMENT (OUTPATIENT)
Dept: HEPATOLOGY | Facility: CLINIC | Age: 64
End: 2024-10-01
Payer: COMMERCIAL

## 2024-10-01 VITALS
HEART RATE: 69 BPM | BODY MASS INDEX: 23.98 KG/M2 | WEIGHT: 127 LBS | TEMPERATURE: 97.5 F | OXYGEN SATURATION: 97 % | DIASTOLIC BLOOD PRESSURE: 70 MMHG | SYSTOLIC BLOOD PRESSURE: 110 MMHG | HEIGHT: 61 IN

## 2024-10-01 PROCEDURE — 99215 OFFICE O/P EST HI 40 MIN: CPT

## 2024-10-01 NOTE — ASSESSMENT
[FreeTextEntry1] :  Trisha Villalobos is a 63 y/o female with Splenic Vein thrombosis and abnormal liver morphology (no cirrhosis) 2/2 portal vein malformation w/ hx of thrombus, portal HTN w/ EV, here for f/u.  #Abnormal liver morphology 2/2 cavernous transformation of portal vein - Chronic liver dx w/u and viral hepatitis negative - 8/17/23 MRE w/ normal elastography and c/f cirrhosis 2/2 abnormal liver morphology likely r/t cavernous transformation of portal vein > 6/14/24 MRI Abd unchanged and repeat again in 1 year (if not sooner if patient becomes sx) - 9/2024 labs noting normal liver tests - No indication for liver bx at this time as it will not  - F/u Hematology to complete w/u and r/o underlying hypercoagulable disorder - pHTN: 5/5/2023 EGD w/ one column of trace EV > consider repeat next year - Maintain f/u with Cardiology for optimization of comorbidities - Reinforced importance of judicious use of OTC meds/supps/herbals - Safe ETOH use reviewed (1 drink/week)  RTC next spring. Plan discussed with Dr. Dent.  Ciera Monaco, MSN, AGAP-BC Transplant Hepatology Nurse Practitioner [facility] [address] [place] [address] T: [phone] | F: [phone].

## 2024-10-01 NOTE — PHYSICAL EXAM
[Scleral Icterus] : No Scleral Icterus [Spider Angioma] : No spider angioma(s) were observed [Ascites Shifting Dullness] : no shifting dullness of ascites [Asterixis] : no asterixis observed [Jaundice] : No jaundice [General Appearance - Alert] : alert [General Appearance - Well-Appearing] : healthy appearing [Sclera] : the sclera and conjunctiva were normal [] : the neck was supple [Respiration, Rhythm And Depth] : normal respiratory rhythm and effort [Auscultation Breath Sounds / Voice Sounds] : lungs were clear to auscultation bilaterally [Heart Rate And Rhythm] : heart rate was normal and rhythm regular [Heart Sounds] : normal S1 and S2 [Bowel Sounds] : normal bowel sounds [Abdomen Soft] : soft [Abdomen Tenderness] : non-tender [Skin Color & Pigmentation] : normal skin color and pigmentation [Oriented To Time, Place, And Person] : oriented to person, place, and time

## 2024-10-01 NOTE — REVIEW OF SYSTEMS
[Fever] : no fever [Chills] : no chills [Feeling Tired] : feeling tired [Recent Weight Gain (___ Lbs)] : no recent weight gain [Chest Pain] : no chest pain [Palpitations] : no palpitations [Shortness Of Breath] : no shortness of breath [Cough] : no cough [Abdominal Pain] : no abdominal pain [Vomiting] : no vomiting [Constipation] : no constipation [Diarrhea] : no diarrhea [Melena] : no melena [Dysuria] : no dysuria [Limb Swelling] : no limb swelling [Itching] : no itching [Confused] : no confusion [Dizziness] : no dizziness

## 2024-10-01 NOTE — HISTORY OF PRESENT ILLNESS
[FreeTextEntry1] : Transplant Hepatologist: Dr. Layne Dent Transplant Hepatology NP: Ciera Monaco, [facility]-BC  Trisha Villalobos is a 65 y/o female with a PMH of Rheumatic mitral disease, GERD, ITP, hyperkalemic periodic paralysis dx at 37 y/o, JO on CPAP, BCC, T11 fx, cataracts, Splenic Vein thrombosis, abnormal liver morphology 2/2 portal vein malformation w/ hx of thrombus, portal HTN w/ EV, here for follow-up.  Prior HPI: Patient seen inpatient at [facility] in 5/2023 after being advised to present to the ED for anemia (Hgb ~6) i/s/o progressive GUEVARA. Of note, patient recently underwent bioprosthetic AVR and ascending aorta replacement on 12/12/20222 with post-op course c/b paroxysmal afib with RVR s/p PVI and CTI ablation on 1/18/2023 and previously maintained on Eliquis + ASA, subsequently transitioned to Xarelto and now maintained on ASA only. 1/2023 TTE: LVEF 66%, mildly dilated LA, mild ND, moderate TR, mild RA enlargement, & normal pulmonary pressures. S/p LHC w/o significant CAD. Hepatology consulted for evaluation of anemia and cirrhosis. 5/5/23 CTAP comments on cirrhotic liver morphology, e/o pHTN with esophageal and splenic varices, small amount of pelvic ascites, and unchanged Portal Vein cavernous transformation (since 2020). 5/5/2023 EGD: one column of trace EV that flatten upon insufflation, no active bleeding or high risk stigmata of recent bleeding and no variceal banding performed. Colonoscopy notable for innumerable gastric polyps with one polyp removed for bx (pathology negative for malignancy). Cirrhosis thought to be 2/2 congestive hepatopathy v splenic vein thrombosis v portal vein malformation/thrombosis. 5/5/23 MELD 13. Anemia ultimately tx with IV iron, pt transitioned to PO iron supp and anemia thought to be r/t epistaxis prior to admission.  Patient first made aware of c/f cirrhosis during 5/2023 admission. Denies episodes of liver decompensation. No prior liver bx. No known family hx of liver disease. Known EV varices 2/2 portal HTN since 2014, with reported hemorrhage at age 15. Hx of known splenic and portal vein thrombosis, initially diagnosed ~ 30 yrs ago. No ETOH use since recent hospitalization -- prior mild/moderate ETOH with ~ 2 drinks a few times a month. No IVDU. +Blood transfusion in 2000's. Heaviest weight in mid 130's and has been living at current weight for past few yrs, current weight 122 lbs. Ethnicity from [place]. Currently works PT as RN in Ambulatory Surgery (on disability). Lives at home with . Independent in ADLs/iADLs.  - 8/17/23 MRE: Cavernous transformation of portal vein (SMV/splenic vein not visualized) w/ paraesophageal, perigastric, and perisplenic varices. No liver fibrosis and morphologic changes attributed to PVT rather than advanced fibrosis. - 6/14/23 MRI Abd: Lobulated morphology with large caudate and right hepatic lobe atrophy, no lesions, normal spleen, no ascites, and chronically thrombosed cavernous transformation of the SMV/Portal Vein (unchanged).  In the interim since last visit, there have been no additional illnesses of hospitalizations. No episodes of liver decompensation. Self-limited mild Covid infection over th summer. Still working as RN and has potential plans for intermediate soon. Following with sleep medicine and now using CPAP mask for central JO. Maintained routine f/u with Cardiology and only maintained on ASA for AC. Recently underwent cataract surgery and carpal tunnel release -- tolerated well and w/o complication. Pending consultation with Endocrinology for tx of Osteoporosis. Patient's allergies, medications, past medical, surgical, family, and social histories were reviewed and updated as appropriate. Seen in clinic today, reports that she has been feeling well and in baseline state of health -- recently experiencing some back pain and fatigue. Denies any recent fevers, chills, cough, lightheadedness, AMS, abdominal pain, n/v, diarrhea, hematochezia, hematemesis, and melena. Denies tobacco, or recreational drug use. Rare/social ETOH use with 1 drink/week.

## 2024-10-09 ENCOUNTER — APPOINTMENT (OUTPATIENT)
Dept: PULMONOLOGY | Facility: CLINIC | Age: 64
End: 2024-10-09
Payer: COMMERCIAL

## 2024-10-09 ENCOUNTER — NON-APPOINTMENT (OUTPATIENT)
Age: 64
End: 2024-10-09

## 2024-10-09 VITALS
DIASTOLIC BLOOD PRESSURE: 75 MMHG | BODY MASS INDEX: 23.98 KG/M2 | RESPIRATION RATE: 18 BRPM | WEIGHT: 127 LBS | OXYGEN SATURATION: 97 % | SYSTOLIC BLOOD PRESSURE: 110 MMHG | HEART RATE: 64 BPM | HEIGHT: 61 IN | TEMPERATURE: 97.3 F

## 2024-10-09 DIAGNOSIS — Z72.820 SLEEP DEPRIVATION: ICD-10-CM

## 2024-10-09 DIAGNOSIS — G47.31 PRIMARY CENTRAL SLEEP APNEA: ICD-10-CM

## 2024-10-09 DIAGNOSIS — G47.33 OBSTRUCTIVE SLEEP APNEA (ADULT) (PEDIATRIC): ICD-10-CM

## 2024-10-09 DIAGNOSIS — J45.909 UNSPECIFIED ASTHMA, UNCOMPLICATED: ICD-10-CM

## 2024-10-09 PROCEDURE — 99214 OFFICE O/P EST MOD 30 MIN: CPT | Mod: 25

## 2024-10-14 ENCOUNTER — APPOINTMENT (OUTPATIENT)
Dept: OBGYN | Facility: CLINIC | Age: 64
End: 2024-10-14
Payer: COMMERCIAL

## 2024-10-14 PROCEDURE — 99213 OFFICE O/P EST LOW 20 MIN: CPT | Mod: 25

## 2024-10-14 PROCEDURE — 56820 COLPOSCOPY VULVA: CPT

## 2024-11-27 DIAGNOSIS — R30.0 DYSURIA: ICD-10-CM

## 2024-12-16 ENCOUNTER — NON-APPOINTMENT (OUTPATIENT)
Age: 64
End: 2024-12-16

## 2024-12-16 ENCOUNTER — APPOINTMENT (OUTPATIENT)
Dept: CARDIOLOGY | Facility: CLINIC | Age: 64
End: 2024-12-16
Payer: COMMERCIAL

## 2024-12-16 VITALS
WEIGHT: 127 LBS | BODY MASS INDEX: 23.98 KG/M2 | OXYGEN SATURATION: 96 % | DIASTOLIC BLOOD PRESSURE: 76 MMHG | SYSTOLIC BLOOD PRESSURE: 121 MMHG | HEART RATE: 63 BPM | HEIGHT: 61 IN

## 2024-12-16 DIAGNOSIS — R42 DIZZINESS AND GIDDINESS: ICD-10-CM

## 2024-12-16 PROCEDURE — 93000 ELECTROCARDIOGRAM COMPLETE: CPT

## 2024-12-16 PROCEDURE — 99214 OFFICE O/P EST MOD 30 MIN: CPT

## 2024-12-16 PROCEDURE — G2211 COMPLEX E/M VISIT ADD ON: CPT

## 2024-12-20 NOTE — DISCHARGE NOTE PROVIDER - NSDCQMSTAIRS_GEN_ALL_CORE
Alyssa from [facility]'s prior auth department called stating patient had a catscan head without contrast ordered on 12/3/24 by Monica Elder and is scheduled for this test on 12/28/24.  Said the diagnosis is chest pain and she is afraid it will be denied. Asking if this is correct?    Please advise Alyssa at [phone]   No

## 2025-01-02 ENCOUNTER — NON-APPOINTMENT (OUTPATIENT)
Age: 65
End: 2025-01-02

## 2025-01-08 ENCOUNTER — APPOINTMENT (OUTPATIENT)
Dept: INTERNAL MEDICINE | Facility: CLINIC | Age: 65
End: 2025-01-08

## 2025-01-14 PROBLEM — M81.0 OSTEOPOROSIS: Status: ACTIVE | Noted: 2025-01-14

## 2025-01-15 ENCOUNTER — APPOINTMENT (OUTPATIENT)
Dept: ENDOCRINOLOGY | Facility: CLINIC | Age: 65
End: 2025-01-15
Payer: COMMERCIAL

## 2025-01-15 VITALS
BODY MASS INDEX: 24.43 KG/M2 | SYSTOLIC BLOOD PRESSURE: 118 MMHG | HEART RATE: 72 BPM | OXYGEN SATURATION: 97 % | DIASTOLIC BLOOD PRESSURE: 74 MMHG | WEIGHT: 129.31 LBS

## 2025-01-15 DIAGNOSIS — M81.0 AGE-RELATED OSTEOPOROSIS W/OUT CURRENT PATHOLOGICAL FRACTURE: ICD-10-CM

## 2025-01-15 PROCEDURE — G2211 COMPLEX E/M VISIT ADD ON: CPT

## 2025-01-15 PROCEDURE — 99205 OFFICE O/P NEW HI 60 MIN: CPT

## 2025-01-15 RX ADMIN — ZOLEDRONIC ACID 5 MG/100ML: 5 INJECTION INTRAVENOUS at 00:00

## 2025-01-16 LAB
CALCIUM SERPL-MCNC: 9.8 MG/DL
PARATHYROID HORMONE INTACT: 24 PG/ML
PHOSPHATE SERPL-MCNC: 2.8 MG/DL

## 2025-01-17 ENCOUNTER — APPOINTMENT (OUTPATIENT)
Dept: INTERNAL MEDICINE | Facility: CLINIC | Age: 65
End: 2025-01-17

## 2025-01-21 LAB — COLLAGEN CTX SERPL-MCNC: 202 PG/ML

## 2025-01-28 ENCOUNTER — APPOINTMENT (OUTPATIENT)
Dept: INTERNAL MEDICINE | Facility: CLINIC | Age: 65
End: 2025-01-28
Payer: COMMERCIAL

## 2025-01-28 VITALS
TEMPERATURE: 97.9 F | HEART RATE: 69 BPM | BODY MASS INDEX: 24.55 KG/M2 | HEIGHT: 61 IN | WEIGHT: 130 LBS | OXYGEN SATURATION: 95 %

## 2025-01-28 DIAGNOSIS — K76.6 PORTAL HYPERTENSION: ICD-10-CM

## 2025-01-28 DIAGNOSIS — I35.0 NONRHEUMATIC AORTIC (VALVE) STENOSIS: ICD-10-CM

## 2025-01-28 DIAGNOSIS — D50.9 IRON DEFICIENCY ANEMIA, UNSPECIFIED: ICD-10-CM

## 2025-01-28 DIAGNOSIS — I48.0 PAROXYSMAL ATRIAL FIBRILLATION: ICD-10-CM

## 2025-01-28 DIAGNOSIS — Z00.00 ENCOUNTER FOR GENERAL ADULT MEDICAL EXAMINATION W/OUT ABNORMAL FINDINGS: ICD-10-CM

## 2025-01-28 PROCEDURE — 99214 OFFICE O/P EST MOD 30 MIN: CPT

## 2025-01-28 PROCEDURE — G2211 COMPLEX E/M VISIT ADD ON: CPT

## 2025-01-29 VITALS — SYSTOLIC BLOOD PRESSURE: 112 MMHG | DIASTOLIC BLOOD PRESSURE: 75 MMHG

## 2025-01-30 ENCOUNTER — LABORATORY RESULT (OUTPATIENT)
Age: 65
End: 2025-01-30

## 2025-02-11 ENCOUNTER — APPOINTMENT (OUTPATIENT)
Dept: RHEUMATOLOGY | Facility: CLINIC | Age: 65
End: 2025-02-11
Payer: COMMERCIAL

## 2025-02-11 VITALS
TEMPERATURE: 97.9 F | RESPIRATION RATE: 16 BRPM | DIASTOLIC BLOOD PRESSURE: 70 MMHG | HEART RATE: 58 BPM | OXYGEN SATURATION: 98 % | SYSTOLIC BLOOD PRESSURE: 110 MMHG

## 2025-02-11 VITALS
DIASTOLIC BLOOD PRESSURE: 67 MMHG | HEART RATE: 56 BPM | OXYGEN SATURATION: 95 % | SYSTOLIC BLOOD PRESSURE: 112 MMHG | RESPIRATION RATE: 16 BRPM

## 2025-02-11 PROCEDURE — 96374 THER/PROPH/DIAG INJ IV PUSH: CPT

## 2025-02-14 NOTE — DIETITIAN INITIAL EVALUATION ADULT - PERTINENT LABORATORY DATA
No protocol for requested medication.    Medication: 1/9/2025 last refilled    traMADol (ULTRAM) 50 MG tablet         Sig: Take 1 tablet by mouth daily as needed for Pain.    Disp: 15 tablet    Refills: 0    Start: 2/13/2025     Last office visit date: 1/9/2025  Upcoming appointment: 3/14/2025  Pharmacy: [facility] [address] [place] [address]    Order pended, routed to clinician for review.     Medication: 1/9/2025 last refilled     meloxicam (MOBIC) 7.5 MG tablet          Sig: Take 1 tablet by mouth daily.    Disp: 30 tablet    Refills: 0    Start: 2/13/2025    Class: Eprescribe    passed protocol.   Last office visit date: 1/9/2025  Next appointment scheduled?: Yes 3/14/2025  Number of refills given: 0    
12-15    139  |  104  |  13  ----------------------------<  158<H>  3.3<L>   |  27  |  0.44<L>    Ca    9.0      15 Dec 2022 13:35  Phos  1.9     12-15  Mg     2.0     12-15    TPro  6.2  /  Alb  4.4  /  TBili  0.8  /  DBili  x   /  AST  40  /  ALT  21  /  AlkPhos  67  12-15  POCT Blood Glucose.: 133 mg/dL (12-15-22 @ 11:53)  A1C with Estimated Average Glucose Result: 5.2 % (12-11-22 @ 16:10)

## 2025-03-03 NOTE — ASU PATIENT PROFILE, ADULT - FALL HARM RISK - UNIVERSAL INTERVENTIONS
Problem type: Chronic Illness with exacerbation, progression, or side effects of treatment    Assessment:   Patient required increase in dose due to increased emotional lability and difficulty in school. She has done well with dose increase.    Plan  Medication:   -Continue Methylphenidate LA 30mg daily  -Continue Methylphenidate IR 5mg in the afternoon - inconsistent use  -Continue Intuniv 2mg daily    Therapy: continue outpatient therapy        Bed in lowest position, wheels locked, appropriate side rails in place/Call bell, personal items and telephone in reach/Instruct patient to call for assistance before getting out of bed or chair/Non-slip footwear when patient is out of bed/[place] to call system/Physically safe environment - no spills, clutter or unnecessary equipment/Purposeful Proactive Rounding/Room/bathroom lighting operational, light cord in reach

## 2025-03-04 NOTE — DISCHARGE NOTE PROVIDER - NSDCQMSTROKE_NEU_ALL_CORE
Katy notified.   
Patient called and said she no longer thinks she needs the EGD she would like to just stay on a softer food diet. She will keep the Colonoscopy apt on 3/14.   
Please let surgery scheduling know. I will place new CR with only colonoscopy  
No

## 2025-04-03 ENCOUNTER — OUTPATIENT (OUTPATIENT)
Dept: OUTPATIENT SERVICES | Facility: HOSPITAL | Age: 65
LOS: 1 days | End: 2025-04-03

## 2025-04-03 ENCOUNTER — RESULT REVIEW (OUTPATIENT)
Age: 65
End: 2025-04-03

## 2025-04-03 ENCOUNTER — APPOINTMENT (OUTPATIENT)
Dept: CV DIAGNOSITCS | Facility: HOSPITAL | Age: 65
End: 2025-04-03

## 2025-04-03 DIAGNOSIS — Z98.890 OTHER SPECIFIED POSTPROCEDURAL STATES: Chronic | ICD-10-CM

## 2025-04-03 DIAGNOSIS — Z95.2 PRESENCE OF PROSTHETIC HEART VALVE: Chronic | ICD-10-CM

## 2025-04-03 DIAGNOSIS — O00.90 UNSPECIFIED ECTOPIC PREGNANCY WITHOUT INTRAUTERINE PREGNANCY: Chronic | ICD-10-CM

## 2025-04-03 DIAGNOSIS — Z98.49 CATARACT EXTRACTION STATUS, UNSPECIFIED EYE: Chronic | ICD-10-CM

## 2025-04-03 DIAGNOSIS — I35.9 NONRHEUMATIC AORTIC VALVE DISORDER, UNSPECIFIED: ICD-10-CM

## 2025-04-03 PROCEDURE — 93356 MYOCRD STRAIN IMG SPCKL TRCK: CPT

## 2025-04-03 PROCEDURE — 76376 3D RENDER W/INTRP POSTPROCES: CPT | Mod: 26

## 2025-04-03 PROCEDURE — 93306 TTE W/DOPPLER COMPLETE: CPT | Mod: 26

## 2025-04-14 ENCOUNTER — NON-APPOINTMENT (OUTPATIENT)
Age: 65
End: 2025-04-14

## 2025-04-15 ENCOUNTER — APPOINTMENT (OUTPATIENT)
Dept: HEPATOLOGY | Facility: CLINIC | Age: 65
End: 2025-04-15
Payer: COMMERCIAL

## 2025-04-15 VITALS
DIASTOLIC BLOOD PRESSURE: 68 MMHG | HEIGHT: 61 IN | WEIGHT: 132 LBS | TEMPERATURE: 97.5 F | BODY MASS INDEX: 24.92 KG/M2 | SYSTOLIC BLOOD PRESSURE: 110 MMHG

## 2025-04-15 DIAGNOSIS — I07.1 RHEUMATIC TRICUSPID INSUFFICIENCY: ICD-10-CM

## 2025-04-15 DIAGNOSIS — K76.6 PORTAL HYPERTENSION: ICD-10-CM

## 2025-04-15 DIAGNOSIS — I82.890 ACUTE EMBOLISM AND THROMBOSIS OF OTHER SPECIFIED VEINS: ICD-10-CM

## 2025-04-15 PROCEDURE — 99215 OFFICE O/P EST HI 40 MIN: CPT

## 2025-04-15 RX ORDER — CHLORHEXIDINE GLUCONATE 4 %
325 (65 FE) LIQUID (ML) TOPICAL
Refills: 0 | Status: ACTIVE | COMMUNITY

## 2025-04-16 ENCOUNTER — RX RENEWAL (OUTPATIENT)
Age: 65
End: 2025-04-16

## 2025-05-27 ENCOUNTER — OUTPATIENT (OUTPATIENT)
Dept: OUTPATIENT SERVICES | Facility: HOSPITAL | Age: 65
LOS: 1 days | End: 2025-05-27
Payer: COMMERCIAL

## 2025-05-27 ENCOUNTER — APPOINTMENT (OUTPATIENT)
Dept: CT IMAGING | Facility: IMAGING CENTER | Age: 65
End: 2025-05-27
Payer: COMMERCIAL

## 2025-05-27 DIAGNOSIS — Z98.890 OTHER SPECIFIED POSTPROCEDURAL STATES: Chronic | ICD-10-CM

## 2025-05-27 DIAGNOSIS — Z98.49 CATARACT EXTRACTION STATUS, UNSPECIFIED EYE: Chronic | ICD-10-CM

## 2025-05-27 DIAGNOSIS — O00.90 UNSPECIFIED ECTOPIC PREGNANCY WITHOUT INTRAUTERINE PREGNANCY: Chronic | ICD-10-CM

## 2025-05-27 DIAGNOSIS — Z98.890 OTHER SPECIFIED POSTPROCEDURAL STATES: ICD-10-CM

## 2025-05-27 DIAGNOSIS — Z95.2 PRESENCE OF PROSTHETIC HEART VALVE: Chronic | ICD-10-CM

## 2025-05-27 PROCEDURE — 74174 CTA ABD&PLVS W/CONTRAST: CPT | Mod: 26

## 2025-05-27 PROCEDURE — 71275 CT ANGIOGRAPHY CHEST: CPT

## 2025-05-27 PROCEDURE — 74174 CTA ABD&PLVS W/CONTRAST: CPT

## 2025-05-27 PROCEDURE — 71275 CT ANGIOGRAPHY CHEST: CPT | Mod: 26

## 2025-05-29 ENCOUNTER — APPOINTMENT (OUTPATIENT)
Dept: PULMONOLOGY | Facility: CLINIC | Age: 65
End: 2025-05-29
Payer: COMMERCIAL

## 2025-05-29 VITALS
HEART RATE: 59 BPM | DIASTOLIC BLOOD PRESSURE: 74 MMHG | RESPIRATION RATE: 16 BRPM | TEMPERATURE: 97.7 F | BODY MASS INDEX: 24.92 KG/M2 | WEIGHT: 132 LBS | SYSTOLIC BLOOD PRESSURE: 108 MMHG | OXYGEN SATURATION: 97 % | HEIGHT: 61 IN

## 2025-05-29 DIAGNOSIS — G47.31 PRIMARY CENTRAL SLEEP APNEA: ICD-10-CM

## 2025-05-29 DIAGNOSIS — R93.89 ABNORMAL FINDINGS ON DIAGNOSTIC IMAGING OF OTHER SPECIFIED BODY STRUCTURES: ICD-10-CM

## 2025-05-29 DIAGNOSIS — J45.909 UNSPECIFIED ASTHMA, UNCOMPLICATED: ICD-10-CM

## 2025-05-29 DIAGNOSIS — R06.02 SHORTNESS OF BREATH: ICD-10-CM

## 2025-05-29 DIAGNOSIS — G47.33 OBSTRUCTIVE SLEEP APNEA (ADULT) (PEDIATRIC): ICD-10-CM

## 2025-05-29 DIAGNOSIS — Z72.820 SLEEP DEPRIVATION: ICD-10-CM

## 2025-05-29 DIAGNOSIS — R06.09 OTHER FORMS OF DYSPNEA: ICD-10-CM

## 2025-05-29 PROCEDURE — 94729 DIFFUSING CAPACITY: CPT

## 2025-05-29 PROCEDURE — 94010 BREATHING CAPACITY TEST: CPT

## 2025-05-29 PROCEDURE — 95012 NITRIC OXIDE EXP GAS DETER: CPT

## 2025-05-29 PROCEDURE — 99214 OFFICE O/P EST MOD 30 MIN: CPT | Mod: 25

## 2025-05-29 PROCEDURE — 94727 GAS DIL/WSHOT DETER LNG VOL: CPT

## 2025-05-29 PROCEDURE — ZZZZZ: CPT

## 2025-06-09 ENCOUNTER — RESULT REVIEW (OUTPATIENT)
Age: 65
End: 2025-06-09

## 2025-06-09 ENCOUNTER — OUTPATIENT (OUTPATIENT)
Dept: OUTPATIENT SERVICES | Facility: HOSPITAL | Age: 65
LOS: 1 days | End: 2025-06-09
Payer: COMMERCIAL

## 2025-06-09 ENCOUNTER — APPOINTMENT (OUTPATIENT)
Dept: MRI IMAGING | Facility: CLINIC | Age: 65
End: 2025-06-09
Payer: COMMERCIAL

## 2025-06-09 ENCOUNTER — NON-APPOINTMENT (OUTPATIENT)
Age: 65
End: 2025-06-09

## 2025-06-09 DIAGNOSIS — Z98.890 OTHER SPECIFIED POSTPROCEDURAL STATES: Chronic | ICD-10-CM

## 2025-06-09 DIAGNOSIS — Z98.49 CATARACT EXTRACTION STATUS, UNSPECIFIED EYE: Chronic | ICD-10-CM

## 2025-06-09 DIAGNOSIS — Z00.00 ENCOUNTER FOR GENERAL ADULT MEDICAL EXAMINATION WITHOUT ABNORMAL FINDINGS: ICD-10-CM

## 2025-06-09 DIAGNOSIS — O00.90 UNSPECIFIED ECTOPIC PREGNANCY WITHOUT INTRAUTERINE PREGNANCY: Chronic | ICD-10-CM

## 2025-06-09 DIAGNOSIS — Z95.2 PRESENCE OF PROSTHETIC HEART VALVE: Chronic | ICD-10-CM

## 2025-06-09 PROCEDURE — 74183 MRI ABD W/O CNTR FLWD CNTR: CPT

## 2025-06-09 PROCEDURE — 76391 MR ELASTOGRAPHY: CPT

## 2025-06-09 PROCEDURE — 74183 MRI ABD W/O CNTR FLWD CNTR: CPT | Mod: 26

## 2025-06-09 PROCEDURE — 76391 MR ELASTOGRAPHY: CPT | Mod: 26

## 2025-06-09 PROCEDURE — A9585: CPT

## 2025-06-11 ENCOUNTER — APPOINTMENT (OUTPATIENT)
Dept: CARDIOTHORACIC SURGERY | Facility: CLINIC | Age: 65
End: 2025-06-11
Payer: COMMERCIAL

## 2025-06-11 VITALS
WEIGHT: 130 LBS | BODY MASS INDEX: 24.55 KG/M2 | HEART RATE: 62 BPM | HEIGHT: 61 IN | OXYGEN SATURATION: 96 % | DIASTOLIC BLOOD PRESSURE: 69 MMHG | SYSTOLIC BLOOD PRESSURE: 107 MMHG | TEMPERATURE: 98 F | RESPIRATION RATE: 16 BRPM

## 2025-06-11 PROCEDURE — 99214 OFFICE O/P EST MOD 30 MIN: CPT

## 2025-07-17 ENCOUNTER — APPOINTMENT (OUTPATIENT)
Facility: CLINIC | Age: 65
End: 2025-07-17
Payer: COMMERCIAL

## 2025-07-17 VITALS
OXYGEN SATURATION: 97 % | TEMPERATURE: 98.7 F | WEIGHT: 131.31 LBS | BODY MASS INDEX: 25.78 KG/M2 | RESPIRATION RATE: 16 BRPM | HEIGHT: 60 IN | DIASTOLIC BLOOD PRESSURE: 78 MMHG | HEART RATE: 68 BPM | SYSTOLIC BLOOD PRESSURE: 110 MMHG

## 2025-07-17 PROBLEM — R31.9 HEMATURIA: Status: ACTIVE | Noted: 2025-07-17

## 2025-07-17 PROBLEM — N39.0 UTI (URINARY TRACT INFECTION): Status: ACTIVE | Noted: 2025-07-17 | Resolved: 2025-08-16

## 2025-07-17 LAB
BILIRUB UR QL STRIP: NEGATIVE
CLARITY UR: NORMAL
COLLECTION METHOD: NORMAL
GLUCOSE UR-MCNC: NEGATIVE
HCG UR QL: 0.2 EU/DL
HGB UR QL STRIP.AUTO: NORMAL
KETONES UR-MCNC: NEGATIVE
LEUKOCYTE ESTERASE UR QL STRIP: NEGATIVE
NITRITE UR QL STRIP: NEGATIVE
PH UR STRIP: 7
PROT UR STRIP-MCNC: NEGATIVE
SP GR UR STRIP: 1.01

## 2025-07-17 PROCEDURE — 81003 URINALYSIS AUTO W/O SCOPE: CPT | Mod: QW

## 2025-07-17 PROCEDURE — 99204 OFFICE O/P NEW MOD 45 MIN: CPT

## 2025-07-17 RX ORDER — SULFAMETHOXAZOLE AND TRIMETHOPRIM 800; 160 MG/1; MG/1
800-160 TABLET ORAL TWICE DAILY
Qty: 6 | Refills: 0 | Status: ACTIVE | COMMUNITY
Start: 2025-07-17 | End: 1900-01-01

## 2025-07-19 ENCOUNTER — OUTPATIENT (OUTPATIENT)
Dept: OUTPATIENT SERVICES | Facility: HOSPITAL | Age: 65
LOS: 1 days | End: 2025-07-19
Payer: COMMERCIAL

## 2025-07-19 ENCOUNTER — APPOINTMENT (OUTPATIENT)
Dept: ULTRASOUND IMAGING | Facility: CLINIC | Age: 65
End: 2025-07-19
Payer: COMMERCIAL

## 2025-07-19 DIAGNOSIS — O00.90 UNSPECIFIED ECTOPIC PREGNANCY WITHOUT INTRAUTERINE PREGNANCY: Chronic | ICD-10-CM

## 2025-07-19 DIAGNOSIS — Z98.890 OTHER SPECIFIED POSTPROCEDURAL STATES: Chronic | ICD-10-CM

## 2025-07-19 DIAGNOSIS — R31.9 HEMATURIA, UNSPECIFIED: ICD-10-CM

## 2025-07-19 DIAGNOSIS — Z98.49 CATARACT EXTRACTION STATUS, UNSPECIFIED EYE: Chronic | ICD-10-CM

## 2025-07-19 DIAGNOSIS — Z95.2 PRESENCE OF PROSTHETIC HEART VALVE: Chronic | ICD-10-CM

## 2025-07-19 PROCEDURE — 76770 US EXAM ABDO BACK WALL COMP: CPT | Mod: 26

## 2025-07-19 PROCEDURE — 76770 US EXAM ABDO BACK WALL COMP: CPT

## 2025-07-23 ENCOUNTER — TRANSCRIPTION ENCOUNTER (OUTPATIENT)
Age: 65
End: 2025-07-23

## 2025-07-23 LAB — BACTERIA UR CULT: NORMAL

## 2025-08-21 ENCOUNTER — APPOINTMENT (OUTPATIENT)
Dept: UROLOGY | Facility: CLINIC | Age: 65
End: 2025-08-21
Payer: COMMERCIAL

## 2025-08-21 ENCOUNTER — NON-APPOINTMENT (OUTPATIENT)
Age: 65
End: 2025-08-21

## 2025-08-21 VITALS — SYSTOLIC BLOOD PRESSURE: 107 MMHG | HEART RATE: 67 BPM | DIASTOLIC BLOOD PRESSURE: 71 MMHG

## 2025-08-21 DIAGNOSIS — Z80.9 FAMILY HISTORY OF MALIGNANT NEOPLASM, UNSPECIFIED: ICD-10-CM

## 2025-08-21 DIAGNOSIS — Z87.891 PERSONAL HISTORY OF NICOTINE DEPENDENCE: ICD-10-CM

## 2025-08-21 DIAGNOSIS — N28.1 CYST OF KIDNEY, ACQUIRED: ICD-10-CM

## 2025-08-21 PROCEDURE — 99204 OFFICE O/P NEW MOD 45 MIN: CPT

## 2025-08-24 LAB
APPEARANCE: CLEAR
BILIRUBIN URINE: NEGATIVE
BLOOD URINE: NEGATIVE
COLOR: YELLOW
GLUCOSE QUALITATIVE U: NEGATIVE MG/DL
KETONES URINE: NEGATIVE MG/DL
LEUKOCYTE ESTERASE URINE: NEGATIVE
NITRITE URINE: NEGATIVE
PH URINE: 8
PROTEIN URINE: NEGATIVE MG/DL
SPECIFIC GRAVITY URINE: 1.01
URINE CYTOLOGY: NORMAL
UROBILINOGEN URINE: 0.2 MG/DL

## 2025-08-26 ENCOUNTER — OUTPATIENT (OUTPATIENT)
Dept: OUTPATIENT SERVICES | Facility: HOSPITAL | Age: 65
LOS: 1 days | End: 2025-08-26
Payer: COMMERCIAL

## 2025-08-26 ENCOUNTER — APPOINTMENT (OUTPATIENT)
Dept: CT IMAGING | Facility: IMAGING CENTER | Age: 65
End: 2025-08-26
Payer: COMMERCIAL

## 2025-08-26 DIAGNOSIS — O00.90 UNSPECIFIED ECTOPIC PREGNANCY WITHOUT INTRAUTERINE PREGNANCY: Chronic | ICD-10-CM

## 2025-08-26 DIAGNOSIS — Z95.2 PRESENCE OF PROSTHETIC HEART VALVE: Chronic | ICD-10-CM

## 2025-08-26 DIAGNOSIS — Z98.49 CATARACT EXTRACTION STATUS, UNSPECIFIED EYE: Chronic | ICD-10-CM

## 2025-08-26 DIAGNOSIS — Z98.890 OTHER SPECIFIED POSTPROCEDURAL STATES: Chronic | ICD-10-CM

## 2025-08-26 DIAGNOSIS — R31.0 GROSS HEMATURIA: ICD-10-CM

## 2025-08-26 PROCEDURE — 74178 CT ABD&PLV WO CNTR FLWD CNTR: CPT | Mod: 26

## 2025-08-26 PROCEDURE — 74178 CT ABD&PLV WO CNTR FLWD CNTR: CPT

## 2025-08-27 ENCOUNTER — APPOINTMENT (OUTPATIENT)
Dept: UROLOGY | Facility: CLINIC | Age: 65
End: 2025-08-27
Payer: COMMERCIAL

## 2025-08-27 ENCOUNTER — OUTPATIENT (OUTPATIENT)
Dept: OUTPATIENT SERVICES | Facility: HOSPITAL | Age: 65
LOS: 1 days | End: 2025-08-27
Payer: COMMERCIAL

## 2025-08-27 VITALS — OXYGEN SATURATION: 94 % | SYSTOLIC BLOOD PRESSURE: 136 MMHG | HEART RATE: 70 BPM | DIASTOLIC BLOOD PRESSURE: 86 MMHG

## 2025-08-27 DIAGNOSIS — Z98.890 OTHER SPECIFIED POSTPROCEDURAL STATES: Chronic | ICD-10-CM

## 2025-08-27 DIAGNOSIS — Z98.49 CATARACT EXTRACTION STATUS, UNSPECIFIED EYE: Chronic | ICD-10-CM

## 2025-08-27 DIAGNOSIS — R35.0 FREQUENCY OF MICTURITION: ICD-10-CM

## 2025-08-27 DIAGNOSIS — R31.0 GROSS HEMATURIA: ICD-10-CM

## 2025-08-27 DIAGNOSIS — N20.0 CALCULUS OF KIDNEY: ICD-10-CM

## 2025-08-27 DIAGNOSIS — N95.2 POSTMENOPAUSAL ATROPHIC VAGINITIS: ICD-10-CM

## 2025-08-27 DIAGNOSIS — Z95.2 PRESENCE OF PROSTHETIC HEART VALVE: Chronic | ICD-10-CM

## 2025-08-27 DIAGNOSIS — O00.90 UNSPECIFIED ECTOPIC PREGNANCY WITHOUT INTRAUTERINE PREGNANCY: Chronic | ICD-10-CM

## 2025-08-27 PROCEDURE — 99214 OFFICE O/P EST MOD 30 MIN: CPT | Mod: 25

## 2025-08-27 PROCEDURE — 52000 CYSTOURETHROSCOPY: CPT

## 2025-08-28 DIAGNOSIS — R31.0 GROSS HEMATURIA: ICD-10-CM

## 2025-08-28 DIAGNOSIS — N95.2 POSTMENOPAUSAL ATROPHIC VAGINITIS: ICD-10-CM

## 2025-08-28 DIAGNOSIS — N20.0 CALCULUS OF KIDNEY: ICD-10-CM

## 2025-09-02 ENCOUNTER — APPOINTMENT (OUTPATIENT)
Dept: OBGYN | Facility: CLINIC | Age: 65
End: 2025-09-02
Payer: COMMERCIAL

## 2025-09-02 PROCEDURE — 81002 URINALYSIS NONAUTO W/O SCOPE: CPT

## 2025-09-02 PROCEDURE — 99397 PER PM REEVAL EST PAT 65+ YR: CPT

## 2025-09-02 PROCEDURE — 99459 PELVIC EXAMINATION: CPT

## 2025-09-04 ENCOUNTER — EMERGENCY (EMERGENCY)
Facility: HOSPITAL | Age: 65
LOS: 1 days | End: 2025-09-04
Attending: EMERGENCY MEDICINE | Admitting: EMERGENCY MEDICINE
Payer: COMMERCIAL

## 2025-09-04 VITALS
HEART RATE: 67 BPM | DIASTOLIC BLOOD PRESSURE: 87 MMHG | HEIGHT: 60 IN | WEIGHT: 130.95 LBS | OXYGEN SATURATION: 98 % | TEMPERATURE: 99 F | RESPIRATION RATE: 16 BRPM | SYSTOLIC BLOOD PRESSURE: 158 MMHG

## 2025-09-04 VITALS
TEMPERATURE: 98 F | OXYGEN SATURATION: 95 % | DIASTOLIC BLOOD PRESSURE: 73 MMHG | HEART RATE: 63 BPM | RESPIRATION RATE: 16 BRPM | SYSTOLIC BLOOD PRESSURE: 121 MMHG

## 2025-09-04 DIAGNOSIS — Z95.2 PRESENCE OF PROSTHETIC HEART VALVE: Chronic | ICD-10-CM

## 2025-09-04 DIAGNOSIS — Z98.49 CATARACT EXTRACTION STATUS, UNSPECIFIED EYE: Chronic | ICD-10-CM

## 2025-09-04 DIAGNOSIS — Z98.890 OTHER SPECIFIED POSTPROCEDURAL STATES: Chronic | ICD-10-CM

## 2025-09-04 DIAGNOSIS — O00.90 UNSPECIFIED ECTOPIC PREGNANCY WITHOUT INTRAUTERINE PREGNANCY: Chronic | ICD-10-CM

## 2025-09-04 LAB
ALBUMIN SERPL ELPH-MCNC: 4.1 G/DL — SIGNIFICANT CHANGE UP (ref 3.3–5)
ALP SERPL-CCNC: 66 U/L — SIGNIFICANT CHANGE UP (ref 40–120)
ALT FLD-CCNC: 22 U/L — SIGNIFICANT CHANGE UP (ref 4–33)
ANION GAP SERPL CALC-SCNC: 11 MMOL/L — SIGNIFICANT CHANGE UP (ref 7–14)
AST SERPL-CCNC: 22 U/L — SIGNIFICANT CHANGE UP (ref 4–32)
BASOPHILS # BLD AUTO: 0.04 K/UL — SIGNIFICANT CHANGE UP (ref 0–0.2)
BASOPHILS # BLD MANUAL: 0.06 K/UL — SIGNIFICANT CHANGE UP (ref 0–0.2)
BASOPHILS NFR BLD AUTO: 0.6 % — SIGNIFICANT CHANGE UP (ref 0–2)
BASOPHILS NFR BLD MANUAL: 0.9 % — SIGNIFICANT CHANGE UP (ref 0–2)
BILIRUB SERPL-MCNC: 0.6 MG/DL — SIGNIFICANT CHANGE UP (ref 0.2–1.2)
BUN SERPL-MCNC: 13 MG/DL — SIGNIFICANT CHANGE UP (ref 7–23)
CALCIUM SERPL-MCNC: 9.3 MG/DL — SIGNIFICANT CHANGE UP (ref 8.4–10.5)
CHLORIDE SERPL-SCNC: 106 MMOL/L — SIGNIFICANT CHANGE UP (ref 98–107)
CO2 SERPL-SCNC: 23 MMOL/L — SIGNIFICANT CHANGE UP (ref 22–31)
CREAT SERPL-MCNC: 0.6 MG/DL — SIGNIFICANT CHANGE UP (ref 0.5–1.3)
EGFR: 99 ML/MIN/1.73M2 — SIGNIFICANT CHANGE UP
EGFR: 99 ML/MIN/1.73M2 — SIGNIFICANT CHANGE UP
EOSINOPHIL # BLD AUTO: 0.18 K/UL — SIGNIFICANT CHANGE UP (ref 0–0.5)
EOSINOPHIL # BLD MANUAL: 0.16 K/UL — SIGNIFICANT CHANGE UP (ref 0–0.5)
EOSINOPHIL NFR BLD AUTO: 2.9 % — SIGNIFICANT CHANGE UP (ref 0–6)
EOSINOPHIL NFR BLD MANUAL: 2.6 % — SIGNIFICANT CHANGE UP (ref 0–6)
GLUCOSE SERPL-MCNC: 97 MG/DL — SIGNIFICANT CHANGE UP (ref 70–99)
HCT VFR BLD CALC: 41.9 % — SIGNIFICANT CHANGE UP (ref 34.5–45)
HGB BLD-MCNC: 14 G/DL — SIGNIFICANT CHANGE UP (ref 11.5–15.5)
IMM GRANULOCYTES # BLD AUTO: 0.02 K/UL — SIGNIFICANT CHANGE UP (ref 0–0.07)
IMM GRANULOCYTES NFR BLD AUTO: 0.3 % — SIGNIFICANT CHANGE UP (ref 0–0.9)
IMMATURE PLATELET FRACTION #: 6.3 K/UL — SIGNIFICANT CHANGE UP (ref 4.7–11.1)
IMMATURE PLATELET FRACTION %: 6.4 % — HIGH (ref 1.6–4.9)
LIDOCAIN IGE QN: 42 U/L — SIGNIFICANT CHANGE UP (ref 7–60)
LYMPHOCYTES # BLD AUTO: 1.38 K/UL — SIGNIFICANT CHANGE UP (ref 1–3.3)
LYMPHOCYTES # BLD MANUAL: 1.5 K/UL — SIGNIFICANT CHANGE UP (ref 1–3.3)
LYMPHOCYTES NFR BLD AUTO: 22.2 % — SIGNIFICANT CHANGE UP (ref 13–44)
LYMPHOCYTES NFR BLD MANUAL: 24.1 % — SIGNIFICANT CHANGE UP (ref 13–44)
MANUAL NEUTROPHIL BANDS #: 0.06 K/UL — SIGNIFICANT CHANGE UP (ref 0–0.84)
MANUAL REACTIVE LYMPHOCYTES #: 0.06 K/UL — SIGNIFICANT CHANGE UP (ref 0–0.63)
MCHC RBC-ENTMCNC: 30.2 PG — SIGNIFICANT CHANGE UP (ref 27–34)
MCHC RBC-ENTMCNC: 33.4 G/DL — SIGNIFICANT CHANGE UP (ref 32–36)
MCV RBC AUTO: 90.5 FL — SIGNIFICANT CHANGE UP (ref 80–100)
MONOCYTES # BLD AUTO: 0.53 K/UL — SIGNIFICANT CHANGE UP (ref 0–0.9)
MONOCYTES # BLD MANUAL: 0.16 K/UL — SIGNIFICANT CHANGE UP (ref 0–0.9)
MONOCYTES NFR BLD AUTO: 8.5 % — SIGNIFICANT CHANGE UP (ref 2–14)
MONOCYTES NFR BLD MANUAL: 2.6 % — SIGNIFICANT CHANGE UP (ref 2–14)
NEUTROPHILS # BLD AUTO: 4.07 K/UL — SIGNIFICANT CHANGE UP (ref 1.8–7.4)
NEUTROPHILS # BLD MANUAL: 4.23 K/UL — SIGNIFICANT CHANGE UP (ref 1.8–7.4)
NEUTROPHILS NFR BLD AUTO: 65.5 % — SIGNIFICANT CHANGE UP (ref 43–77)
NEUTROPHILS NFR BLD MANUAL: 68 % — SIGNIFICANT CHANGE UP (ref 43–77)
NEUTS BAND # BLD: 0.9 % — SIGNIFICANT CHANGE UP (ref 0–8)
NEUTS BAND NFR BLD: 0.9 % — SIGNIFICANT CHANGE UP (ref 0–8)
NRBC # BLD AUTO: 0 K/UL — SIGNIFICANT CHANGE UP (ref 0–0)
NRBC # FLD: 0 K/UL — SIGNIFICANT CHANGE UP (ref 0–0)
NRBC BLD AUTO-RTO: 0 /100 WBCS — SIGNIFICANT CHANGE UP (ref 0–0)
NT-PROBNP SERPL-SCNC: 297 PG/ML — SIGNIFICANT CHANGE UP
PLAT MORPH BLD: NORMAL — SIGNIFICANT CHANGE UP
PLATELET # BLD AUTO: 98 K/UL — LOW (ref 150–400)
PLATELET COUNT - ESTIMATE: ABNORMAL
PMV BLD: 11.9 FL — SIGNIFICANT CHANGE UP (ref 7–13)
POTASSIUM SERPL-MCNC: 3.8 MMOL/L — SIGNIFICANT CHANGE UP (ref 3.5–5.3)
POTASSIUM SERPL-SCNC: 3.8 MMOL/L — SIGNIFICANT CHANGE UP (ref 3.5–5.3)
PROT SERPL-MCNC: 6.8 G/DL — SIGNIFICANT CHANGE UP (ref 6–8.3)
RBC # BLD: 4.63 M/UL — SIGNIFICANT CHANGE UP (ref 3.8–5.2)
RBC # FLD: 13.1 % — SIGNIFICANT CHANGE UP (ref 10.3–14.5)
RBC BLD AUTO: NORMAL — SIGNIFICANT CHANGE UP
SODIUM SERPL-SCNC: 140 MMOL/L — SIGNIFICANT CHANGE UP (ref 135–145)
TROPONIN T, HIGH SENSITIVITY RESULT: 6 NG/L — SIGNIFICANT CHANGE UP
VARIANT LYMPHS # BLD: 0.9 % — SIGNIFICANT CHANGE UP (ref 0–6)
VARIANT LYMPHS NFR BLD MANUAL: 0.9 % — SIGNIFICANT CHANGE UP (ref 0–6)
WBC # BLD: 6.22 K/UL — SIGNIFICANT CHANGE UP (ref 3.8–10.5)
WBC # FLD AUTO: 6.22 K/UL — SIGNIFICANT CHANGE UP (ref 3.8–10.5)

## 2025-09-04 PROCEDURE — 93971 EXTREMITY STUDY: CPT | Mod: 26,LT

## 2025-09-04 PROCEDURE — 71045 X-RAY EXAM CHEST 1 VIEW: CPT | Mod: 26

## 2025-09-04 PROCEDURE — 99285 EMERGENCY DEPT VISIT HI MDM: CPT

## 2025-09-04 PROCEDURE — 93010 ELECTROCARDIOGRAM REPORT: CPT

## 2025-09-04 RX ADMIN — Medication 20 MILLIGRAM(S): at 12:07

## 2025-09-08 ENCOUNTER — APPOINTMENT (OUTPATIENT)
Dept: UROLOGY | Facility: CLINIC | Age: 65
End: 2025-09-08
Payer: COMMERCIAL

## 2025-09-08 VITALS — HEART RATE: 66 BPM | SYSTOLIC BLOOD PRESSURE: 126 MMHG | DIASTOLIC BLOOD PRESSURE: 75 MMHG

## 2025-09-08 DIAGNOSIS — N20.1 CALCULUS OF URETER: ICD-10-CM

## 2025-09-08 DIAGNOSIS — N20.0 CALCULUS OF KIDNEY: ICD-10-CM

## 2025-09-08 DIAGNOSIS — N13.30 UNSPECIFIED HYDRONEPHROSIS: ICD-10-CM

## 2025-09-08 PROCEDURE — 99214 OFFICE O/P EST MOD 30 MIN: CPT

## 2025-09-11 ENCOUNTER — NON-APPOINTMENT (OUTPATIENT)
Age: 65
End: 2025-09-11

## 2025-09-17 ENCOUNTER — APPOINTMENT (OUTPATIENT)
Dept: CV DIAGNOSTICS | Facility: HOSPITAL | Age: 65
End: 2025-09-17

## 2025-09-17 ENCOUNTER — APPOINTMENT (OUTPATIENT)
Dept: CV DIAGNOSITCS | Facility: HOSPITAL | Age: 65
End: 2025-09-17

## 2025-09-17 ENCOUNTER — RESULT REVIEW (OUTPATIENT)
Age: 65
End: 2025-09-17

## (undated) DEVICE — SUT SILK 5-0 60" TIES

## (undated) DEVICE — DRAPE SLUSH / WARMER 44 X 66"

## (undated) DEVICE — SPECIMEN CONTAINER 100ML

## (undated) DEVICE — PACING CABLE (BLUE) ATRIAL TEMP SCREW DOWN 12FT

## (undated) DEVICE — KNIFE LIGAMENT RETROGRADE

## (undated) DEVICE — BLADE SCALPEL SAFETY #11 WITH PLASTIC GREEN HANDLE

## (undated) DEVICE — Device

## (undated) DEVICE — SUT PROLENE 2-0 36" SH

## (undated) DEVICE — CARDIOPLEGIA MANAGEMENT SET COLOR CODED CLAMPS

## (undated) DEVICE — ELCTR ECG CONDUCTIVE ADHESIVE

## (undated) DEVICE — SUT VICRYL 1 36" CTX UNDYED

## (undated) DEVICE — NDL ANGIOGRAPHIC ADVANCED 18G X 7CM THIN (SMOOTH)

## (undated) DEVICE — VENTING ADAPTER "Y" (RED/BLUE) 7.5"

## (undated) DEVICE — PACK OPEN HEART LNX

## (undated) DEVICE — DRSG CURITY GAUZE SPONGE 4 X 4" 12-PLY NON-STERILE

## (undated) DEVICE — SUT PROLENE 3-0 36" SH

## (undated) DEVICE — CONTAINER FORMALIN 10% 20ML

## (undated) DEVICE — LUBRICATING JELLY HR ONE SHOT 3G

## (undated) DEVICE — SUT PROLENE 3-0 36" SH-1

## (undated) DEVICE — DRSG MEPILEX 10 X 25CM (4 X 10") AG

## (undated) DEVICE — TONGUE DEPRESSOR

## (undated) DEVICE — DRAPE IOBAN 33" X 23"

## (undated) DEVICE — SUCTION YANKAUER BULBOUS TIP NO VENT

## (undated) DEVICE — SUT PROLENE 3-0 36" RB-1

## (undated) DEVICE — SUT BOOT STANDARD (ORIGINAL YELLOW) 5 PAIR

## (undated) DEVICE — SUT PROLENE 5-0 30" RB-2

## (undated) DEVICE — NDL COUNTER FOAM AND MAGNET 40-70

## (undated) DEVICE — DRSG DERMABOND 0.7ML

## (undated) DEVICE — DRSG 2X2

## (undated) DEVICE — CONNECTOR STRAIGHT 1/4 X 3/8"

## (undated) DEVICE — DRSG PREVENA PLUS SYSTEM

## (undated) DEVICE — VESSEL LOOP MAXI-BLUE 0.120" X 16"

## (undated) DEVICE — WARMING BLANKET LOWER ADULT

## (undated) DEVICE — TUBING SMOKE EVAC 3/8" X 10FT FOR NEPTUNE

## (undated) DEVICE — CONNECTOR STRAIGHT 1/2 X 1/2"

## (undated) DEVICE — TUBING BRAT 2 SUCTION ASSEMBLY TWIST LOCK

## (undated) DEVICE — SUT STAINLESS STEEL 7 4-18" CCS

## (undated) DEVICE — DRSG COBAN 2" LF STERILE

## (undated) DEVICE — DRSG TAPE UMBILICAL COTTON 2" X 30 X 1/8"

## (undated) DEVICE — BIOPSY FORCEP COLD DISP

## (undated) DEVICE — TOURNIQUET CUFF 18" DUAL PORT SINGLE BLADDER LUER LOCK (BLACK)

## (undated) DEVICE — MARKING PEN W RULER

## (undated) DEVICE — KIT APPLICATOR SPRAY FOR HARVEST SYSTEM

## (undated) DEVICE — SUT MONOCRYL 5-0 18" P-3 UNDYED

## (undated) DEVICE — CATH TRIOX OXIMETRY 8F 3 LUMENS

## (undated) DEVICE — PHRENIC NERVE PAD MEDIUM

## (undated) DEVICE — SUT PROLENE 4-0 36" RB-1

## (undated) DEVICE — POSITIONER FOAM EGG CRATE ULNAR 2PCS (PINK)

## (undated) DEVICE — DRSG STERISTRIPS 0.5 X 4"

## (undated) DEVICE — DENTURE CUP PINK

## (undated) DEVICE — GLV 8.5 PROTEXIS (WHITE)

## (undated) DEVICE — GLV 8 PROTEXIS (WHITE)

## (undated) DEVICE — PACK IV START WITH CHG

## (undated) DEVICE — GOWN LG

## (undated) DEVICE — BASIN EMESIS 10IN GRADUATED MAUVE

## (undated) DEVICE — CATH NG SALEM SUMP 16FR

## (undated) DEVICE — SNARE EXACTO COLD 9MMX230CM

## (undated) DEVICE — DRAIN RESERVOIR FOR JACKSON PRATT 100CC CARDINAL

## (undated) DEVICE — DRSG TEGADERM 4X4.75"

## (undated) DEVICE — SUMP INTRACARDIAC/PERICARDIAL 20FR 1/4" ADULT

## (undated) DEVICE — SUT PROLENE 6-0 30" RB-2

## (undated) DEVICE — SOL IRR POUR NS 0.9% 500ML

## (undated) DEVICE — SUT VICRYL 2-0 27" CT-1

## (undated) DEVICE — PREP SCRUB BRUSH W CHG 4%

## (undated) DEVICE — CATH IV SAFE BC 22G X 1" (BLUE)

## (undated) DEVICE — DRAPE PROBE COVER 5" X 96"

## (undated) DEVICE — ELCTR BOVIE TIP BLADE INSULATED 4" EDGE

## (undated) DEVICE — BIOPSY FORCEP RADIAL JAW 4 STANDARD WITH NEEDLE

## (undated) DEVICE — DRAPE TOWEL BLUE 17" X 24"

## (undated) DEVICE — FOLEY TRAY 16FR 5CC LF LUBRISIL ADVANCE TEMP CLOSED

## (undated) DEVICE — SUT PLEDGET SOFT MEDIUM 1/4" X 1/8" X 1/16" X6

## (undated) DEVICE — PACK HAND TRAY

## (undated) DEVICE — SUT PDS II 2-0 27" CT-1

## (undated) DEVICE — TUBING MEDI-VAC W MAXIGRIP CONNECTORS 1/4"X6'

## (undated) DEVICE — TOURNIQUET SET 12FR (1 RED, 1 BLUE, 3 CLEAR, 1 SNARE) 7"

## (undated) DEVICE — PACK PROC CV DRAPE

## (undated) DEVICE — DRSG TRACH DRAINAGE 4X4

## (undated) DEVICE — UNDERPAD LINEN SAVER 17 X 24"

## (undated) DEVICE — DRSG CURITY GAUZE SPONGE 4 X 4" 12-PLY

## (undated) DEVICE — SUT PDS II 0 27" CT-1

## (undated) DEVICE — ELCTR CAUTERY HI TEMP SHAFT EXT 2"

## (undated) DEVICE — SALIVA EJECTOR (BLUE)

## (undated) DEVICE — SUT NUROLON 1 18" OS-8 (POP-OFF)

## (undated) DEVICE — CLAMP BX HOT RAD JAW 3

## (undated) DEVICE — SUT SILK 2-0 18" SH (POP-OFF)

## (undated) DEVICE — CATH CV TRAY INSR ST UNIV

## (undated) DEVICE — CONTAINER FORMALIN 80ML YELLOW

## (undated) DEVICE — BITE BLOCK ADULT 20 X 27MM (GREEN)

## (undated) DEVICE — PREP CHLORAPREP HI-LITE ORANGE 26ML

## (undated) DEVICE — SOL ANTI FOG

## (undated) DEVICE — DRSG BANDAID 0.75X3"

## (undated) DEVICE — VENODYNE/SCD SLEEVE CALF MEDIUM

## (undated) DEVICE — SUT PROLENE 2-0 36" MH

## (undated) DEVICE — SUT ETHIBOND 3-0 36" RB-1

## (undated) DEVICE — INS ST DBL SAFEJAW FGRTY

## (undated) DEVICE — SUT PROLENE 4-0 36" SH

## (undated) DEVICE — GLV 7 PROTEXIS (WHITE)

## (undated) DEVICE — BLADE SCALPEL SAFETY #15 WITH PLASTIC GREEN HANDLE

## (undated) DEVICE — SUT MONOCRYL 4-0 27" PS-2 UNDYED

## (undated) DEVICE — TUBING SUCTION NONCONDUCTIVE 6MM X 12FT

## (undated) DEVICE — RIGID ADULT SUCKER

## (undated) DEVICE — SUCTION CATH AIRLIFE CONTROL VALVE TRIFLO 14FR

## (undated) DEVICE — ELCTR STRYKER NEPTUNE SMOKE EVACUATION PENCIL (GREEN)

## (undated) DEVICE — LINE BREATHE SAMPLNG

## (undated) DEVICE — TRAP QUICK CATCH  SINGL CHAMBER

## (undated) DEVICE — TUBING IV SET GRAVITY 3Y 100" MACRO

## (undated) DEVICE — SUT PLEDGET 9MM X 4MM X 1.5MM

## (undated) DEVICE — SUT PROLENE 5-0 36" RB-1